# Patient Record
Sex: MALE | Race: WHITE
[De-identification: names, ages, dates, MRNs, and addresses within clinical notes are randomized per-mention and may not be internally consistent; named-entity substitution may affect disease eponyms.]

---

## 2017-02-04 ENCOUNTER — HOSPITAL ENCOUNTER (OUTPATIENT)
Dept: HOSPITAL 75 - MLF | Age: 47
End: 2017-02-04
Attending: NURSE PRACTITIONER
Payer: MEDICAID

## 2017-02-04 DIAGNOSIS — K59.00: Primary | ICD-10-CM

## 2017-02-04 DIAGNOSIS — Z79.82: ICD-10-CM

## 2017-02-04 PROCEDURE — 82274 ASSAY TEST FOR BLOOD FECAL: CPT

## 2017-02-05 ENCOUNTER — HOSPITAL ENCOUNTER (OUTPATIENT)
Dept: HOSPITAL 75 - MLF | Age: 47
End: 2017-02-05
Attending: INTERNAL MEDICINE
Payer: MEDICAID

## 2017-02-05 DIAGNOSIS — L03.116: Primary | ICD-10-CM

## 2017-02-05 PROCEDURE — 87077 CULTURE AEROBIC IDENTIFY: CPT

## 2017-02-05 PROCEDURE — 87186 SC STD MICRODIL/AGAR DIL: CPT

## 2017-02-05 PROCEDURE — 87205 SMEAR GRAM STAIN: CPT

## 2017-02-05 PROCEDURE — 87070 CULTURE OTHR SPECIMN AEROBIC: CPT

## 2017-03-11 ENCOUNTER — HOSPITAL ENCOUNTER (OUTPATIENT)
Dept: HOSPITAL 75 - MLF | Age: 47
LOS: 89 days | Discharge: HOME | End: 2017-06-08
Attending: INTERNAL MEDICINE
Payer: MEDICAID

## 2017-03-11 DIAGNOSIS — R19.5: Primary | ICD-10-CM

## 2017-03-11 PROCEDURE — 82274 ASSAY TEST FOR BLOOD FECAL: CPT

## 2019-09-10 ENCOUNTER — HOSPITAL ENCOUNTER (EMERGENCY)
Dept: HOSPITAL 75 - ER | Age: 49
Discharge: OTHER INSTITUTION | End: 2019-09-10
Payer: MEDICAID

## 2019-09-10 VITALS — SYSTOLIC BLOOD PRESSURE: 191 MMHG | DIASTOLIC BLOOD PRESSURE: 89 MMHG

## 2019-09-10 VITALS — WEIGHT: 315 LBS | BODY MASS INDEX: 43.13 KG/M2 | HEIGHT: 71.65 IN

## 2019-09-10 DIAGNOSIS — F41.9: ICD-10-CM

## 2019-09-10 DIAGNOSIS — Z87.891: ICD-10-CM

## 2019-09-10 DIAGNOSIS — Z88.5: ICD-10-CM

## 2019-09-10 DIAGNOSIS — Z79.84: ICD-10-CM

## 2019-09-10 DIAGNOSIS — Z79.82: ICD-10-CM

## 2019-09-10 DIAGNOSIS — Z91.018: ICD-10-CM

## 2019-09-10 DIAGNOSIS — I10: ICD-10-CM

## 2019-09-10 DIAGNOSIS — E11.52: Primary | ICD-10-CM

## 2019-09-10 DIAGNOSIS — Z88.0: ICD-10-CM

## 2019-09-10 DIAGNOSIS — G47.30: ICD-10-CM

## 2019-09-10 LAB
ALBUMIN SERPL-MCNC: 3.2 GM/DL (ref 3.2–4.5)
ALP SERPL-CCNC: 118 U/L (ref 40–136)
ALT SERPL-CCNC: 15 U/L (ref 0–55)
APTT BLD: 27 SEC (ref 24–35)
BASOPHILS # BLD AUTO: 0 10^3/UL (ref 0–0.1)
BASOPHILS NFR BLD AUTO: 1 % (ref 0–10)
BILIRUB SERPL-MCNC: 0.2 MG/DL (ref 0.1–1)
BUN/CREAT SERPL: 17
CALCIUM SERPL-MCNC: 9.6 MG/DL (ref 8.5–10.1)
CHLORIDE SERPL-SCNC: 99 MMOL/L (ref 98–107)
CO2 SERPL-SCNC: 27 MMOL/L (ref 21–32)
CREAT SERPL-MCNC: 1.08 MG/DL (ref 0.6–1.3)
EOSINOPHIL # BLD AUTO: 0.3 10^3/UL (ref 0–0.3)
EOSINOPHIL NFR BLD AUTO: 4 % (ref 0–10)
ERYTHROCYTE [DISTWIDTH] IN BLOOD BY AUTOMATED COUNT: 15.1 % (ref 10–14.5)
ERYTHROCYTE [SEDIMENTATION RATE] IN BLOOD: 8 MM/HR (ref 0–15)
GFR SERPLBLD BASED ON 1.73 SQ M-ARVRAT: > 60 ML/MIN
GLUCOSE SERPL-MCNC: 345 MG/DL (ref 70–105)
HCT VFR BLD CALC: 43 % (ref 40–54)
HGB BLD-MCNC: 13.9 G/DL (ref 13.3–17.7)
INR PPP: 0.9 (ref 0.8–1.4)
LYMPHOCYTES # BLD AUTO: 1.2 X 10^3 (ref 1–4)
LYMPHOCYTES NFR BLD AUTO: 19 % (ref 12–44)
MANUAL DIFFERENTIAL PERFORMED BLD QL: NO
MCH RBC QN AUTO: 27 PG (ref 25–34)
MCHC RBC AUTO-ENTMCNC: 33 G/DL (ref 32–36)
MCV RBC AUTO: 82 FL (ref 80–99)
MONOCYTES # BLD AUTO: 0.5 X 10^3 (ref 0–1)
MONOCYTES NFR BLD AUTO: 8 % (ref 0–12)
NEUTROPHILS # BLD AUTO: 4.4 X 10^3 (ref 1.8–7.8)
NEUTROPHILS NFR BLD AUTO: 69 % (ref 42–75)
PLATELET # BLD: 362 10^3/UL (ref 130–400)
PMV BLD AUTO: 10.4 FL (ref 7.4–10.4)
POTASSIUM SERPL-SCNC: 3.8 MMOL/L (ref 3.6–5)
PROT SERPL-MCNC: 8.7 GM/DL (ref 6.4–8.2)
PROTHROMBIN TIME: 12.2 SEC (ref 12.2–14.7)
SODIUM SERPL-SCNC: 133 MMOL/L (ref 135–145)
WBC # BLD AUTO: 6.4 10^3/UL (ref 4.3–11)

## 2019-09-10 PROCEDURE — 85025 COMPLETE CBC W/AUTO DIFF WBC: CPT

## 2019-09-10 PROCEDURE — 93926 LOWER EXTREMITY STUDY: CPT

## 2019-09-10 PROCEDURE — 73630 X-RAY EXAM OF FOOT: CPT

## 2019-09-10 PROCEDURE — 85652 RBC SED RATE AUTOMATED: CPT

## 2019-09-10 PROCEDURE — 80053 COMPREHEN METABOLIC PANEL: CPT

## 2019-09-10 PROCEDURE — 83605 ASSAY OF LACTIC ACID: CPT

## 2019-09-10 PROCEDURE — 36415 COLL VENOUS BLD VENIPUNCTURE: CPT

## 2019-09-10 PROCEDURE — 96374 THER/PROPH/DIAG INJ IV PUSH: CPT

## 2019-09-10 PROCEDURE — 85730 THROMBOPLASTIN TIME PARTIAL: CPT

## 2019-09-10 PROCEDURE — 87040 BLOOD CULTURE FOR BACTERIA: CPT

## 2019-09-10 PROCEDURE — 85610 PROTHROMBIN TIME: CPT

## 2019-09-10 RX ADMIN — SODIUM CHLORIDE ONE MLS/HR: 900 INJECTION INTRAVENOUS at 14:51

## 2019-09-10 NOTE — DIAGNOSTIC IMAGING REPORT
INDICATION: Right toe pain.



TECHNIQUE: Grayscale, color-flow, and duplex Doppler evaluation

of the right lower extremity arterial system was performed.



FINDINGS: There is predominantly monophasic flow throughout the

right lower extremity arterial system with the exception of

triphasic flow in the right common femoral artery. The velocities

are fairly symmetric throughout right lower extremity apart from

some dampening at the posterior tibial artery at the ankle. No

high-grade stenosis or occlusion is seen.



IMPRESSION: Predominantly monophasic waveforms throughout the

right lower extremity arterial system. However, no high-grade

stenosis or occlusion is seen.



Dictated by: 



  Dictated on workstation # [id number]

## 2019-09-10 NOTE — DIAGNOSTIC IMAGING REPORT
INDICATION: Right toe pain.



FINDINGS: Three views of the right foot show gas within the soft

tissues surrounding the distal phalanx of the big toe. There is

no appreciable fracture or dislocation.



IMPRESSION: Soft tissue gas surrounding the distal phalanx of the

big toe concerning for possibility of a gangrene.



Dictated by: 



  Dictated on workstation # [id number]

## 2019-09-10 NOTE — CONSULTATION - SURGERY
History of Present Illness


History of Present Illness


Patient Consulted On(wellington/time)


9/10/19


 16:26


Time Seen by Provider:  15:26


History of Present Illness


Surgery asked to consult regarding Right 1st toe.





HPI per ED:  has been treating a wound on his R foot first toe, had wrapped toe 

too tight and cut off blood flow on saturday, took bandage off on  

morning, states whole end of toe is black 





This morbidly obese type II diabetic with chronic lymphedema of the left leg 

presents with a wound to the right great toe. This wound has been present for 

several weeks he felt as though he was finally getting it to start healing. On 

Saturday, 19 he wrapped it tightly with a bandage. He suspects that he 

wrapped too tightly because on  when he removed the bandage the toe was 

black. It has remained black since then. No fevers or chills.


Onset:  other (2 days ago)


Severity:  moderate


Pain/Injury Location:  right 1st toe





When I spoke to pt he states that he tripped on his carpet and it ripped toenail

off "down to the quick".  He states it was healing really well until he wrapped 

it to tight.  He denies pain.





Allergies and Home Medications


Allergies


Coded Allergies:  


     meperidine HCl (Unverified  Allergy, Unknown, 11)


     shellfish derived (Unverified  Allergy, Unknown, 13)


   


   FROM UNCODED ALLERGIES


     Penicillins (Unverified  Adverse Reaction, Intermediate, NAUSEA, 11)





Home Medications


Aspirin 325 Mg Tablet.dr, 325 MG PO DAILY, (Reported)


Atorvastatin Calcium 40 Mg Tablet, 40 MG PO DAILY, (Reported)


Diclofenac Sodium 75 Mg Tablet.dr, 75 MG PO BID, (Reported)


Glucosa Menard 2Kcl/Chondroitin Menard 1 Each Capsule, 1 CAP PO BID, (Reported)


Levofloxacin 500 Mg Tablet, 500 MG PO DAILY@11


   Prescribed by: ENRIQUE FRANK on 16 1005


Lisinopril/Hydrochlorothiazide 1 Each Tablet, 1 TAB PO DAILY, (Reported)


Metformin HCl 1,000 Mg Tablet, 1,000 MG PO BID, (Reported)


Metronidazole 500 Mg Tablet, 500 MG PO TID


   Prescribed by: ENRIQUE FRANK on 16 1005


Pioglitazone HCl 30 Mg Tablet, 30 MG PO DAILY, (Reported)


Tramadol HCl 50 Mg Tablet, 50 MG PO DAILY PRN for PAIN, (Reported)





Patient Home Medication List


Home Medication List Reviewed:  Yes





Past Medical-Social-Family Hx


Patient Social History


Alcohol Use:  Rarely Uses


Recreational Drug Use:  No


Former Smoker, Quit:  1999


Type Used:  Cigarettes


Recent Foreign Travel:  No


Contact w/Someone Who Travel:  No


Recent Infectious Disease Expo:  No


Recent Hopitalizations:  No





Immunizations Up To Date


Tetanus Booster (TDap):  Unknown


PED Vaccines UTD:  No


Date of Pneumonia Vaccine:  2010





Seasonal Allergies


Seasonal Allergies:  Yes





Surgeries


History of Surgeries:  Yes (Left toe amputation, hernia-??Umbilical)





Respiratory


History of Respiratory Disorde:  No


Respiratory Disorders:  Sleep Apnea





Cardiovascular


History of Cardiac Disorders:  Yes


Cardiac Disorders:  Hypertension





Neurological


History of Neurological Disord:  No





Reproductive System


Hx Reproductive Disorders:  No


Sexually Transmitted Disease:  No


HIV/AIDS:  No





Genitourinary


History of Genitourinary Disor:  No





Gastrointestinal


History of Gastrointestinal Di:  No





Musculoskeletal


History of Musculoskeletal Dis:  Yes (lymphedema left leg)


Musculoskeletal Disorders:  Arthritis





Endocrine


History of Endocrine Disorders:  Yes


Endocrine Disorders:  Diabetes, Non-Insulin dep





HEENT


History of HEENT Disorders:  No


Loss of Vision:  Denies


Hearing Impairment:  Denies





Cancer


History of Cancer:  No





Psychosocial


History of Psychiatric Problem:  Yes


Behavioral Health Disorders:  Anxiety





Integumentary


History of Skin or Integumenta:  Yes


Skin/Integumentary Disorders:  Recent Skin Changes





Blood Transfusions


History of Blood Disorders:  No





Family Medical History


Significant Family History:  Cancer (Father, unsure what type), Diabetes, 

Hypertension


Family Medial History:  


Arthritis


  19 FATHER, Onset:Unknown


Diabetes mellitus


  19 MOTHER, , Onset:Unknown





Review of Systems-General


Constitutional:  No chills, No diaphoresis, No weakness


EENTM:  No blurred vision, No mouth swelling, No epistaxis


Respiratory:  No cough, No dyspnea on exertion, No hemoptysis, No short of 

breath


Cardiovascular:  No chest pain, No palpitations


Gastrointestinal:  No abdominal pain, No jaundice, No melena, No nausea, No 

vomiting


Genitourinary:  No dysuria, No frequency, No hematuria


Musculoskeletal:  joint pain, muscle pain, muscle stiffness, other (lymphedema 

left leg)


Skin:  dryness, other (erythema right lower extremity)


Psychiatric/Neurological:  Denies Anxiety, Denies Depressed, Denies Seizure, 

Denies Tremors


Other


pt denies any history of abnormal bleeding or bruising





Physical Exam-General Problems


Physical Exam


Vital Signs





Vital Signs - First Documented








 9/10/19 9/10/19





 13:23 15:56


 


Temp 36.2 


 


Pulse 71 


 


Resp 18 


 


B/P (MAP) 200/97 


 


Pulse Ox  99


 


O2 Delivery Room Air 





Capillary Refill : Less Than 3 Seconds


General Appearance:  WD/WN, no apparent distress


Eyes:  Bilateral Eye PERRL, Bilateral Eye EOMI


HEENT:  pharynx normal; No scleral icterus (R), No scleral icterus (L)


Respiratory:  chest non-tender, lungs clear, normal breath sounds, no 

respiratory distress, no accessory muscle use


Cardiovascular:  regular rate, rhythm, no murmur


Gastrointestinal:  normal bowel sounds, soft, no organomegaly, no pulsatile mass


Extremities:  other (Pt has severe lymphedema in entire left leg, with chronic 

changes, ?charcot foot left.  Right great toe is black, dry gangrene.  + edema 

and erythema between knee and ankle right leg, with some on right foot)


Neurologic/Psychiatric:  CNs II-XII nml as tested, alert, oriented x 3


Skin:  normal color, warm/dry


Lymphatic:  no adenopathy (neck and axilla)





Data Review


Labs


Laboratory Tests


9/10/19 13:44: 


White Blood Count 6.4, Red Blood Count 5.19, Hemoglobin 13.9, Hematocrit 43, 

Mean Corpuscular Volume 82, Mean Corpuscular Hemoglobin 27, Mean Corpuscular 

Hemoglobin Concent 33, Red Cell Distribution Width 15.1H, Platelet Count 362, 

Mean Platelet Volume 10.4, Neutrophils (%) (Auto) 69, Lymphocytes (%) (Auto) 19,

Monocytes (%) (Auto) 8, Eosinophils (%) (Auto) 4, Basophils (%) (Auto) 1, 

Neutrophils # (Auto) 4.4, Lymphocytes # (Auto) 1.2, Monocytes # (Auto) 0.5, 

Eosinophils # (Auto) 0.3, Basophils # (Auto) 0.0, Erythrocyte Sedimentation Rate

8, Prothrombin Time 12.2, INR Comment 0.9, Activated Partial Thromboplast Time 

27, Sodium Level 133L, Potassium Level 3.8, Chloride Level 99, Carbon Dioxide 

Level 27, Anion Gap 7, Blood Urea Nitrogen 18, Creatinine 1.08, Estimat 

Glomerular Filtration Rate > 60, BUN/Creatinine Ratio 17, Glucose Level 345H, 

Lactic Acid Level 1.65, Calcium Level 9.6, Corrected Calcium 10.2H, Total 

Bilirubin 0.2, Aspartate Amino Transf (AST/SGOT) 13, Alanine Aminotransferase 

(ALT/SGPT) 15, Alkaline Phosphatase 118, Total Protein 8.7H, Albumin 3.2





Assessment/Plan


Dry Gangrene right first toe


Lymphedema Left leg





Plan is send home and bring pt back tomorrow for outpt Right 1st toe amputation.

 Discussed the procedure with pt, risks and complications not limited to pain, 

bleeding, infection, scar and possible need for further surgery.


All questions answered to pt's satisfaction.











BREANNA BLACK DO               Sep 10, 2019 16:31

## 2019-09-10 NOTE — ED LOWER EXTREMITY
General


Chief Complaint:  General Problems/Pain


Stated Complaint:  R TOE PAIN


Nursing Triage Note:  


has been treating a wound on his R foot first toe, had wrapped toe too tight and




cut off blood flow on saturday, took bandage off on  morning, states whole




end of toe is black and goo


Nursing Sepsis Screen:  No Definite Risk


Source:  patient


Exam Limitations:  no limitations





History of Present Illness


Date Seen by Provider:  Sep 10, 2019


Time Seen by Provider:  13:53


Initial Comments


This morbidly obese type II diabetic with chronic lymphedema of the left leg 

presents with a wound to the right great toe. This wound has been present for 

several weeks he felt as though he was finally getting it to start healing. On 

Saturday, 19 he wrapped it tightly with a bandage. He suspects that he 

wrapped too tightly because on  when he removed the bandage the toe was 

black. It has remained black since then. No fevers or chills.


Onset:  other (2 days ago)


Severity:  moderate


Pain/Injury Location:  right 1st toe





Allergies and Home Medications


Allergies


Coded Allergies:  


     meperidine HCl (Unverified  Allergy, Unknown, 11)


     shellfish derived (Unverified  Allergy, Unknown, 13)


   


   FROM UNCODED ALLERGIES


     Penicillins (Unverified  Adverse Reaction, Intermediate, NAUSEA, 11)





Home Medications


Aspirin 325 Mg Tablet.dr, 325 MG PO DAILY, (Reported)


Atorvastatin Calcium 40 Mg Tablet, 40 MG PO DAILY, (Reported)


Diclofenac Sodium 75 Mg Tablet.dr, 75 MG PO BID, (Reported)


Glucosa Menard 2Kcl/Chondroitin Menard 1 Each Capsule, 1 CAP PO BID, (Reported)


Levofloxacin 500 Mg Tablet, 500 MG PO DAILY@11


   Prescribed by: ENRIQUE FRANK on 16 1005


Lisinopril/Hydrochlorothiazide 1 Each Tablet, 1 TAB PO DAILY, (Reported)


Metformin HCl 1,000 Mg Tablet, 1,000 MG PO BID, (Reported)


Metronidazole 500 Mg Tablet, 500 MG PO TID


   Prescribed by: ENRIQUE FRANK on 16 1005


Pioglitazone HCl 30 Mg Tablet, 30 MG PO DAILY, (Reported)


Tramadol HCl 50 Mg Tablet, 50 MG PO DAILY PRN for PAIN, (Reported)





Patient Home Medication List


Home Medication List Reviewed:  Yes





Review of Systems


Constitutional:  see HPI


EENTM:  see HPI


Respiratory:  no symptoms reported


Cardiovascular:  no symptoms reported


Genitourinary:  no symptoms reported


Musculoskeletal:  see HPI


Skin:  no symptoms reported


Psychiatric/Neurological:  No Symptoms Reported





Past Medical-Social-Family Hx


Patient Social History


Alcohol Use:  Rarely Uses


Recreational Drug Use:  No


Type Used:  Cigarettes


Former Smoker, Quit:  1999


Recent Foreign Travel:  No


Contact w/Someone Who Travel:  No


Recent Infectious Disease Expo:  No


Recent Hopitalizations:  No





Immunizations Up To Date


Tetanus Booster (TDap):  Unknown


PED Vaccines UTD:  No


Date of Pneumonia Vaccine:  2010





Seasonal Allergies


Seasonal Allergies:  Yes





Past Medical History


Respiratory:  No


Sleep Apnea


Currently Using CPAP:  No (PT STATES "CAN'T")


Currently Using BIPAP:  No


Cardiac:  Yes


Hypertension


Neurological:  No


Reproductive Disorders:  No


Sexually Transmitted Disease:  No


HIV/AIDS:  No


Genitourinary:  No


Gastrointestinal:  No


Musculoskeletal:  No


Arthritis


Endocrine:  Yes


Diabetes, Non-Insulin dep


Are Your Blood Sugars Over 250:  Yes


HEENT:  No


Loss of Vision:  Denies


Hearing Impairment:  Denies


Cancer:  No


Psychosocial:  Yes


Anxiety


Integumentary:  Yes


Recent Skin Changes


Blood Disorders:  No





Family Medical History





Arthritis


  19 FATHER, Onset:Unknown


Diabetes mellitus


  19 MOTHER, , Onset:Unknown


Diabetes





Physical Exam


Vital Signs





Vital Signs - First Documented








 9/10/19





 13:23


 


Temp 36.2


 


Pulse 71


 


Resp 18


 


B/P (MAP) 200/97


 


O2 Delivery Room Air





Capillary Refill : Less Than 3 Seconds


Height, Weight, BMI


Height: 6'0.00"


Weight: 400lbs. 4.0oz. [id number]; 49.00 BMI


Method:Stated


General Appearance:  WD/WN, no apparent distress, obese, other (he is able to 

transfer himself from the wheelchair to the bed)


Neck:  non-tender, full range of motion


Respiratory:  no respiratory distress, no accessory muscle use


Gastrointestinal:  normal bowel sounds, non tender, soft


Hips:  bilateral hip non-tender, bilateral hip normal inspection


Legs:  bilateral leg non-tender, bilateral leg other (the left leg has markedly 

lymphedema with thickening and crusting of the skin. The right lower extremity 

has mild swelling, discoloration of the lower leg consistent with venous stasis 

dermatitis, the right great toe from the mid aspect of the proximal phalanx 

distally is black moist and necrotic.)


Knees:  bilateral knee non-tender, bilateral knee normal range of motion


Ankles:  bilateral ankle non-tender, bilateral ankle normal range of motion


Neurologic/Psychiatric:  alert, normal mood/affect, oriented x 3


Skin:  normal color, other (As above)





Progress/Results/Core Measures


Results/Orders


Lab Results





Laboratory Tests








Test


 9/10/19


13:44 Range/Units


 


 


White Blood Count


 6.4 


 4.3-11.0


10^3/uL


 


Red Blood Count


 5.19 


 4.35-5.85


10^6/uL


 


Hemoglobin 13.9  13.3-17.7  G/DL


 


Hematocrit 43  40-54  %


 


Mean Corpuscular Volume 82  80-99  FL


 


Mean Corpuscular Hemoglobin 27  25-34  PG


 


Mean Corpuscular Hemoglobin


Concent 33 


 32-36  G/DL





 


Red Cell Distribution Width 15.1 H 10.0-14.5  %


 


Platelet Count


 362 


 130-400


10^3/uL


 


Mean Platelet Volume 10.4  7.4-10.4  FL


 


Neutrophils (%) (Auto) 69  42-75  %


 


Lymphocytes (%) (Auto) 19  12-44  %


 


Monocytes (%) (Auto) 8  0-12  %


 


Eosinophils (%) (Auto) 4  0-10  %


 


Basophils (%) (Auto) 1  0-10  %


 


Neutrophils # (Auto) 4.4  1.8-7.8  X 10^3


 


Lymphocytes # (Auto) 1.2  1.0-4.0  X 10^3


 


Monocytes # (Auto) 0.5  0.0-1.0  X 10^3


 


Eosinophils # (Auto)


 0.3 


 0.0-0.3


10^3/uL


 


Basophils # (Auto)


 0.0 


 0.0-0.1


10^3/uL


 


Erythrocyte Sedimentation Rate 8  0-15  MM/HR


 


Prothrombin Time 12.2  12.2-14.7  SEC


 


INR Comment 0.9  0.8-1.4  


 


Activated Partial


Thromboplast Time 27 


 24-35  SEC





 


Sodium Level 133 L 135-145  MMOL/L


 


Potassium Level 3.8  3.6-5.0  MMOL/L


 


Chloride Level 99    MMOL/L


 


Carbon Dioxide Level 27  21-32  MMOL/L


 


Anion Gap 7  5-14  MMOL/L


 


Blood Urea Nitrogen 18  7-18  MG/DL


 


Creatinine


 1.08 


 0.60-1.30


MG/DL


 


Estimat Glomerular Filtration


Rate > 60 


  





 


BUN/Creatinine Ratio 17   


 


Glucose Level 345 H   MG/DL


 


Lactic Acid Level


 1.65 


 0.50-2.00


MMOL/L


 


Calcium Level 9.6  8.5-10.1  MG/DL


 


Corrected Calcium 10.2 H 8.5-10.1  MG/DL


 


Total Bilirubin 0.2  0.1-1.0  MG/DL


 


Aspartate Amino Transf


(AST/SGOT) 13 


 5-34  U/L





 


Alanine Aminotransferase


(ALT/SGPT) 15 


 0-55  U/L





 


Alkaline Phosphatase 118    U/L


 


Total Protein 8.7 H 6.4-8.2  GM/DL


 


Albumin 3.2  3.2-4.5  GM/DL








My Orders





Orders - LATOSHA AKERS


Cbc With Automated Diff (9/10/19 13:30)


Comprehensive Metabolic Panel (9/10/19 13:30)


Erythrocyte Sedimentation Rate (9/10/19 13:30)


Foot, Right, 3 View (9/10/19 13:30)


Us Right Low Ext [id number] (9/10/19 13:40)


Blood Culture (9/10/19 13:40)


Lactic Acid Analyzer (9/10/19 13:40)


Protime With Inr (9/10/19 13:46)


Partial Thromboplastin Time (9/10/19 13:46)


Ed Iv/Invasive Line Start (9/10/19 13:52)


Cefazolin  Injection (Ancef  Injection) (9/10/19 14:45)





Medications Given in ED





Current Medications








 Medications  Dose


 Ordered  Sig/Marvin


 Route  Start Time


 Stop Time Status Last Admin


Dose Admin


 


 Cefazolin Sodium


 1000 mg/Sterile


 Water  10 ml @ 


 200 mls/hr  ONCE  ONCE


 IV  9/10/19 14:45


 9/10/19 14:47 DC 9/10/19 14:51


200 MLS/HR








Vital Signs/I&O











 9/10/19





 13:23


 


Temp 36.2


 


Pulse 71


 


Resp 18


 


B/P (MAP) 200/97


 


O2 Delivery Room Air














Blood Pressure Mean:                    131











Diagnostic Imaging





   Diagonstic Imaging:  Ultrasound


Comments


NAME:   JANNA REYNOLDS


[facility] REC#:   [id number]


ACCOUNT#:   [id number]


PT STATUS:   REG ER


:   1970


PHYSICIAN:   LATOSHA AKERS


ADMIT DATE:   09/10/19/ER


                                   ***Draft***


Date of Exam:09/10/19





US RIGHT LOW EXT [id number]








INDICATION: Right toe pain.





TECHNIQUE: Grayscale, color-flow, and duplex Doppler evaluation


of the right lower extremity arterial system was performed.





FINDINGS: There is predominantly monophasic flow throughout the


right lower extremity arterial system with the exception of


triphasic flow in the right common femoral artery. The velocities


are fairly symmetric throughout right lower extremity apart from


some dampening at the posterior tibial artery at the ankle. No


high-grade stenosis or occlusion is seen.





IMPRESSION: Predominantly monophasic waveforms throughout the


right lower extremity arterial system. However, no high-grade


stenosis or occlusion is seen.





  Dictated on workstation # [id number]








Dict:   09/10/19 1431


Trans:   09/10/19 1442


[facility] [id number]





Interpreted by:     GHADA WATSON MD


Electronically signed by:  


NAME:   JANNA REYNOLDS


[facility] REC#:   [id number]


ACCOUNT#:   [id number]


PT STATUS:   REG ER


:   1970


PHYSICIAN:   LATOSHA AKERS


ADMIT DATE:   09/10/19/ER


                                   ***Draft***


Date of Exam:09/10/19





FOOT, RIGHT, 3 VIEW








INDICATION: Right toe pain.





FINDINGS: Three views of the right foot show gas within the soft


tissues surrounding the distal phalanx of the big toe. There is


no appreciable fracture or dislocation.





IMPRESSION: Soft tissue gas surrounding the distal phalanx of the


big toe concerning for possibility of a gangrene.





  Dictated on workstation # [id number]








Dict:   09/10/19 1445


Trans:   09/10/19 1503


 [id number]





Interpreted by:     SASHA SUMNER MD


Electronically signed by:





Departure


Communication (Admissions)


[id number]-discussed the case with Dr. Dunham who has seen the patient in the 

emergency room. Plan is to send home to return tomorrow for amputation of the 

great toe.





Impression





   Primary Impression:  


   Gangrene of toe of right foot


Disposition:   ADMITTED AS INPATIENT


Condition:  Stable





Admissions


Decision to Admit Reason:  Admit from ER (General)


Decision to Admit/Date:  Sep 10, 2019


Time/Decision to Admit Time:  14:01





Departure-Patient Inst.


Decision time for Depature:  15:43


Referrals:  


YAA LEE MD (PCP/Family)


Primary Care Physician





Add. Discharge Instructions:  


 Do not eat or drink anything after midnight tonight. Check in at the surgery 

center here at the hospital tomorrow at 11:45 AM.











LATOSHA AKERS             Sep 10, 2019 13:56

## 2020-09-28 ENCOUNTER — HOSPITAL ENCOUNTER (INPATIENT)
Dept: HOSPITAL 75 - ER | Age: 50
LOS: 4 days | Discharge: HOME | DRG: 565 | End: 2020-10-02
Attending: FAMILY MEDICINE | Admitting: FAMILY MEDICINE
Payer: MEDICAID

## 2020-09-28 VITALS — SYSTOLIC BLOOD PRESSURE: 151 MMHG | DIASTOLIC BLOOD PRESSURE: 65 MMHG

## 2020-09-28 VITALS — DIASTOLIC BLOOD PRESSURE: 72 MMHG | SYSTOLIC BLOOD PRESSURE: 128 MMHG

## 2020-09-28 VITALS — HEIGHT: 72 IN | BODY MASS INDEX: 42.66 KG/M2 | WEIGHT: 315 LBS

## 2020-09-28 VITALS — DIASTOLIC BLOOD PRESSURE: 80 MMHG | SYSTOLIC BLOOD PRESSURE: 160 MMHG

## 2020-09-28 VITALS — SYSTOLIC BLOOD PRESSURE: 155 MMHG | DIASTOLIC BLOOD PRESSURE: 83 MMHG

## 2020-09-28 VITALS — SYSTOLIC BLOOD PRESSURE: 170 MMHG | DIASTOLIC BLOOD PRESSURE: 86 MMHG

## 2020-09-28 VITALS — SYSTOLIC BLOOD PRESSURE: 121 MMHG | DIASTOLIC BLOOD PRESSURE: 77 MMHG

## 2020-09-28 DIAGNOSIS — G47.30: ICD-10-CM

## 2020-09-28 DIAGNOSIS — Z79.01: ICD-10-CM

## 2020-09-28 DIAGNOSIS — M19.91: ICD-10-CM

## 2020-09-28 DIAGNOSIS — R53.81: ICD-10-CM

## 2020-09-28 DIAGNOSIS — J30.2: ICD-10-CM

## 2020-09-28 DIAGNOSIS — Z87.891: ICD-10-CM

## 2020-09-28 DIAGNOSIS — R26.9: ICD-10-CM

## 2020-09-28 DIAGNOSIS — F41.9: ICD-10-CM

## 2020-09-28 DIAGNOSIS — Z91.120: ICD-10-CM

## 2020-09-28 DIAGNOSIS — T87.81: ICD-10-CM

## 2020-09-28 DIAGNOSIS — I10: ICD-10-CM

## 2020-09-28 DIAGNOSIS — E87.1: ICD-10-CM

## 2020-09-28 DIAGNOSIS — E11.40: ICD-10-CM

## 2020-09-28 DIAGNOSIS — E66.9: ICD-10-CM

## 2020-09-28 DIAGNOSIS — T87.44: Primary | ICD-10-CM

## 2020-09-28 DIAGNOSIS — I89.0: ICD-10-CM

## 2020-09-28 DIAGNOSIS — E11.52: ICD-10-CM

## 2020-09-28 DIAGNOSIS — Z89.612: ICD-10-CM

## 2020-09-28 DIAGNOSIS — D64.9: ICD-10-CM

## 2020-09-28 DIAGNOSIS — Z89.421: ICD-10-CM

## 2020-09-28 DIAGNOSIS — T87.54: ICD-10-CM

## 2020-09-28 DIAGNOSIS — Z79.4: ICD-10-CM

## 2020-09-28 DIAGNOSIS — E11.29: ICD-10-CM

## 2020-09-28 LAB
ALBUMIN SERPL-MCNC: 2.8 GM/DL (ref 3.2–4.5)
ALP SERPL-CCNC: 115 U/L (ref 40–136)
ALT SERPL-CCNC: 13 U/L (ref 0–55)
APTT BLD: 30 SEC (ref 24–35)
APTT PPP: YELLOW S
BACTERIA #/AREA URNS HPF: NEGATIVE /HPF
BASOPHILS # BLD AUTO: 0.1 10^3/UL (ref 0–0.1)
BASOPHILS NFR BLD AUTO: 1 % (ref 0–10)
BILIRUB SERPL-MCNC: 0.1 MG/DL (ref 0.1–1)
BILIRUB UR QL STRIP: NEGATIVE
BUN/CREAT SERPL: 18
CALCIUM SERPL-MCNC: 8.5 MG/DL (ref 8.5–10.1)
CHLORIDE SERPL-SCNC: 97 MMOL/L (ref 98–107)
CO2 SERPL-SCNC: 26 MMOL/L (ref 21–32)
CREAT SERPL-MCNC: 1.06 MG/DL (ref 0.6–1.3)
EOSINOPHIL # BLD AUTO: 0.7 10^3/UL (ref 0–0.3)
EOSINOPHIL NFR BLD AUTO: 9 % (ref 0–10)
FIBRINOGEN PPP-MCNC: CLEAR MG/DL
GFR SERPLBLD BASED ON 1.73 SQ M-ARVRAT: > 60 ML/MIN
GLUCOSE SERPL-MCNC: 458 MG/DL (ref 70–105)
GLUCOSE UR STRIP-MCNC: (no result) MG/DL
HCT VFR BLD CALC: 30 % (ref 40–54)
HGB BLD-MCNC: 9.3 G/DL (ref 13.3–17.7)
INR PPP: 0.9 (ref 0.8–1.4)
KETONES UR QL STRIP: NEGATIVE
LEUKOCYTE ESTERASE UR QL STRIP: NEGATIVE
LYMPHOCYTES # BLD AUTO: 1.1 10^3/UL (ref 1–4)
LYMPHOCYTES NFR BLD AUTO: 15 % (ref 12–44)
MANUAL DIFFERENTIAL PERFORMED BLD QL: NO
MCH RBC QN AUTO: 27 PG (ref 25–34)
MCHC RBC AUTO-ENTMCNC: 31 G/DL (ref 32–36)
MCV RBC AUTO: 88 FL (ref 80–99)
MONOCYTES # BLD AUTO: 0.6 10^3/UL (ref 0–1)
MONOCYTES NFR BLD AUTO: 8 % (ref 0–12)
NEUTROPHILS # BLD AUTO: 5.2 10^3/UL (ref 1.8–7.8)
NEUTROPHILS NFR BLD AUTO: 68 % (ref 42–75)
NITRITE UR QL STRIP: NEGATIVE
PH UR STRIP: 7.5 [PH] (ref 5–9)
PLATELET # BLD: 533 10^3/UL (ref 130–400)
PMV BLD AUTO: 9.8 FL (ref 9–12.2)
POTASSIUM SERPL-SCNC: 4.1 MMOL/L (ref 3.6–5)
PROT SERPL-MCNC: 7.9 GM/DL (ref 6.4–8.2)
PROT UR QL STRIP: (no result)
PROTHROMBIN TIME: 12.3 SEC (ref 12.2–14.7)
RBC #/AREA URNS HPF: (no result) /HPF
SODIUM SERPL-SCNC: 133 MMOL/L (ref 135–145)
SP GR UR STRIP: 1.01 (ref 1.02–1.02)
SQUAMOUS #/AREA URNS HPF: (no result) /HPF
WBC # BLD AUTO: 7.7 10^3/UL (ref 4.3–11)
WBC #/AREA URNS HPF: (no result) /HPF

## 2020-09-28 PROCEDURE — 87205 SMEAR GRAM STAIN: CPT

## 2020-09-28 PROCEDURE — 81000 URINALYSIS NONAUTO W/SCOPE: CPT

## 2020-09-28 PROCEDURE — 82962 GLUCOSE BLOOD TEST: CPT

## 2020-09-28 PROCEDURE — 83540 ASSAY OF IRON: CPT

## 2020-09-28 PROCEDURE — 83036 HEMOGLOBIN GLYCOSYLATED A1C: CPT

## 2020-09-28 PROCEDURE — 80048 BASIC METABOLIC PNL TOTAL CA: CPT

## 2020-09-28 PROCEDURE — 36415 COLL VENOUS BLD VENIPUNCTURE: CPT

## 2020-09-28 PROCEDURE — 80202 ASSAY OF VANCOMYCIN: CPT

## 2020-09-28 PROCEDURE — 87077 CULTURE AEROBIC IDENTIFY: CPT

## 2020-09-28 PROCEDURE — 96360 HYDRATION IV INFUSION INIT: CPT

## 2020-09-28 PROCEDURE — 87186 SC STD MICRODIL/AGAR DIL: CPT

## 2020-09-28 PROCEDURE — 82728 ASSAY OF FERRITIN: CPT

## 2020-09-28 PROCEDURE — 87088 URINE BACTERIA CULTURE: CPT

## 2020-09-28 PROCEDURE — 87040 BLOOD CULTURE FOR BACTERIA: CPT

## 2020-09-28 PROCEDURE — 80053 COMPREHEN METABOLIC PANEL: CPT

## 2020-09-28 PROCEDURE — 96372 THER/PROPH/DIAG INJ SC/IM: CPT

## 2020-09-28 PROCEDURE — 85610 PROTHROMBIN TIME: CPT

## 2020-09-28 PROCEDURE — 85730 THROMBOPLASTIN TIME PARTIAL: CPT

## 2020-09-28 PROCEDURE — 87070 CULTURE OTHR SPECIMN AEROBIC: CPT

## 2020-09-28 PROCEDURE — 71045 X-RAY EXAM CHEST 1 VIEW: CPT

## 2020-09-28 PROCEDURE — 83605 ASSAY OF LACTIC ACID: CPT

## 2020-09-28 PROCEDURE — 85025 COMPLETE CBC W/AUTO DIFF WBC: CPT

## 2020-09-28 RX ADMIN — SODIUM CHLORIDE SCH MLS/HR: 900 INJECTION, SOLUTION INTRAVENOUS at 15:26

## 2020-09-28 RX ADMIN — INSULIN ASPART SCH UNIT: 100 INJECTION, SOLUTION INTRAVENOUS; SUBCUTANEOUS at 21:33

## 2020-09-28 RX ADMIN — INSULIN ASPART SCH UNIT: 100 INJECTION, SOLUTION INTRAVENOUS; SUBCUTANEOUS at 15:40

## 2020-09-28 RX ADMIN — VANCOMYCIN HYDROCHLORIDE SCH MLS/HR: 500 INJECTION, POWDER, LYOPHILIZED, FOR SOLUTION INTRAVENOUS at 15:26

## 2020-09-28 RX ADMIN — AMLODIPINE BESYLATE NR MG: 5 TABLET ORAL at 19:54

## 2020-09-28 RX ADMIN — SODIUM CHLORIDE SCH MLS/HR: 900 INJECTION INTRAVENOUS at 15:27

## 2020-09-28 RX ADMIN — HYDROCODONE BITARTRATE AND ACETAMINOPHEN PRN TAB: 5; 325 TABLET ORAL at 15:27

## 2020-09-28 RX ADMIN — SODIUM CHLORIDE SCH MLS/HR: 900 INJECTION INTRAVENOUS at 21:23

## 2020-09-28 RX ADMIN — ENOXAPARIN SODIUM SCH MG: 100 INJECTION SUBCUTANEOUS at 15:27

## 2020-09-28 RX ADMIN — MICONAZOLE NITRATE SCH GM: 20 POWDER TOPICAL at 21:23

## 2020-09-28 RX ADMIN — AMLODIPINE BESYLATE NR MG: 5 TABLET ORAL at 19:53

## 2020-09-28 NOTE — HISTORY & PHYSICAL
SHARAN MAIER MED STUDENT 20 1502:


HPI


History of Present Illness:


CC: postoperative wound infection 





HPI: 50 yp M presents with postoperative wound infection S/P L AKA on 20. 

pt states that the green and purulent drainage started on 20. pt 

stated that home health encouraged him to be seen at the ED for this drainage. 

pt rates his pain at 3 out of 10. pain is described as achy and dull. pain is 

better with pain meds. pain is worse when he accidentally hits the amputation 

site. pt stated that he was supposed to be started on a wound vac last week, but

he is currently waiting for the supplies to come in. pt left leg is swollen and 

erythematous. there are multiple areas in between the sutures that are green.


Source:  patient


Date seen by provider:  Sep 28, 2020


Time Seen by Provider:  15:02


Attending Physician


Jie Richardson MD


PCP


Noe Roque MD


Consult





Date of Admission


Sep 28, 2020 at 13:49





Home Medications


Home Medications


Reviewed patient Home Medication Reconciliation performed by pharmacy medication

reconciliations technician and/or nursing.


Patients Allergies have been reviewed.





Allergies


Coded Allergies:  


     meperidine HCl (Unverified  Allergy, Unknown, 11)


     shellfish derived (Unverified  Allergy, Unknown, 13)


   


   FROM UNCODED ALLERGIES


     Penicillins (Unverified  Adverse Reaction, Intermediate, NAUSEA, 11)





PMH-Social-Family Hx


Patient Social History


Marrital Status:  cohabiting (lives with and takes care of father )


Former smoker/When Quit:  2003


Type Used:  Cigarettes


2nd Hand Smoke Exposure:  No


Recent Foreign Travel:  No


Contact w/other who traveled:  No


Recent Hopitalizations:  No


Recent Infectious Disease Expo:  No





Immunizations Up To Date


Tetanus Booster (TDap):  Unknown


Date of Pneumonia Vaccine:  2010





Past Medical History





PMHx:


Diabetes mellitus


Hypertension


Lymphedema





PSurgHx:


Right second toe removal due to gangrene after injury


Umbilical hernia repair





Family Medical History


Significant Family History:  Cancer, Diabetes, Hypertension


Family History:  


Arthritis


  19 FATHER, Onset:Unknown


Diabetes mellitus


  19 MOTHER, , Onset:Unknown





Review of Systems (CHC)


Constitutional:  No chills, No diaphoresis, No fever; malaise; No weakness


Respiratory:  No cough, No short of breath


Cardiovascular:  No chest pain, No palpitations


Gastrointestinal:  No abdominal pain, No constipation, No diarrhea, No nausea, 

No vomiting


Genitourinary:  No dysuria, No incontinence


Musculoskeletal:  back pain, other (L thigh pain)





Physical Exam-([facility])


Physical Exam


Vital Signs





                          VS - Last 72 Hours, by Label








 20





 11:30 13:44 14:30 14:41


 


Temp 35.9 35.9  36.2


 


Pulse 85 75 83 74


 


Resp 16 18 16 20


 


B/P (MAP) 194/88 (123) 155/83 (107) 155/83 128/72 (90)


 


Pulse Ox  97 96 98


 


O2 Delivery  Room Air Room Air Room Air





Capillary Refill : Less Than 3 Seconds


General Appearance:  WD/WN, no apparent distress


HEENT:  PERRL/EOMI, normal ENT inspection; No scleral icterus (R), No scleral 

icterus (L)


Neck:  supple; No lymphadenopathy (R), No lymphadenopathy (L)


Respiratory:  chest non-tender, lungs clear, normal breath sounds, no 

respiratory distress, no accessory muscle use


Cardiovascular:  normal peripheral pulses, regular rate, rhythm, no murmur


Peripheral Pulses:  2+ Carotid (R), 2+ Carotid (L), 2+ Dorsalis Pedis (R); 0 

Left Dors-Pedis (L); 2+ Radial Pulses (R), 2+ Radial Pulses (L)


Gastrointestinal:  normal bowel sounds, non tender, soft


Extremities:  inflammation, pedal edema


Neurologic/Psychiatric:  CNs II-XII nml as tested, alert, normal mood/affect, 

oriented x 3


Skin:  normal color, warm/dry; No diaphoresis


Lymphatic:  no adenopathy





Assessment/Plan


Assessment/Plan


Admission Status:  Observation


Assessment & Plan


1 L leg wound infection 


   start vancomycin and cefepime 


   surgery consulted and managing


   NPO after midnight 


   pain management with lortab 


2 IDDM 


   order Hemoglobin A1C 


   start insulin per sliding scale 


3 normocytic anemia 


   pt is not actively bleeding, will continue to monitor 


4 hyponatremia    


5 chronic lymphedema 


6 hypertension 


   continue home dose of amlodipine 


7 dvt prophylaxis 


   lovenox injections





JIE RICHARDSON MD 20 1704:


HPI


History of Present Illness:


Agree with above and verified history. Patient was sent in by his  nurse due 

to increasing drainage and pain in his surgical wound.


Source:  patient


Exam Limitations:  no limitations





Home Medications


Allergies


Coded Allergies:  


     meperidine HCl (Unverified  Allergy, Unknown, 11)


     shellfish derived (Unverified  Allergy, Unknown, 13)


   


   FROM UNCODED ALLERGIES


     Penicillins (Unverified  Adverse Reaction, Intermediate, NAUSEA, 11)





PMH-Social-Family Hx


Past Medical History





IDDM w/Neuropathy and arterial compromise


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


HTN


Obesity


Debility





Family Medical History


Family History:  


Arthritis


  19 FATHER, Onset:Unknown


Diabetes mellitus


  19 MOTHER, , Onset:Unknown





Review of Systems (CHC)


Constitutional:  No fever; malaise


EENTM:  no symptoms reported; No mouth pain, No nose congestion, No nose pain, 

No throat pain


Respiratory:  no symptoms reported; No cough, No short of breath


Cardiovascular:  no symptoms reported; No chest pain, No palpitations


Gastrointestinal:  no symptoms reported; No constipation, No diarrhea, No loss 

of appetite, No nausea, No vomiting


Genitourinary:  no symptoms reported; No dysuria, No incontinence


Musculoskeletal:  back pain (chronic)


Skin:  other (Open surgical wound)


Psychiatric/Neurological:  Numbness, Paresthesia, Weakness





Reviewed Test Results


Reviewed Test Results


Lab





Laboratory Tests








Test


 20


11:55 20


12:50 20


13:04 20


13:35 Range/Units


 


 


White Blood Count


 7.7 


 


 


 


 4.3-11.0


10^3/uL


 


Red Blood Count


 3.43 L


 


 


 


 4.30-5.52


10^6/uL


 


Hemoglobin 9.3 L    13.3-17.7  g/dL


 


Hematocrit 30 L    40-54  %


 


Mean Corpuscular Volume 88     80-99  fL


 


Mean Corpuscular Hemoglobin 27     25-34  pg


 


Mean Corpuscular Hemoglobin


Concent 31 L


 


 


 


 32-36  g/dL





 


Red Cell Distribution Width 14.7 H    10.0-14.5  %


 


Platelet Count


 533 H


 


 


 


 130-400


10^3/uL


 


Mean Platelet Volume 9.8     9.0-12.2  fL


 


Immature Granulocyte % (Auto) 1      %


 


Neutrophils (%) (Auto) 68     42-75  %


 


Lymphocytes (%) (Auto) 15     12-44  %


 


Monocytes (%) (Auto) 8     0-12  %


 


Eosinophils (%) (Auto) 9     0-10  %


 


Basophils (%) (Auto) 1     0-10  %


 


Neutrophils # (Auto)


 5.2 


 


 


 


 1.8-7.8


10^3/uL


 


Lymphocytes # (Auto)


 1.1 


 


 


 


 1.0-4.0


10^3/uL


 


Monocytes # (Auto)


 0.6 


 


 


 


 0.0-1.0


10^3/uL


 


Eosinophils # (Auto)


 0.7 H


 


 


 


 0.0-0.3


10^3/uL


 


Basophils # (Auto)


 0.1 


 


 


 


 0.0-0.1


10^3/uL


 


Immature Granulocyte # (Auto)


 0.1 


 


 


 


 0.0-0.1


10^3/uL


 


Sodium Level 133 L    135-145  MMOL/L


 


Potassium Level 4.1     3.6-5.0  MMOL/L


 


Chloride Level 97 L      MMOL/L


 


Carbon Dioxide Level 26     21-32  MMOL/L


 


Anion Gap 10     5-14  MMOL/L


 


Blood Urea Nitrogen 19 H    7-18  MG/DL


 


Creatinine


 1.06 


 


 


 


 0.60-1.30


MG/DL


 


Estimat Glomerular Filtration


Rate > 60 


 


 


 


  





 


BUN/Creatinine Ratio 18      


 


Glucose Level 458 *H      MG/DL


 


Lactic Acid Level


 2.93 *H


 


 


 


 0.50-2.00


MMOL/L


 


Calcium Level 8.5     8.5-10.1  MG/DL


 


Corrected Calcium 9.5     8.5-10.1  MG/DL


 


Total Bilirubin 0.1     0.1-1.0  MG/DL


 


Aspartate Amino Transf


(AST/SGOT) 11 


 


 


 


 5-34  U/L





 


Alanine Aminotransferase


(ALT/SGPT) 13 


 


 


 


 0-55  U/L





 


Alkaline Phosphatase 115       U/L


 


Total Protein 7.9     6.4-8.2  GM/DL


 


Albumin 2.8 L    3.2-4.5  GM/DL


 


Urine Color  YELLOW     


 


Urine Clarity  CLEAR     


 


Urine pH  7.5    5-9  


 


Urine Specific Gravity  1.015 L   1.016-1.022  


 


Urine Protein  2+ H   NEGATIVE  


 


Urine Glucose (UA)  3+ H   NEGATIVE  


 


Urine Ketones  NEGATIVE    NEGATIVE  


 


Urine Nitrite  NEGATIVE    NEGATIVE  


 


Urine Bilirubin  NEGATIVE    NEGATIVE  


 


Urine Urobilinogen  0.2    < = 1.0  MG/DL


 


Urine Leukocyte Esterase  NEGATIVE    NEGATIVE  


 


Urine RBC (Auto)  TRACE-I    NEGATIVE  


 


Urine RBC  2-5 H    /HPF


 


Urine WBC  2-5     /HPF


 


Urine Squamous Epithelial


Cells 


 0-2 


 


 


  /HPF





 


Urine Crystals  NONE     /LPF


 


Urine Bacteria  NEGATIVE     /HPF


 


Urine Casts  NONE     /LPF


 


Urine Mucus  NEGATIVE     /LPF


 


Urine Culture Indicated  NO     


 


Prothrombin Time   12.3   12.2-14.7  SEC


 


INR Comment   0.9   0.8-1.4  


 


Activated Partial


Thromboplast Time 


 


 30 


 


 24-35  SEC





 


Glucometer    447 *H   MG/DL


 


Test


 20


14:17 20


15:36 


 


 Range/Units


 


 


Lactic Acid Level


 1.69 


 


 


 


 0.50-2.00


MMOL/L


 


Glucometer  363 H     MG/DL











Physical Exam-([facility])


Physical Exam


General Appearance:  WD/WN, no apparent distress, obese


HEENT:  PERRL/EOMI


Neck:  non-tender, full range of motion, supple


Respiratory:  chest non-tender, lungs clear, normal breath sounds, no 

respiratory distress, no accessory muscle use


Cardiovascular:  regular rate, rhythm, no murmur


Gastrointestinal:  normal bowel sounds, non tender, soft


Genital/Rectal:  other (scrotum with erythema and mild swelling, ttp)


Extremities:  other (Left AKA, Large open wound with purulent drainage, 2+ 

pitting edema in LLE)


Neurologic/Psychiatric:  CNs II-XII nml as tested, alert, normal mood/affect, 

oriented x 3


Skin:  normal color, warm/dry


Lymphatic:  no adenopathy





Assessment/Plan


Assessment/Plan


Admission Status:  Inpatient Order (span 2 midnights)


Reason for Inpatient Admission:  


Patient is going to require surgical intervention and IV antibiotics


Assessment & Plan


See Problem list for Dr Richardson's plan, patient seen and examined with Sharan Maier, MS3





(1) Postoperative wound infection


Status:  Acute


Assessment & Plan:  - Dr Pike consulted and managing wound, Started on 

Vancomycin and Cefepime D1





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


Status:  Chronic


Assessment & Plan:  - Restart home insulin with SSI, Accucheck AC/HS, A1c 

pending





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


Status:  Chronic


Assessment & Plan:  - Cr at baseline, will monitor closely due to Vancomycin





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


Status:  Acute


(5) Insulin dependent diabetes mellitus with complications


Status:  Chronic


(6) HTN (hypertension)


Status:  Chronic


Assessment & Plan:  - Restart home meds


Qualifiers:  


   Qualified Codes:  I10 - Essential (primary) hypertension


(7) S/P AKA (above knee amputation)


Status:  Acute





Supervisory-Addendum Brief


Supervisory Addendum


Verification and Attestation of Medical Student E/M Service





A medical student performed and documented this service in my presence. I 

reviewed and verified all information documented by the medical student and made

modifications to such information, when appropriate. I personally performed the 

physical exam and medical decision making. 





 Jie Richardson, Sep 28, 2020,17:07











SHARAN MAIER STUDENT        Sep 28, 2020 15:02


JIE RICHARDSON MD               Sep 28, 2020 17:04

## 2020-09-28 NOTE — CONSULTATION - SURGERY
PASTOR WILKINS,MED STUDENT 20 1838:


History of Present Illness


History of Present Illness


Patient Consulted On(wellington/time)


20


 18:33


Date Seen by Provider:  Sep 28, 2020


Time Seen by Provider:  06:25


History of Present Illness


Yordan Pink is a 50 year old male who presented to the ER today complaining 

of a possible wound infection. He had a left AKA on 20 due to chronic lym

phedema. He states the wound has been draining since then but 2 days ago the 

drainage got thicker and started having a funny odor. He states that it has also

opened up more and he is having more constant pain, and rates it as 3/10. His 

pain is better with pain medication and worse with touching it. Denies fever or 

chills. On exam there is a large amount of greenish pus, and some of the sutures

appear to have opened up. He is very lethargic during exam and states that it is

because of his sleep apnea.





Allergies and Home Medications


Allergies


Coded Allergies:  


     meperidine HCl (Unverified  Allergy, Unknown, 11)


     shellfish derived (Unverified  Allergy, Unknown, 13)


   


   FROM UNCODED ALLERGIES


     Penicillins (Unverified  Adverse Reaction, Intermediate, NAUSEA, 11)





Home Medications


Amlodipine Besylate 5 Mg Tablet, 5 MG PO DAILY


   Prescribed by: JACK OLIVARES on 20


Aspirin 325 Mg Tablet., 325 MG PO DAILY, (Reported)


Hydrocodone/Acetaminophen 1 Each Tablet, 1-2 TAB PO Q4H PRN for PAIN-MODERATE 

(5-7)


   Prescribed by: JACK OLIVARES on 20


Pantoprazole Sodium 40 Mg Tablet., 40 MG PO DAILY


   Prescribed by: JACK OLIVARES on 20 09





Past Medical-Social-Family Hx


Patient Social History


Alcohol Use:  Past History


Number of Drinks Today:  AA


Recreational Drug Use:  No


Smoking Status:  Former Smoker


Former Smoker, Quit:  1999


Type Used:  Cigarettes


2nd Hand Smoke Exposure:  No


Recent Foreign Travel:  No


Contact w/Someone Who Travel:  No


Recent Infectious Disease Expo:  No


Recent Hopitalizations:  No


Physical Abuse Screen:  No


Sexual Abuse:  No





Immunizations Up To Date


Tetanus Booster (TDap):  Unknown


PED Vaccines UTD:  No


Date of Pneumonia Vaccine:  2010





Seasonal Allergies


Seasonal Allergies:  Yes





Surgeries


History of Surgeries:  Yes


Surgeries:  Abdominal (umbilical hernia repair), Amputation (right 2nd toe), 

Orthopedic (left AKA)





Respiratory


History of Respiratory Disorde:  Yes


Respiratory Disorders:  Sleep Apnea





Cardiovascular


History of Cardiac Disorders:  Yes


Cardiac Disorders:  Hypertension





Neurological


History of Neurological Disord:  Yes (SEVERAL CONCUSSIONS PER PATIENT)


Neurological Disorders:  Concussion





Reproductive System


Hx Reproductive Disorders:  No


Sexually Transmitted Disease:  No


HIV/AIDS:  No





Genitourinary


History of Genitourinary Disor:  No





Gastrointestinal


History of Gastrointestinal Di:  No


Gastrointestinal Disorders:  Abdominal Hernia





Musculoskeletal


History of Musculoskeletal Dis:  Yes (lymphedema left leg)


Musculoskeletal Disorders:  Amputee, Arthritis





Endocrine


History of Endocrine Disorders:  Yes


Endocrine Disorders:  Diabetes, Non-Insulin dep





HEENT


History of HEENT Disorders:  No


Loss of Vision:  Denies


Hearing Impairment:  Denies





Cancer


History of Cancer:  No





Psychosocial


History of Psychiatric Problem:  Yes


Behavioral Health Disorders:  Anxiety





Integumentary


History of Skin or Integumenta:  Yes


Skin/Integumentary Disorders:  Recent Skin Changes





Blood Transfusions


History of Blood Disorders:  No





Family Medical History


Significant Family History:  Cancer (father, colon), Diabetes (mother), 

Hypertension


Family Medial History:  


Arthritis


  19 FATHER, Onset:Unknown


Diabetes mellitus


  19 MOTHER, , Onset:Unknown





Review of Systems-General


Constitutional:  No chills, No fever; malaise


EENTM:  throat pain; No double vision, No nose congestion


Respiratory:  No cough, No short of breath


Cardiovascular:  No chest pain; edema; No palpitations


Gastrointestinal:  No abdominal pain, No constipation, No diarrhea, No melena, 

No nausea, No vomiting


Genitourinary:  No dysuria, No frequency, No hematuria


Musculoskeletal:  other (left leg pain)


Skin:  rash (RLE ), other (LLE wound infection)


Psychiatric/Neurological:  Denies Headache





Physical Exam-General Problems


Physical Exam


Vital Signs





Vital Signs - First Documented








 20





 11:30 13:44


 


Temp 35.9 


 


Pulse 85 


 


Resp 16 


 


B/P (MAP) 194/88 (123) 


 


Pulse Ox  97


 


O2 Delivery  Room Air





Capillary Refill : Less Than 3 Seconds


General Appearance:  WD/WN, other (lethargic, falls asleep mid-conversation)


HEENT:  PERRL/EOMI, pharynx normal


Respiratory:  lungs clear, no respiratory distress, no accessory muscle use


Cardiovascular:  regular rate, rhythm, no murmur


Peripheral Pulses:  2+ Dorsalis Pedis (R), 2+ Radial Pulses (R), 2+ Radial 

Pulses (L)


Gastrointestinal:  normal bowel sounds, non tender, soft


Extremities:  pedal edema (RLE), swelling (RLE, lymphedema), other (left AKA, 

incisions opening, draining greenish disharge)


Neurologic/Psychiatric:  alert, other (lethargic)


Skin:  warm/dry, rash (RLE, open areas to medial ankle and dorsal foot)





Data Review


Labs


Laboratory Tests


20 11:55: 


White Blood Count 7.7, Red Blood Count 3.43L, Hemoglobin 9.3L, Hematocrit 30L, 

Mean Corpuscular Volume 88, Mean Corpuscular Hemoglobin 27, Mean Corpuscular 

Hemoglobin Concent 31L, Red Cell Distribution Width 14.7H, Platelet Count 533H, 

Mean Platelet Volume 9.8, Immature Granulocyte % (Auto) 1, Neutrophils (%) 

(Auto) 68, Lymphocytes (%) (Auto) 15, Monocytes (%) (Auto) 8, Eosinophils (%) 

(Auto) 9, Basophils (%) (Auto) 1, Neutrophils # (Auto) 5.2, Lymphocytes # (Auto)

1.1, Monocytes # (Auto) 0.6, Eosinophils # (Auto) 0.7H, Basophils # (Auto) 0.1, 

Immature Granulocyte # (Auto) 0.1, Sodium Level 133L, Potassium Level 4.1, 

Chloride Level 97L, Carbon Dioxide Level 26, Anion Gap 10, Blood Urea Nitrogen 

19H, Creatinine 1.06, Estimat Glomerular Filtration Rate > 60, BUN/Creatinine 

Ratio 18, Glucose Level 458*H, Lactic Acid Level 2.93*H, Calcium Level 8.5, 

Corrected Calcium 9.5, Total Bilirubin 0.1, Aspartate Amino Transf (AST/SGOT) 

11, Alanine Aminotransferase (ALT/SGPT) 13, Alkaline Phosphatase 115, Total 

Protein 7.9, Albumin 2.8L


20 12:50: 


Urine Color YELLOW, Urine Clarity CLEAR, Urine pH 7.5, Urine Specific Gravity 

1.015L, Urine Protein 2+H, Urine Glucose (UA) 3+H, Urine Ketones NEGATIVE, Urine

Nitrite NEGATIVE, Urine Bilirubin NEGATIVE, Urine Urobilinogen 0.2, Urine 

Leukocyte Esterase NEGATIVE, Urine RBC (Auto) TRACE-I, Urine RBC 2-5H, Urine WBC

2-5, Urine Squamous Epithelial Cells 0-2, Urine Crystals NONE, Urine Bacteria 

NEGATIVE, Urine Casts NONE, Urine Mucus NEGATIVE, Urine Culture Indicated NO


20 13:04: 


Prothrombin Time 12.3, INR Comment 0.9, Activated Partial Thromboplast Time 30


20 13:35: Glucometer 447*H


20 14:17: Lactic Acid Level 1.69


20 15:36: Glucometer 363H





Radiology


Date of Exam:20





CHEST 1 VIEW, AP/PA ONLY








INDICATION: Diabetic patient with sepsis.





Frontal chest obtained at 12:07 p.m. and compared to 2020.





There is mild cardiomegaly. Mediastinal silhouette appears


unremarkable.  The lungs are clear. There is no pneumothorax or


pleural fluid.





IMPRESSION:





Mild cardiomegaly with no acute process in the chest.





Assessment/Plan


left AKA wound infection- Cefepime and Vanc. Pack wound with dry Kerlex, wound 

culture, NPO at midnight tonight for possible surgical intervention tomorrow.





Chronic lymphedema- continue local wound care to open areas on RLE





Diabetes Mellitus- sliding scale insulin, managed by medical team





HTN- has had some elevated BPs, on amlodipine currently





Obesity





Sleep apnea





DVT prophylaxis- enoxaparin





Clinical Quality Measures


DVT/VTE Risk/Contraindication:


Risk Factor Score Per Nursin


RFS Level Per Nursing on Admit:  4+=Very High





NAI DAI DO 20 2000:


History of Present Illness


Patient is a 50-year-old male with recent above-knee amputation who has been 

having some green thick drainage with slight odor coming from the left AKA 

wound.  Patient was post have a wound VAC being planned however this was denied 

by insurance.  2 days ago began having the drainage.  A dull aching pain.  

Patient unable to take his insulin as well recently due to cost.  Patient with 

chronic lymphedema to the left lower extremity with a chronic wound which is 

reason for the amputation.  Patient states current pain is 3 out of 10.





Allergies and Home Medications


Allergies


Coded Allergies:  


     meperidine HCl (Unverified  Allergy, Unknown, 11)


     shellfish derived (Unverified  Allergy, Unknown, 13)


   


   FROM UNCODED ALLERGIES


     Penicillins (Unverified  Adverse Reaction, Intermediate, NAUSEA, 11)





Home Medications


Amlodipine Besylate 5 Mg Tablet, 5 MG PO DAILY


   Prescribed by: JACK OLIVARES on 20


Aspirin 325 Mg Tablet.dr, 325 MG PO DAILY, (Reported)


Hydrocodone/Acetaminophen 1 Each Tablet, 1-2 TAB PO Q4H PRN for PAIN-MODERATE 

(5-7)


   Prescribed by: JACK OLIVARES on 20


Pantoprazole Sodium 40 Mg Tablet.dr, 40 MG PO DAILY


   Prescribed by: JACK OLIVARES on 20





Patient Home Medication List


Home Medication List Reviewed:  Yes





Past Medical-Social-Family Hx


Reviewed Nursing Assessment


Reviewed/Agree w Nursing PMH:  Yes





Family Medical History


Significant Family History:  No Pertinent Family Hx


Family Medial History:  


Arthritis


  19 FATHER, Onset:Unknown


Diabetes mellitus


  19 MOTHER, , Onset:Unknown





Review of Systems-General


Constitutional:  No chills, No fever; malaise


EENTM:  No double vision


Respiratory:  No cough, No short of breath


Cardiovascular:  No chest pain; edema; No palpitations


Gastrointestinal:  No abdominal pain, No constipation, No diarrhea, No melena, 

No nausea, No vomiting


Genitourinary:  No dysuria, No frequency, No hematuria


Musculoskeletal:  No back pain; other (left leg pain)


Skin:  rash (RLE ), other (LLE wound infection)


Psychiatric/Neurological:  Denies Depressed, Denies Headache


All Other Systems Reviewed


Negative Unless Noted:  Yes (Negative excepted noted.)





Physical Exam-General Problems


Physical Exam


General Appearance:  WD/WN, no apparent distress, other (lethargic, falls asleep

mid-conversation)


HEENT:  PERRL/EOMI, pharynx normal


Neck:  non-tender, supple, normal inspection


Respiratory:  chest non-tender, lungs clear, no respiratory distress, no 

accessory muscle use


Cardiovascular:  regular rate, rhythm, no edema, no murmur


Gastrointestinal:  normal bowel sounds, non tender, soft


Back:  normal inspection, no CVA tenderness


Extremities:  pedal edema (RLE), swelling (RLE, lymphedema), other (left AKA, 

incisions opening, draining greenish disharge)


Neurologic/Psychiatric:  no motor/sensory deficits, alert, oriented x 3, other 

(lethargic)


Skin:  normal color, warm/dry, rash (RLE, open areas to medial ankle and dorsal 

foot)


Lymphatic:  no adenopathy





Assessment/Plan


left AKA wound infection- Cefepime and Vanc. Pack wound with dry Kerlex, wound 

culture, NPO at midnight tonight for possible surgical intervention tomorrow.





Chronic lymphedema- continue local wound care to open areas on RLE





Diabetes Mellitus- sliding scale insulin, managed by medical team





HTN- has had some elevated BPs, on amlodipine currently





Obesity





Sleep apnea





DVT prophylaxis- enoxaparin





Supervisory-Addendum Brief


Verification & Attestation


Participated in pt care:  history, MDM, physical


Personally performed:  exam, history, MDM, supervision of care


Care discussed with:  Medical Student


Procedures:  n/a


Results interpretation:  Verified all documentation


Verification and Attestation of Medical Student E/M Service





A medical student performed and documented this service in my presence. I 

reviewed and verified all information documented by the medical student and made

modifications to such information, when appropriate. I personally performed the 

physical exam and medical decision making. 





 Nai Dai, Sep 28, 2020,20:00











PASTOR WILKINS,MED STUDENT    Sep 28, 2020 18:38


NAI DAI DO              Sep 28, 2020 20:00

## 2020-09-28 NOTE — ED INTEGUMENTARY GENERAL
General


Chief Complaint:  Skin/Wound Problems


Stated Complaint:  L LEG AMPUTATION INFECTION


Nursing Triage Note:  


ARRIVED VIA WC.  PT HAD A LEFT AKA 2 WEEKS AGO HERE BY DR BLACK.  STATES 


YESTERDAY HE NOTICED GREEN DRAINAGE.  PT IS A INSULIN DEPENDENT DIABETIC WHO HAS




NOT BEEN TAKING HIS INUSLIN DUE TO COST.


Source:  patient


Exam Limitations:  no limitations





History of Present Illness


Date Seen by Provider:  Sep 28, 2020


Time Seen by Provider:  11:45


Initial Comments


To ER infection of the left above-the-knee amputation site. His home health 

nurse referred him to ER. He overall feels okay. Denies fevers or chills.


Timing/Duration:  constant


Severity:  mild


Possible Cause:  no cause identified


Associated Symptoms:  denies symptoms





Allergies and Home Medications


Allergies


Coded Allergies:  


     meperidine HCl (Unverified  Allergy, Unknown, 11)


     shellfish derived (Unverified  Allergy, Unknown, 13)


   


   FROM UNCODED ALLERGIES


     Penicillins (Unverified  Adverse Reaction, Intermediate, NAUSEA, 11)





Home Medications


Amlodipine Besylate 5 Mg Tablet, 5 MG PO DAILY


   Prescribed by: JACK OLIVARES on 20


Aspirin 325 Mg Tablet.dr, 325 MG PO DAILY, (Reported)


Hydrocodone/Acetaminophen 1 Each Tablet, 1-2 TAB PO Q4H PRN for PAIN-MODERATE 

(5-7)


   Prescribed by: JACK OLIVARES on 20


Pantoprazole Sodium 40 Mg Tablet.dr, 40 MG PO DAILY


   Prescribed by: JACK OLIVARES on 20





Patient Home Medication List


Home Medication List Reviewed:  Yes





Review of Systems


Review of Systems


Constitutional:  see HPI


EENTM:  see HPI


Respiratory:  no symptoms reported


Cardiovascular:  no symptoms reported


Genitourinary:  no symptoms reported


Musculoskeletal:  no symptoms reported


Skin:  no symptoms reported


Psychiatric/Neurological:  No Symptoms Reported


Endocrine:  No Symptoms Reported


Hematologic/Lymphatic:  No Symptoms Reported





Past Medical-Social-Family Hx


Patient Social History


Alcohol Beverage of Choice:  Beer


Type Used:  Cigarettes


Former Smoker, Quit:  1999


2nd Hand Smoke Exposure:  No


Recent Foreign Travel:  No


Contact w/Someone Who Travel:  No


Recent Infectious Disease Expo:  No


Recent Hopitalizations:  No





Immunizations Up To Date


Tetanus Booster (TDap):  Unknown


PED Vaccines UTD:  No


Date of Pneumonia Vaccine:  2010





Seasonal Allergies


Seasonal Allergies:  Yes





Past Medical History


Surgeries:  Yes (hernia-??Umbilical)


Amputation, Orthopedic


Respiratory:  No


Sleep Apnea


Currently Using CPAP:  No


Currently Using BIPAP:  No


Cardiac:  Yes


Hypertension


Neurological:  Yes (SEVERAL CONCUSSIONS PER PATIENT)


Concussion


Reproductive Disorders:  No


Sexually Transmitted Disease:  No


HIV/AIDS:  No


Genitourinary:  No


Gastrointestinal:  No


Abdominal Hernia


Musculoskeletal:  Yes (lymphedema left leg)


Arthritis


Endocrine:  Yes


Diabetes, Non-Insulin dep


HEENT:  No


Loss of Vision:  Denies


Hearing Impairment:  Denies


Cancer:  No


Psychosocial:  Yes


Anxiety


Integumentary:  Yes


Recent Skin Changes


Blood Disorders:  No





Family Medical History





Arthritis


  19 FATHER, Onset:Unknown


Diabetes mellitus


  19 MOTHER, , Onset:Unknown


Cancer, Diabetes, Hypertension





Physical Exam


Vital Signs





Vital Signs - First Documented








 20





 11:30


 


Temp 35.9


 


Pulse 85


 


Resp 16


 


B/P (MAP) 194/88 (123)





Capillary Refill : Less Than 3 Seconds


General Appearance:  WD/WN, no apparent distress, obese


Neck:  non-tender, full range of motion


Respiratory:  normal breath sounds, no respiratory distress, no accessory muscle

use


Extremities:  normal range of motion, non-tender


Neurologic/Psychiatric:  alert, normal mood/affect, oriented x 3


Skin:  normal color, warm/dry


Skin Problem Location:  lower extremities (stump on left foul smelling purulent 

discharge, partial dehiscence of surgical wound.)





Progress/Results/Core Measures


Results/Orders


Lab Results





Laboratory Tests








Test


 20


11:55 20


12:50 20


13:04 Range/Units


 


 


White Blood Count


 7.7 


 


 


 4.3-11.0


10^3/uL


 


Red Blood Count


 3.43 L


 


 


 4.30-5.52


10^6/uL


 


Hemoglobin 9.3 L   13.3-17.7  g/dL


 


Hematocrit 30 L   40-54  %


 


Mean Corpuscular Volume 88    80-99  fL


 


Mean Corpuscular Hemoglobin 27    25-34  pg


 


Mean Corpuscular Hemoglobin


Concent 31 L


 


 


 32-36  g/dL





 


Red Cell Distribution Width 14.7 H   10.0-14.5  %


 


Platelet Count


 533 H


 


 


 130-400


10^3/uL


 


Mean Platelet Volume 9.8    9.0-12.2  fL


 


Immature Granulocyte % (Auto) 1     %


 


Neutrophils (%) (Auto) 68    42-75  %


 


Lymphocytes (%) (Auto) 15    12-44  %


 


Monocytes (%) (Auto) 8    0-12  %


 


Eosinophils (%) (Auto) 9    0-10  %


 


Basophils (%) (Auto) 1    0-10  %


 


Neutrophils # (Auto)


 5.2 


 


 


 1.8-7.8


10^3/uL


 


Lymphocytes # (Auto)


 1.1 


 


 


 1.0-4.0


10^3/uL


 


Monocytes # (Auto)


 0.6 


 


 


 0.0-1.0


10^3/uL


 


Eosinophils # (Auto)


 0.7 H


 


 


 0.0-0.3


10^3/uL


 


Basophils # (Auto)


 0.1 


 


 


 0.0-0.1


10^3/uL


 


Immature Granulocyte # (Auto)


 0.1 


 


 


 0.0-0.1


10^3/uL


 


Sodium Level 133 L   135-145  MMOL/L


 


Potassium Level 4.1    3.6-5.0  MMOL/L


 


Chloride Level 97 L     MMOL/L


 


Carbon Dioxide Level 26    21-32  MMOL/L


 


Anion Gap 10    5-14  MMOL/L


 


Blood Urea Nitrogen 19 H   7-18  MG/DL


 


Creatinine


 1.06 


 


 


 0.60-1.30


MG/DL


 


Estimat Glomerular Filtration


Rate > 60 


 


 


  





 


BUN/Creatinine Ratio 18     


 


Glucose Level 458 *H     MG/DL


 


Lactic Acid Level


 2.93 *H


 


 


 0.50-2.00


MMOL/L


 


Calcium Level 8.5    8.5-10.1  MG/DL


 


Corrected Calcium 9.5    8.5-10.1  MG/DL


 


Total Bilirubin 0.1    0.1-1.0  MG/DL


 


Aspartate Amino Transf


(AST/SGOT) 11 


 


 


 5-34  U/L





 


Alanine Aminotransferase


(ALT/SGPT) 13 


 


 


 0-55  U/L





 


Alkaline Phosphatase 115      U/L


 


Total Protein 7.9    6.4-8.2  GM/DL


 


Albumin 2.8 L   3.2-4.5  GM/DL


 


Urine Color  YELLOW    


 


Urine Clarity  CLEAR    


 


Urine pH  7.5   5-9  


 


Urine Specific Gravity  1.015 L  1.016-1.022  


 


Urine Protein  2+ H  NEGATIVE  


 


Urine Glucose (UA)  3+ H  NEGATIVE  


 


Urine Ketones  NEGATIVE   NEGATIVE  


 


Urine Nitrite  NEGATIVE   NEGATIVE  


 


Urine Bilirubin  NEGATIVE   NEGATIVE  


 


Urine Urobilinogen  0.2   < = 1.0  MG/DL


 


Urine Leukocyte Esterase  NEGATIVE   NEGATIVE  


 


Urine RBC (Auto)  TRACE-I   NEGATIVE  


 


Urine RBC  2-5 H   /HPF


 


Urine WBC  2-5    /HPF


 


Urine Squamous Epithelial


Cells 


 0-2 


 


  /HPF





 


Urine Crystals  NONE    /LPF


 


Urine Bacteria  NEGATIVE    /HPF


 


Urine Casts  NONE    /LPF


 


Urine Mucus  NEGATIVE    /LPF


 


Urine Culture Indicated  NO    








My Orders





Orders - LATOSHA AKERS


Cbc With Automated Diff (20 11:43)


Comprehensive Metabolic Panel (20 11:43)


Blood Culture (20 11:43)


Sputum Culture (20 11:43)


Urinalysis (20 11:43)


Urine Culture (20 11:43)


Protime With Inr (20 11:43)


Partial Thromboplastin Time (20 11:43)


Chest 1 View, Ap/Pa Only (20 11:43)


Ed Iv/Invasive Line Start (20 11:43)


Ed Iv/Invasive Line Start (20 11:43)


Vital Signs Adult Sepsis Patie Q15M (20 11:43)


O2 (20 11:43)


Remove Rings In Anticipation O (20 11:43)


Lactic Acid Analyzer (20 11:43)


Insulin (Regular) Human (Novolin R (Per (20 12:30)


Ns Iv 1000 Ml (Sodium Chloride 0.9%) (20 12:30)





Vital Signs/I&O











 20





 11:30


 


Temp 35.9


 


Pulse 85


 


Resp 16


 


B/P (MAP) 194/88 (123)














Blood Pressure Mean:                    123











Departure


Communication (Admissions)


Time/Spoke to Admitting Phy:  13:24


Spoke with Dr. Blas and Dr. Pike, we'll admit on IV antibiotics.





Impression





   Primary Impression:  


   Postoperative wound infection


   Additional Impressions:  


   S/P AKA (above knee amputation)


   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


   Gait disorder


Disposition:   ADMITTED AS INPATIENT


Condition:  Stable





Admissions


Decision to Admit Reason:  Admit from ER (General)


Decision to Admit/Date:  Sep 28, 2020


Time/Decision to Admit Time:  13:22





Departure-Patient Inst.


Referrals:  


YAA LEE MD (PCP/Family)


Primary Care Physician


Patient Instructions:  Wound Care











LATOSHA AKERS             Sep 28, 2020 11:47

## 2020-09-28 NOTE — DIAGNOSTIC IMAGING REPORT
INDICATION: Diabetic patient with sepsis.



Frontal chest obtained at 12:07 p.m. and compared to 08/27/2020.



There is mild cardiomegaly. Mediastinal silhouette appears

unremarkable.  The lungs are clear. There is no pneumothorax or

pleural fluid.



IMPRESSION:



Mild cardiomegaly with no acute process in the chest.



Dictated by: 



  Dictated on workstation # [id number]

## 2020-09-29 VITALS — SYSTOLIC BLOOD PRESSURE: 171 MMHG | DIASTOLIC BLOOD PRESSURE: 80 MMHG

## 2020-09-29 VITALS — SYSTOLIC BLOOD PRESSURE: 112 MMHG | DIASTOLIC BLOOD PRESSURE: 68 MMHG

## 2020-09-29 VITALS — DIASTOLIC BLOOD PRESSURE: 75 MMHG | SYSTOLIC BLOOD PRESSURE: 160 MMHG

## 2020-09-29 VITALS — DIASTOLIC BLOOD PRESSURE: 70 MMHG | SYSTOLIC BLOOD PRESSURE: 144 MMHG

## 2020-09-29 VITALS — SYSTOLIC BLOOD PRESSURE: 137 MMHG | DIASTOLIC BLOOD PRESSURE: 72 MMHG

## 2020-09-29 LAB
ALBUMIN SERPL-MCNC: 2.6 GM/DL (ref 3.2–4.5)
ALP SERPL-CCNC: 94 U/L (ref 40–136)
ALT SERPL-CCNC: 13 U/L (ref 0–55)
BASOPHILS # BLD AUTO: 0.1 10^3/UL (ref 0–0.1)
BASOPHILS NFR BLD AUTO: 1 % (ref 0–10)
BILIRUB SERPL-MCNC: 0.2 MG/DL (ref 0.1–1)
BUN/CREAT SERPL: 20
CALCIUM SERPL-MCNC: 8.2 MG/DL (ref 8.5–10.1)
CHLORIDE SERPL-SCNC: 103 MMOL/L (ref 98–107)
CO2 SERPL-SCNC: 24 MMOL/L (ref 21–32)
CREAT SERPL-MCNC: 0.94 MG/DL (ref 0.6–1.3)
EOSINOPHIL # BLD AUTO: 0.8 10^3/UL (ref 0–0.3)
EOSINOPHIL NFR BLD AUTO: 10 % (ref 0–10)
GFR SERPLBLD BASED ON 1.73 SQ M-ARVRAT: > 60 ML/MIN
GLUCOSE SERPL-MCNC: 237 MG/DL (ref 70–105)
HCT VFR BLD CALC: 28 % (ref 40–54)
HGB BLD-MCNC: 8.6 G/DL (ref 13.3–17.7)
LYMPHOCYTES # BLD AUTO: 1.1 10^3/UL (ref 1–4)
LYMPHOCYTES NFR BLD AUTO: 14 % (ref 12–44)
MANUAL DIFFERENTIAL PERFORMED BLD QL: NO
MCH RBC QN AUTO: 27 PG (ref 25–34)
MCHC RBC AUTO-ENTMCNC: 31 G/DL (ref 32–36)
MCV RBC AUTO: 88 FL (ref 80–99)
MONOCYTES # BLD AUTO: 0.6 10^3/UL (ref 0–1)
MONOCYTES NFR BLD AUTO: 8 % (ref 0–12)
NEUTROPHILS # BLD AUTO: 5.2 10^3/UL (ref 1.8–7.8)
NEUTROPHILS NFR BLD AUTO: 67 % (ref 42–75)
PLATELET # BLD: 510 10^3/UL (ref 130–400)
PMV BLD AUTO: 9.7 FL (ref 9–12.2)
POTASSIUM SERPL-SCNC: 4 MMOL/L (ref 3.6–5)
PROT SERPL-MCNC: 7.1 GM/DL (ref 6.4–8.2)
SODIUM SERPL-SCNC: 135 MMOL/L (ref 135–145)
WBC # BLD AUTO: 7.8 10^3/UL (ref 4.3–11)

## 2020-09-29 RX ADMIN — SODIUM CHLORIDE SCH MLS/HR: 900 INJECTION INTRAVENOUS at 16:02

## 2020-09-29 RX ADMIN — SODIUM CHLORIDE SCH MLS/HR: 900 INJECTION INTRAVENOUS at 08:32

## 2020-09-29 RX ADMIN — SODIUM CHLORIDE SCH MLS/HR: 900 INJECTION, SOLUTION INTRAVENOUS at 02:40

## 2020-09-29 RX ADMIN — VANCOMYCIN HYDROCHLORIDE SCH MLS/HR: 500 INJECTION, POWDER, LYOPHILIZED, FOR SOLUTION INTRAVENOUS at 02:42

## 2020-09-29 RX ADMIN — AMLODIPINE BESYLATE SCH MG: 5 TABLET ORAL at 08:32

## 2020-09-29 RX ADMIN — HYDROCODONE BITARTRATE AND ACETAMINOPHEN PRN TAB: 5; 325 TABLET ORAL at 18:48

## 2020-09-29 RX ADMIN — SODIUM CHLORIDE SCH MLS/HR: 900 INJECTION INTRAVENOUS at 20:45

## 2020-09-29 RX ADMIN — SODIUM CHLORIDE SCH MLS/HR: 900 INJECTION, SOLUTION INTRAVENOUS at 23:44

## 2020-09-29 RX ADMIN — MICONAZOLE NITRATE SCH GM: 20 POWDER TOPICAL at 08:34

## 2020-09-29 RX ADMIN — SODIUM CHLORIDE SCH MLS/HR: 900 INJECTION, SOLUTION INTRAVENOUS at 13:33

## 2020-09-29 RX ADMIN — MICONAZOLE NITRATE SCH GM: 20 POWDER TOPICAL at 20:46

## 2020-09-29 RX ADMIN — INSULIN ASPART SCH UNIT: 100 INJECTION, SOLUTION INTRAVENOUS; SUBCUTANEOUS at 11:59

## 2020-09-29 RX ADMIN — INSULIN ASPART SCH UNIT: 100 INJECTION, SOLUTION INTRAVENOUS; SUBCUTANEOUS at 06:29

## 2020-09-29 RX ADMIN — SODIUM CHLORIDE SCH MLS/HR: 900 INJECTION INTRAVENOUS at 02:42

## 2020-09-29 RX ADMIN — AMLODIPINE BESYLATE NR MG: 5 TABLET ORAL at 19:41

## 2020-09-29 RX ADMIN — ENOXAPARIN SODIUM SCH MG: 100 INJECTION SUBCUTANEOUS at 16:51

## 2020-09-29 RX ADMIN — ENOXAPARIN SODIUM SCH MG: 100 INJECTION SUBCUTANEOUS at 02:41

## 2020-09-29 RX ADMIN — INSULIN ASPART SCH UNIT: 100 INJECTION, SOLUTION INTRAVENOUS; SUBCUTANEOUS at 20:45

## 2020-09-29 RX ADMIN — VANCOMYCIN HYDROCHLORIDE SCH MLS/HR: 500 INJECTION, POWDER, LYOPHILIZED, FOR SOLUTION INTRAVENOUS at 16:02

## 2020-09-29 RX ADMIN — SODIUM CHLORIDE SCH MLS/HR: 900 INJECTION, SOLUTION INTRAVENOUS at 07:07

## 2020-09-29 NOTE — PROGRESS NOTE - SURGERY
PASTOR WILKINS,MED STUDENT 20 0728:


Subjective


Date Seen by a Provider:  Sep 29, 2020


Time Seen by a Provider:  06:52


Subjective/Events-last exam


Patient without complaints this morning, resting comfortably in bed. He states 

his pain is a 2/10. Left stump still draining a moderate amount of purulent 

discharge. Denies fever, chills, chest pain, shortness of breath, n/v.


Review of Systems


General:  No Chills


HEENT:  No Head Aches, No Sore Throat


Pulmonary:  No Dyspnea, No Cough


Cardiovascular:  Edema; No: Chest Pain


Gastrointestinal:  No: Nausea, Vomiting, Abdominal Pain


Genitourinary:  No Dysuria, No Frequency, No Hematuria





Focused Exam


Lactate Level


20 11:55: Lactic Acid Level 2.93*H


20 14:17: Lactic Acid Level 1.69





Objective


Exam





Vital Signs








  Date Time  Temp Pulse Resp B/P (MAP) Pulse Ox O2 Delivery O2 Flow Rate FiO2


 


20 04:45 36.6 77 20 171/80 (110) 95 Room Air  


 


20 23:54 36.4 69 20 151/65 (93) 95 Room Air  


 


20 21:30      Room Air  


 


20 19:29 36.3 66 18 121/77 (92) 92 Room Air  


 


20 17:53    160/80 (106)    


 


20 16:00 36.6 90 18 170/86 (114) 100 Room Air  


 


20 15:00     100 Room Air  


 


20 14:52 36.4 74 20 128/72 98 Room Air  


 


20 14:41 36.2 74 20 128/72 (90) 98 Room Air  


 


20 14:30  83 16 155/83 96 Room Air  


 


20 13:44 35.9 75 18 155/83 (107) 97 Room Air  


 


20 11:30 35.9 85 16 194/88 (123)    














I & O 


 


 20





 07:00


 


Intake Total 1840 ml


 


Output Total 1425 ml


 


Balance 415 ml





Capillary Refill : Greater Than 3 Seconds


General Appearance:  No Apparent Distress, Other (obese)


HEENT:  PERRL/EOMI, Moist Mucous Membranes


Respiratory:  Lungs Clear, No Accessory Muscle Use, No Respiratory Distress


Cardiovascular:  Regular Rate, Rhythm, No Murmur


Peripheral Pulses:  2+ Carotid (R), 2+ Carotid (L), 2+ Dorsalis Pedis (R); 0 

Left Dors-Pedis (L); 2+ Radial Pulses (R), 2+ Radial Pulses (L)


Gastrointestinal:  normal bowel sounds, non tender, soft


Extremity:  Other (left AKA stump with greenish purulent drainage, mild erythema

around sutures, anterior incision (12oclock) with increased drainage than 

yesterday. )


Neurologic/Psychiatric:  Alert, Oriented x3, Normal Mood/Affect


Skin:  Warm/Dry, Rash (RLE, lymphedema)





Results


Lab


Laboratory Tests


20 11:55: 


White Blood Count 7.7, Red Blood Count 3.43L, Hemoglobin 9.3L, Hematocrit 30L, 

Mean Corpuscular Volume 88, Mean Corpuscular Hemoglobin 27, Mean Corpuscular 

Hemoglobin Concent 31L, Red Cell Distribution Width 14.7H, Platelet Count 533H, 

Mean Platelet Volume 9.8, Immature Granulocyte % (Auto) 1, Neutrophils (%) 

(Auto) 68, Lymphocytes (%) (Auto) 15, Monocytes (%) (Auto) 8, Eosinophils (%) 

(Auto) 9, Basophils (%) (Auto) 1, Neutrophils # (Auto) 5.2, Lymphocytes # (Auto)

1.1, Monocytes # (Auto) 0.6, Eosinophils # (Auto) 0.7H, Basophils # (Auto) 0.1, 

Immature Granulocyte # (Auto) 0.1, Sodium Level 133L, Potassium Level 4.1, 

Chloride Level 97L, Carbon Dioxide Level 26, Anion Gap 10, Blood Urea Nitrogen 

19H, Creatinine 1.06, Estimat Glomerular Filtration Rate > 60, BUN/Creatinine 

Ratio 18, Glucose Level 458*H, Lactic Acid Level 2.93*H, Calcium Level 8.5, 

Corrected Calcium 9.5, Total Bilirubin 0.1, Aspartate Amino Transf (AST/SGOT) 

11, Alanine Aminotransferase (ALT/SGPT) 13, Alkaline Phosphatase 115, Total 

Protein 7.9, Albumin 2.8L


20 12:50: 


Urine Color YELLOW, Urine Clarity CLEAR, Urine pH 7.5, Urine Specific Gravity 

1.015L, Urine Protein 2+H, Urine Glucose (UA) 3+H, Urine Ketones NEGATIVE, Urine

Nitrite NEGATIVE, Urine Bilirubin NEGATIVE, Urine Urobilinogen 0.2, Urine 

Leukocyte Esterase NEGATIVE, Urine RBC (Auto) TRACE-I, Urine RBC 2-5H, Urine WBC

2-5, Urine Squamous Epithelial Cells 0-2, Urine Crystals NONE, Urine Bacteria 

NEGATIVE, Urine Casts NONE, Urine Mucus NEGATIVE, Urine Culture Indicated NO


20 13:04: 


Prothrombin Time 12.3, INR Comment 0.9, Activated Partial Thromboplast Time 30


20 13:35: Glucometer 447*H


20 14:17: Lactic Acid Level 1.69


20 15:36: Glucometer 363H


20 21:27: Glucometer 321H


20 04:22: 


White Blood Count 7.8, Red Blood Count 3.15L, Hemoglobin 8.6L, Hematocrit 28L, 

Mean Corpuscular Volume 88, Mean Corpuscular Hemoglobin 27, Mean Corpuscular 

Hemoglobin Concent 31L, Red Cell Distribution Width 14.9H, Platelet Count 510H, 

Mean Platelet Volume 9.7, Immature Granulocyte % (Auto) 1, Neutrophils (%) 

(Auto) 67, Lymphocytes (%) (Auto) 14, Monocytes (%) (Auto) 8, Eosinophils (%) 

(Auto) 10, Basophils (%) (Auto) 1, Neutrophils # (Auto) 5.2, Lymphocytes # 

(Auto) 1.1, Monocytes # (Auto) 0.6, Eosinophils # (Auto) 0.8H, Basophils # 

(Auto) 0.1, Immature Granulocyte # (Auto) 0.1, Sodium Level 135, Potassium Level

4.0, Chloride Level 103, Carbon Dioxide Level 24, Anion Gap 8, Blood Urea 

Nitrogen 19H, Creatinine 0.94, Estimat Glomerular Filtration Rate > 60, 

BUN/Creatinine Ratio 20, Glucose Level 237H, Calcium Level 8.2L, Corrected 

Calcium 9.3, Total Bilirubin 0.2, Aspartate Amino Transf (AST/SGOT) 11, Alanine 

Aminotransferase (ALT/SGPT) 13, Alkaline Phosphatase 94, Total Protein 7.1, 

Albumin 2.6L





Assessment/Plan


left AKA wound infection- continue Cefepime and Vanc. Pack wound with dry 

Kerlex, wound culture, NPO for possible surgical intervention today.





Chronic lymphedema- continue local wound care to open areas on RLE





Diabetes Mellitus- sliding scale insulin, managed by medical team





HTN- has had some elevated BPs, on amlodipine currently





Obesity





Sleep apnea





DVT prophylaxis- enoxaparin





Clinical Quality Measures


DVT/VTE Risk/Contraindication:


Risk Factor Score Per Nursin


RFS Level Per Nursing on Admit:  4+=Very High





NAI PIKE DO 20:


Subjective


Subjective/Events-last exam


Patient still with left leg pain.  He states is about a 2 out of 10.  Still has 

purulent drainage from open wound.  Tolerating wound care.  Has no other 

complaints at this time.  Denies any nausea vomiting fever sweats chills 

shortness of breath or chest pain.





Objective


Exam


General Appearance:  No Apparent Distress, Other (obese)


HEENT:  PERRL/EOMI, Moist Mucous Membranes


Neck:  Full Range of Motion, Normal Inspection, Non Tender


Respiratory:  Chest Non Tender, No Accessory Muscle Use, No Respiratory Distress


Cardiovascular:  Regular Rate, Rhythm, No JVD


Gastrointestinal:  non tender, soft


Extremity:  Other (left AKA stump with greenish purulent drainage, mild erythema

around sutures, anterior incision (12oclock) with increased drainage than 

yesterday. )


Neurologic/Psychiatric:  Alert, Oriented x3, Normal Mood/Affect


Skin:  Warm/Dry, Rash (RLE, lymphedema)


Lymphatic:  No Adenopathy





Assessment/Plan


left AKA wound infection- continue Cefepime and Vanc. Pack wound with dry 

Kerlex, wound culture, wound care performed today





Chronic lymphedema- continue local wound care to open areas on RLE





Diabetes Mellitus- sliding scale insulin, managed by medical team





HTN- has had some elevated BPs, on amlodipine currently





Obesity





Sleep apnea





DVT prophylaxis- enoxaparin





We will plan on wound VAC placement tomorrow morning.








Supervisory-Addendum Brief


Verification & Attestation


Participated in pt care:  history, MDM, physical


Personally performed:  exam, history, MDM, supervision of care


Care discussed with:  Medical Student


Procedures:  n/a


Results interpretation:  Verified all documentation


Verification and Attestation of Medical Student E/M Service





A medical student performed and documented this service in my presence. I 

reviewed and verified all information documented by the medical student and made

modifications to such information, when appropriate. I personally performed the 

physical exam and medical decision making. 





 Nai Pike, Sep 29, 2020,20:24











PASTOR WILKINS,MED STUDENT    Sep 29, 2020 07:28


NAI PIKE DO              Sep 29, 2020 20:24

## 2020-09-29 NOTE — PROGRESS NOTE
Subjective


Subjective/Events-last exam


Patient denies any complaints today. Pain well controlled. Wound continues to 

drain purulent material. NPO for possible surgery.


Review of Systems


General:  No Chills, No Malaise


Pulmonary:  No Dyspnea, No Cough


Cardiovascular:  Edema; No: Chest Pain, Palpitations


Gastrointestinal:  No: Nausea, Vomiting, Diarrhea, Constipation


Neurological:  Weakness, Incoordination





Focused Exam


Lactate Level


20 11:55: Lactic Acid Level 2.93*H


20 14:17: Lactic Acid Level 1.69





Objective


Exam


Last Set of Vital Signs





Vital Signs








  Date Time  Temp Pulse Resp B/P (MAP) Pulse Ox O2 Delivery O2 Flow Rate FiO2


 


20 15:33 36.7 69 20 137/72 (93) 95 Room Air  





Capillary Refill : Greater Than 3 Seconds


I&O











Intake and Output 


 


 20





 00:00


 


Intake Total 1640 ml


 


Output Total 575 ml


 


Balance 1065 ml


 


 


 


Intake Oral 640 ml


 


IV Total 1000 ml


 


Output Urine Total 575 ml


 


Daily Weight Change No





 No








General:  Alert, Oriented X3, No Acute Distress, Other (Obese)


Lungs:  Clear to Auscultation, Normal Air Movement


Heart:  Regular Rate, No Murmurs


Abdomen:  Normal Bowel Sounds, Soft, No Tenderness, No Masses


Extremities:  Other (Left AKA, 3+ pitting edema with open surgical wound 

draining purulent material)





Results/Procedures


Lab


Laboratory Tests


20 21:27: Glucometer 321H


20 04:22: 


White Blood Count 7.8, Red Blood Count 3.15L, Hemoglobin 8.6L, Hematocrit 28L, 

Mean Corpuscular Volume 88, Mean Corpuscular Hemoglobin 27, Mean Corpuscular 

Hemoglobin Concent 31L, Red Cell Distribution Width 14.9H, Platelet Count 510H, 

Mean Platelet Volume 9.7, Immature Granulocyte % (Auto) 1, Neutrophils (%) 

(Auto) 67, Lymphocytes (%) (Auto) 14, Monocytes (%) (Auto) 8, Eosinophils (%) 

(Auto) 10, Basophils (%) (Auto) 1, Neutrophils # (Auto) 5.2, Lymphocytes # 

(Auto) 1.1, Monocytes # (Auto) 0.6, Eosinophils # (Auto) 0.8H, Basophils # 

(Auto) 0.1, Immature Granulocyte # (Auto) 0.1, Sodium Level 135, Potassium Level

4.0, Chloride Level 103, Carbon Dioxide Level 24, Anion Gap 8, Blood Urea 

Nitrogen 19H, Creatinine 0.94, Estimat Glomerular Filtration Rate > 60, 

BUN/Creatinine Ratio 20, Glucose Level 237H, Calcium Level 8.2L, Corrected 

Calcium 9.3, Total Bilirubin 0.2, Aspartate Amino Transf (AST/SGOT) 11, Alanine 

Aminotransferase (ALT/SGPT) 13, Alkaline Phosphatase 94, Total Protein 7.1, 

Albumin 2.6L


20 11:14: Glucometer 223H


20 13:59: Vancomycin Level Trough 16.9


20 16:48: Glucometer 165H





Microbiology


20 Gram Stain - Final, Resulted


          


20 Wound Culture - Preliminary, Resulted


          Mixed Bacterial Ya


          Strep agalactiae Group B


          Pseudomonas aeruginosa


          Proteus Group


          Staphylococcus aureus


          YEAST


20 Urine Culture - Final, Complete


          3 or more isolates


20 Blood Culture - Preliminary, Resulted


          No growth


Radiology


Date of Exam:20





CHEST 1 VIEW, AP/PA ONLY








INDICATION: Diabetic patient with sepsis.





Frontal chest obtained at 12:07 p.m. and compared to 2020.





There is mild cardiomegaly. Mediastinal silhouette appears


unremarkable.  The lungs are clear. There is no pneumothorax or


pleural fluid.





IMPRESSION:





Mild cardiomegaly with no acute process in the chest.





Assessment/Plan


Assessment/Plan


Assessment & Plan


See Problem list for Dr Blas's plan, patient seen and examined with Rosy Maier, MS3





(1) Postoperative wound infection


Status:  Acute


Assessment & Plan:  - Dr Pike consulted and managing wound, Started on 

Vancomycin and Cefepime D1


: D2 IV antibiotics, wound ctx pending





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


Status:  Chronic


Assessment & Plan:  - Restart home insulin with SSI, Accucheck AC/HS, A1c 

pending





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


Status:  Chronic


Assessment & Plan:  - Cr at baseline, will monitor closely due to Vancomycin





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


Status:  Acute


(5) Insulin dependent diabetes mellitus with complications


Status:  Chronic


(6) HTN (hypertension)


Status:  Chronic


Assessment & Plan:  - Restart home meds


Qualifiers:  


   Qualified Codes:  I10 - Essential (primary) hypertension


(7) S/P AKA (above knee amputation)


Status:  Acute





Clinical Quality Measures


DVT/VTE Risk/Contraindication:


Risk Factor Score Per Nursin


RFS Level Per Nursing on Admit:  4+=Very High











JIE BLAS MD               Sep 29, 2020 18:03

## 2020-09-30 VITALS — DIASTOLIC BLOOD PRESSURE: 74 MMHG | SYSTOLIC BLOOD PRESSURE: 150 MMHG

## 2020-09-30 VITALS — SYSTOLIC BLOOD PRESSURE: 140 MMHG | DIASTOLIC BLOOD PRESSURE: 67 MMHG

## 2020-09-30 VITALS — DIASTOLIC BLOOD PRESSURE: 80 MMHG | SYSTOLIC BLOOD PRESSURE: 156 MMHG

## 2020-09-30 VITALS — SYSTOLIC BLOOD PRESSURE: 140 MMHG | DIASTOLIC BLOOD PRESSURE: 69 MMHG

## 2020-09-30 VITALS — SYSTOLIC BLOOD PRESSURE: 144 MMHG | DIASTOLIC BLOOD PRESSURE: 66 MMHG

## 2020-09-30 VITALS — DIASTOLIC BLOOD PRESSURE: 77 MMHG | SYSTOLIC BLOOD PRESSURE: 160 MMHG

## 2020-09-30 LAB
BASOPHILS # BLD AUTO: 0.1 10^3/UL (ref 0–0.1)
BASOPHILS NFR BLD AUTO: 1 % (ref 0–10)
BUN/CREAT SERPL: 15
CALCIUM SERPL-MCNC: 8.2 MG/DL (ref 8.5–10.1)
CHLORIDE SERPL-SCNC: 105 MMOL/L (ref 98–107)
CO2 SERPL-SCNC: 21 MMOL/L (ref 21–32)
CREAT SERPL-MCNC: 0.84 MG/DL (ref 0.6–1.3)
EOSINOPHIL # BLD AUTO: 0.7 10^3/UL (ref 0–0.3)
EOSINOPHIL NFR BLD AUTO: 9 % (ref 0–10)
GFR SERPLBLD BASED ON 1.73 SQ M-ARVRAT: > 60 ML/MIN
GLUCOSE SERPL-MCNC: 167 MG/DL (ref 70–105)
HCT VFR BLD CALC: 29 % (ref 40–54)
HGB BLD-MCNC: 8.8 G/DL (ref 13.3–17.7)
LYMPHOCYTES # BLD AUTO: 1.1 10^3/UL (ref 1–4)
LYMPHOCYTES NFR BLD AUTO: 14 % (ref 12–44)
MANUAL DIFFERENTIAL PERFORMED BLD QL: NO
MCH RBC QN AUTO: 27 PG (ref 25–34)
MCHC RBC AUTO-ENTMCNC: 31 G/DL (ref 32–36)
MCV RBC AUTO: 88 FL (ref 80–99)
MONOCYTES # BLD AUTO: 0.6 10^3/UL (ref 0–1)
MONOCYTES NFR BLD AUTO: 7 % (ref 0–12)
NEUTROPHILS # BLD AUTO: 5.4 10^3/UL (ref 1.8–7.8)
NEUTROPHILS NFR BLD AUTO: 69 % (ref 42–75)
PLATELET # BLD: 561 10^3/UL (ref 130–400)
PMV BLD AUTO: 9.9 FL (ref 9–12.2)
POTASSIUM SERPL-SCNC: 4 MMOL/L (ref 3.6–5)
SODIUM SERPL-SCNC: 135 MMOL/L (ref 135–145)
WBC # BLD AUTO: 7.8 10^3/UL (ref 4.3–11)

## 2020-09-30 RX ADMIN — VANCOMYCIN HYDROCHLORIDE SCH MLS/HR: 500 INJECTION, POWDER, LYOPHILIZED, FOR SOLUTION INTRAVENOUS at 14:49

## 2020-09-30 RX ADMIN — INSULIN ASPART SCH UNIT: 100 INJECTION, SOLUTION INTRAVENOUS; SUBCUTANEOUS at 20:19

## 2020-09-30 RX ADMIN — SODIUM CHLORIDE SCH MLS/HR: 900 INJECTION, SOLUTION INTRAVENOUS at 14:49

## 2020-09-30 RX ADMIN — SODIUM CHLORIDE SCH MLS/HR: 900 INJECTION, SOLUTION INTRAVENOUS at 10:18

## 2020-09-30 RX ADMIN — INSULIN ASPART SCH UNIT: 100 INJECTION, SOLUTION INTRAVENOUS; SUBCUTANEOUS at 06:15

## 2020-09-30 RX ADMIN — INSULIN ASPART SCH UNIT: 100 INJECTION, SOLUTION INTRAVENOUS; SUBCUTANEOUS at 15:59

## 2020-09-30 RX ADMIN — AMLODIPINE BESYLATE SCH MG: 5 TABLET ORAL at 08:05

## 2020-09-30 RX ADMIN — MICONAZOLE NITRATE SCH GM: 20 POWDER TOPICAL at 08:04

## 2020-09-30 RX ADMIN — HYDROCODONE BITARTRATE AND ACETAMINOPHEN PRN TAB: 5; 325 TABLET ORAL at 08:05

## 2020-09-30 RX ADMIN — SODIUM CHLORIDE SCH MLS/HR: 900 INJECTION INTRAVENOUS at 08:05

## 2020-09-30 RX ADMIN — MICONAZOLE NITRATE SCH GM: 20 POWDER TOPICAL at 20:19

## 2020-09-30 RX ADMIN — SODIUM CHLORIDE SCH MLS/HR: 900 INJECTION INTRAVENOUS at 20:19

## 2020-09-30 RX ADMIN — SODIUM CHLORIDE SCH MLS/HR: 900 INJECTION INTRAVENOUS at 14:49

## 2020-09-30 RX ADMIN — INSULIN ASPART SCH UNIT: 100 INJECTION, SOLUTION INTRAVENOUS; SUBCUTANEOUS at 12:12

## 2020-09-30 RX ADMIN — SODIUM CHLORIDE SCH MLS/HR: 900 INJECTION, SOLUTION INTRAVENOUS at 20:23

## 2020-09-30 RX ADMIN — VANCOMYCIN HYDROCHLORIDE SCH MLS/HR: 500 INJECTION, POWDER, LYOPHILIZED, FOR SOLUTION INTRAVENOUS at 02:56

## 2020-09-30 RX ADMIN — SODIUM CHLORIDE SCH MLS/HR: 900 INJECTION INTRAVENOUS at 02:56

## 2020-09-30 NOTE — PROGRESS NOTE
Subjective


Subjective/Events-last exam


Patient doing well this AM. States that the pain is much better. Tolerating PO 

diet.


Review of Systems


Pulmonary:  No Dyspnea, No Cough


Cardiovascular:  No: Chest Pain, Palpitations


Gastrointestinal:  No: Nausea, Vomiting, Abdominal Pain, Diarrhea, Constipation


Musculoskeletal:  leg pain





Focused Exam


Lactate Level


20 11:55: Lactic Acid Level 2.93*H


20 14:17: Lactic Acid Level 1.69





Objective


Exam


Last Set of Vital Signs





Vital Signs








  Date Time  Temp Pulse Resp B/P (MAP) Pulse Ox O2 Delivery O2 Flow Rate FiO2


 


20 20:00     94 Room Air  


 


20 19:45 36.1 64 18 140/67 (91)    





Capillary Refill : Greater Than 3 Seconds


I&O











Intake and Output 


 


 20





 00:00


 


Intake Total 1770 ml


 


Output Total 2480 ml


 


Balance -710 ml


 


 


 


Intake Oral 750 ml


 


IV Total 1020 ml


 


Output Urine Total 2480 ml


 


# Voids 4








General:  Alert, Oriented X3, No Acute Distress


Lungs:  Clear to Auscultation, Normal Air Movement


Heart:  Regular Rate, No Murmurs


Abdomen:  Normal Bowel Sounds, Soft, No Tenderness, No Masses


Extremities:  Other (Left AKA)


Neuro:  Normal Speech, Sensation Intact, Cranial Nerves 3-12 NL





Results/Procedures


Lab


Laboratory Tests


20 05:37: 


White Blood Count 7.8, Red Blood Count 3.24L, Hemoglobin 8.8L, Hematocrit 29L, 

Mean Corpuscular Volume 88, Mean Corpuscular Hemoglobin 27, Mean Corpuscular 

Hemoglobin Concent 31L, Red Cell Distribution Width 14.9H, Platelet Count 561H, 

Mean Platelet Volume 9.9, Immature Granulocyte % (Auto) 0, Neutrophils (%) 

(Auto) 69, Lymphocytes (%) (Auto) 14, Monocytes (%) (Auto) 7, Eosinophils (%) 

(Auto) 9, Basophils (%) (Auto) 1, Neutrophils # (Auto) 5.4, Lymphocytes # (Auto)

1.1, Monocytes # (Auto) 0.6, Eosinophils # (Auto) 0.7H, Basophils # (Auto) 0.1, 

Immature Granulocyte # (Auto) 0.0, Sodium Level 135, Potassium Level 4.0, 

Chloride Level 105, Carbon Dioxide Level 21, Anion Gap 9, Blood Urea Nitrogen 

13, Creatinine 0.84, Estimat Glomerular Filtration Rate > 60, BUN/Creatinine 

Ratio 15, Glucose Level 167H, Calcium Level 8.2L, Iron Level 23L, Total Iron 

Binding Capacity 232L, Unsaturated Iron Binding Capacity 209, Transferrin % 

Saturation 10L, Ferritin 56.3


20 10:47: Glucometer 288H


20 15:26: Glucometer 246H


20 19:59: Glucometer 225H





Microbiology


20 Gram Stain - Final, Resulted


          


20 Wound Culture - Preliminary, Resulted


          Mixed Bacterial Ya


          Strep agalactiae Group B


          Pseudomonas aeruginosa


          Proteus Group


          Staphylococcus aureus


          YEAST


20 Urine Culture - Final, Complete


          3 or more isolates


20 Blood Culture - Preliminary, Resulted


          No growth


Radiology


Date of Exam:20





CHEST 1 VIEW, AP/PA ONLY








INDICATION: Diabetic patient with sepsis.





Frontal chest obtained at 12:07 p.m. and compared to 2020.





There is mild cardiomegaly. Mediastinal silhouette appears


unremarkable.  The lungs are clear. There is no pneumothorax or


pleural fluid.





IMPRESSION:





Mild cardiomegaly with no acute process in the chest.





Assessment/Plan


Assessment/Plan


Assessment & Plan


See Problem list for Dr Blas's plan, patient seen and examined with Rosy Maier, MS3





(1) Postoperative wound infection


Status:  Acute


Assessment & Plan:  - Dr Pike consulted and managing wound, Started on 

Vancomycin and Cefepime D1


: D2 IV antibiotics, wound ctx pending


: Wound Vac placed, multiple bacterial present





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


Status:  Chronic


Assessment & Plan:  - Restart home insulin with SSI, Accucheck AC/HS, A1c 

pending


: A1c 11.2





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


Status:  Chronic


Assessment & Plan:  - Cr at baseline, will monitor closely due to Vancomycin





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


Status:  Acute


(5) Insulin dependent diabetes mellitus with complications


Status:  Chronic


(6) HTN (hypertension)


Status:  Chronic


Assessment & Plan:  - Restart home meds


Qualifiers:  


   Qualified Codes:  I10 - Essential (primary) hypertension


(7) S/P AKA (above knee amputation)


Status:  Acute





Clinical Quality Measures


DVT/VTE Risk/Contraindication:


Risk Factor Score Per Nursin


RFS Level Per Nursing on Admit:  4+=Very High











JIE BLAS MD               Sep 30, 2020 22:20

## 2020-09-30 NOTE — PROGRESS NOTE - SURGERY
PASTOR WILKINS,MED STUDENT 20 0726:


Subjective


Date Seen by a Provider:  Sep 30, 2020


Time Seen by a Provider:  06:59


Subjective/Events-last exam


Patient without complaints this morning. Denies pain to LLE stump, fever, 

chills, chest pain, shortness of breath, n/v. Bedside wound vac placement 

planned for this morning.


Review of Systems


General:  No Chills


HEENT:  No Head Aches


Pulmonary:  No Dyspnea, No Cough


Cardiovascular:  Edema; No: Chest Pain


Gastrointestinal:  No: Nausea, Vomiting, Abdominal Pain, Diarrhea, Constipation


Genitourinary:  No Dysuria, No Hematuria


Neurological:  No: Weakness





Focused Exam


Lactate Level


20 11:55: Lactic Acid Level 2.93*H


20 14:17: Lactic Acid Level 1.69





Objective


Exam





Vital Signs








  Date Time  Temp Pulse Resp B/P (MAP) Pulse Ox O2 Delivery O2 Flow Rate FiO2


 


20 04:39 36.4 63 18 140/69 (92) 99 Room Air  


 


20 00:30 36.4 61 18 144/66 (92) 97 Room Air  


 


20 20:00     94 Room Air  


 


20 19:22 36.4 66 18 112/68 (83) 96 Room Air  


 


20 15:33 36.7 69 20 137/72 (93) 95 Room Air  


 


20 12:32 36.4 67 14 144/70 (94) 99 Room Air  


 


20 08:21 36.2 73 16 160/75 (103) 94 Room Air  


 


20 08:00     94 Room Air  














I & O 


 


 20





 07:00


 


Intake Total 2220 ml


 


Output Total 2805 ml


 


Balance -585 ml





Capillary Refill : Greater Than 3 Seconds


General Appearance:  No Apparent Distress, Obese


HEENT:  PERRL/EOMI, Moist Mucous Membranes


Respiratory:  No Accessory Muscle Use, No Respiratory Distress


Cardiovascular:  Regular Rate, Rhythm


Gastrointestinal:  normal bowel sounds, non tender, soft


Extremity:  Pedal Edema (RLE, pitting), Other (left AKA stump with greenish 

purulent drainage, mild erythema around sutures, unchanged from yesterday)


Neurologic/Psychiatric:  Alert, Normal Mood/Affect


Skin:  Warm/Dry, Rash (RLE, lymphedema)





Results


Lab


Laboratory Tests


20 11:14: Glucometer 223H


20 13:59: Vancomycin Level Trough 16.9


20 16:48: Glucometer 165H


20 20:38: Glucometer 253H


20 05:37: 


White Blood Count 7.8, Red Blood Count 3.24L, Hemoglobin 8.8L, Hematocrit 29L, 

Mean Corpuscular Volume 88, Mean Corpuscular Hemoglobin 27, Mean Corpuscular 

Hemoglobin Concent 31L, Red Cell Distribution Width 14.9H, Platelet Count 561H, 

Mean Platelet Volume 9.9, Immature Granulocyte % (Auto) 0, Neutrophils (%) 

(Auto) 69, Lymphocytes (%) (Auto) 14, Monocytes (%) (Auto) 7, Eosinophils (%) 

(Auto) 9, Basophils (%) (Auto) 1, Neutrophils # (Auto) 5.4, Lymphocytes # (Auto)

1.1, Monocytes # (Auto) 0.6, Eosinophils # (Auto) 0.7H, Basophils # (Auto) 0.1, 

Immature Granulocyte # (Auto) 0.0, Sodium Level 135, Potassium Level 4.0, 

Chloride Level 105, Carbon Dioxide Level 21, Anion Gap 9, Blood Urea Nitrogen 

13, Creatinine 0.84, Estimat Glomerular Filtration Rate > 60, BUN/Creatinine 

Ratio 15, Glucose Level 167H, Calcium Level 8.2L





Microbiology


20 Gram Stain - Final, Resulted


          


20 Wound Culture - Preliminary, Resulted


          Mixed Bacterial Jacky


          Strep agalactiae Group B


          Pseudomonas aeruginosa


          Proteus Group


          Staphylococcus aureus


          YEAST


20 Urine Culture - Final, Complete


          3 or more isolates


20 Blood Culture - Preliminary, Resulted


          No growth





Assessment/Plan


left AKA wound infection- continue Cefepime and Vanc. Cultures showed mixed 

jacky, Strep agalacticae, few Psuedomonas, Proteus and few Staph aureus. Bedside

wound-vac placement this morning. 





Chronic lymphedema- continue local wound care to open areas on RLE





Diabetes Mellitus- sliding scale insulin, managed by medical team





HTN- has had some elevated BPs, on amlodipine currently





Obesity





Sleep apnea





DVT prophylaxis- enoxaparin











Clinical Quality Measures


DVT/VTE Risk/Contraindication:


Risk Factor Score Per Nursin


RFS Level Per Nursing on Admit:  4+=Very High





NAI PIKE DO 20:


Subjective


Subjective/Events-last exam


Patient doing well without any complaints.  Patient still with drainage from the

left lower extremity.  Patient has wound packed and planning on wound VAC 

placement today.  He denies any nausea vomiting fever sweats chills shortness of

breath or chest pain.





Objective


Exam


General Appearance:  No Apparent Distress, Obese


HEENT:  PERRL/EOMI, Moist Mucous Membranes


Neck:  Full Range of Motion, Non Tender


Respiratory:  Chest Non Tender, No Accessory Muscle Use, No Respiratory Distress


Cardiovascular:  Regular Rate, Rhythm, No JVD


Gastrointestinal:  normal bowel sounds, non tender, soft


Extremity:  Pedal Edema (RLE, pitting), Other (left AKA stump with greenish 

purulent drainage, mild erythema around sutures, unchanged from yesterday)


Neurologic/Psychiatric:  Alert, Oriented x3, No Motor/Sensory Deficits, Normal 

Mood/Affect


Skin:  Warm/Dry, Rash (RLE, lymphedema)


Lymphatic:  No Adenopathy





Assessment/Plan


left AKA wound infection- continue Cefepime and Vanc. Cultures showed mixed 

jacky, Strep agalacticae, few Psuedomonas, Proteus and few Staph aureus. Bedside

wound-vac placement this morning. 





Chronic lymphedema- continue local wound care to open areas on RLE





Diabetes Mellitus- sliding scale insulin, managed by medical team





HTN- has had some elevated BPs, on amlodipine currently





Obesity





Sleep apnea





DVT prophylaxis- enoxaparin





Wound VAC was placed today.  Patient after having wound VAC placed having some 

purulent drainage from the lateral aspect of the wound.  Patient discussed going

to the operating room to have the wound overall open washed out and replacing 

the wound VAC which patient understands and wishes to proceed.  There is a lot 

of tunneling of the wound therefore this will assist in healing faster as well. 

Patient n.p.o. after midnight we will plan on surgical intervention tomorrow.





Supervisory-Addendum Brief


Verification & Attestation


Participated in pt care:  history, MDM, physical


Personally performed:  exam, history, MDM, supervision of care


Care discussed with:  Medical Student


Procedures:  n/a


Results interpretation:  Verified all documentation


Verification and Attestation of Medical Student E/M Service





A medical student performed and documented this service in my presence. I 

reviewed and verified all information documented by the medical student and made

modifications to such information, when appropriate. I personally performed the 

physical exam and medical decision making. 





 Nai Pike, Sep 30, 2020,19:52











PASTOR WILKINS,MED STUDENT    Sep 30, 2020 07:26


NAI PIKE DO              Sep 30, 2020 19:54

## 2020-10-01 VITALS — DIASTOLIC BLOOD PRESSURE: 62 MMHG | SYSTOLIC BLOOD PRESSURE: 131 MMHG

## 2020-10-01 VITALS — SYSTOLIC BLOOD PRESSURE: 152 MMHG | DIASTOLIC BLOOD PRESSURE: 80 MMHG

## 2020-10-01 VITALS — SYSTOLIC BLOOD PRESSURE: 179 MMHG | DIASTOLIC BLOOD PRESSURE: 81 MMHG

## 2020-10-01 VITALS — SYSTOLIC BLOOD PRESSURE: 167 MMHG | DIASTOLIC BLOOD PRESSURE: 84 MMHG

## 2020-10-01 VITALS — SYSTOLIC BLOOD PRESSURE: 169 MMHG | DIASTOLIC BLOOD PRESSURE: 78 MMHG

## 2020-10-01 VITALS — SYSTOLIC BLOOD PRESSURE: 139 MMHG | DIASTOLIC BLOOD PRESSURE: 68 MMHG

## 2020-10-01 VITALS — DIASTOLIC BLOOD PRESSURE: 80 MMHG | SYSTOLIC BLOOD PRESSURE: 159 MMHG

## 2020-10-01 VITALS — SYSTOLIC BLOOD PRESSURE: 159 MMHG | DIASTOLIC BLOOD PRESSURE: 80 MMHG

## 2020-10-01 VITALS — SYSTOLIC BLOOD PRESSURE: 153 MMHG | DIASTOLIC BLOOD PRESSURE: 69 MMHG

## 2020-10-01 VITALS — SYSTOLIC BLOOD PRESSURE: 134 MMHG | DIASTOLIC BLOOD PRESSURE: 63 MMHG

## 2020-10-01 VITALS — DIASTOLIC BLOOD PRESSURE: 89 MMHG | SYSTOLIC BLOOD PRESSURE: 196 MMHG

## 2020-10-01 VITALS — SYSTOLIC BLOOD PRESSURE: 163 MMHG | DIASTOLIC BLOOD PRESSURE: 95 MMHG

## 2020-10-01 VITALS — DIASTOLIC BLOOD PRESSURE: 81 MMHG | SYSTOLIC BLOOD PRESSURE: 183 MMHG

## 2020-10-01 VITALS — DIASTOLIC BLOOD PRESSURE: 75 MMHG | SYSTOLIC BLOOD PRESSURE: 160 MMHG

## 2020-10-01 LAB
BASOPHILS # BLD AUTO: 0.1 10^3/UL (ref 0–0.1)
BASOPHILS NFR BLD AUTO: 1 % (ref 0–10)
BUN/CREAT SERPL: 12
CALCIUM SERPL-MCNC: 8.4 MG/DL (ref 8.5–10.1)
CHLORIDE SERPL-SCNC: 107 MMOL/L (ref 98–107)
CO2 SERPL-SCNC: 20 MMOL/L (ref 21–32)
CREAT SERPL-MCNC: 0.93 MG/DL (ref 0.6–1.3)
EOSINOPHIL # BLD AUTO: 0.5 10^3/UL (ref 0–0.3)
EOSINOPHIL NFR BLD AUTO: 7 % (ref 0–10)
GFR SERPLBLD BASED ON 1.73 SQ M-ARVRAT: > 60 ML/MIN
GLUCOSE SERPL-MCNC: 190 MG/DL (ref 70–105)
HCT VFR BLD CALC: 29 % (ref 40–54)
HGB BLD-MCNC: 9 G/DL (ref 13.3–17.7)
LYMPHOCYTES # BLD AUTO: 0.9 10^3/UL (ref 1–4)
LYMPHOCYTES NFR BLD AUTO: 13 % (ref 12–44)
MANUAL DIFFERENTIAL PERFORMED BLD QL: NO
MCH RBC QN AUTO: 27 PG (ref 25–34)
MCHC RBC AUTO-ENTMCNC: 31 G/DL (ref 32–36)
MCV RBC AUTO: 87 FL (ref 80–99)
MONOCYTES # BLD AUTO: 0.5 10^3/UL (ref 0–1)
MONOCYTES NFR BLD AUTO: 7 % (ref 0–12)
NEUTROPHILS # BLD AUTO: 5.3 10^3/UL (ref 1.8–7.8)
NEUTROPHILS NFR BLD AUTO: 72 % (ref 42–75)
PLATELET # BLD: 553 10^3/UL (ref 130–400)
PMV BLD AUTO: 9.8 FL (ref 9–12.2)
POTASSIUM SERPL-SCNC: 4 MMOL/L (ref 3.6–5)
SODIUM SERPL-SCNC: 136 MMOL/L (ref 135–145)
WBC # BLD AUTO: 7.3 10^3/UL (ref 4.3–11)

## 2020-10-01 PROCEDURE — 0YJD0ZZ INSPECTION OF LEFT UPPER LEG, OPEN APPROACH: ICD-10-PCS | Performed by: SURGERY

## 2020-10-01 RX ADMIN — MICONAZOLE NITRATE SCH GM: 20 POWDER TOPICAL at 20:18

## 2020-10-01 RX ADMIN — MICONAZOLE NITRATE SCH GM: 20 POWDER TOPICAL at 09:53

## 2020-10-01 RX ADMIN — INSULIN ASPART SCH UNIT: 100 INJECTION, SOLUTION INTRAVENOUS; SUBCUTANEOUS at 20:18

## 2020-10-01 RX ADMIN — SODIUM CHLORIDE SCH MLS/HR: 900 INJECTION INTRAVENOUS at 02:48

## 2020-10-01 RX ADMIN — SODIUM CHLORIDE SCH MLS/HR: 900 INJECTION INTRAVENOUS at 17:32

## 2020-10-01 RX ADMIN — INSULIN ASPART SCH UNIT: 100 INJECTION, SOLUTION INTRAVENOUS; SUBCUTANEOUS at 17:32

## 2020-10-01 RX ADMIN — AMLODIPINE BESYLATE SCH MG: 5 TABLET ORAL at 17:31

## 2020-10-01 RX ADMIN — ENOXAPARIN SODIUM SCH MG: 100 INJECTION SUBCUTANEOUS at 17:33

## 2020-10-01 RX ADMIN — INSULIN ASPART SCH UNIT: 100 INJECTION, SOLUTION INTRAVENOUS; SUBCUTANEOUS at 11:20

## 2020-10-01 RX ADMIN — SODIUM CHLORIDE SCH MLS/HR: 900 INJECTION, SOLUTION INTRAVENOUS at 17:32

## 2020-10-01 RX ADMIN — VANCOMYCIN HYDROCHLORIDE SCH MLS/HR: 500 INJECTION, POWDER, LYOPHILIZED, FOR SOLUTION INTRAVENOUS at 17:32

## 2020-10-01 RX ADMIN — SODIUM CHLORIDE SCH MLS/HR: 900 INJECTION, SOLUTION INTRAVENOUS at 04:28

## 2020-10-01 RX ADMIN — INSULIN ASPART SCH UNIT: 100 INJECTION, SOLUTION INTRAVENOUS; SUBCUTANEOUS at 05:49

## 2020-10-01 RX ADMIN — SODIUM CHLORIDE SCH MLS/HR: 900 INJECTION INTRAVENOUS at 21:36

## 2020-10-01 RX ADMIN — SODIUM CHLORIDE SCH MLS/HR: 900 INJECTION INTRAVENOUS at 09:00

## 2020-10-01 RX ADMIN — VANCOMYCIN HYDROCHLORIDE SCH MLS/HR: 500 INJECTION, POWDER, LYOPHILIZED, FOR SOLUTION INTRAVENOUS at 02:48

## 2020-10-01 NOTE — PROGRESS NOTE-POST OPERATIVE
Post-Operative Progess Note


Surgeon (s)/Assistant (s)


Surgeon


NAI DAI DO


Assistant:  na





Pre-Operative Diagnosis


left aka wound infection





Post-Operative Diagnosis





same





Procedure & Operative Findings


Date of Procedure


10/1/20


Procedure Performed/Findings


opening of left aka wound with irrigation/washout and applications of wound vac 

24x12 cm.


Anesthesia Type


general





Estimated Blood Loss


Estimated blood loss (mL):  minimal





Specimens/Packing


Specimens Removed


na











NAI DAI DO               Oct 1, 2020 18:52

## 2020-10-01 NOTE — PROGRESS NOTE - SURGERY
HAMMAD ZEPEDA MED STUDENT 10/1/20 0716:


Subjective


Time Seen by a Provider:  06:55


Subjective/Events-last exam


PT CURRENTLY NPO FOR DEBRIDEMENT AND REPLACEMENT OF VAC FOR L AKA SITE. PT STA

[place] HES FEELING WELL WITH NO CHANGE IN CONDITION, PT STATES THAT WITH VAC 

PLACEMENT YESTERDAY WOUND SITE 'LOOKS '. PT STATES PAIN IS 2/10 FROM 

WOUND SITE. PT DENIES NAUSEA OR VOMITING IN PAST 48HRS, DENIES R CALF 

TENDERNESS. PT AND NIGHT-CALL NURSE BOTH STATE PT WAS UP AND URINATING A LOT 

THIS PAST NIGHT, NURSE SAID HE PASSED 2,000ML IN URINE.


Review of Systems


General:  No Chills, No Night Sweats, No Fatigue


HEENT:  No Sore Throat


Pulmonary:  No Dyspnea, No Pleuritic Chest Pain


Cardiovascular:  Edema (R LE 3+ EDEMA); No: Chest Pain, Palpitations


Gastrointestinal:  No: Nausea, Vomiting, Abdominal Pain, Diarrhea, Constipation


Genitourinary:  No Dysuria, No Frequency


Neurological:  No: Weakness





Focused Exam


Lactate Level


20 11:55: Lactic Acid Level 2.93*H


20 14:17: Lactic Acid Level 1.69





Objective


Exam





Vital Signs








  Date Time  Temp Pulse Resp B/P (MAP) Pulse Ox O2 Delivery O2 Flow Rate FiO2


 


10/1/20 03:57 36.1 74 16 153/69 (97) 95 Room Air  


 


10/1/20 00:11 36.2 62 20 179/81 (113) 93 Room Air  


 


20 20:00     94 Room Air  


 


20 19:45 36.1 64 18 140/67 (91) 95 Room Air  


 


20 15:58    160/77 (104)  Room Air  


 


20 12:00 36.6 65 18 156/80 (105) 98 Room Air  


 


20 08:00     94 Room Air  


 


20 07:55 36.2 72 20 150/74 (99) 98 Room Air  














I & O 


 


 10/1/20





 07:00


 


Intake Total 1615 ml


 


Output Total 4900 ml


 


Balance -3285 ml





Capillary Refill : Greater Than 3 Seconds


General Appearance:  No Apparent Distress, Obese


Respiratory:  Chest Non Tender, No Accessory Muscle Use, No Respiratory Distress


Cardiovascular:  Regular Rate, Rhythm, No JVD


Extremity:  Non Tender, No Calf Tenderness, Pedal Edema (RLE, pitting), Other 

(left AKA stump with VAC placed. R foot has proximal dorsal lesion which pt 

states has been present for a couple days. )


Neurologic/Psychiatric:  Alert, Oriented x3, No Motor/Sensory Deficits, Normal 

Mood/Affect


Skin:  Warm/Dry, Rash (RLE, lymphedema)


Lymphatic:  No Adenopathy





Results


Lab


Laboratory Tests


20 10:47: Glucometer 288H


20 15:26: Glucometer 246H


20 19:59: Glucometer 225H


10/1/20 05:48: Glucometer 165H





Microbiology


20 Gram Stain - Final, Resulted


          


20 Wound Culture - Preliminary, Resulted


          Mixed Bacterial Ya


          Strep agalactiae Group B


          Pseudomonas aeruginosa


          Proteus Group


          Staphylococcus aureus


          YEAST


20 Urine Culture - Final, Complete


          3 or more isolates


20 Blood Culture - Preliminary, Resulted


          No growth





Assessment/Plan


Assessment/Plan


Admission Diagonsis


LEFT AKA WOUND INFECTION


Assessment/Plan


LEFT AKA INFECTION: CONTINUE VANCOMYCIN AND CEFEPRIME, MONITOR RENAL FUNCTION 

FOR VANCOMYCIN-INDUCE RENAL TOXICITY, BUN AND CREATINE DECREASED ON 10/1 CMP. 


CONTINUE WITH DEBRIDEMENT AND REPLACEMENT OF VAC LATER TODAY. 





HYPERTENSION: BP THIS MORNING 153/69. CONTINUE TO MONITOR BP AND CONTROL WITH 

AMLODIPINE. 





TYPE 2 DM: INSULIN HELD, GLUCOSE DROPPED  ON 10/1 DUE TO NPO STATUS.





OBESITY: CONSIDER WEIGHT MANAGEMENT COUNCELING AFTER DISCHARGE. 





DVT PROPHYLAXIS: ENOXAPARIN HAS BEEN DISCONTINUED FOR SX LATER TODAY.





Clinical Quality Measures


DVT/VTE Risk/Contraindication:


Risk Factor Score Per Nursin


RFS Level Per Nursing on Admit:  4+=Very High





Supervisory-Addendum Brief


Verification & Attestation


Participated in pt care:  history, physical


Personally performed:  exam, history


Care discussed with:  Medical Student


Procedures:  n/a


HISTORY AND PE PERFORMED BY STUDENT DOCTOR ZEPEDA, RE-EVALUATED BY DR PIKE.





NAI PIKE DO 10/1/20 0952:


Subjective


Date Seen by a Provider:  Oct 1, 2020


Subjective/Events-last exam


NPO.  WOund vac in place.  Having slight drainage from lateral aspect of wound. 

Minimal discomfort 2/10  Denies n/v fever sweats chills shortness of breath or 

chest pain.





Objective


Exam


General Appearance:  No Apparent Distress, Chronically ill, Obese


HEENT:  PERRL/EOMI, Normal ENT Inspection


Neck:  Normal Inspection, Non Tender


Respiratory:  Chest Non Tender, No Accessory Muscle Use, No Respiratory Distress


Cardiovascular:  Regular Rate, Rhythm, No JVD


Extremity:  Non Tender, No Calf Tenderness, Pedal Edema (RLE, pitting), Other 

(left AKA stump with VAC placed. R foot has proximal dorsal lesion which pt 

states has been present for a couple days. )


Neurologic/Psychiatric:  Alert, Oriented x3, No Motor/Sensory Deficits, Normal 

Mood/Affect


Skin:  Normal Color, Warm/Dry


Lymphatic:  No Adenopathy





Assessment/Plan


LEFT AKA WOUND INFECTION


HYPERTENSION


TYPE 2 DM


OBESITY


DVT PROPHYLAXIS: ENOXAPARIN HAS BEEN HELD 


PLAN WASH OUT AND ALL OTHER INDICATED PROCEDURES TO LEFT AKA WOUND AND REPLACE 

WOUNDVAC


NPO


SURGERY TODAY








Supervisory-Addendum Brief


Verification & Attestation


Participated in pt care:  history, MDM, physical


Personally performed:  exam, history, MDM, supervision of care


Care discussed with:  Medical Student


Procedures:  n/a


Results interpretation:  Verified all documentation


Verification and Attestation of Medical Student E/M Service





A medical student performed and documented this service in my presence. I 

reviewed and verified all information documented by the medical student and made

modifications to such information, when appropriate. I personally performed the 

physical exam and medical decision making. 





 Nai Pike, Oct 1, 2020,09:52


  











HAMMAD ZEPEDA MED STUDENT      Oct 1, 2020 07:16


NAI PIKE DO               Oct 1, 2020 09:52

## 2020-10-01 NOTE — PROGRESS NOTE
Subjective


Subjective/Events-last exam


Patient doing well. No concerns today.


Review of Systems


Pulmonary:  No Dyspnea, No Cough


Cardiovascular:  No: Chest Pain, Palpitations


Gastrointestinal:  No: Nausea, Abdominal Pain, Diarrhea, Constipation


Genitourinary:  No Dysuria, No Frequency, No Hematuria


Neurological:  Weakness, Incoordination





Objective


Exam


Last Set of Vital Signs





Vital Signs








  Date Time  Temp Pulse Resp B/P (MAP) Pulse Ox O2 Delivery O2 Flow Rate FiO2


 


10/1/20 19:55 36.0 75 17 134/63 (86) 96 Room Air  


 


10/1/20 16:45       2 





Capillary Refill : Greater Than 3 Seconds


I&O











Intake and Output 


 


 10/1/20





 00:00


 


Intake Total 2265 ml


 


Output Total 3500 ml


 


Balance -1235 ml


 


 


 


Intake Oral 2265 ml


 


Output Urine Total 3500 ml








General:  Alert, Oriented X3


Lungs:  Clear to Auscultation, Normal Air Movement


Heart:  Regular Rate, No Murmurs


Abdomen:  Normal Bowel Sounds, Soft, No Tenderness, No Masses


Extremities:  Other (2+ pitting edema in limb with AKA, wound vac in place)


Neuro:  Normal Speech, Sensation Intact, Cranial Nerves 3-12 NL





Results/Procedures


Lab


Laboratory Tests


10/1/20 05:48: Glucometer 165H


10/1/20 07:17: 


White Blood Count 7.3, Red Blood Count 3.37L, Hemoglobin 9.0L, Hematocrit 29L, 

Mean Corpuscular Volume 87, Mean Corpuscular Hemoglobin 27, Mean Corpuscular 

Hemoglobin Concent 31L, Red Cell Distribution Width 14.9H, Platelet Count 553H, 

Mean Platelet Volume 9.8, Immature Granulocyte % (Auto) 0, Neutrophils (%) 

(Auto) 72, Lymphocytes (%) (Auto) 13, Monocytes (%) (Auto) 7, Eosinophils (%) 

(Auto) 7, Basophils (%) (Auto) 1, Neutrophils # (Auto) 5.3, Lymphocytes # (Auto)

0.9L, Monocytes # (Auto) 0.5, Eosinophils # (Auto) 0.5H, Basophils # (Auto) 0.1,

Immature Granulocyte # (Auto) 0.0, Sodium Level 136, Potassium Level 4.0, 

Chloride Level 107, Carbon Dioxide Level 20L, Anion Gap 9, Blood Urea Nitrogen 

11, Creatinine 0.93, Estimat Glomerular Filtration Rate > 60, BUN/Creatinine 

Ratio 12, Glucose Level 190H, Calcium Level 8.4L


10/1/20 11:10: Glucometer 199H


10/1/20 17:26: Glucometer 155H


10/1/20 20:06: Glucometer 224H





Microbiology


20 Gram Stain - Final, Resulted


          


20 Wound Culture - Preliminary, Resulted


          Mixed Bacterial Ya


          Strep agalactiae Group B


          Pseudomonas aeruginosa


          Pseudomonas aeruginosa#2


          Proteus Group


          Staphylococcus aureus


          YEAST


20 Urine Culture - Final, Complete


          3 or more isolates


20 Blood Culture - Preliminary, Resulted


          No growth


Radiology


Date of Exam:20





CHEST 1 VIEW, AP/PA ONLY








INDICATION: Diabetic patient with sepsis.





Frontal chest obtained at 12:07 p.m. and compared to 2020.





There is mild cardiomegaly. Mediastinal silhouette appears


unremarkable.  The lungs are clear. There is no pneumothorax or


pleural fluid.





IMPRESSION:





Mild cardiomegaly with no acute process in the chest.





Assessment/Plan


Assessment/Plan


Assessment & Plan


See Problem list for Dr Blas's plan, patient seen and examined with Rosy Maier, MS3





(1) Postoperative wound infection


Status:  Acute


Assessment & Plan:  - Dr Pike consulted and managing wound, Started on 

Vancomycin and Cefepime D1


: D2 IV antibiotics, wound ctx pending


: Wound Vac placed, multiple bacterial present


10/1: Waiting for culture and sensitivities





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


Status:  Chronic


Assessment & Plan:  - Restart home insulin with SSI, Accucheck AC/HS, A1c 

pending


: A1c 11.2





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


Status:  Chronic


Assessment & Plan:  - Cr at baseline, will monitor closely due to Vancomycin





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


Status:  Acute


(5) Insulin dependent diabetes mellitus with complications


Status:  Chronic


(6) HTN (hypertension)


Status:  Chronic


Assessment & Plan:  - Restart home meds


Qualifiers:  


   Qualified Codes:  I10 - Essential (primary) hypertension


(7) S/P AKA (above knee amputation)


Status:  Acute





Clinical Quality Measures


DVT/VTE Risk/Contraindication:


Risk Factor Score Per Nursin


RFS Level Per Nursing on Admit:  4+=Very High











JIE BLAS MD                Oct 1, 2020 21:11

## 2020-10-02 VITALS — SYSTOLIC BLOOD PRESSURE: 136 MMHG | DIASTOLIC BLOOD PRESSURE: 70 MMHG

## 2020-10-02 VITALS — DIASTOLIC BLOOD PRESSURE: 82 MMHG | SYSTOLIC BLOOD PRESSURE: 150 MMHG

## 2020-10-02 VITALS — SYSTOLIC BLOOD PRESSURE: 139 MMHG | DIASTOLIC BLOOD PRESSURE: 69 MMHG

## 2020-10-02 LAB
ALBUMIN SERPL-MCNC: 2.8 GM/DL (ref 3.2–4.5)
ALP SERPL-CCNC: 88 U/L (ref 40–136)
ALT SERPL-CCNC: 12 U/L (ref 0–55)
BASOPHILS # BLD AUTO: 0 10^3/UL (ref 0–0.1)
BASOPHILS NFR BLD AUTO: 1 % (ref 0–10)
BILIRUB SERPL-MCNC: 0.2 MG/DL (ref 0.1–1)
BUN/CREAT SERPL: 11
CALCIUM SERPL-MCNC: 8.3 MG/DL (ref 8.5–10.1)
CHLORIDE SERPL-SCNC: 105 MMOL/L (ref 98–107)
CO2 SERPL-SCNC: 21 MMOL/L (ref 21–32)
CREAT SERPL-MCNC: 0.94 MG/DL (ref 0.6–1.3)
EOSINOPHIL # BLD AUTO: 0.4 10^3/UL (ref 0–0.3)
EOSINOPHIL NFR BLD AUTO: 6 % (ref 0–10)
GFR SERPLBLD BASED ON 1.73 SQ M-ARVRAT: > 60 ML/MIN
GLUCOSE SERPL-MCNC: 201 MG/DL (ref 70–105)
HCT VFR BLD CALC: 28 % (ref 40–54)
HGB BLD-MCNC: 8.7 G/DL (ref 13.3–17.7)
LYMPHOCYTES # BLD AUTO: 1 10^3/UL (ref 1–4)
LYMPHOCYTES NFR BLD AUTO: 13 % (ref 12–44)
MANUAL DIFFERENTIAL PERFORMED BLD QL: NO
MCH RBC QN AUTO: 27 PG (ref 25–34)
MCHC RBC AUTO-ENTMCNC: 31 G/DL (ref 32–36)
MCV RBC AUTO: 88 FL (ref 80–99)
MONOCYTES # BLD AUTO: 0.6 10^3/UL (ref 0–1)
MONOCYTES NFR BLD AUTO: 8 % (ref 0–12)
NEUTROPHILS # BLD AUTO: 5.5 10^3/UL (ref 1.8–7.8)
NEUTROPHILS NFR BLD AUTO: 72 % (ref 42–75)
PLATELET # BLD: 545 10^3/UL (ref 130–400)
PMV BLD AUTO: 9.9 FL (ref 9–12.2)
POTASSIUM SERPL-SCNC: 4 MMOL/L (ref 3.6–5)
PROT SERPL-MCNC: 7.6 GM/DL (ref 6.4–8.2)
SODIUM SERPL-SCNC: 136 MMOL/L (ref 135–145)
WBC # BLD AUTO: 7.7 10^3/UL (ref 4.3–11)

## 2020-10-02 RX ADMIN — SODIUM CHLORIDE SCH MLS/HR: 900 INJECTION, SOLUTION INTRAVENOUS at 02:54

## 2020-10-02 RX ADMIN — AMLODIPINE BESYLATE SCH MG: 5 TABLET ORAL at 08:53

## 2020-10-02 RX ADMIN — MICONAZOLE NITRATE SCH GM: 20 POWDER TOPICAL at 09:09

## 2020-10-02 RX ADMIN — ENOXAPARIN SODIUM SCH MG: 100 INJECTION SUBCUTANEOUS at 04:29

## 2020-10-02 RX ADMIN — SODIUM CHLORIDE SCH MLS/HR: 900 INJECTION INTRAVENOUS at 08:54

## 2020-10-02 RX ADMIN — VANCOMYCIN HYDROCHLORIDE SCH MLS/HR: 500 INJECTION, POWDER, LYOPHILIZED, FOR SOLUTION INTRAVENOUS at 04:25

## 2020-10-02 RX ADMIN — INSULIN ASPART SCH UNIT: 100 INJECTION, SOLUTION INTRAVENOUS; SUBCUTANEOUS at 11:52

## 2020-10-02 RX ADMIN — SODIUM CHLORIDE SCH MLS/HR: 900 INJECTION INTRAVENOUS at 04:15

## 2020-10-02 RX ADMIN — INSULIN ASPART SCH UNIT: 100 INJECTION, SOLUTION INTRAVENOUS; SUBCUTANEOUS at 05:42

## 2020-10-02 NOTE — NUR
ATTEMPT TO BRING PT BACK ET HE IS IN THE BATHROOM.
ATTEMPT TO CALL REPORT ET NO ANSWER.
ATTEMPT TO CALL REPORT ET NURSE DID NOT ANSWER THE PHONE.
CALLED DR RICHARDSON.  WHEN PATIENT IS NPO CHANGE SLIDING SCALE TO Q6 HOURS.  CONTINUE TO GIVE 
THE INSULIN THE SCALE CALLS FOR.
CM/SS finalized discharge. 



Plan: The patient will discharge home with home health and Wound Vac. 



CM/SS discussed with patient that he will be discharging home with wound vac and this sw has 
called to resume his home health. He verbalized understanding. CM/SS asked if patient felt 
comfortable going with current discharge plans. He stated "well, I guess all I can do is 
try". 



Home Health: CM/SS contacted [facility] referral line and spoke with Emma. CM/SS 
faxed finalized home health orders to resume care. 



Wound Vac: CM/SS contacted [facility] to discuss wound vac and wound care. She reports patient 
was approved prior to admission and will contact [facility] to verify.



Patient denied any further questions.
CM/SS following patient for discharge planning. 



The patient was sitting up in bed watching a movie on his computer. He states he is doing 
well today. The patient has a wound vac on currently and is planning to go to surgery today. 




This CM/SS discussed that patient may need longer term wound care due to infection and poor 
healing. He verbalized understanding. This sw is concerned about home conditions for wound 
healing. CM/SS discussed nursing home placement and a [facility] in 
[place]. Patient will have to use a [place] [facility] due to his Medicaid. 



STACI/SS spoke with patient's physician regarding plan of care. She is agreeable with plan for 
an LTAC; however, patient may not be agreeable due to his father being at home alone and 
needing care. The patient reported that his niece is helping with his father at this time. 



STACI/SS contacted LTAC in [place] and spoke with Ben in Intake. He reports that patient 
sound appropriate but they are currently full until next week. He will keep patient's 
referral on file if a bed opens up. 



CM/SS will continue to follow.
DR DAI HERE TO SEE PT AT THIS TIME.
JANNA REYNOLDS admitted to room 418-1, with an admitting diagnosis of wound infection, on 
09/28/20 from ED via wheel chair , accompanied by staff .JANNA REYNOLDS introduced to 
surroundings, call light, bed controls, phone, TV, temperature control, lights, meal times, 
smoking policy, visitor policy, side rail policy, bathrooms and showers.  Patient Rights 
given to patient in the handbook. JANNA REYNOLDS verbalizes understanding that [facility] 
is not responsible for the loss or damage to any personal effects or valuables that are kept 
in the patients posession during their hospitalization.  The following Patient Care Plans 
and discharge were discussed with the patient . JANNA REYNOLDS verbalizes understanding of 
Interdisciplinary Patient Education. Patient was  informed about the Rapid Response Team and 
its purpose.
LAB CONTACTED TO DRAW BLUE TOP
LAB IN DRAWING BLOOD AT THIS TIME.
PT'S CALL LIGHT WAS GOING OFF IN BATHROOM. DENIES HELP WHEN I WENT TO CHECK ON 
HIM.
RD ASSESSMENT 



PMHx: HTN; DM; L AKA (8/25/20)



PT INTERACTION: Pt was awake and pleasant during nutrition assessment. Pt states current 
appetite is pretty good. Note pt is currently NPO, per chart review. Pt states following a 
regular diet at home, and has no issues with chewing/swallowing food. Pt states no recent 
issues with nausea, vomiting, or diarrhea. Pt states chronic issues with constipation, and 
that his last BM was 9/28. Note pt not currently on bowel regimen per chart review. Pt 
states recent wt loss due to loss of left leg. Note recent 22# wt gain x3w, per chart 
review. When asked about current level of DM management: "I don't check it every day, but I 
have been eating less sugary-type food, like potatoes and cakes." Note recent HbA1c results 
are currently pending, per chart review. Note presence of wound (surgical wound, left leg), 
per chart review. 



ABNORMAL NUTRITION-RELATED LAB VALUES

LOW: Ca 8.2; alb 2.6

HIGH: BUN 19; glu 237



Est. kcal needs: 2025 kcal | 25 kcal/kg IBW, based on IBW of 81 kg (178#) 

Est. Pro needs:  113 g Pro | 1.4 g Pro/kg IBW



PES STATEMENT: Inadequate oral intake (NI-2.1) related to constipation | NPO status as 
evidenced by pt interview | chart review 



Inadequate protein intake (NI-5.6.1) related to increased protein needs as evidenced by 
presence of wound (surg wound, left leg)



INTERVENTION:  

Note pt is currently NPO. 

Would recommend diet advancement to consistent CHO diet, when medically able and as 
tolerated. 

Pt would likely benefit from nutrition supplementation of Ensure HP (vary) upon diet 
advancement, for increased protein intake for perceived benefit to wound healing. 

Will offer diet education reinforcement prior to discharge. 

Will continue to follow and reassess as pt needs, intake, and status change. 





CAROL Hoang, MS, RD, LD
SPOKE WITH THE PT, WENT THRU THE EXT MED HISTORY AND CALLED [facility] TO COMPLETE THE MED REC



CURRENTLY [facility] HAS LISINOPRIL 20MG AND METFORMIN ER 500MG FILLED AND READY FOR , 
WHEN I CALLED TO SEE THE LAST FILL DATE PRIOR TO THE CURRENT FILL I WAS TOLD BOTH THESE 
MEDICATIONS WERE LAST FILLED IN NOVEMBER 2019 FOR 30 DAY SUPPLIES. FOR THIS REASON I DID NOT 
INCLUDE THEM IN THE MED REC



OTC MEDS:

ASPIRIN 325MG
SUPERVISOR CONTACTED FOR BED.
VANCOMYCIN DOSING



TROUGH LEVEL 16.9 - CONTINUE CURRENT DOSE OF VANC 2 GM Q12H
reassessment of wound vac.  suction/drainage shows sero-sanguienous fluid.  lateral side, 
scabbed-over incision now draining purulent drainage.  Notified , new orders rec'd. pt to 
be NPO after midnight, hold today's lovenox, dr will debride wound in OR tomorrow and place 
wound vac in OR.
No

## 2020-10-02 NOTE — PROGRESS NOTE - SURGERY
HAMMAD ZEPEDA MED STUDENT 10/2/20 0754:


Subjective


Time Seen by a Provider:  07:20


Subjective/Events-last exam


PT FEELING WELL SINCE DEBRIDEMENT AND VAC PLACEMENT YESTERDAY 10/1 BY DR PIKE.

PT DESCRIBED THE WOUND SITE AS 'RAW', A 1/10 PAIN THAT IS NON-EXISTENT AT REST. 

PT STATES THE ALARM FOR THE VAC MACHINE HASNT GONE OFF SINCE PT GOT BACK TO 

HOSPTIAL BED FROM OPERATION. PT HAS PASSED STOOL AND URINATED SINCE AFTER THE 

PROCEDURE. DENIES ABD PAIN, NAUSEA, VOMITING AND CHEST PAIN.


Review of Systems


General:  No Night Sweats, No Fatigue


Pulmonary:  No Dyspnea, No Cough, No Pleuritic Chest Pain


Cardiovascular:  No: Chest Pain, Palpitations


Gastrointestinal:  No: Nausea, Vomiting, Abdominal Pain, Diarrhea, Constipation


Genitourinary:  No Dysuria





Objective


Exam





Vital Signs








  Date Time  Temp Pulse Resp B/P (MAP) Pulse Ox O2 Delivery O2 Flow Rate FiO2


 


10/2/20 04:26 36.3 61 18 139/69 (92) 97 Room Air  


 


10/1/20 23:39 36.2 74 18 131/62 (85) 98 Room Air  


 


10/1/20 19:55 36.0 75 17 134/63 (86) 96 Room Air  


 


10/1/20 19:05      Room Air  


 


10/1/20 17:07 36.1 62 17 196/89 (124) 99 Room Air  


 


10/1/20 16:45      Nasal Cannula 2 


 


10/1/20 16:40 36.6  16 167/84 (111) 98 Nasal Cannula 2 


 


10/1/20 16:35      OxyMask 2 


 


10/1/20 16:30   16 152/80 (104) 94 Nasal Cannula 2 


 


10/1/20 16:25      OxyMask 4 


 


10/1/20 16:20   18 163/95 (117) 100 OxyMask 4 


 


10/1/20 16:15      OxyMask 6 


 


10/1/20 16:12 36.7 63 20 159/80 (106) 99 Room Air  


 


10/1/20 16:10   20 169/78 (108) 96 OxyMask 6 


 


10/1/20 16:05      OxyMask 8 


 


10/1/20 16:00   18 160/75 (103) 95 OxyMask 8 


 


10/1/20 15:52 36.6  10 139/68 (91) 96 OxyMask 10 


 


10/1/20 15:52      OxyMask 10 


 


10/1/20 12:12 36.7 63 20 159/80 (106) 99 Room Air  


 


10/1/20 08:00      Room Air  


 


10/1/20 08:00 36.2 66 20 183/81 (115) 98 Room Air  














I & O 


 


 10/2/20





 07:00


 


Intake Total 4120 ml


 


Output Total 4370 ml


 


Balance -250 ml





Capillary Refill : Greater Than 3 Seconds


General Appearance:  No Apparent Distress, Chronically ill, Obese


Respiratory:  Chest Non Tender, Lungs Clear, Normal Breath Sounds, No Accessory 

Muscle Use, No Respiratory Distress


Cardiovascular:  Regular Rate, Rhythm, No JVD


Extremity:  Pedal Edema (RLE, pitting), Other (left AKA stump with VAC placed. R

foot has proximal dorsal lesion which pt states has been present for SEVERAL 

days. )


Neurologic/Psychiatric:  Alert, Oriented x3, No Motor/Sensory Deficits, Normal 

Mood/Affect


Skin:  Normal Color, Warm/Dry


Lymphatic:  No Adenopathy





Results


Lab


Laboratory Tests


10/1/20 11:10: Glucometer 199H


10/1/20 17:26: Glucometer 155H


10/1/20 20:06: Glucometer 224H


10/2/20 05:24: 


White Blood Count 7.7, Red Blood Count 3.19L, Hemoglobin 8.7L, Hematocrit 28L, 

Mean Corpuscular Volume 88, Mean Corpuscular Hemoglobin 27, Mean Corpuscular 

Hemoglobin Concent 31L, Red Cell Distribution Width 14.8H, Platelet Count 545H, 

Mean Platelet Volume 9.9, Immature Granulocyte % (Auto) 0, Neutrophils (%) 

(Auto) 72, Lymphocytes (%) (Auto) 13, Monocytes (%) (Auto) 8, Eosinophils (%) 

(Auto) 6, Basophils (%) (Auto) 1, Neutrophils # (Auto) 5.5, Lymphocytes # (Auto)

1.0, Monocytes # (Auto) 0.6, Eosinophils # (Auto) 0.4H, Basophils # (Auto) 0.0, 

Immature Granulocyte # (Auto) 0.0, Sodium Level 136, Potassium Level 4.0, 

Chloride Level 105, Carbon Dioxide Level 21, Anion Gap 10, Blood Urea Nitrogen 

10, Creatinine 0.94, Estimat Glomerular Filtration Rate > 60, BUN/Creatinine 

Ratio 11, Glucose Level 201H, Calcium Level 8.3L, Corrected Calcium 9.3, Total 

Bilirubin 0.2, Aspartate Amino Transf (AST/SGOT) 16, Alanine Aminotransferase 

(ALT/SGPT) 12, Alkaline Phosphatase 88, Total Protein 7.6, Albumin 2.8L


10/2/20 05:37: Glucometer 207H





Microbiology


20 Gram Stain - Final, Resulted


          


20 Wound Culture - Preliminary, Resulted


          Mixed Bacterial Ya


          Strep agalactiae Group B


          Pseudomonas aeruginosa


          Pseudomonas aeruginosa#2


          Proteus Group


          Staphylococcus aureus


          YEAST


20 Urine Culture - Final, Complete


          3 or more isolates


20 Blood Culture - Preliminary, Resulted


          No growth





Assessment/Plan


Assessment/Plan


Admission Diagonsis


LEFT AKA INFECTION


Assessment/Plan


LEFT AKA INFECTION: CONTINUE VANCOMYCIN AND CEFEPRIME, MONITOR RENAL FUNCTION 

FOR VANCOMYCIN-INDUCE RENAL TOXICITY, BUN AND CREATINE DECREASED ON 10/2 CMP. 


MONITOR VAC PLACEMENT SITE. 





HYPERTENSION: BP THIS MORNING 139/69. CONTINUE TO MONITOR BP AND CONTROL WITH 

AMLODIPINE. 





TYPE 2 DM: INSULIN RESTARTED AFTER SX. 





OBESITY: CONSIDER WEIGHT MANAGEMENT COUNCELING AFTER DISCHARGE. 





DVT PROPHYLAXIS: ENOXAPARIN HAS RESTARTED AFTER YESTERDAYS SX.








Clinical Quality Measures


DVT/VTE Risk/Contraindication:


Risk Factor Score Per Nursin


RFS Level Per Nursing on Admit:  4+=Very High





Supervisory-Addendum Brief


Verification & Attestation


Participated in pt care:  history, physical


Personally performed:  exam, history


Care discussed with:  Medical Student


Procedures:  n/a


HISTORY AND PE PERFORMED BY STUDENT DOCTOR KATELYN, RE-EVALUATED BY DR PIKE.





NAI PIKE DO 10/2/20 1303:


Subjective


Subjective/Events-last exam


feeling well pain controlled


denies new complaints


wound vac in place





Objective


Exam


General Appearance:  No Apparent Distress, Obese


HEENT:  PERRL/EOMI


Neck:  Non Tender


Respiratory:  Chest Non Tender, No Accessory Muscle Use, No Respiratory Distress


Cardiovascular:  Regular Rate, Rhythm, No JVD


Gastrointestinal:  non tender, soft


Extremity:  Normal Capillary Refill, Non Tender, Other (left AKA stump with VAC 

placed. R foot has proximal dorsal lesion )


Neurologic/Psychiatric:  Alert, Oriented x3, No Motor/Sensory Deficits, Normal 

Mood/Affect


Skin:  Normal Color, Warm/Dry


Lymphatic:  No Adenopathy





Assessment/Plan


left aka wound infection s/p opening wound, washout and wound vac placement POD1


abx tailored to cultures


will need wound care at dc


okay to dc home from surgical standpoint.





Supervisory-Addendum Brief


Verification & Attestation


Participated in pt care:  history, MDM, physical


Personally performed:  exam, history, MDM, supervision of care


Care discussed with:  Medical Student


Procedures:  n/a


Results interpretation:  Verified all documentation


Verification and Attestation of Medical Student E/M Service





A medical student performed and documented this service in my presence. I 

reviewed and verified all information documented by the medical student and made

modifications to such information, when appropriate. I personally performed the 

physical exam and medical decision making. 





 Nai Pike, Oct 2, 2020,13:03


  











HAMMAD ZEPEDA MED STUDENT      Oct 2, 2020 07:54


NAI PIKE DO               Oct 2, 2020 13:03

## 2020-10-02 NOTE — DISCHARGE SUMMARY
Discharge Summary


Reconcile Patient Problems


Problems Reviewed?:  Yes





Instructions for Patient


[facility], [phone]


Assessment/Instructions


Left Surgical Wound Infection


h/o Left AKA


IDDM Uncontrolled


HTN


Physician to follow Patient:  Heurter


Discharge Diet for Home:  ADA Diet


Hospital Course


Date of Admission: Sep 28, 2020 at 13:49 


Admission Diagnosis :  





Family Physician/Provider: Noe Roque MD  





Date of Discharge: 10/2/20 


Discharge Diagnosis: 


Post Surgical Wound Infection


s/p Left AKA


IDDM Uncontrolled


HTN








Hospital Course:


51 yo M that presented with worsening post op infection. Patient was sent to ER 

by  nurse because his wound was getting larger and had more drainage. He was 

started on broad spectrum IV antibiotics and wound was cultured. Patient was 

seen by General surgery and had wound vac placed. He was continued on IV 

antibiotics until culture results were available. He has a multi organism 

infection in the wound. Patient was transitioned to PO Levaquin at discharge.











Labs and Pending Lab Test:


Laboratory Tests


10/1/20 17:26: Glucometer 155H


10/1/20 20:06: Glucometer 224H


10/2/20 05:24: 


White Blood Count 7.7, Red Blood Count 3.19L, Hemoglobin 8.7L, Hematocrit 28L, 

Mean Corpuscular Volume 88, Mean Corpuscular Hemoglobin 27, Mean Corpuscular 

Hemoglobin Concent 31L, Red Cell Distribution Width 14.8H, Platelet Count 545H, 

Mean Platelet Volume 9.9, Immature Granulocyte % (Auto) 0, Neutrophils (%) 

(Auto) 72, Lymphocytes (%) (Auto) 13, Monocytes (%) (Auto) 8, Eosinophils (%) 

(Auto) 6, Basophils (%) (Auto) 1, Neutrophils # (Auto) 5.5, Lymphocytes # (Auto)

1.0, Monocytes # (Auto) 0.6, Eosinophils # (Auto) 0.4H, Basophils # (Auto) 0.0, 

Immature Granulocyte # (Auto) 0.0, Sodium Level 136, Potassium Level 4.0, 

Chloride Level 105, Carbon Dioxide Level 21, Anion Gap 10, Blood Urea Nitrogen 

10, Creatinine 0.94, Estimat Glomerular Filtration Rate > 60, BUN/Creatinine 

Ratio 11, Glucose Level 201H, Calcium Level 8.3L, Corrected Calcium 9.3, Total 

Bilirubin 0.2, Aspartate Amino Transf (AST/SGOT) 16, Alanine Aminotransferase 

(ALT/SGPT) 12, Alkaline Phosphatase 88, Total Protein 7.6, Albumin 2.8L


10/2/20 05:37: Glucometer 207H





Microbiology


9/28/20 Gram Stain - Final, Resulted


          


9/28/20 Wound Culture - Preliminary, Resulted


          Mixed Bacterial Ya


          Strep agalactiae Group B


          Pseudomonas aeruginosa


          Pseudomonas aeruginosa#2


          Proteus Group


          Staphylococcus aureus


          YEAST


9/28/20 Urine Culture - Final, Complete


          3 or more isolates


9/28/20 Blood Culture - Preliminary, Resulted


          No growth





Home Meds


Active


Reported


Hydrocodone-Acetamin 5-325 mg (Hydrocodone/Acetaminophen) 1 Each Tablet 1-2 Each

PO Q4H PRN


Pantoprazole Sodium 40 Mg Tablet.dr 40 Mg PO DAILY


Amlodipine Besylate 5 Mg Tablet 5 Mg PO DAILY


Aspirin EC (Aspirin) 325 Mg Tablet.dr 325 Mg PO DAILY


Consulations


Dr Pike: General Surgery


Patient Allergies:  


Coded Allergies:  


     meperidine HCl (Unverified  Allergy, Unknown, 11/26/11)


     shellfish derived (Unverified  Allergy, Unknown, 1/14/13)


   


   FROM UNCODED ALLERGIES


     Penicillins (Unverified  Adverse Reaction, Intermediate, NAUSEA, 11/26/11)


Height (Feet):  6


Height (Inches):  0.00


Weight (Pounds):  400


Weight (Ounces):  4.0


New Medications:  


Levofloxacin (Levofloxacin) 750 Mg Tablet


750 MG PO DAILY, #10 TAB





 


Continued Medications:  


Amlodipine Besylate (Amlodipine Besylate) 5 Mg Tablet


5 MG PO DAILY, TAB





Aspirin (Aspirin EC) 325 Mg Tablet.dr


325 MG PO DAILY, TAB





Hydrocodone/Acetaminophen (Hydrocodone-Acetamin 5-325 mg) 1 Each Tablet


1-2 EACH PO Q4H PRN for PAIN-MODERATE (5-7), TAB





Pantoprazole Sodium (Pantoprazole Sodium) 40 Mg Tablet.dr


40 MG PO DAILY, TAB











Home Health Need/Face to Face


Date of Face to Face:  Oct 2, 2020


Clinical Findings:  Non or partial weight bearing, Wound infection, Non-healing 

wound


I have seen Pt face-to-face:  Yes


Discharged To:  Home


Diagnosis/Conditions:  


See Above


Patient is Homebound due to:  Jesika fall risk due to instabilty, Non-weight 

bearing


Homebound Status


   Due to the above stated illness, injury or surgical procedure (medical 

condition or diagnosis) and associated clinical findings, the patient is 

homebound because of his/her inability to leave home except with aid of a 

supportive device and/or person AND leaving the home requires a considerable and

taxing effort or is medically contraindicated.


Pt req the following assistanc:  Aid of another person





Home Health Nursing Orders


Home Health Services Order:  Nursing Services, Occupational Ther-Evaluate & 

Treat, Physical Therapy-Evaluate & Treat, Wound Care-Eval/Treat





Wound Vac Care


Wound care on Right toe


DM control: A1c increased to 11





Therapy Orders


Therapy Orders:  OT (must have SN or PT order), PT to assess for OT


Therapy Specific Orders:  Increase strength/endurance


Certify Stmt


I certify that this patient is under my care and that I, a nurse practitioner or

a physician; a assistant working with me, had a face to face encounter that -

meets the physician face to face encounter requirements with this patient as 

dated.





Discharge Physical Exam


General:  Alert, Oriented X3, Cooperative


Lungs:  Clear to Auscultation, Normal Air Movement


Heart:  Regular Rate, No Murmurs


Abdomen:  Normal Bowel Sounds, Soft, No Tenderness, No Masses


Extremities:  Other (LLE wound vac in place, swelling present in stump)


Neuro:  Cranial Nerves 3-12 NL


Psych/Mental Status:  Mental Status NL, Mood NL











JIE RICHARDSON MD                Oct 2, 2020 11:34

## 2020-10-02 NOTE — OPERATIVE REPORT
DATE OF SERVICE:  10/01/2020



PREOPERATIVE DIAGNOSIS:

Left above-knee amputation wound infection.



POSTOPERATIVE DIAGNOSIS:

Left above-knee amputation wound infection.



PROCEDURE:

Opening of the left above-knee amputation wound with irrigation and washout and

application of wound VAC 24 x 12 cm.



SURGEON:

Nai Pike DO



ANESTHESIA:

General.



ESTIMATED BLOOD LOSS:

Minimal.



COMPLICATIONS:

None.



INDICATIONS:

The patient is a 50-year-old male who had recent left above-knee amputation. 

The patient's wound became infected and we discussed risks and benefits of above

procedure.  He wishes to proceed.  Consent was signed in the chart.



DESCRIPTION OF PROCEDURE:

The patient was taken to the operating suite, was prepped and draped in sterile

fashion.  Timeout was performed.  Cautery was used to open up the skin where

the tunneling was present on the wound for easier management of his wound.  The

incision was made along the previous scars from the distal stump.  Once opened,

the wound was irrigated and washed with a power  3 liters.  The wound

bed appears clean.  There were no pockets present at this time.  The femur was

inspected and has had some granulation tissue starting to begin.  The vascular

bundles were inspected.  Hemostasis was still present.  At this time, white foam

was placed over the bone and the vascular bundle.  White foam was then used to

pack the wound and then the left leg was then washed and dried and the bandage

was applied to the wound VAC and wound VAC was then able to be turned on in the

usual fashion.  The overall wound packing was 24 x 12 cm.  The patient tolerated

procedure well without any complications.  He was taken to the recovery room in

stable condition.





Job ID: [id number]

DocumentID: [id number]

Dictated Date:  10/01/2020 18:51:10

Transcription Date: 10/02/2020 05:51:53

Dictated By: NAI PIKE DO

[facility]

## 2020-10-02 NOTE — ANESTHESIA-GENERAL POST-OP
General


Patient Condition


Mental Status/LOC:  Same as Preop


Cardiovascular:  Satisfactory


Nausea/Vomiting:  Absent


Respiratory:  Satisfactory


Pain:  Controlled


Complications:  Absent





Post Op Complications


Complications


None





Follow Up Care/Instructions


Patient Instructions


None needed.





Anesthesia/Patient Condition


Patient Condition


Patient is doing well, no complaints, stable vital signs, no apparent adverse 

anesthesia problems.











XANDER KAN DO          Oct 2, 2020 10:58

## 2020-10-12 ENCOUNTER — HOSPITAL ENCOUNTER (EMERGENCY)
Dept: HOSPITAL 75 - ER | Age: 50
Discharge: HOME | End: 2020-10-12
Payer: MEDICAID

## 2020-10-12 VITALS — WEIGHT: 315 LBS | HEIGHT: 71.65 IN | BODY MASS INDEX: 43.13 KG/M2

## 2020-10-12 VITALS — DIASTOLIC BLOOD PRESSURE: 76 MMHG | SYSTOLIC BLOOD PRESSURE: 184 MMHG

## 2020-10-12 DIAGNOSIS — Z87.891: ICD-10-CM

## 2020-10-12 DIAGNOSIS — Z82.61: ICD-10-CM

## 2020-10-12 DIAGNOSIS — Z79.82: ICD-10-CM

## 2020-10-12 DIAGNOSIS — Z48.01: ICD-10-CM

## 2020-10-12 DIAGNOSIS — Z87.820: ICD-10-CM

## 2020-10-12 DIAGNOSIS — E66.9: ICD-10-CM

## 2020-10-12 DIAGNOSIS — L03.116: Primary | ICD-10-CM

## 2020-10-12 DIAGNOSIS — Z88.0: ICD-10-CM

## 2020-10-12 DIAGNOSIS — Z83.3: ICD-10-CM

## 2020-10-12 DIAGNOSIS — Z88.5: ICD-10-CM

## 2020-10-12 PROCEDURE — 99282 EMERGENCY DEPT VISIT SF MDM: CPT

## 2020-10-12 NOTE — ED INTEGUMENTARY GENERAL
General


Chief Complaint:  Skin/Wound Problems


Stated Complaint:  WOUND INFECTION LEFT LEG


Source:  patient


Exam Limitations:  no limitations





History of Present Illness


Date Seen by Provider:  Oct 12, 2020


Time Seen by Provider:  17:36


Initial Comments


Patient presents ER by private conveyance with chief complaint he is having 

wound back problems. He went to Dr. Dunham who did his left leg above-the-knee 

amputation today and they change the dressing and had a working the time he 

left. He says when home health came back tonight it was not working again so 

they reapplied a new wound VAC dressing and it is still getting the same errors 

of high leak. His home health nurse noticed that his leg was warm to the touch 

and erythematous. He stopped his antibiotic course 2 days ago at the end of 10 

days. He does not have any fever nausea vomiting or pain.





Allergies and Home Medications


Allergies


Coded Allergies:  


     meperidine HCl (Unverified  Allergy, Unknown, 11)


     shellfish derived (Unverified  Allergy, Unknown, 13)


   


   FROM UNCODED ALLERGIES


     Penicillins (Unverified  Adverse Reaction, Intermediate, NAUSEA, 11)





Home Medications


Amlodipine Besylate 5 Mg Tablet, 5 MG PO DAILY, (Reported)


Aspirin 325 Mg Tablet.dr, 325 MG PO DAILY, (Reported)


Cephalexin 500 Mg Tablet, 500 MG PO QID


   Prescribed by: ROWENA STOCKTON on 10/12/20 1800


Hydrocodone/Acetaminophen 1 Each Tablet, 1-2 EACH PO Q4H PRN for PAIN-MODERATE 

(5-7), (Reported)


Levofloxacin 750 Mg Tablet, 750 MG PO DAILY


   Prescribed by: JIE RICHARDSON on 10/2/20 1139


Pantoprazole Sodium 40 Mg Tablet., 40 MG PO DAILY, (Reported)





Patient Home Medication List


Home Medication List Reviewed:  Yes





Review of Systems


Review of Systems


Constitutional:  No chills, No diaphoresis


EENTM:  No ear discharge, No ear pain


Respiratory:  No cough, No short of breath


Cardiovascular:  No chest pain, No palpitations


Gastrointestinal:  No abdominal pain, No nausea, No vomiting





All Other Systems Reviewed


Negative Unless Noted:  Yes





Past Medical-Social-Family Hx


Patient Social History


Alcohol Use:  Regular Use


Alcohol Beverage of Choice:  Beer


Recreational Drug Use:  No


Smoking Status:  Former Smoker


Type Used:  Cigarettes


Former Smoker, Quit:  1999


2nd Hand Smoke Exposure:  No


Recent Foreign Travel:  No


Contact w/Someone Who Travel:  No


Recent Hopitalizations:  No





Immunizations Up To Date


Tetanus Booster (TDap):  Unknown


PED Vaccines UTD:  No


Date of Pneumonia Vaccine:  2010





Seasonal Allergies


Seasonal Allergies:  Yes





Past Medical History


Surgeries:  Yes


Abdominal, Amputation, Orthopedic


Respiratory:  Yes


Sleep Apnea


Currently Using CPAP:  No


Currently Using BIPAP:  No


Cardiac:  Yes


Hypertension


Neurological:  Yes (SEVERAL CONCUSSIONS PER PATIENT)


Concussion


Reproductive Disorders:  No


Sexually Transmitted Disease:  No


HIV/AIDS:  No


Genitourinary:  No


Gastrointestinal:  No


Abdominal Hernia


Musculoskeletal:  Yes (lymphedema left leg)


Amputee, Arthritis


Endocrine:  Yes


Diabetes, Non-Insulin dep


HEENT:  No


Loss of Vision:  Denies


Hearing Impairment:  Denies


Cancer:  No


Psychosocial:  Yes


Anxiety


Integumentary:  Yes


Recent Skin Changes


Blood Disorders:  No





Family Medical History





Arthritis


  19 FATHER, Onset:Unknown


Diabetes mellitus


  19 MOTHER, , Onset:Unknown


No Pertinent Family Hx





Physical Exam


Vital Signs





Vital Signs - First Documented








 10/12/20





 17:40


 


Temp 36.5


 


Pulse 77


 


Resp 20


 


B/P (MAP) 184/76 (112)


 


Pulse Ox 98


 


O2 Delivery Room Air





Capillary Refill :


General Appearance:  no apparent distress, obese


Neck:  full range of motion, normal inspection


Cardiovascular:  normal peripheral pulses, regular rate, rhythm


Respiratory:  no respiratory distress, no accessory muscle use


Neurologic/Psychiatric:  alert, normal mood/affect, oriented x 3


Skin:  warm/dry, other (Erythematous to this portion of the left stump. Clean 

looking wound with wound VAC on.)





Progress/Results/Core Measures


Results/Orders


My Orders





Orders - ROWENA STOCKTON


Cbc With Automated Diff (10/12/20 17:51)


Comprehensive Metabolic Panel (10/12/20 17:51)


Hs C Reactive Protein (10/12/20 17:51)


Ed Iv/Invasive Line Start (10/12/20 17:51)





Vital Signs/I&O











 10/12/20





 17:40


 


Temp 36.5


 


Pulse 77


 


Resp 20


 


B/P (MAP) 184/76 (112)


 


Pulse Ox 98


 


O2 Delivery Room Air











Progress


Progress Note :  


   Time:  17:49


Progress Note


Discussed the case with Dr. Dunham, General Surgery. He recommends that the 

patient just keep the wound VAC limping along and see him tomorrow morning in 

the clinic. He is okay with us starting a cephalosporin for potential 

cellulitis.





Departure


Impression





   Primary Impression:  


   Cellulitis


   Qualified Codes:  L03.116 - Cellulitis of left lower limb


   Additional Impression:  


   Encounter for management of vacuum-assisted closure (VAC) of wound


Disposition:   HOME, SELF-CARE


Condition:  Stable





Departure-Patient Inst.


Decision time for Depature:  17:59


Referrals:  


BREANNA DUNHAM DAVID F MD (PCP/Family)


Primary Care Physician


Patient Instructions:  Wound Care (DC), Cellulitis (Skin Infection), Adult (DC)





Add. Discharge Instructions:  


Keep the wound VAC running as often as possible. Just keep turning it back on.


Tomorrow morning call Dr. Dunham for an appointment in his office in the 

morning.


Start taking Keflex one capsule 4 times a day for the next week.


All discharge instructions reviewed with patient and/or family. Voiced 

understanding.


Scripts


Cephalexin (Cephalexin) 500 Mg Tablet


500 MG PO QID, #28 TAB 0 Refills


   Prov: ROWENA STOCKTON         10/12/20











ROWENA STOCKTON                 Oct 12, 2020 17:57

## 2020-12-10 ENCOUNTER — HOSPITAL ENCOUNTER (OUTPATIENT)
Dept: HOSPITAL 75 - WOUNDCARE | Age: 50
End: 2020-12-10
Attending: SURGERY
Payer: MEDICAID

## 2020-12-10 DIAGNOSIS — E66.01: ICD-10-CM

## 2020-12-10 DIAGNOSIS — L97.122: ICD-10-CM

## 2020-12-10 DIAGNOSIS — Z89.612: ICD-10-CM

## 2020-12-10 DIAGNOSIS — E11.65: ICD-10-CM

## 2020-12-10 DIAGNOSIS — E11.622: Primary | ICD-10-CM

## 2020-12-10 DIAGNOSIS — I89.0: ICD-10-CM

## 2020-12-10 PROCEDURE — 11045 DBRDMT SUBQ TISS EACH ADDL: CPT

## 2020-12-10 PROCEDURE — 11042 DBRDMT SUBQ TIS 1ST 20SQCM/<: CPT

## 2020-12-17 ENCOUNTER — HOSPITAL ENCOUNTER (OUTPATIENT)
Dept: HOSPITAL 75 - WOUNDCARE | Age: 50
End: 2020-12-17
Attending: SURGERY
Payer: MEDICAID

## 2020-12-17 DIAGNOSIS — E66.01: ICD-10-CM

## 2020-12-17 DIAGNOSIS — L97.122: ICD-10-CM

## 2020-12-17 DIAGNOSIS — I89.0: ICD-10-CM

## 2020-12-17 DIAGNOSIS — E11.622: Primary | ICD-10-CM

## 2020-12-17 DIAGNOSIS — Z89.612: ICD-10-CM

## 2020-12-17 DIAGNOSIS — I96: ICD-10-CM

## 2020-12-17 DIAGNOSIS — E11.65: ICD-10-CM

## 2020-12-17 PROCEDURE — 11045 DBRDMT SUBQ TISS EACH ADDL: CPT

## 2020-12-17 PROCEDURE — 11042 DBRDMT SUBQ TIS 1ST 20SQCM/<: CPT

## 2021-01-15 ENCOUNTER — HOSPITAL ENCOUNTER (OUTPATIENT)
Dept: HOSPITAL 75 - WOUNDCARE | Age: 51
End: 2021-01-15
Attending: ORTHOPAEDIC SURGERY
Payer: MEDICAID

## 2021-01-15 DIAGNOSIS — E11.65: ICD-10-CM

## 2021-01-15 DIAGNOSIS — E66.01: ICD-10-CM

## 2021-01-15 DIAGNOSIS — L97.122: ICD-10-CM

## 2021-01-15 DIAGNOSIS — I89.0: ICD-10-CM

## 2021-01-15 DIAGNOSIS — E11.622: Primary | ICD-10-CM

## 2021-01-15 DIAGNOSIS — Z89.612: ICD-10-CM

## 2021-01-15 DIAGNOSIS — I96: ICD-10-CM

## 2021-01-15 PROCEDURE — 11042 DBRDMT SUBQ TIS 1ST 20SQCM/<: CPT

## 2021-01-15 PROCEDURE — 11045 DBRDMT SUBQ TISS EACH ADDL: CPT

## 2021-01-21 ENCOUNTER — HOSPITAL ENCOUNTER (OUTPATIENT)
Dept: HOSPITAL 75 - WOUNDCARE | Age: 51
End: 2021-01-21
Attending: SURGERY
Payer: MEDICAID

## 2021-01-21 DIAGNOSIS — Z89.612: ICD-10-CM

## 2021-01-21 DIAGNOSIS — E11.52: ICD-10-CM

## 2021-01-21 DIAGNOSIS — I89.0: ICD-10-CM

## 2021-01-21 DIAGNOSIS — E66.01: ICD-10-CM

## 2021-01-21 DIAGNOSIS — E11.622: ICD-10-CM

## 2021-01-21 DIAGNOSIS — E11.65: ICD-10-CM

## 2021-01-21 DIAGNOSIS — L97.122: Primary | ICD-10-CM

## 2021-01-21 PROCEDURE — 11042 DBRDMT SUBQ TIS 1ST 20SQCM/<: CPT

## 2021-01-21 PROCEDURE — 11045 DBRDMT SUBQ TISS EACH ADDL: CPT

## 2021-01-26 ENCOUNTER — HOSPITAL ENCOUNTER (OUTPATIENT)
Dept: HOSPITAL 75 - WOUNDCARE | Age: 51
End: 2021-01-26
Attending: ORTHOPAEDIC SURGERY
Payer: MEDICAID

## 2021-01-26 DIAGNOSIS — E11.622: ICD-10-CM

## 2021-01-26 DIAGNOSIS — E66.01: ICD-10-CM

## 2021-01-26 DIAGNOSIS — E11.65: ICD-10-CM

## 2021-01-26 DIAGNOSIS — I89.0: ICD-10-CM

## 2021-01-26 DIAGNOSIS — Z89.612: ICD-10-CM

## 2021-01-26 DIAGNOSIS — L97.122: ICD-10-CM

## 2021-01-26 DIAGNOSIS — I96: Primary | ICD-10-CM

## 2021-01-26 PROCEDURE — 11042 DBRDMT SUBQ TIS 1ST 20SQCM/<: CPT

## 2021-01-26 PROCEDURE — 11045 DBRDMT SUBQ TISS EACH ADDL: CPT

## 2021-02-02 ENCOUNTER — HOSPITAL ENCOUNTER (OUTPATIENT)
Dept: HOSPITAL 75 - WOUNDCARE | Age: 51
End: 2021-02-02
Attending: SURGERY
Payer: MEDICAID

## 2021-02-02 DIAGNOSIS — Z89.612: ICD-10-CM

## 2021-02-02 DIAGNOSIS — E11.52: ICD-10-CM

## 2021-02-02 DIAGNOSIS — I89.0: ICD-10-CM

## 2021-02-02 DIAGNOSIS — E11.65: ICD-10-CM

## 2021-02-02 DIAGNOSIS — L97.122: Primary | ICD-10-CM

## 2021-02-02 DIAGNOSIS — E11.622: ICD-10-CM

## 2021-02-02 DIAGNOSIS — E66.01: ICD-10-CM

## 2021-02-02 PROCEDURE — 11042 DBRDMT SUBQ TIS 1ST 20SQCM/<: CPT

## 2021-02-02 PROCEDURE — 11045 DBRDMT SUBQ TISS EACH ADDL: CPT

## 2021-02-09 ENCOUNTER — HOSPITAL ENCOUNTER (OUTPATIENT)
Dept: HOSPITAL 75 - WOUNDCARE | Age: 51
End: 2021-02-09
Attending: SURGERY
Payer: MEDICAID

## 2021-02-09 DIAGNOSIS — L97.122: ICD-10-CM

## 2021-02-09 DIAGNOSIS — Z89.612: ICD-10-CM

## 2021-02-09 DIAGNOSIS — E11.622: ICD-10-CM

## 2021-02-09 DIAGNOSIS — I96: Primary | ICD-10-CM

## 2021-02-09 DIAGNOSIS — E11.65: ICD-10-CM

## 2021-02-09 DIAGNOSIS — E66.01: ICD-10-CM

## 2021-02-09 DIAGNOSIS — I89.0: ICD-10-CM

## 2021-02-09 PROCEDURE — 11045 DBRDMT SUBQ TISS EACH ADDL: CPT

## 2021-02-09 PROCEDURE — 11042 DBRDMT SUBQ TIS 1ST 20SQCM/<: CPT

## 2021-02-19 ENCOUNTER — HOSPITAL ENCOUNTER (OUTPATIENT)
Dept: HOSPITAL 75 - WOUNDCARE | Age: 51
End: 2021-02-19
Attending: ORTHOPAEDIC SURGERY
Payer: MEDICAID

## 2021-02-19 DIAGNOSIS — E11.622: Primary | ICD-10-CM

## 2021-02-19 DIAGNOSIS — I89.0: ICD-10-CM

## 2021-02-19 DIAGNOSIS — I96: ICD-10-CM

## 2021-02-19 DIAGNOSIS — Z89.612: ICD-10-CM

## 2021-02-19 DIAGNOSIS — L97.122: ICD-10-CM

## 2021-02-19 DIAGNOSIS — E66.01: ICD-10-CM

## 2021-02-19 DIAGNOSIS — E11.65: ICD-10-CM

## 2021-02-19 PROCEDURE — 11042 DBRDMT SUBQ TIS 1ST 20SQCM/<: CPT

## 2021-02-19 PROCEDURE — 11045 DBRDMT SUBQ TISS EACH ADDL: CPT

## 2021-02-23 ENCOUNTER — HOSPITAL ENCOUNTER (OUTPATIENT)
Dept: HOSPITAL 75 - WOUNDCARE | Age: 51
End: 2021-02-23
Attending: SURGERY
Payer: MEDICAID

## 2021-02-23 DIAGNOSIS — I89.0: ICD-10-CM

## 2021-02-23 DIAGNOSIS — E11.622: ICD-10-CM

## 2021-02-23 DIAGNOSIS — E66.01: ICD-10-CM

## 2021-02-23 DIAGNOSIS — I96: Primary | ICD-10-CM

## 2021-02-23 DIAGNOSIS — E11.65: ICD-10-CM

## 2021-02-23 DIAGNOSIS — Z89.612: ICD-10-CM

## 2021-02-23 DIAGNOSIS — L97.122: ICD-10-CM

## 2021-02-23 PROCEDURE — 11045 DBRDMT SUBQ TISS EACH ADDL: CPT

## 2021-02-23 PROCEDURE — 11042 DBRDMT SUBQ TIS 1ST 20SQCM/<: CPT

## 2021-03-04 ENCOUNTER — HOSPITAL ENCOUNTER (OUTPATIENT)
Dept: HOSPITAL 75 - WOUNDCARE | Age: 51
End: 2021-03-04
Attending: ORTHOPAEDIC SURGERY
Payer: MEDICAID

## 2021-03-04 DIAGNOSIS — E11.65: ICD-10-CM

## 2021-03-04 DIAGNOSIS — I89.0: ICD-10-CM

## 2021-03-04 DIAGNOSIS — E11.52: ICD-10-CM

## 2021-03-04 DIAGNOSIS — L97.122: Primary | ICD-10-CM

## 2021-03-04 DIAGNOSIS — E11.622: ICD-10-CM

## 2021-03-04 DIAGNOSIS — E66.01: ICD-10-CM

## 2021-03-04 DIAGNOSIS — Z89.612: ICD-10-CM

## 2021-03-04 PROCEDURE — 11042 DBRDMT SUBQ TIS 1ST 20SQCM/<: CPT

## 2021-03-04 PROCEDURE — 11045 DBRDMT SUBQ TISS EACH ADDL: CPT

## 2021-03-09 ENCOUNTER — HOSPITAL ENCOUNTER (OUTPATIENT)
Dept: HOSPITAL 75 - WOUNDCARE | Age: 51
End: 2021-03-09
Attending: SURGERY
Payer: MEDICAID

## 2021-03-09 DIAGNOSIS — I96: Primary | ICD-10-CM

## 2021-03-09 DIAGNOSIS — E66.01: ICD-10-CM

## 2021-03-09 DIAGNOSIS — I89.0: ICD-10-CM

## 2021-03-09 DIAGNOSIS — E11.65: ICD-10-CM

## 2021-03-09 DIAGNOSIS — Z89.612: ICD-10-CM

## 2021-03-09 DIAGNOSIS — L97.122: ICD-10-CM

## 2021-03-09 DIAGNOSIS — E11.622: ICD-10-CM

## 2021-03-09 PROCEDURE — 11042 DBRDMT SUBQ TIS 1ST 20SQCM/<: CPT

## 2021-03-09 PROCEDURE — 11045 DBRDMT SUBQ TISS EACH ADDL: CPT

## 2021-03-16 ENCOUNTER — HOSPITAL ENCOUNTER (OUTPATIENT)
Dept: HOSPITAL 75 - WOUNDCARE | Age: 51
End: 2021-03-16
Attending: SURGERY
Payer: MEDICAID

## 2021-03-16 DIAGNOSIS — Z89.612: ICD-10-CM

## 2021-03-16 DIAGNOSIS — E66.01: ICD-10-CM

## 2021-03-16 DIAGNOSIS — I96: Primary | ICD-10-CM

## 2021-03-16 DIAGNOSIS — I89.0: ICD-10-CM

## 2021-03-16 DIAGNOSIS — E11.65: ICD-10-CM

## 2021-03-16 DIAGNOSIS — L97.122: ICD-10-CM

## 2021-03-16 DIAGNOSIS — E11.622: ICD-10-CM

## 2021-03-16 PROCEDURE — 11045 DBRDMT SUBQ TISS EACH ADDL: CPT

## 2021-03-16 PROCEDURE — 11042 DBRDMT SUBQ TIS 1ST 20SQCM/<: CPT

## 2021-03-23 ENCOUNTER — HOSPITAL ENCOUNTER (OUTPATIENT)
Dept: HOSPITAL 75 - WOUNDCARE | Age: 51
End: 2021-03-23
Attending: SURGERY
Payer: MEDICAID

## 2021-03-23 DIAGNOSIS — I89.0: ICD-10-CM

## 2021-03-23 DIAGNOSIS — E11.52: ICD-10-CM

## 2021-03-23 DIAGNOSIS — Z89.612: ICD-10-CM

## 2021-03-23 DIAGNOSIS — E66.01: ICD-10-CM

## 2021-03-23 DIAGNOSIS — E11.65: ICD-10-CM

## 2021-03-23 DIAGNOSIS — L97.122: Primary | ICD-10-CM

## 2021-03-23 DIAGNOSIS — E11.622: ICD-10-CM

## 2021-03-23 PROCEDURE — 11045 DBRDMT SUBQ TISS EACH ADDL: CPT

## 2021-03-23 PROCEDURE — 97605 NEG PRS WND THER DME<=50SQCM: CPT

## 2021-03-23 PROCEDURE — 11042 DBRDMT SUBQ TIS 1ST 20SQCM/<: CPT

## 2021-03-25 ENCOUNTER — HOSPITAL ENCOUNTER (OUTPATIENT)
Dept: HOSPITAL 75 - LABNPT | Age: 51
End: 2021-03-25
Attending: INTERNAL MEDICINE
Payer: MEDICAID

## 2021-03-25 DIAGNOSIS — I10: ICD-10-CM

## 2021-03-25 DIAGNOSIS — E11.40: ICD-10-CM

## 2021-03-25 DIAGNOSIS — E66.01: ICD-10-CM

## 2021-03-25 DIAGNOSIS — L97.122: Primary | ICD-10-CM

## 2021-03-25 DIAGNOSIS — I89.0: ICD-10-CM

## 2021-03-25 DIAGNOSIS — Z89.612: ICD-10-CM

## 2021-03-25 LAB
ALBUMIN SERPL-MCNC: 3.1 GM/DL (ref 3.2–4.5)
ALP SERPL-CCNC: 135 U/L (ref 40–136)
ALT SERPL-CCNC: 15 U/L (ref 0–55)
BASOPHILS # BLD AUTO: 0.1 10^3/UL (ref 0–0.1)
BASOPHILS NFR BLD AUTO: 1 % (ref 0–10)
BILIRUB SERPL-MCNC: 0.2 MG/DL (ref 0.1–1)
BUN/CREAT SERPL: 23
CALCIUM SERPL-MCNC: 8.2 MG/DL (ref 8.5–10.1)
CHLORIDE SERPL-SCNC: 97 MMOL/L (ref 98–107)
CO2 SERPL-SCNC: 25 MMOL/L (ref 21–32)
CREAT SERPL-MCNC: 1.39 MG/DL (ref 0.6–1.3)
EOSINOPHIL # BLD AUTO: 0.6 10^3/UL (ref 0–0.3)
EOSINOPHIL NFR BLD AUTO: 8 % (ref 0–10)
GFR SERPLBLD BASED ON 1.73 SQ M-ARVRAT: 54 ML/MIN
GLUCOSE SERPL-MCNC: 559 MG/DL (ref 70–105)
HCT VFR BLD CALC: 37 % (ref 40–54)
HGB BLD-MCNC: 11.8 G/DL (ref 13.3–17.7)
LYMPHOCYTES # BLD AUTO: 1.3 10^3/UL (ref 1–4)
LYMPHOCYTES NFR BLD AUTO: 17 % (ref 12–44)
MANUAL DIFFERENTIAL PERFORMED BLD QL: NO
MCH RBC QN AUTO: 28 PG (ref 25–34)
MCHC RBC AUTO-ENTMCNC: 32 G/DL (ref 32–36)
MCV RBC AUTO: 86 FL (ref 80–99)
MONOCYTES # BLD AUTO: 0.6 10^3/UL (ref 0–1)
MONOCYTES NFR BLD AUTO: 8 % (ref 0–12)
NEUTROPHILS # BLD AUTO: 5.1 10^3/UL (ref 1.8–7.8)
NEUTROPHILS NFR BLD AUTO: 66 % (ref 42–75)
PLATELET # BLD: 343 10^3/UL (ref 130–400)
PMV BLD AUTO: 11.2 FL (ref 9–12.2)
POTASSIUM SERPL-SCNC: 5 MMOL/L (ref 3.6–5)
PROT SERPL-MCNC: 7.4 GM/DL (ref 6.4–8.2)
SODIUM SERPL-SCNC: 129 MMOL/L (ref 135–145)
WBC # BLD AUTO: 7.7 10^3/UL (ref 4.3–11)

## 2021-03-25 PROCEDURE — 83036 HEMOGLOBIN GLYCOSYLATED A1C: CPT

## 2021-03-25 PROCEDURE — 80053 COMPREHEN METABOLIC PANEL: CPT

## 2021-03-25 PROCEDURE — 85025 COMPLETE CBC W/AUTO DIFF WBC: CPT

## 2021-03-30 ENCOUNTER — HOSPITAL ENCOUNTER (OUTPATIENT)
Dept: HOSPITAL 75 - WOUNDCARE | Age: 51
End: 2021-03-30
Attending: SURGERY
Payer: MEDICAID

## 2021-03-30 DIAGNOSIS — I96: Primary | ICD-10-CM

## 2021-03-30 DIAGNOSIS — E11.65: ICD-10-CM

## 2021-03-30 DIAGNOSIS — Z89.612: ICD-10-CM

## 2021-03-30 DIAGNOSIS — E11.622: ICD-10-CM

## 2021-03-30 DIAGNOSIS — L97.122: ICD-10-CM

## 2021-03-30 DIAGNOSIS — I89.0: ICD-10-CM

## 2021-03-30 DIAGNOSIS — E66.01: ICD-10-CM

## 2021-03-30 PROCEDURE — 97605 NEG PRS WND THER DME<=50SQCM: CPT

## 2021-04-06 ENCOUNTER — HOSPITAL ENCOUNTER (OUTPATIENT)
Dept: HOSPITAL 75 - WOUNDCARE | Age: 51
End: 2021-04-06
Attending: SURGERY
Payer: MEDICAID

## 2021-04-06 DIAGNOSIS — E11.622: ICD-10-CM

## 2021-04-06 DIAGNOSIS — Z89.612: ICD-10-CM

## 2021-04-06 DIAGNOSIS — E66.01: ICD-10-CM

## 2021-04-06 DIAGNOSIS — I89.0: ICD-10-CM

## 2021-04-06 DIAGNOSIS — L97.122: ICD-10-CM

## 2021-04-06 DIAGNOSIS — E11.52: Primary | ICD-10-CM

## 2021-04-06 DIAGNOSIS — E11.65: ICD-10-CM

## 2021-04-06 DIAGNOSIS — I96: ICD-10-CM

## 2021-04-06 PROCEDURE — 11045 DBRDMT SUBQ TISS EACH ADDL: CPT

## 2021-04-06 PROCEDURE — 11042 DBRDMT SUBQ TIS 1ST 20SQCM/<: CPT

## 2021-04-13 ENCOUNTER — HOSPITAL ENCOUNTER (OUTPATIENT)
Dept: HOSPITAL 75 - WOUNDCARE | Age: 51
End: 2021-04-13
Attending: SURGERY
Payer: MEDICAID

## 2021-04-13 DIAGNOSIS — E11.65: ICD-10-CM

## 2021-04-13 DIAGNOSIS — E11.622: ICD-10-CM

## 2021-04-13 DIAGNOSIS — Z89.612: ICD-10-CM

## 2021-04-13 DIAGNOSIS — E66.01: ICD-10-CM

## 2021-04-13 DIAGNOSIS — I89.0: ICD-10-CM

## 2021-04-13 DIAGNOSIS — L97.122: ICD-10-CM

## 2021-04-13 DIAGNOSIS — I96: Primary | ICD-10-CM

## 2021-04-13 PROCEDURE — 11042 DBRDMT SUBQ TIS 1ST 20SQCM/<: CPT

## 2021-04-13 PROCEDURE — 11045 DBRDMT SUBQ TISS EACH ADDL: CPT

## 2021-04-13 PROCEDURE — 99212 OFFICE O/P EST SF 10 MIN: CPT

## 2021-04-20 ENCOUNTER — HOSPITAL ENCOUNTER (OUTPATIENT)
Dept: HOSPITAL 75 - WOUNDCARE | Age: 51
End: 2021-04-20
Attending: SURGERY
Payer: MEDICAID

## 2021-04-20 DIAGNOSIS — Z89.612: ICD-10-CM

## 2021-04-20 DIAGNOSIS — I89.0: ICD-10-CM

## 2021-04-20 DIAGNOSIS — E11.52: ICD-10-CM

## 2021-04-20 DIAGNOSIS — E66.01: ICD-10-CM

## 2021-04-20 DIAGNOSIS — E11.622: ICD-10-CM

## 2021-04-20 DIAGNOSIS — L97.222: Primary | ICD-10-CM

## 2021-04-20 DIAGNOSIS — E11.65: ICD-10-CM

## 2021-04-20 PROCEDURE — 11042 DBRDMT SUBQ TIS 1ST 20SQCM/<: CPT

## 2021-04-20 PROCEDURE — 11045 DBRDMT SUBQ TISS EACH ADDL: CPT

## 2021-04-29 ENCOUNTER — HOSPITAL ENCOUNTER (OUTPATIENT)
Dept: HOSPITAL 75 - WOUNDCARE | Age: 51
End: 2021-04-29
Attending: SURGERY
Payer: MEDICAID

## 2021-04-29 DIAGNOSIS — L97.211: ICD-10-CM

## 2021-04-29 DIAGNOSIS — E11.622: ICD-10-CM

## 2021-04-29 DIAGNOSIS — L97.122: ICD-10-CM

## 2021-04-29 DIAGNOSIS — E66.01: ICD-10-CM

## 2021-04-29 DIAGNOSIS — I96: Primary | ICD-10-CM

## 2021-04-29 DIAGNOSIS — E11.65: ICD-10-CM

## 2021-04-29 DIAGNOSIS — I89.0: ICD-10-CM

## 2021-04-29 DIAGNOSIS — Z89.612: ICD-10-CM

## 2021-04-29 PROCEDURE — 11045 DBRDMT SUBQ TISS EACH ADDL: CPT

## 2021-04-29 PROCEDURE — 11042 DBRDMT SUBQ TIS 1ST 20SQCM/<: CPT

## 2021-05-04 ENCOUNTER — HOSPITAL ENCOUNTER (OUTPATIENT)
Dept: HOSPITAL 75 - RAD | Age: 51
End: 2021-05-04
Attending: SURGERY
Payer: MEDICAID

## 2021-05-04 ENCOUNTER — HOSPITAL ENCOUNTER (OUTPATIENT)
Dept: HOSPITAL 75 - WOUNDCARE | Age: 51
End: 2021-05-04
Attending: SURGERY
Payer: MEDICAID

## 2021-05-04 DIAGNOSIS — Z89.612: ICD-10-CM

## 2021-05-04 DIAGNOSIS — I70.232: ICD-10-CM

## 2021-05-04 DIAGNOSIS — E11.622: ICD-10-CM

## 2021-05-04 DIAGNOSIS — I70.234: ICD-10-CM

## 2021-05-04 DIAGNOSIS — E11.65: ICD-10-CM

## 2021-05-04 DIAGNOSIS — I89.0: ICD-10-CM

## 2021-05-04 DIAGNOSIS — L97.211: ICD-10-CM

## 2021-05-04 DIAGNOSIS — L97.122: ICD-10-CM

## 2021-05-04 DIAGNOSIS — L97.411: Primary | ICD-10-CM

## 2021-05-04 DIAGNOSIS — E66.01: ICD-10-CM

## 2021-05-04 DIAGNOSIS — E11.52: ICD-10-CM

## 2021-05-04 DIAGNOSIS — L97.211: Primary | ICD-10-CM

## 2021-05-04 PROCEDURE — 11045 DBRDMT SUBQ TISS EACH ADDL: CPT

## 2021-05-04 PROCEDURE — 93923 UPR/LXTR ART STDY 3+ LVLS: CPT

## 2021-05-04 PROCEDURE — 11042 DBRDMT SUBQ TIS 1ST 20SQCM/<: CPT

## 2021-05-04 PROCEDURE — 93926 LOWER EXTREMITY STUDY: CPT

## 2021-05-04 NOTE — DIAGNOSTIC IMAGING REPORT
EXAMINATION:

Right ankle brachial index. 



INDICATION: 

Leg pain.



TECHNIQUE: 

The ankle-brachial index was obtained in the usual manner. The

ankle-brachial index is 0.97 (normal 1.0 greater).



IMPRESSION: 

1. The ankle brachial index of the right lower extremity is just

below normal limits.

2. Reportedly, a right lower extremity arterial Doppler exam is

pending for further evaluation.



Dictated by: 



  Dictated on workstation # [id number]

## 2021-05-04 NOTE — DIAGNOSTIC IMAGING REPORT
EXAMINATION:

Right lower extremity arterial Doppler. 



INDICATION: 

Non-pressure chronic ulcer right calf.



TECHNIQUE: 

Spectral and color flow imaging of the arterial system of the

right lower extremity was performed.



FINDINGS:

The previous right lower extremity arterial Doppler exam of

09/10/2019 noted predominantly monophasic waveforms throughout

the arterial system of the right lower extremity but failed to

show any sign of a hemodynamically significant stenosis or

occlusion.



On this study, there is still monophasic flow throughout the

arterial system; however, in the interval since the prior exam, a

rather abrupt change in the velocity has occurred between the

midportion of the superficial femoral the distal superficial

femoral arteries. I suspect that there is a hemodynamically

significant stenosis in this region. Furthermore, the proximal

midportion of the anterior tibial artery could not be visualized

and may well be occluded. There has also been a reduction in the

velocities in the distal posterior tibial and dorsalis pedis

arteries.



IMPRESSION: 

The appearance of the right lower extremity has worsened since

the prior exam as a hemodynamically significant stenosis has

developed near the junction of the mid and distal superficial

femoral arteries. The poor visualization of the proximal and mid

anterior tibial artery would also indicate that this segment of

the vessel is occluded. These findings do result in diminished

arterial blood flow to the right lower leg.  



Dictated by: 



  Dictated on workstation # [id number]

## 2021-05-18 ENCOUNTER — HOSPITAL ENCOUNTER (OUTPATIENT)
Dept: HOSPITAL 75 - WOUNDCARE | Age: 51
End: 2021-05-18
Attending: SURGERY
Payer: MEDICAID

## 2021-05-18 DIAGNOSIS — Z89.612: ICD-10-CM

## 2021-05-18 DIAGNOSIS — E11.622: ICD-10-CM

## 2021-05-18 DIAGNOSIS — I70.234: ICD-10-CM

## 2021-05-18 DIAGNOSIS — E66.01: ICD-10-CM

## 2021-05-18 DIAGNOSIS — L97.212: ICD-10-CM

## 2021-05-18 DIAGNOSIS — L97.122: ICD-10-CM

## 2021-05-18 DIAGNOSIS — E11.65: ICD-10-CM

## 2021-05-18 DIAGNOSIS — L97.412: ICD-10-CM

## 2021-05-18 DIAGNOSIS — I89.0: ICD-10-CM

## 2021-05-18 DIAGNOSIS — I96: Primary | ICD-10-CM

## 2021-05-18 PROCEDURE — 11045 DBRDMT SUBQ TISS EACH ADDL: CPT

## 2021-05-18 PROCEDURE — 11042 DBRDMT SUBQ TIS 1ST 20SQCM/<: CPT

## 2021-05-25 ENCOUNTER — HOSPITAL ENCOUNTER (OUTPATIENT)
Dept: HOSPITAL 75 - WOUNDCARE | Age: 51
End: 2021-05-25
Attending: SURGERY
Payer: MEDICAID

## 2021-05-25 DIAGNOSIS — Z89.612: ICD-10-CM

## 2021-05-25 DIAGNOSIS — E11.65: ICD-10-CM

## 2021-05-25 DIAGNOSIS — L03.115: Primary | ICD-10-CM

## 2021-05-25 DIAGNOSIS — L97.212: ICD-10-CM

## 2021-05-25 DIAGNOSIS — L97.122: ICD-10-CM

## 2021-05-25 DIAGNOSIS — I70.234: ICD-10-CM

## 2021-05-25 DIAGNOSIS — E66.01: ICD-10-CM

## 2021-05-25 DIAGNOSIS — I96: ICD-10-CM

## 2021-05-25 DIAGNOSIS — E11.622: ICD-10-CM

## 2021-05-25 DIAGNOSIS — L97.412: ICD-10-CM

## 2021-05-25 DIAGNOSIS — I89.0: ICD-10-CM

## 2021-05-25 PROCEDURE — 11042 DBRDMT SUBQ TIS 1ST 20SQCM/<: CPT

## 2021-05-25 PROCEDURE — 11045 DBRDMT SUBQ TISS EACH ADDL: CPT

## 2021-06-07 ENCOUNTER — HOSPITAL ENCOUNTER (OUTPATIENT)
Dept: HOSPITAL 75 - LABNPT | Age: 51
End: 2021-06-07
Attending: THORACIC SURGERY (CARDIOTHORACIC VASCULAR SURGERY)
Payer: MEDICAID

## 2021-06-07 DIAGNOSIS — Z01.812: Primary | ICD-10-CM

## 2021-06-15 ENCOUNTER — HOSPITAL ENCOUNTER (OUTPATIENT)
Dept: HOSPITAL 75 - WOUNDCARE | Age: 51
End: 2021-06-15
Attending: SURGERY
Payer: MEDICAID

## 2021-06-15 DIAGNOSIS — L97.212: ICD-10-CM

## 2021-06-15 DIAGNOSIS — E11.65: ICD-10-CM

## 2021-06-15 DIAGNOSIS — L03.115: Primary | ICD-10-CM

## 2021-06-15 DIAGNOSIS — Z89.612: ICD-10-CM

## 2021-06-15 DIAGNOSIS — I70.234: ICD-10-CM

## 2021-06-15 DIAGNOSIS — L97.122: ICD-10-CM

## 2021-06-15 DIAGNOSIS — E66.01: ICD-10-CM

## 2021-06-15 DIAGNOSIS — L97.412: ICD-10-CM

## 2021-06-15 DIAGNOSIS — I89.0: ICD-10-CM

## 2021-06-15 DIAGNOSIS — E11.622: ICD-10-CM

## 2021-06-15 DIAGNOSIS — E11.52: ICD-10-CM

## 2021-06-15 PROCEDURE — 11042 DBRDMT SUBQ TIS 1ST 20SQCM/<: CPT

## 2021-06-15 PROCEDURE — 11045 DBRDMT SUBQ TISS EACH ADDL: CPT

## 2021-06-22 ENCOUNTER — HOSPITAL ENCOUNTER (OUTPATIENT)
Dept: HOSPITAL 75 - WOUNDCARE | Age: 51
End: 2021-06-22
Attending: SURGERY
Payer: MEDICAID

## 2021-06-22 DIAGNOSIS — E11.622: ICD-10-CM

## 2021-06-22 DIAGNOSIS — L03.115: Primary | ICD-10-CM

## 2021-06-22 DIAGNOSIS — E11.65: ICD-10-CM

## 2021-06-22 DIAGNOSIS — I89.0: ICD-10-CM

## 2021-06-22 DIAGNOSIS — L97.122: ICD-10-CM

## 2021-06-22 DIAGNOSIS — L97.212: ICD-10-CM

## 2021-06-22 DIAGNOSIS — L97.412: ICD-10-CM

## 2021-06-22 DIAGNOSIS — E66.01: ICD-10-CM

## 2021-06-22 DIAGNOSIS — Z89.612: ICD-10-CM

## 2021-06-22 DIAGNOSIS — I96: ICD-10-CM

## 2021-06-22 DIAGNOSIS — I70.234: ICD-10-CM

## 2021-06-22 PROCEDURE — 11045 DBRDMT SUBQ TISS EACH ADDL: CPT

## 2021-06-22 PROCEDURE — 11042 DBRDMT SUBQ TIS 1ST 20SQCM/<: CPT

## 2021-07-06 ENCOUNTER — HOSPITAL ENCOUNTER (OUTPATIENT)
Dept: HOSPITAL 75 - WOUNDCARE | Age: 51
End: 2021-07-06
Attending: SURGERY
Payer: MEDICAID

## 2021-07-06 DIAGNOSIS — Z89.612: ICD-10-CM

## 2021-07-06 DIAGNOSIS — E66.01: ICD-10-CM

## 2021-07-06 DIAGNOSIS — L03.115: Primary | ICD-10-CM

## 2021-07-06 DIAGNOSIS — I89.0: ICD-10-CM

## 2021-07-06 DIAGNOSIS — E11.52: ICD-10-CM

## 2021-07-06 DIAGNOSIS — I70.234: ICD-10-CM

## 2021-07-06 DIAGNOSIS — E11.65: ICD-10-CM

## 2021-07-06 DIAGNOSIS — L97.212: ICD-10-CM

## 2021-07-06 DIAGNOSIS — L97.122: ICD-10-CM

## 2021-07-06 DIAGNOSIS — E11.622: ICD-10-CM

## 2021-07-06 DIAGNOSIS — L97.412: ICD-10-CM

## 2021-07-06 PROCEDURE — 11042 DBRDMT SUBQ TIS 1ST 20SQCM/<: CPT

## 2021-07-06 PROCEDURE — 11045 DBRDMT SUBQ TISS EACH ADDL: CPT

## 2021-07-13 ENCOUNTER — HOSPITAL ENCOUNTER (OUTPATIENT)
Dept: HOSPITAL 75 - WOUNDCARE | Age: 51
End: 2021-07-13
Attending: SURGERY
Payer: MEDICAID

## 2021-07-13 ENCOUNTER — HOSPITAL ENCOUNTER (OUTPATIENT)
Dept: HOSPITAL 75 - LAB | Age: 51
End: 2021-07-13
Attending: SURGERY
Payer: MEDICAID

## 2021-07-13 DIAGNOSIS — L03.115: Primary | ICD-10-CM

## 2021-07-13 DIAGNOSIS — L97.122: ICD-10-CM

## 2021-07-13 DIAGNOSIS — I89.0: ICD-10-CM

## 2021-07-13 DIAGNOSIS — E66.01: ICD-10-CM

## 2021-07-13 DIAGNOSIS — I70.234: ICD-10-CM

## 2021-07-13 DIAGNOSIS — L97.412: ICD-10-CM

## 2021-07-13 DIAGNOSIS — E11.622: ICD-10-CM

## 2021-07-13 DIAGNOSIS — Z89.612: ICD-10-CM

## 2021-07-13 DIAGNOSIS — E11.65: ICD-10-CM

## 2021-07-13 DIAGNOSIS — L97.212: ICD-10-CM

## 2021-07-13 LAB
ALBUMIN SERPL-MCNC: 2.9 GM/DL (ref 3.2–4.5)
ALP SERPL-CCNC: 92 U/L (ref 40–136)
ALT SERPL-CCNC: 12 U/L (ref 0–55)
BASOPHILS # BLD AUTO: 0.1 10^3/UL (ref 0–0.1)
BASOPHILS NFR BLD AUTO: 1 % (ref 0–10)
BILIRUB SERPL-MCNC: 0.2 MG/DL (ref 0.1–1)
BUN/CREAT SERPL: 16
CALCIUM SERPL-MCNC: 8.5 MG/DL (ref 8.5–10.1)
CHLORIDE SERPL-SCNC: 99 MMOL/L (ref 98–107)
CO2 SERPL-SCNC: 24 MMOL/L (ref 21–32)
CREAT SERPL-MCNC: 1.35 MG/DL (ref 0.6–1.3)
EOSINOPHIL # BLD AUTO: 0.3 10^3/UL (ref 0–0.3)
EOSINOPHIL NFR BLD AUTO: 3 % (ref 0–10)
GFR SERPLBLD BASED ON 1.73 SQ M-ARVRAT: 56 ML/MIN
GLUCOSE SERPL-MCNC: 459 MG/DL (ref 70–105)
HCT VFR BLD CALC: 38 % (ref 40–54)
HGB BLD-MCNC: 11.7 G/DL (ref 13.3–17.7)
LYMPHOCYTES # BLD AUTO: 1.1 10^3/UL (ref 1–4)
LYMPHOCYTES NFR BLD AUTO: 13 % (ref 12–44)
MANUAL DIFFERENTIAL PERFORMED BLD QL: NO
MCH RBC QN AUTO: 26 PG (ref 25–34)
MCHC RBC AUTO-ENTMCNC: 31 G/DL (ref 32–36)
MCV RBC AUTO: 83 FL (ref 80–99)
MONOCYTES # BLD AUTO: 0.6 10^3/UL (ref 0–1)
MONOCYTES NFR BLD AUTO: 8 % (ref 0–12)
NEUTROPHILS # BLD AUTO: 6.5 10^3/UL (ref 1.8–7.8)
NEUTROPHILS NFR BLD AUTO: 76 % (ref 42–75)
PLATELET # BLD: 357 10^3/UL (ref 130–400)
PMV BLD AUTO: 10.3 FL (ref 9–12.2)
POTASSIUM SERPL-SCNC: 4.6 MMOL/L (ref 3.6–5)
PROT SERPL-MCNC: 7 GM/DL (ref 6.4–8.2)
SODIUM SERPL-SCNC: 135 MMOL/L (ref 135–145)
WBC # BLD AUTO: 8.6 10^3/UL (ref 4.3–11)

## 2021-07-13 PROCEDURE — 11045 DBRDMT SUBQ TISS EACH ADDL: CPT

## 2021-07-13 PROCEDURE — 36415 COLL VENOUS BLD VENIPUNCTURE: CPT

## 2021-07-13 PROCEDURE — 85025 COMPLETE CBC W/AUTO DIFF WBC: CPT

## 2021-07-13 PROCEDURE — 83036 HEMOGLOBIN GLYCOSYLATED A1C: CPT

## 2021-07-13 PROCEDURE — 11042 DBRDMT SUBQ TIS 1ST 20SQCM/<: CPT

## 2021-07-13 PROCEDURE — 80053 COMPREHEN METABOLIC PANEL: CPT

## 2021-07-27 ENCOUNTER — HOSPITAL ENCOUNTER (OUTPATIENT)
Dept: HOSPITAL 75 - WOUNDCARE | Age: 51
End: 2021-07-27
Attending: SURGERY
Payer: MEDICAID

## 2021-07-27 DIAGNOSIS — L97.412: ICD-10-CM

## 2021-07-27 DIAGNOSIS — E11.52: ICD-10-CM

## 2021-07-27 DIAGNOSIS — L03.115: Primary | ICD-10-CM

## 2021-07-27 DIAGNOSIS — L97.122: ICD-10-CM

## 2021-07-27 DIAGNOSIS — I70.234: ICD-10-CM

## 2021-07-27 DIAGNOSIS — I89.0: ICD-10-CM

## 2021-07-27 DIAGNOSIS — E11.622: ICD-10-CM

## 2021-07-27 DIAGNOSIS — E11.65: ICD-10-CM

## 2021-07-27 DIAGNOSIS — Z89.612: ICD-10-CM

## 2021-07-27 DIAGNOSIS — L97.212: ICD-10-CM

## 2021-07-27 DIAGNOSIS — E66.01: ICD-10-CM

## 2021-07-27 PROCEDURE — 11042 DBRDMT SUBQ TIS 1ST 20SQCM/<: CPT

## 2021-07-27 PROCEDURE — 11045 DBRDMT SUBQ TISS EACH ADDL: CPT

## 2021-08-03 ENCOUNTER — HOSPITAL ENCOUNTER (OUTPATIENT)
Dept: HOSPITAL 75 - WOUNDCARE | Age: 51
End: 2021-08-03
Attending: SURGERY
Payer: MEDICAID

## 2021-08-03 DIAGNOSIS — E11.622: ICD-10-CM

## 2021-08-03 DIAGNOSIS — L97.222: ICD-10-CM

## 2021-08-03 DIAGNOSIS — Z89.612: ICD-10-CM

## 2021-08-03 DIAGNOSIS — I70.234: ICD-10-CM

## 2021-08-03 DIAGNOSIS — I96: ICD-10-CM

## 2021-08-03 DIAGNOSIS — I89.0: ICD-10-CM

## 2021-08-03 DIAGNOSIS — E66.9: ICD-10-CM

## 2021-08-03 DIAGNOSIS — L97.212: ICD-10-CM

## 2021-08-03 DIAGNOSIS — L97.412: ICD-10-CM

## 2021-08-03 DIAGNOSIS — L03.115: Primary | ICD-10-CM

## 2021-08-03 DIAGNOSIS — E11.65: ICD-10-CM

## 2021-08-03 PROCEDURE — 11042 DBRDMT SUBQ TIS 1ST 20SQCM/<: CPT

## 2021-08-03 PROCEDURE — 11045 DBRDMT SUBQ TISS EACH ADDL: CPT

## 2021-08-10 ENCOUNTER — HOSPITAL ENCOUNTER (OUTPATIENT)
Dept: HOSPITAL 75 - WOUNDCARE | Age: 51
End: 2021-08-10
Attending: SURGERY
Payer: MEDICAID

## 2021-08-10 DIAGNOSIS — E11.65: ICD-10-CM

## 2021-08-10 DIAGNOSIS — L03.115: Primary | ICD-10-CM

## 2021-08-10 DIAGNOSIS — Z89.612: ICD-10-CM

## 2021-08-10 DIAGNOSIS — E11.52: ICD-10-CM

## 2021-08-10 DIAGNOSIS — L97.212: ICD-10-CM

## 2021-08-10 DIAGNOSIS — L97.412: ICD-10-CM

## 2021-08-10 DIAGNOSIS — L97.122: ICD-10-CM

## 2021-08-10 DIAGNOSIS — I70.234: ICD-10-CM

## 2021-08-10 DIAGNOSIS — E11.622: ICD-10-CM

## 2021-08-10 DIAGNOSIS — I89.0: ICD-10-CM

## 2021-08-10 DIAGNOSIS — E66.01: ICD-10-CM

## 2021-08-10 PROCEDURE — 11045 DBRDMT SUBQ TISS EACH ADDL: CPT

## 2021-08-10 PROCEDURE — 11042 DBRDMT SUBQ TIS 1ST 20SQCM/<: CPT

## 2021-08-17 ENCOUNTER — HOSPITAL ENCOUNTER (OUTPATIENT)
Dept: HOSPITAL 75 - WOUNDCARE | Age: 51
End: 2021-08-17
Attending: SURGERY
Payer: MEDICAID

## 2021-08-17 DIAGNOSIS — L03.115: Primary | ICD-10-CM

## 2021-08-17 DIAGNOSIS — E66.01: ICD-10-CM

## 2021-08-17 DIAGNOSIS — E11.622: ICD-10-CM

## 2021-08-17 DIAGNOSIS — E11.65: ICD-10-CM

## 2021-08-17 DIAGNOSIS — E11.52: ICD-10-CM

## 2021-08-17 DIAGNOSIS — L97.412: ICD-10-CM

## 2021-08-17 DIAGNOSIS — I70.234: ICD-10-CM

## 2021-08-17 DIAGNOSIS — L97.122: ICD-10-CM

## 2021-08-17 DIAGNOSIS — L97.212: ICD-10-CM

## 2021-08-17 DIAGNOSIS — I89.0: ICD-10-CM

## 2021-08-17 DIAGNOSIS — Z89.612: ICD-10-CM

## 2021-08-17 PROCEDURE — 11045 DBRDMT SUBQ TISS EACH ADDL: CPT

## 2021-08-17 PROCEDURE — 11042 DBRDMT SUBQ TIS 1ST 20SQCM/<: CPT

## 2021-08-24 ENCOUNTER — HOSPITAL ENCOUNTER (OUTPATIENT)
Dept: HOSPITAL 75 - WOUNDCARE | Age: 51
End: 2021-08-24
Attending: SURGERY
Payer: MEDICAID

## 2021-08-24 DIAGNOSIS — E66.01: ICD-10-CM

## 2021-08-24 DIAGNOSIS — E11.622: ICD-10-CM

## 2021-08-24 DIAGNOSIS — E11.65: ICD-10-CM

## 2021-08-24 DIAGNOSIS — I89.0: ICD-10-CM

## 2021-08-24 DIAGNOSIS — Z89.612: ICD-10-CM

## 2021-08-24 DIAGNOSIS — L97.412: ICD-10-CM

## 2021-08-24 DIAGNOSIS — I70.234: ICD-10-CM

## 2021-08-24 DIAGNOSIS — L97.122: ICD-10-CM

## 2021-08-24 DIAGNOSIS — L97.212: ICD-10-CM

## 2021-08-24 DIAGNOSIS — L03.115: Primary | ICD-10-CM

## 2021-08-24 DIAGNOSIS — I96: ICD-10-CM

## 2021-08-24 PROCEDURE — 11042 DBRDMT SUBQ TIS 1ST 20SQCM/<: CPT

## 2021-08-24 PROCEDURE — 11045 DBRDMT SUBQ TISS EACH ADDL: CPT

## 2021-09-07 ENCOUNTER — HOSPITAL ENCOUNTER (OUTPATIENT)
Dept: HOSPITAL 75 - WOUNDCARE | Age: 51
End: 2021-09-07
Attending: SURGERY
Payer: MEDICAID

## 2021-09-07 DIAGNOSIS — L03.115: Primary | ICD-10-CM

## 2021-09-07 DIAGNOSIS — E11.52: ICD-10-CM

## 2021-09-07 DIAGNOSIS — E11.65: ICD-10-CM

## 2021-09-07 DIAGNOSIS — Z89.612: ICD-10-CM

## 2021-09-07 DIAGNOSIS — E11.622: ICD-10-CM

## 2021-09-07 DIAGNOSIS — L97.212: ICD-10-CM

## 2021-09-07 DIAGNOSIS — L97.122: ICD-10-CM

## 2021-09-07 DIAGNOSIS — L97.412: ICD-10-CM

## 2021-09-07 DIAGNOSIS — I70.234: ICD-10-CM

## 2021-09-07 DIAGNOSIS — I89.0: ICD-10-CM

## 2021-09-07 DIAGNOSIS — E66.01: ICD-10-CM

## 2021-09-14 ENCOUNTER — HOSPITAL ENCOUNTER (OUTPATIENT)
Dept: HOSPITAL 75 - WOUNDCARE | Age: 51
End: 2021-09-14
Attending: SURGERY
Payer: MEDICAID

## 2021-09-14 DIAGNOSIS — Z89.612: ICD-10-CM

## 2021-09-14 DIAGNOSIS — E66.01: ICD-10-CM

## 2021-09-14 DIAGNOSIS — L97.112: ICD-10-CM

## 2021-09-14 DIAGNOSIS — I89.0: ICD-10-CM

## 2021-09-14 DIAGNOSIS — E11.622: ICD-10-CM

## 2021-09-14 DIAGNOSIS — L97.412: Primary | ICD-10-CM

## 2021-09-14 DIAGNOSIS — I70.234: ICD-10-CM

## 2021-09-14 DIAGNOSIS — E11.65: ICD-10-CM

## 2021-09-14 DIAGNOSIS — E11.52: ICD-10-CM

## 2021-09-14 DIAGNOSIS — L97.212: ICD-10-CM

## 2021-09-14 PROCEDURE — 11045 DBRDMT SUBQ TISS EACH ADDL: CPT

## 2021-09-14 PROCEDURE — 11042 DBRDMT SUBQ TIS 1ST 20SQCM/<: CPT

## 2021-09-21 ENCOUNTER — HOSPITAL ENCOUNTER (OUTPATIENT)
Dept: HOSPITAL 75 - WOUNDCARE | Age: 51
End: 2021-09-21
Attending: SURGERY
Payer: MEDICAID

## 2021-09-21 DIAGNOSIS — E11.622: ICD-10-CM

## 2021-09-21 DIAGNOSIS — E66.01: ICD-10-CM

## 2021-09-21 DIAGNOSIS — L97.122: ICD-10-CM

## 2021-09-21 DIAGNOSIS — I89.0: ICD-10-CM

## 2021-09-21 DIAGNOSIS — L97.212: ICD-10-CM

## 2021-09-21 DIAGNOSIS — E11.52: ICD-10-CM

## 2021-09-21 DIAGNOSIS — Z89.612: ICD-10-CM

## 2021-09-21 DIAGNOSIS — I70.234: ICD-10-CM

## 2021-09-21 DIAGNOSIS — L97.412: Primary | ICD-10-CM

## 2021-09-21 DIAGNOSIS — E11.65: ICD-10-CM

## 2021-09-21 PROCEDURE — 11042 DBRDMT SUBQ TIS 1ST 20SQCM/<: CPT

## 2021-09-21 PROCEDURE — 99214 OFFICE O/P EST MOD 30 MIN: CPT

## 2021-09-21 PROCEDURE — 11045 DBRDMT SUBQ TISS EACH ADDL: CPT

## 2021-09-28 ENCOUNTER — HOSPITAL ENCOUNTER (OUTPATIENT)
Dept: HOSPITAL 75 - WOUNDCARE | Age: 51
End: 2021-09-28
Attending: SURGERY
Payer: MEDICAID

## 2021-09-28 DIAGNOSIS — L97.212: ICD-10-CM

## 2021-09-28 DIAGNOSIS — I70.234: ICD-10-CM

## 2021-09-28 DIAGNOSIS — Z89.612: ICD-10-CM

## 2021-09-28 DIAGNOSIS — L97.122: ICD-10-CM

## 2021-09-28 DIAGNOSIS — I96: Primary | ICD-10-CM

## 2021-09-28 DIAGNOSIS — E66.01: ICD-10-CM

## 2021-09-28 DIAGNOSIS — E11.65: ICD-10-CM

## 2021-09-28 DIAGNOSIS — I89.0: ICD-10-CM

## 2021-09-28 DIAGNOSIS — L97.412: ICD-10-CM

## 2021-09-28 DIAGNOSIS — E11.622: ICD-10-CM

## 2021-09-28 PROCEDURE — 11045 DBRDMT SUBQ TISS EACH ADDL: CPT

## 2021-09-28 PROCEDURE — 11042 DBRDMT SUBQ TIS 1ST 20SQCM/<: CPT

## 2021-10-05 ENCOUNTER — HOSPITAL ENCOUNTER (OUTPATIENT)
Dept: HOSPITAL 75 - WOUNDCARE | Age: 51
End: 2021-10-05
Attending: SURGERY
Payer: MEDICAID

## 2021-10-05 DIAGNOSIS — L97.412: Primary | ICD-10-CM

## 2021-10-05 DIAGNOSIS — L97.212: ICD-10-CM

## 2021-10-05 DIAGNOSIS — I89.0: ICD-10-CM

## 2021-10-05 DIAGNOSIS — I70.234: ICD-10-CM

## 2021-10-05 DIAGNOSIS — E11.65: ICD-10-CM

## 2021-10-05 DIAGNOSIS — E11.52: ICD-10-CM

## 2021-10-05 DIAGNOSIS — Z89.612: ICD-10-CM

## 2021-10-05 DIAGNOSIS — E66.01: ICD-10-CM

## 2021-10-05 DIAGNOSIS — E11.622: ICD-10-CM

## 2021-10-05 DIAGNOSIS — L97.122: ICD-10-CM

## 2021-10-05 PROCEDURE — 11045 DBRDMT SUBQ TISS EACH ADDL: CPT

## 2021-10-05 PROCEDURE — 11042 DBRDMT SUBQ TIS 1ST 20SQCM/<: CPT

## 2021-10-12 ENCOUNTER — HOSPITAL ENCOUNTER (OUTPATIENT)
Dept: HOSPITAL 75 - WOUNDCARE | Age: 51
End: 2021-10-12
Attending: SURGERY
Payer: MEDICAID

## 2021-10-12 DIAGNOSIS — I89.0: ICD-10-CM

## 2021-10-12 DIAGNOSIS — E11.52: ICD-10-CM

## 2021-10-12 DIAGNOSIS — Z89.612: ICD-10-CM

## 2021-10-12 DIAGNOSIS — E11.622: ICD-10-CM

## 2021-10-12 DIAGNOSIS — L97.212: ICD-10-CM

## 2021-10-12 DIAGNOSIS — L97.122: ICD-10-CM

## 2021-10-12 DIAGNOSIS — I70.234: Primary | ICD-10-CM

## 2021-10-12 DIAGNOSIS — E66.01: ICD-10-CM

## 2021-10-12 DIAGNOSIS — E11.42: ICD-10-CM

## 2021-10-12 DIAGNOSIS — L97.412: ICD-10-CM

## 2021-10-12 DIAGNOSIS — E11.65: ICD-10-CM

## 2021-10-12 PROCEDURE — 11042 DBRDMT SUBQ TIS 1ST 20SQCM/<: CPT

## 2021-10-12 PROCEDURE — 11045 DBRDMT SUBQ TISS EACH ADDL: CPT

## 2021-10-19 ENCOUNTER — HOSPITAL ENCOUNTER (OUTPATIENT)
Dept: HOSPITAL 75 - WOUNDCARE | Age: 51
End: 2021-10-19
Attending: FAMILY MEDICINE
Payer: MEDICAID

## 2021-10-19 ENCOUNTER — HOSPITAL ENCOUNTER (OUTPATIENT)
Dept: HOSPITAL 75 - LAB | Age: 51
End: 2021-10-19
Attending: FAMILY MEDICINE
Payer: MEDICAID

## 2021-10-19 DIAGNOSIS — E66.01: ICD-10-CM

## 2021-10-19 DIAGNOSIS — E11.52: ICD-10-CM

## 2021-10-19 DIAGNOSIS — L97.412: Primary | ICD-10-CM

## 2021-10-19 DIAGNOSIS — Z89.612: ICD-10-CM

## 2021-10-19 DIAGNOSIS — E11.65: ICD-10-CM

## 2021-10-19 DIAGNOSIS — I89.0: ICD-10-CM

## 2021-10-19 DIAGNOSIS — L97.122: ICD-10-CM

## 2021-10-19 DIAGNOSIS — E11.622: ICD-10-CM

## 2021-10-19 DIAGNOSIS — I70.234: ICD-10-CM

## 2021-10-19 DIAGNOSIS — L97.212: ICD-10-CM

## 2021-10-19 LAB
ALBUMIN SERPL-MCNC: 2.9 GM/DL (ref 3.2–4.5)
ALP SERPL-CCNC: 100 U/L (ref 40–136)
ALT SERPL-CCNC: 17 U/L (ref 0–55)
BASOPHILS # BLD AUTO: 0.1 10^3/UL (ref 0–0.1)
BASOPHILS NFR BLD AUTO: 1 % (ref 0–10)
BILIRUB SERPL-MCNC: 0.3 MG/DL (ref 0.1–1)
BUN/CREAT SERPL: 12
CALCIUM SERPL-MCNC: 8.7 MG/DL (ref 8.5–10.1)
CHLORIDE SERPL-SCNC: 100 MMOL/L (ref 98–107)
CO2 SERPL-SCNC: 23 MMOL/L (ref 21–32)
CREAT SERPL-MCNC: 1.29 MG/DL (ref 0.6–1.3)
EOSINOPHIL # BLD AUTO: 0.4 10^3/UL (ref 0–0.3)
EOSINOPHIL NFR BLD AUTO: 5 % (ref 0–10)
GFR SERPLBLD BASED ON 1.73 SQ M-ARVRAT: 59 ML/MIN
GLUCOSE SERPL-MCNC: 381 MG/DL (ref 70–105)
HCT VFR BLD CALC: 38 % (ref 40–54)
HGB BLD-MCNC: 11.7 G/DL (ref 13.3–17.7)
LYMPHOCYTES # BLD AUTO: 1 10^3/UL (ref 1–4)
LYMPHOCYTES NFR BLD AUTO: 12 % (ref 12–44)
MANUAL DIFFERENTIAL PERFORMED BLD QL: NO
MCH RBC QN AUTO: 26 PG (ref 25–34)
MCHC RBC AUTO-ENTMCNC: 31 G/DL (ref 32–36)
MCV RBC AUTO: 84 FL (ref 80–99)
MONOCYTES # BLD AUTO: 0.6 10^3/UL (ref 0–1)
MONOCYTES NFR BLD AUTO: 6 % (ref 0–12)
NEUTROPHILS # BLD AUTO: 6.5 10^3/UL (ref 1.8–7.8)
NEUTROPHILS NFR BLD AUTO: 75 % (ref 42–75)
PLATELET # BLD: 378 10^3/UL (ref 130–400)
PMV BLD AUTO: 10.3 FL (ref 9–12.2)
POTASSIUM SERPL-SCNC: 4.1 MMOL/L (ref 3.6–5)
PROT SERPL-MCNC: 7.1 GM/DL (ref 6.4–8.2)
SODIUM SERPL-SCNC: 133 MMOL/L (ref 135–145)
WBC # BLD AUTO: 8.6 10^3/UL (ref 4.3–11)

## 2021-10-19 PROCEDURE — 85025 COMPLETE CBC W/AUTO DIFF WBC: CPT

## 2021-10-19 PROCEDURE — 84134 ASSAY OF PREALBUMIN: CPT

## 2021-10-19 PROCEDURE — 36415 COLL VENOUS BLD VENIPUNCTURE: CPT

## 2021-10-19 PROCEDURE — 80053 COMPREHEN METABOLIC PANEL: CPT

## 2021-10-19 PROCEDURE — 11042 DBRDMT SUBQ TIS 1ST 20SQCM/<: CPT

## 2021-10-19 PROCEDURE — 83036 HEMOGLOBIN GLYCOSYLATED A1C: CPT

## 2021-10-26 ENCOUNTER — HOSPITAL ENCOUNTER (OUTPATIENT)
Dept: HOSPITAL 75 - WOUNDCARE | Age: 51
End: 2021-10-26
Attending: FAMILY MEDICINE
Payer: MEDICAID

## 2021-10-26 DIAGNOSIS — E11.52: ICD-10-CM

## 2021-10-26 DIAGNOSIS — E66.01: ICD-10-CM

## 2021-10-26 DIAGNOSIS — L97.412: ICD-10-CM

## 2021-10-26 DIAGNOSIS — L97.212: ICD-10-CM

## 2021-10-26 DIAGNOSIS — L97.122: ICD-10-CM

## 2021-10-26 DIAGNOSIS — E11.622: Primary | ICD-10-CM

## 2021-10-26 DIAGNOSIS — Z89.612: ICD-10-CM

## 2021-10-26 DIAGNOSIS — E11.65: ICD-10-CM

## 2021-10-26 DIAGNOSIS — I89.0: ICD-10-CM

## 2021-10-26 DIAGNOSIS — I70.261: ICD-10-CM

## 2021-10-26 PROCEDURE — 11045 DBRDMT SUBQ TISS EACH ADDL: CPT

## 2021-10-26 PROCEDURE — 11042 DBRDMT SUBQ TIS 1ST 20SQCM/<: CPT

## 2021-11-02 ENCOUNTER — HOSPITAL ENCOUNTER (OUTPATIENT)
Dept: HOSPITAL 75 - WOUNDCARE | Age: 51
End: 2021-11-02
Attending: FAMILY MEDICINE
Payer: MEDICAID

## 2021-11-02 DIAGNOSIS — L97.212: ICD-10-CM

## 2021-11-02 DIAGNOSIS — Z89.612: ICD-10-CM

## 2021-11-02 DIAGNOSIS — I89.0: ICD-10-CM

## 2021-11-02 DIAGNOSIS — E11.65: ICD-10-CM

## 2021-11-02 DIAGNOSIS — E11.52: ICD-10-CM

## 2021-11-02 DIAGNOSIS — I70.234: Primary | ICD-10-CM

## 2021-11-02 DIAGNOSIS — E66.01: ICD-10-CM

## 2021-11-02 DIAGNOSIS — E11.622: ICD-10-CM

## 2021-11-02 DIAGNOSIS — L97.412: ICD-10-CM

## 2021-11-02 DIAGNOSIS — L97.122: ICD-10-CM

## 2021-11-02 PROCEDURE — 11045 DBRDMT SUBQ TISS EACH ADDL: CPT

## 2021-11-02 PROCEDURE — 11042 DBRDMT SUBQ TIS 1ST 20SQCM/<: CPT

## 2021-11-09 ENCOUNTER — HOSPITAL ENCOUNTER (OUTPATIENT)
Dept: HOSPITAL 75 - WOUNDCARE | Age: 51
End: 2021-11-09
Attending: FAMILY MEDICINE
Payer: MEDICAID

## 2021-11-09 DIAGNOSIS — L97.212: ICD-10-CM

## 2021-11-09 DIAGNOSIS — L97.122: ICD-10-CM

## 2021-11-09 DIAGNOSIS — L97.412: ICD-10-CM

## 2021-11-09 DIAGNOSIS — I70.234: Primary | ICD-10-CM

## 2021-11-09 DIAGNOSIS — E66.01: ICD-10-CM

## 2021-11-09 DIAGNOSIS — E11.52: ICD-10-CM

## 2021-11-09 DIAGNOSIS — I89.0: ICD-10-CM

## 2021-11-09 DIAGNOSIS — Z89.612: ICD-10-CM

## 2021-11-09 DIAGNOSIS — Z91.19: ICD-10-CM

## 2021-11-09 DIAGNOSIS — Z91.11: ICD-10-CM

## 2021-11-09 DIAGNOSIS — E11.622: ICD-10-CM

## 2021-11-09 DIAGNOSIS — E11.65: ICD-10-CM

## 2021-11-09 PROCEDURE — 11045 DBRDMT SUBQ TISS EACH ADDL: CPT

## 2021-11-09 PROCEDURE — 11042 DBRDMT SUBQ TIS 1ST 20SQCM/<: CPT

## 2021-11-11 ENCOUNTER — HOSPITAL ENCOUNTER (OUTPATIENT)
Dept: HOSPITAL 75 - RAD | Age: 51
End: 2021-11-11
Attending: FAMILY MEDICINE
Payer: MEDICAID

## 2021-11-11 DIAGNOSIS — I70.234: Primary | ICD-10-CM

## 2021-11-11 DIAGNOSIS — L97.212: ICD-10-CM

## 2021-11-11 DIAGNOSIS — L97.412: ICD-10-CM

## 2021-11-11 DIAGNOSIS — L97.122: ICD-10-CM

## 2021-11-11 PROCEDURE — 93926 LOWER EXTREMITY STUDY: CPT

## 2021-11-17 ENCOUNTER — HOSPITAL ENCOUNTER (OUTPATIENT)
Dept: HOSPITAL 75 - WOUNDCARE | Age: 51
End: 2021-11-17
Attending: FAMILY MEDICINE
Payer: MEDICAID

## 2021-11-17 DIAGNOSIS — E11.65: ICD-10-CM

## 2021-11-17 DIAGNOSIS — B37.2: ICD-10-CM

## 2021-11-17 DIAGNOSIS — E66.01: ICD-10-CM

## 2021-11-17 DIAGNOSIS — E11.622: ICD-10-CM

## 2021-11-17 DIAGNOSIS — L97.122: ICD-10-CM

## 2021-11-17 DIAGNOSIS — Z91.11: ICD-10-CM

## 2021-11-17 DIAGNOSIS — L97.212: ICD-10-CM

## 2021-11-17 DIAGNOSIS — E11.52: Primary | ICD-10-CM

## 2021-11-17 DIAGNOSIS — I89.0: ICD-10-CM

## 2021-11-17 DIAGNOSIS — I70.261: ICD-10-CM

## 2021-11-17 DIAGNOSIS — L97.412: ICD-10-CM

## 2021-11-17 DIAGNOSIS — Z89.612: ICD-10-CM

## 2021-11-17 DIAGNOSIS — Z91.19: ICD-10-CM

## 2021-11-17 PROCEDURE — 11045 DBRDMT SUBQ TISS EACH ADDL: CPT

## 2021-11-17 PROCEDURE — 11042 DBRDMT SUBQ TIS 1ST 20SQCM/<: CPT

## 2021-11-23 ENCOUNTER — HOSPITAL ENCOUNTER (OUTPATIENT)
Dept: HOSPITAL 75 - WOUNDCARE | Age: 51
End: 2021-11-23
Attending: FAMILY MEDICINE
Payer: MEDICAID

## 2021-11-23 DIAGNOSIS — E11.52: ICD-10-CM

## 2021-11-23 DIAGNOSIS — L97.412: ICD-10-CM

## 2021-11-23 DIAGNOSIS — E66.01: ICD-10-CM

## 2021-11-23 DIAGNOSIS — E11.622: ICD-10-CM

## 2021-11-23 DIAGNOSIS — L97.212: ICD-10-CM

## 2021-11-23 DIAGNOSIS — Z91.11: ICD-10-CM

## 2021-11-23 DIAGNOSIS — B37.2: ICD-10-CM

## 2021-11-23 DIAGNOSIS — E11.65: ICD-10-CM

## 2021-11-23 DIAGNOSIS — Z89.612: ICD-10-CM

## 2021-11-23 DIAGNOSIS — Z91.19: ICD-10-CM

## 2021-11-23 DIAGNOSIS — L97.122: ICD-10-CM

## 2021-11-23 DIAGNOSIS — I89.0: ICD-10-CM

## 2021-11-23 DIAGNOSIS — I70.234: Primary | ICD-10-CM

## 2021-11-23 PROCEDURE — 11045 DBRDMT SUBQ TISS EACH ADDL: CPT

## 2021-11-23 PROCEDURE — 11042 DBRDMT SUBQ TIS 1ST 20SQCM/<: CPT

## 2021-11-24 ENCOUNTER — HOSPITAL ENCOUNTER (OUTPATIENT)
Dept: HOSPITAL 75 - ER | Age: 51
Setting detail: OBSERVATION
LOS: 2 days | Discharge: HOME HEALTH | End: 2021-11-26
Attending: INTERNAL MEDICINE | Admitting: FAMILY MEDICINE
Payer: MEDICAID

## 2021-11-24 VITALS — SYSTOLIC BLOOD PRESSURE: 164 MMHG | DIASTOLIC BLOOD PRESSURE: 94 MMHG

## 2021-11-24 VITALS — DIASTOLIC BLOOD PRESSURE: 106 MMHG | SYSTOLIC BLOOD PRESSURE: 169 MMHG

## 2021-11-24 VITALS — DIASTOLIC BLOOD PRESSURE: 85 MMHG | SYSTOLIC BLOOD PRESSURE: 156 MMHG

## 2021-11-24 VITALS — HEIGHT: 72 IN | WEIGHT: 315 LBS | BODY MASS INDEX: 42.66 KG/M2

## 2021-11-24 DIAGNOSIS — Z79.84: ICD-10-CM

## 2021-11-24 DIAGNOSIS — K59.00: ICD-10-CM

## 2021-11-24 DIAGNOSIS — I42.8: ICD-10-CM

## 2021-11-24 DIAGNOSIS — Z79.899: ICD-10-CM

## 2021-11-24 DIAGNOSIS — J96.21: Primary | ICD-10-CM

## 2021-11-24 DIAGNOSIS — Z83.3: ICD-10-CM

## 2021-11-24 DIAGNOSIS — E11.621: ICD-10-CM

## 2021-11-24 DIAGNOSIS — I50.31: ICD-10-CM

## 2021-11-24 DIAGNOSIS — I73.9: ICD-10-CM

## 2021-11-24 DIAGNOSIS — I89.0: ICD-10-CM

## 2021-11-24 DIAGNOSIS — E66.01: ICD-10-CM

## 2021-11-24 DIAGNOSIS — J90: ICD-10-CM

## 2021-11-24 DIAGNOSIS — Z91.19: ICD-10-CM

## 2021-11-24 DIAGNOSIS — I11.0: ICD-10-CM

## 2021-11-24 LAB
ALBUMIN SERPL-MCNC: 2.9 GM/DL (ref 3.2–4.5)
ALP SERPL-CCNC: 107 U/L (ref 40–136)
ALT SERPL-CCNC: 16 U/L (ref 0–55)
APTT BLD: 30 SEC (ref 24–35)
APTT PPP: YELLOW S
BACTERIA #/AREA URNS HPF: (no result) /HPF
BASOPHILS # BLD AUTO: 0.1 10^3/UL (ref 0–0.1)
BASOPHILS NFR BLD AUTO: 1 % (ref 0–10)
BILIRUB SERPL-MCNC: 0.2 MG/DL (ref 0.1–1)
BILIRUB UR QL STRIP: NEGATIVE
BUN/CREAT SERPL: 18
CALCIUM SERPL-MCNC: 8.8 MG/DL (ref 8.5–10.1)
CHLORIDE SERPL-SCNC: 100 MMOL/L (ref 98–107)
CO2 SERPL-SCNC: 24 MMOL/L (ref 21–32)
CREAT SERPL-MCNC: 1.47 MG/DL (ref 0.6–1.3)
EOSINOPHIL # BLD AUTO: 0.3 10^3/UL (ref 0–0.3)
EOSINOPHIL NFR BLD AUTO: 3 % (ref 0–10)
FIBRINOGEN PPP-MCNC: CLEAR MG/DL
GFR SERPLBLD BASED ON 1.73 SQ M-ARVRAT: 51 ML/MIN
GLUCOSE SERPL-MCNC: 446 MG/DL (ref 70–105)
GLUCOSE UR STRIP-MCNC: (no result) MG/DL
HCT VFR BLD CALC: 35 % (ref 40–54)
HGB BLD-MCNC: 10.8 G/DL (ref 13.3–17.7)
INR PPP: 1 (ref 0.8–1.4)
KETONES UR QL STRIP: NEGATIVE
LEUKOCYTE ESTERASE UR QL STRIP: NEGATIVE
LYMPHOCYTES # BLD AUTO: 1.1 10^3/UL (ref 1–4)
LYMPHOCYTES NFR BLD AUTO: 11 % (ref 12–44)
MANUAL DIFFERENTIAL PERFORMED BLD QL: NO
MCH RBC QN AUTO: 25 PG (ref 25–34)
MCHC RBC AUTO-ENTMCNC: 31 G/DL (ref 32–36)
MCV RBC AUTO: 82 FL (ref 80–99)
MONOCYTES # BLD AUTO: 0.6 10^3/UL (ref 0–1)
MONOCYTES NFR BLD AUTO: 6 % (ref 0–12)
NEUTROPHILS # BLD AUTO: 7.8 10^3/UL (ref 1.8–7.8)
NEUTROPHILS NFR BLD AUTO: 79 % (ref 42–75)
NITRITE UR QL STRIP: NEGATIVE
PH UR STRIP: 8 [PH] (ref 5–9)
PLATELET # BLD: 389 10^3/UL (ref 130–400)
PMV BLD AUTO: 10.9 FL (ref 9–12.2)
POTASSIUM SERPL-SCNC: 4.9 MMOL/L (ref 3.6–5)
PROT SERPL-MCNC: 7.2 GM/DL (ref 6.4–8.2)
PROT UR QL STRIP: (no result)
PROTHROMBIN TIME: 13.2 SEC (ref 12.2–14.7)
RBC #/AREA URNS HPF: (no result) /HPF
SODIUM SERPL-SCNC: 133 MMOL/L (ref 135–145)
SP GR UR STRIP: 1.01 (ref 1.02–1.02)
SQUAMOUS #/AREA URNS HPF: (no result) /HPF
WBC # BLD AUTO: 9.9 10^3/UL (ref 4.3–11)
WBC #/AREA URNS HPF: (no result) /HPF

## 2021-11-24 PROCEDURE — 74022 RADEX COMPL AQT ABD SERIES: CPT

## 2021-11-24 PROCEDURE — 85025 COMPLETE CBC W/AUTO DIFF WBC: CPT

## 2021-11-24 PROCEDURE — 80053 COMPREHEN METABOLIC PANEL: CPT

## 2021-11-24 PROCEDURE — 36415 COLL VENOUS BLD VENIPUNCTURE: CPT

## 2021-11-24 PROCEDURE — 87636 SARSCOV2 & INF A&B AMP PRB: CPT

## 2021-11-24 PROCEDURE — 85610 PROTHROMBIN TIME: CPT

## 2021-11-24 PROCEDURE — 74176 CT ABD & PELVIS W/O CONTRAST: CPT

## 2021-11-24 PROCEDURE — 96374 THER/PROPH/DIAG INJ IV PUSH: CPT

## 2021-11-24 PROCEDURE — 94664 DEMO&/EVAL PT USE INHALER: CPT

## 2021-11-24 PROCEDURE — 87040 BLOOD CULTURE FOR BACTERIA: CPT

## 2021-11-24 PROCEDURE — 83880 ASSAY OF NATRIURETIC PEPTIDE: CPT

## 2021-11-24 PROCEDURE — 73620 X-RAY EXAM OF FOOT: CPT

## 2021-11-24 PROCEDURE — 80048 BASIC METABOLIC PNL TOTAL CA: CPT

## 2021-11-24 PROCEDURE — 87088 URINE BACTERIA CULTURE: CPT

## 2021-11-24 PROCEDURE — 81000 URINALYSIS NONAUTO W/SCOPE: CPT

## 2021-11-24 PROCEDURE — 83605 ASSAY OF LACTIC ACID: CPT

## 2021-11-24 PROCEDURE — 93306 TTE W/DOPPLER COMPLETE: CPT

## 2021-11-24 PROCEDURE — 85730 THROMBOPLASTIN TIME PARTIAL: CPT

## 2021-11-24 PROCEDURE — 82947 ASSAY GLUCOSE BLOOD QUANT: CPT

## 2021-11-24 PROCEDURE — 85027 COMPLETE CBC AUTOMATED: CPT

## 2021-11-24 RX ADMIN — INSULIN ASPART SCH UNIT: 100 INJECTION, SOLUTION INTRAVENOUS; SUBCUTANEOUS at 22:38

## 2021-11-24 RX ADMIN — POLYETHYLENE GLYCOL (3350) SCH GM: 17 POWDER, FOR SOLUTION ORAL at 22:13

## 2021-11-24 NOTE — DIAGNOSTIC IMAGING REPORT
EXAMINATION: Abdominal radiographs, acute series.



DATE: November 24, 2021. 



CLINICAL INDICATION: 51-year-old male, sepsis.



COMPARISON: None.



COMMENTS: There is no identified free intracranial air. There are

technical limitations of the study relating to patient body

habitus. There is no definite focal airspace consolidation. There

are no abnormally distended gas-filled segments of bowel. There

is no identified pneumatosis or portal venous gas. There is no

identified abnormal radiodensity overlying the kidneys or

expected locations of the ureters.



IMPRESSION: No radiographically apparent acute abdominal

abnormality.



Dictated by: 



  Dictated on workstation # [id number]

## 2021-11-24 NOTE — DIAGNOSTIC IMAGING REPORT
PROCEDURE: CT abdomen and pelvis without contrast.



TECHNIQUE: Multiple contiguous axial images were obtained through

the abdomen and pelvis without the use of intravenous contrast.

Auto Exposure Controls were utilized during the CT exam to meet

ALARA standards for radiation dose reduction. 



INDICATION: Abdominal pain and constipation.



COMPARISON: None.



FINDINGS: Included portions of the lung bases show mild bibasilar

effusions. Scattered groundglass densities and septal thickening

are also noted, suggestive of pulmonary edema.



CT ABDOMEN: Left lateral margins cannot be entirely included in

the field-of-view secondary to patient body habitus, but

visualized portions of the large and small bowel are

nondistended. Normal appendix is identified.



Exophytic hypodense cyst of the left kidney is noted. Bilateral

renal calcifications are also present and are likely vascular in

nature. No ureteral calculi are seen on either side.

Additionally, there is no hydronephrosis or other evidence of

obstruction.



The adrenal glands, spleen, pancreas and liver have an

unremarkable noncontrast CT appearance. There is no loculated

fluid collection, free fluid or free air. No abnormal mesenteric

or retroperitoneal adenopathy is seen. Osseous structures show no

acute abnormality.



CT PELVIS: Urinary bladder is unopacified and only mildly

distended. No calculi are seen within the urinary bladder. There

is no loculated fluid collection, free fluid or free air. Several

mildly enlarged inguinal and iliac chain lymph nodes are

identified, bilaterally. Reference lymph node is identified

anterior to the left pubic ramus and measures 1.6 x 2.1 cm.



There is no loculated fluid collection or free air within the

pelvis. Osseous structures show no acute abnormality.



IMPRESSION:

1. No acute abnormality is seen within the abdomen or pelvis.

2. Mild bilateral pleural effusions with probable underlying

pulmonary edema.

3. Several mildly prominent bilateral pelvic lymph nodes of

uncertain significance or etiology. Clinical correlation is

advised. 



Dictated by: 



  Dictated on workstation # [id number]

## 2021-11-24 NOTE — ED GENERAL
General


Chief Complaint:  General Problems/Pain


Stated Complaint:  CONSTIPATED, SOA


Nursing Triage Note:  


TO ED PER W/C WITH C/O CONSTIPATION AND SOA. X 3 DAYS PATIENT HAS L BKA


Source of Information:  Patient


Exam Limitations:  No Limitations





History of Present Illness


Date Seen by Provider:  2021


Time Seen by Provider:  15:59


Initial Comments


This is a 51-year-old male who presented to the ER via POV with complaints of 

constipation and shortness of breath over the past 2 days.  States that his 

symptoms started simultaneously and he is now short of breath with rest.  States

that he is suppose to be wearing a CPAP at bedtime but he is noncompliant with 

this.  Additionally states that he is diabetic he does not check his blood 

sugar.  He does not know any of his home medications.  States that he does have 

a history of constipation.  He is currently still passing gas.  Denies any 

abdominal pain at this time, just states there is "lots of pressure". States 

that he is on an antibiotic for the wound on his right lower extremity but he is

unsure what it is.  Additionally states that he is currently treated with the 

[facility] wound clinic.





Allergies and Home Medications


Allergies


Coded Allergies:  


     meperidine HCl (Unverified  Allergy, Unknown, 11)


     shellfish derived (Unverified  Allergy, Unknown, 13)


   FROM UNCODED ALLERGIES


     Penicillins (Unverified  Adverse Reaction, Intermediate, NAUSEA, 11)





Patient Home Medication List


Home Medication List Reviewed:  Yes


Aspirin (Aspirin EC) 325 Mg Tablet.dr, 325 MG PO DAILY, (Reported)


   Entered as Reported by: LENCHO KULKARNI on 12/15/16 1542


   Last Action: Continued


Atorvastatin Calcium (Atorvastatin Calcium) 40 Mg Tablet, 40 MG PO DAILY


   Prescribed by: ENRIQUE FRANK on 21 1018


Insulin Aspart (Novolog Flexpen) 300 Units/3 Ml Solution, 5 UNITS SQ AC


   Prescribed by: ENRIQUE FRANK on 21 1018


Insulin Detemir (Levemir Flextouch) 100 Unit/1 Ml Insuln.pen, 20 UNITS SQ BID


   Prescribed by: ENRIQUE FRANK on 21 1018


Levofloxacin (Levofloxacin) 750 Mg Tablet, 750 MG PO HS


   Prescribed by: ENRIQUE FRANK on 21 1018


Lisinopril (Lisinopril) 20 Mg Tablet, 20 MG PO DAILY


   Prescribed by: ENRIQUE FRANK on 21 1018


Metformin HCl (Metformin HCl) 1,000 Mg Tablet, 1,000 MG PO BID


   Prescribed by: ENRIQUE FRANK on 21 1018


Discontinued Medications


Amlodipine Besylate (Amlodipine Besylate) 5 Mg Tablet, 5 MG PO DAILY, (Reported)


   Entered as Reported by: KRISTY TOLEDO on 20


   Last Action: Discontinued


Cephalexin (Cephalexin) 500 Mg Tablet, 500 MG PO QID


   Prescribed by: ROWENA STOCKTON on 10/12/20 1800


   Last Action: Discontinued


Hydrocodone/Acetaminophen (Hydrocodone-Acetamin 5-325 mg) 1 Each Tablet, 1-2 

EACH PO Q4H PRN for PAIN-MODERATE (5-7), (Reported)


   Entered as Reported by: KRISTY TOLEDO on 20


   Last Action: Discontinued


Levofloxacin (Levofloxacin) 750 Mg Tablet, 750 MG PO DAILY


   Prescribed by: JIE RICHARDSON on 10/2/20 113


   Last Action: Discontinued


Pantoprazole Sodium (Pantoprazole Sodium) 40 Mg Tablet.dr, 40 MG PO DAILY, 

(Reported)


   Entered as Reported by: KRISTY TOLEDO on 20


   Last Action: Discontinued





Review of Systems


Review of Systems


Constitutional:  see HPI


EENTM:  no symptoms reported


Respiratory:  see HPI


Cardiovascular:  see HPI


Gastrointestinal:  see HPI


Genitourinary:  no symptoms reported, other (recent UTI)


Musculoskeletal:  no symptoms reported


Skin:  see HPI


Psychiatric/Neurological:  No Symptoms Reported


Hematologic/Lymphatic:  No Symptoms Reported


Immunological/Allergic:  no symptoms reported





Past Medical-Social-Family Hx


Patient Social History


Tobacco Use?:  No


Use of E-Cig and/or Vaping dev:  No


Substance use?:  No





Immunizations Up To Date


Tetanus Booster (TDap):  Unknown


PED Vaccines UTD:  No


First/Initial COVID19 Vaccinat:  UNSURE OF DATE


Second COVID19 Vaccination Keshawn:  UNSURE OF DATE


COVID19 Vaccine :  FAN





Seasonal Allergies


Seasonal Allergies:  Yes





Past Medical History


Surgeries:  Yes


Abdominal, Amputation, Orthopedic


Respiratory:  Yes


Sleep Apnea


Currently Using CPAP:  No


Currently Using BIPAP:  No


Cardiac:  Yes


Hypertension


Neurological:  Yes (SEVERAL CONCUSSIONS PER PATIENT)


Concussion


Reproductive Disorders:  No


Sexually Transmitted Disease:  No


HIV/AIDS:  No


Genitourinary:  No


Gastrointestinal:  Yes


Abdominal Hernia


Musculoskeletal:  Yes (lymphedema left leg)


Amputee, Arthritis


Endocrine:  Yes


Diabetes, Non-Insulin dep


HEENT:  No


Loss of Vision:  Denies


Hearing Impairment:  Denies


Cancer:  No


Psychosocial:  Yes


Anxiety


Integumentary:  Yes


Recent Skin Changes


Blood Disorders:  No





Family Medical History





Arthritis


  19 FATHER, Onset:Unknown


Diabetes mellitus


  19 MOTHER, , Onset:Unknown


No Pertinent Family Hx





Physical Exam


Vital Signs


Capillary Refill : Less Than 3 Seconds


Height, Weight, BMI


Height: 6'0.00"


Weight: 400lbs. 4.0oz. 181.058654sp; 49.00 BMI


Method:Stated


General Appearance:  No Apparent Distress, WD/WN


Eyes:  Bilateral Eye Normal Inspection, Bilateral Eye PERRL, Bilateral Eye EOMI


HEENT:  PERRL/EOMI, Normal ENT Inspection, Pharynx Normal, Moist Mucous 

Membranes


Neck:  Full Range of Motion, Normal Inspection, Supple


Respiratory:  No Respiratory Distress, Crackles, Decreased Breath Sounds, Other 

(increased effort )


Cardiovascular:  Regular Rate, Rhythm, No Gallop


Gastrointestinal:  No No Organomegaly (unable to assess ); Non Tender, Soft


Neurologic/Psychiatric:  Alert, Oriented x3, No Motor/Sensory Deficits


Skin:  Normal Color, Warm/Dry





Focused Exam


Lactate Level


21 16:18: Lactic Acid Level 1.21





Lactic Acid Level





Laboratory Tests








Test


 21


16:18


 


Lactic Acid Level


 1.21 MMOL/L


(0.50-2.00)











Progress/Results/Core Measures


Suspected Sepsis


SIRS


Temperature: 


Pulse: 93 


Respiratory Rate: 18


 


Laboratory Tests


21 16:18: White Blood Count 9.9


Blood Pressure  / 


Mean: 


 





21 16:18: Lactic Acid Level 1.21


Laboratory Tests


21 16:18: 


Creatinine 1.47H, Platelet Count 389, Total Bilirubin 0.2


21 17:26: INR Comment 1.0








Results/Orders


Lab Results





Laboratory Tests








Test


 21


15:36 21


16:18 21


17:26 21


18:47 Range/Units


 


 


Urine Color YELLOW      


 


Urine Clarity CLEAR      


 


Urine pH 8.0     5-9  


 


Urine Specific Gravity 1.015 L    1.016-1.022  


 


Urine Protein 2+ H    NEGATIVE  


 


Urine Glucose (UA) 3+ H    NEGATIVE  


 


Urine Ketones NEGATIVE     NEGATIVE  


 


Urine Nitrite NEGATIVE     NEGATIVE  


 


Urine Bilirubin NEGATIVE     NEGATIVE  


 


Urine Urobilinogen 0.2     < = 1.0  MG/DL


 


Urine Leukocyte Esterase NEGATIVE     NEGATIVE  


 


Urine RBC (Auto) TRACE-I H    NEGATIVE  


 


Urine RBC 2-5 H     /HPF


 


Urine WBC 2-5      /HPF


 


Urine Squamous Epithelial


Cells 2-5 


 


 


 


  /HPF





 


Urine Crystals NONE      /LPF


 


Urine Bacteria TRACE      /HPF


 


Urine Casts NONE      /LPF


 


Urine Mucus SMALL H     /LPF


 


Urine Culture Indicated


 CULTURE


PENDING 


 


 


  





 


White Blood Count


 


 9.9 


 


 


 4.3-11.0


10^3/uL


 


Red Blood Count


 


 4.30 


 


 


 4.30-5.52


10^6/uL


 


Hemoglobin  10.8 L   13.3-17.7  g/dL


 


Hematocrit  35 L   40-54  %


 


Mean Corpuscular Volume  82    80-99  fL


 


Mean Corpuscular Hemoglobin  25    25-34  pg


 


Mean Corpuscular Hemoglobin


Concent 


 31 L


 


 


 32-36  g/dL





 


Red Cell Distribution Width  15.3 H   10.0-14.5  %


 


Platelet Count


 


 389 


 


 


 130-400


10^3/uL


 


Mean Platelet Volume  10.9    9.0-12.2  fL


 


Immature Granulocyte % (Auto)  0     %


 


Neutrophils (%) (Auto)  79 H   42-75  %


 


Lymphocytes (%) (Auto)  11 L   12-44  %


 


Monocytes (%) (Auto)  6    0-12  %


 


Eosinophils (%) (Auto)  3    0-10  %


 


Basophils (%) (Auto)  1    0-10  %


 


Neutrophils # (Auto)


 


 7.8 


 


 


 1.8-7.8


10^3/uL


 


Lymphocytes # (Auto)


 


 1.1 


 


 


 1.0-4.0


10^3/uL


 


Monocytes # (Auto)


 


 0.6 


 


 


 0.0-1.0


10^3/uL


 


Eosinophils # (Auto)


 


 0.3 


 


 


 0.0-0.3


10^3/uL


 


Basophils # (Auto)


 


 0.1 


 


 


 0.0-0.1


10^3/uL


 


Immature Granulocyte # (Auto)


 


 0.0 


 


 


 0.0-0.1


10^3/uL


 


Sodium Level  133 L   135-145  MMOL/L


 


Potassium Level  4.9    3.6-5.0  MMOL/L


 


Chloride Level  100      MMOL/L


 


Carbon Dioxide Level  24    21-32  MMOL/L


 


Anion Gap  9    5-14  MMOL/L


 


Blood Urea Nitrogen  27 H   7-18  MG/DL


 


Creatinine


 


 1.47 H


 


 


 0.60-1.30


MG/DL


 


Estimat Glomerular Filtration


Rate 


 51 


 


 


  





 


BUN/Creatinine Ratio  18     


 


Glucose Level  446 *H     MG/DL


 


Lactic Acid Level


 


 1.21 


 


 


 0.50-2.00


MMOL/L


 


Calcium Level  8.8    8.5-10.1  MG/DL


 


Corrected Calcium  9.7    8.5-10.1  MG/DL


 


Total Bilirubin  0.2    0.1-1.0  MG/DL


 


Aspartate Amino Transf


(AST/SGOT) 


 22 


 


 


 5-34  U/L





 


Alanine Aminotransferase


(ALT/SGPT) 


 16 


 


 


 0-55  U/L





 


Alkaline Phosphatase  107      U/L


 


B-Type Natriuretic Peptide  499.1 H   <100.0  PG/ML


 


Total Protein  7.2    6.4-8.2  GM/DL


 


Albumin  2.9 L   3.2-4.5  GM/DL


 


Prothrombin Time   13.2   12.2-14.7  SEC


 


INR Comment   1.0   0.8-1.4  


 


Activated Partial


Thromboplast Time 


 


 30 


 


 24-35  SEC





 


SARS-CoV-2 RNA (RT-PCR)    Not Detected  Not Detecte  








Micro Results





Microbiology


21 Blood Culture - Final, Complete


           No growth


21 Blood Culture - Final, Complete


           No growth


21 Urine Culture - Final, Complete


           See Comments





My Orders





Orders - JOY ZIEGLER


Cbc With Automated Diff (21 16:06)


Comprehensive Metabolic Panel (21 16:06)


Blood Culture (21 16:06)


Urinalysis (21 16:06)


Urine Culture (21 16:06)


Protime With Inr (21 16:06)


Partial Thromboplastin Time (21 16:06)


Ed Iv/Invasive Line Start (21 16:06)


Lactic Acid Analyzer (21 16:06)


Acute Abd Series (21 16:06)


Ct Abdomen/Pelvis Wo (21 17:18)


Insulin (Regular) Human (Novolin R (Per (21 18:30)


Covid 19 Inhouse Test (21 18:44)


BNP (21 19:33)





Medications Given in ED





Vital Signs/I&O


Capillary Refill : Less Than 3 Seconds





Diagnostic Imaging





   Diagonstic Imaging:  CT


   Plain Films/CT/US/NM/MRI:  abdomen


Comments


[facility].


[place], [place]





NAME:   JANNA REYNOLDS


[facility] REC#:   [id number]


ACCOUNT#:   [id number]


PT STATUS:   ADM Codey


:   1970


PHYSICIAN:   JOY ZIEGLER


ADMIT DATE:   


***Signed***


Date of Exam:21





CT ABDOMEN/PELVIS WO








PROCEDURE: CT abdomen and pelvis without contrast.





TECHNIQUE: Multiple contiguous axial images were obtained through


the abdomen and pelvis without the use of intravenous contrast.


Auto Exposure Controls were utilized during the CT exam to meet


ALARA standards for radiation dose reduction. 





INDICATION: Abdominal pain and constipation.





COMPARISON: None.





FINDINGS: Included portions of the lung bases show mild bibasilar


effusions. Scattered groundglass densities and septal thickening


are also noted, suggestive of pulmonary edema.





CT ABDOMEN: Left lateral margins cannot be entirely included in


the field-of-view secondary to patient body habitus, but


visualized portions of the large and small bowel are


nondistended. Normal appendix is identified.





Exophytic hypodense cyst of the left kidney is noted. Bilateral


renal calcifications are also present and are likely vascular in


nature. No ureteral calculi are seen on either side.


Additionally, there is no hydronephrosis or other evidence of


obstruction.





The adrenal glands, spleen, pancreas and liver have an


unremarkable noncontrast CT appearance. There is no loculated


fluid collection, free fluid or free air. No abnormal mesenteric


or retroperitoneal adenopathy is seen. Osseous structures show no


acute abnormality.





CT PELVIS: Urinary bladder is unopacified and only mildly


distended. No calculi are seen within the urinary bladder. There


is no loculated fluid collection, free fluid or free air. Several


mildly enlarged inguinal and iliac chain lymph nodes are


identified, bilaterally. Reference lymph node is identified


anterior to the left pubic ramus and measures 1.6 x 2.1 cm.





There is no loculated fluid collection or free air within the


pelvis. Osseous structures show no acute abnormality.





IMPRESSION:


1. No acute abnormality is seen within the abdomen or pelvis.


2. Mild bilateral pleural effusions with probable underlying


pulmonary edema.


3. Several mildly prominent bilateral pelvic lymph nodes of


uncertain significance or etiology. Clinical correlation is


advised. 





Dictated by: 





  Dictated on workstation # [id number]








Dict:   21 1745


Trans:   21


[facility] [id number]





Interpreted by:     NILSA ALTMAN MD


Electronically signed by: NILSA ALTMAN MD 21








   Diagonstic Imaging:  Xray


Comments


                 [facility] [place], [place]





NAME:   JANNA REYNOLDS


[facility] REC#:   [id number]


ACCOUNT#:   [id number]


PT STATUS:   ADM Codey


:   1970


PHYSICIAN:   ENRIQUE FRANK MD


ADMIT DATE:   


                                  ***Signed***


Date of Exam:21





FOOT, RIGHT, 2 VIEW








Examination: Right foot two view.





HISTORY: Diabetic foot wound.





COMPARISON: None available.





FINDINGS:





There is soft tissue irregularity over the dorsum of the foot.


There is diffuse soft tissue swelling about the foot. There has


been a transmetatarsal phalangeal amputation of the right great


toe. There is  mild irregularity at the amputation site but no


clear erosions are seen. No acute fracture.





IMPRESSION:





1. Skin irregularity and soft tissue swelling overlying the


dorsum of the foot but no clear evidence of osteomyelitis.





Dictated by: 





  Dictated on workstation # [id number]








Dict:   21 1149


Trans:   21 1308


[facility] [id number]





Interpreted by:     MIGNON BE MD


Electronically signed by: MIGNON BE MD 21 1308








   Diagonstic Imaging:  Xray


Comments


[facility].


[place], [place]





NAME:   JANNA REYNOLDS


[facility] REC#:   [id number]


ACCOUNT#:   [id number]


PT STATUS:   REG ER


:   1970


PHYSICIAN:   JOY ZIEGLER


ADMIT DATE:   21/ER


***Signed***


Date of Exam:21





ACUTE ABD SERIES








EXAMINATION: Abdominal radiographs, acute series.





DATE: 2021. 





CLINICAL INDICATION: 51-year-old male, sepsis.





COMPARISON: None.





COMMENTS: There is no identified free intracranial air. There are


technical limitations of the study relating to patient body


habitus. There is no definite focal airspace consolidation. There


are no abnormally distended gas-filled segments of bowel. There


is no identified pneumatosis or portal venous gas. There is no


identified abnormal radiodensity overlying the kidneys or


expected locations of the ureters.





IMPRESSION: No radiographically apparent acute abdominal


abnormality.





Dictated by: 





  Dictated on workstation # [id number]








Dict:   21


Trans:   21


[facility] [id number]





Interpreted by:     ALICJA HENRIQUEZ MD


Electronically signed by: ALICJA HENRIQUEZ MD 21





Departure


Communication (Admissions)


Time/Spoke to Admitting Phy:  19:20


Dr. Frank





Impression





   Primary Impression:  


   Pleural effusion, bilateral


   Additional Impressions:  


   Lymphedema of lower extremity


   Right foot ulcer


Disposition:   ADMITTED AS INPATIENT


Condition:  Stable





Admissions


Decision to Admit Reason:  Admit from ER (General)


Decision to Admit/Date:  2021


Time/Decision to Admit Time:  19:20





Departure-Patient Inst.


Referrals:  


YAA LEE MD (PCP/Family)


Primary Care Physician


Scripts


Levofloxacin (Levofloxacin) 750 Mg Tablet


750 MG PO HS, #4 TAB 0 Refills


   Prov: ENRIQUE FRANK MD         21 


Insulin Aspart (Novolog Flexpen) 300 Units/3 Ml Solution


5 UNITS SQ AC, #4 EA 0 Refills


   Prov: ENRIQUE FRANK MD         21 


Atorvastatin Calcium (Atorvastatin Calcium) 40 Mg Tablet


40 MG PO DAILY, #30 TAB 0 Refills


   Prov: ENRIQUE FRANK MD         21 


Lisinopril (Lisinopril) 20 Mg Tablet


20 MG PO DAILY, #30 TAB 0 Refills


   Prov: ENRIQUE FRANK MD         21 


Metformin HCl (Metformin HCl) 1,000 Mg Tablet


1000 MG PO BID, #60 TAB 0 Refills


   Prov: ENRIQUE FRANK MD         21 


Insulin Detemir (Levemir Flextouch) 100 Unit/1 Ml Insuln.pen


20 UNITS SQ BID, #4 EA 0 Refills


   Prov: ENRIQUE FRANK MD         21











JOY ZIEGLER           2021 16:07

## 2021-11-25 VITALS — DIASTOLIC BLOOD PRESSURE: 94 MMHG | SYSTOLIC BLOOD PRESSURE: 162 MMHG

## 2021-11-25 VITALS — DIASTOLIC BLOOD PRESSURE: 76 MMHG | SYSTOLIC BLOOD PRESSURE: 181 MMHG

## 2021-11-25 VITALS — DIASTOLIC BLOOD PRESSURE: 92 MMHG | SYSTOLIC BLOOD PRESSURE: 152 MMHG

## 2021-11-25 VITALS — DIASTOLIC BLOOD PRESSURE: 81 MMHG | SYSTOLIC BLOOD PRESSURE: 165 MMHG

## 2021-11-25 VITALS — DIASTOLIC BLOOD PRESSURE: 74 MMHG | SYSTOLIC BLOOD PRESSURE: 162 MMHG

## 2021-11-25 LAB
BUN/CREAT SERPL: 17
CALCIUM SERPL-MCNC: 8.6 MG/DL (ref 8.5–10.1)
CHLORIDE SERPL-SCNC: 103 MMOL/L (ref 98–107)
CO2 SERPL-SCNC: 24 MMOL/L (ref 21–32)
CREAT SERPL-MCNC: 1.25 MG/DL (ref 0.6–1.3)
GFR SERPLBLD BASED ON 1.73 SQ M-ARVRAT: 61 ML/MIN
GLUCOSE SERPL-MCNC: 286 MG/DL (ref 70–105)
HCT VFR BLD CALC: 34 % (ref 40–54)
HGB BLD-MCNC: 10.3 G/DL (ref 13.3–17.7)
MCH RBC QN AUTO: 25 PG (ref 25–34)
MCHC RBC AUTO-ENTMCNC: 31 G/DL (ref 32–36)
MCV RBC AUTO: 83 FL (ref 80–99)
PLATELET # BLD: 358 10^3/UL (ref 130–400)
PMV BLD AUTO: 9.9 FL (ref 9–12.2)
POTASSIUM SERPL-SCNC: 4.3 MMOL/L (ref 3.6–5)
SODIUM SERPL-SCNC: 135 MMOL/L (ref 135–145)
WBC # BLD AUTO: 8.2 10^3/UL (ref 4.3–11)

## 2021-11-25 RX ADMIN — INSULIN ASPART SCH UNIT: 100 INJECTION, SOLUTION INTRAVENOUS; SUBCUTANEOUS at 11:03

## 2021-11-25 RX ADMIN — INSULIN ASPART SCH UNIT: 100 INJECTION, SOLUTION INTRAVENOUS; SUBCUTANEOUS at 05:17

## 2021-11-25 RX ADMIN — POLYETHYLENE GLYCOL (3350) SCH GM: 17 POWDER, FOR SOLUTION ORAL at 20:40

## 2021-11-25 RX ADMIN — DOCUSATE SODIUM AND SENNOSIDES SCH EA: 8.6; 5 TABLET, FILM COATED ORAL at 20:40

## 2021-11-25 RX ADMIN — POLYETHYLENE GLYCOL (3350) SCH GM: 17 POWDER, FOR SOLUTION ORAL at 08:23

## 2021-11-25 RX ADMIN — INSULIN ASPART SCH UNIT: 100 INJECTION, SOLUTION INTRAVENOUS; SUBCUTANEOUS at 16:48

## 2021-11-25 RX ADMIN — DOCUSATE SODIUM AND SENNOSIDES SCH EA: 8.6; 5 TABLET, FILM COATED ORAL at 08:22

## 2021-11-25 RX ADMIN — POLYETHYLENE GLYCOL (3350) SCH GM: 17 POWDER, FOR SOLUTION ORAL at 10:41

## 2021-11-25 RX ADMIN — ENOXAPARIN SODIUM SCH MG: 100 INJECTION SUBCUTANEOUS at 20:40

## 2021-11-25 RX ADMIN — Medication SCH ML: at 12:41

## 2021-11-25 RX ADMIN — INSULIN ASPART SCH UNIT: 100 INJECTION, SOLUTION INTRAVENOUS; SUBCUTANEOUS at 20:48

## 2021-11-25 RX ADMIN — Medication SCH ML: at 05:11

## 2021-11-25 RX ADMIN — ENOXAPARIN SODIUM SCH MG: 100 INJECTION SUBCUTANEOUS at 08:22

## 2021-11-25 RX ADMIN — PANTOPRAZOLE SODIUM SCH MG: 40 TABLET, DELAYED RELEASE ORAL at 05:11

## 2021-11-25 RX ADMIN — Medication SCH ML: at 20:49

## 2021-11-25 NOTE — HISTORY & PHYSICAL
DIAMOND GIBBS 21 1029:


HPI


History of Present Illness:


Mr. Estrada is a 52 y/o male with PMHx of uncontrolled T2DM, obstructive sleep 

apnea, left LE BKA, lymphedema, foot ulcer, and anxiety. He presented to the ER 

on  for SOB and constipation that started simultaneously 2 days ago. He 

states that he thought he was just so "backed up" that it was causing him to be 

SOB. He has not had a bowel movement for approximately 3 days. He was given 

senna yesterday and still has not had a bowel movement even with addition of 

Miralax and Mg Citrate today. On CXR and abdominal CT there was evidence of b/l 

pleural effusions with no other acute findings. He was started on furosemide for

this as well and is feeling less SOB today. He is also not feeling as bloated 

today. BMP was elevated at 500 and when asked if he has ever had a heart ultraso

und he isn't certain that he has. He has been seeing [facility] wound care for his foot

ulcer and has been finishing a course of cephalexin.


Source:  patient, RN notes reviewed, EMS notes reviewed


Exam Limitations:  no limitations


Date seen by provider:  2021


Time Seen by Provider:  10:00


Attending Physician


Enrique Frank MD


PCP


Noe Roque MD


Consult





Date of Admission


2021 at 19:34





Home Medications


Home Medications


Reviewed patient Home Medication Reconciliation performed by pharmacy medication

reconciliations technician and/or nursing.


Patients Allergies have been reviewed.





Allergies


Coded Allergies:  


     meperidine HCl (Unverified  Allergy, Unknown, 11)


     shellfish derived (Unverified  Allergy, Unknown, 13)


   FROM UNCODED ALLERGIES


     Penicillins (Unverified  Adverse Reaction, Intermediate, NAUSEA, 11)





PMH-Social-Family Hx


Patient Social History


Former smoker/When Quit:  2003


2nd Hand Smoke Exposure:  No


Recent Hopitalizations:  Yes (2020)


Alcohol Use?:  No


Have you traveled recently?:  No





Immunizations Up To Date


Tetanus Booster (TDap):  Unknown


Date of Pneumonia Vaccine:  2010





Past Medical History





IDDM w/Neuropathy and arterial compromise


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


HTN


Obesity


Debility





Family Medical History


Significant Family History:  No Pertinent Family Hx


Family History:  


Arthritis


  19 FATHER, Onset:Unknown


Diabetes mellitus


  19 MOTHER, , Onset:Unknown





Review of Systems ([facility])


Constitutional:  No chills, No diaphoresis, No dizziness, No fever


EENTM:  no symptoms reported; No hearing loss, No double vision, No vision loss


Respiratory:  No cough, No hemoptysis; short of breath (better today. Still 

mildly short of breath); No wheezing


Cardiovascular:  no symptoms reported; No chest pain; edema (lymphedema of right

LE)


Gastrointestinal:  No RUQ, No LUQ, No RLQ, No LLQ; constipation; No loss of 

appetite, No nausea, No vomiting


Genitourinary:  No decreased output, No dysuria, No frequency, No incontinence


Musculoskeletal:  no symptoms reported


Skin:  see HPI, change in color, other (Right foot ulcer with erythema of right 

calf without sign of infection)


Psychiatric/Neurological:  See HPI





Reviewed Test Results


Reviewed Test Results


Lab





Glucose 299


Radiology


CXR and abdominal CT: mild b/l pleural effusions. No evidence of SBO.





Physical Exam-([facility])


Physical Exam


Vital Signs





                          VS - Last 72 Hours, by Label








 21





 14:38 18:08 20:12 20:25


 


Temp 36.0  36.9 


 


Pulse 93 72 74 


 


Resp 18 18 18 


 


B/P (MAP)  164/94 178/104 


 


Pulse Ox 93 96 98 


 


O2 Delivery Room Air Nasal Cannula Nasal Cannula Room Air


 


O2 Flow Rate  3.00 3.00 


 


    





 21





 20:50 21:11 23:05 04:16


 


Temp 37.0 36.0 36.5 36.4


 


Pulse 81 93 85 74


 


Resp 22  20 20


 


B/P (MAP) 169/106 (127)  156/85 (108) 162/74 (103)


 


Pulse Ox 97 93 91 93


 


O2 Delivery Room Air  Room Air Room Air


 


FiO2  21  


 


    





 21 





 07:45 08:00 10:10 


 


Temp 36.1   


 


Pulse 75   


 


Resp 20   


 


B/P (MAP) 165/81 (109)   


 


Pulse Ox 96   


 


O2 Delivery Room Air Room Air Room Air 





Capillary Refill : Less Than 3 Seconds


General Appearance:  no apparent distress, obese


Eyes:  Bilateral Eye Normal Inspection, Bilateral Eye PERRL, Bilateral Eye EOMI


HEENT:  No scleral icterus (R), No scleral icterus (L), No photophobia


Neck:  supple, normal inspection


Respiratory:  chest non-tender, lungs clear, normal breath sounds, no 

respiratory distress, no accessory muscle use; No crackles, No rhonchi, No 

wheezing, No plerual rub


Cardiovascular:  normal peripheral pulses, regular rate, rhythm, no edema, no 

gallop, no JVD; No JVD, No friction rub


Peripheral Pulses:  3+ Dorsalis Pedis (R)


Gastrointestinal:  normal bowel sounds, non tender, soft, no organomegaly, no 

pulsatile mass; No distended, No guarding, No hernia


Back:  normal inspection


Extremities:  pedal edema (Lypmhedema of right LE with hemostasis and multiple 

right foot ulcerations. There is crusting, xerosis but no sign of infection. 

Right calf has dressing in place. No dressing on right foot. ), swelling


Neurologic/Psychiatric:  alert, oriented x 3


Skin:  warm/dry, other (Right foot ulcer)





Assessment/Plan


Assessment/Plan


Admission Status:  Inpatient Order (span 2 midnights)


Reason for Inpatient Admission:  


shortness of breath secondary to bilateral pleural effusions





(1) Acute respiratory insufficiency


Status:  Acute


Assessment & Plan:  Feeling better today after starting furosemide and 

restarting Levofloxacin. Pneumonia ruled out as there is no indication of 

infection. BNP is at 500. Will consider echocardiogram tomorrow as there is none

on file. 





(2) Pleural effusion, bilateral


Status:  Acute


Assessment & Plan:  Likely secondary to fluid overload.Continue furosemide. 

Continue to monitor. 





(3) Constipation


Status:  Acute


Assessment & Plan:  Currently on senna, miralax, and started magnesium citrate 

today. Recheck with patient in AM.





(4) Insulin dependent diabetes mellitus with complications


Status:  Chronic


Assessment & Plan:  Started on lower dose sliding scale as home dosing is not 

accurate due to non-compliance. Will continue to monitor. Advised to consider 

continuous glucose monitor in outpatient setting with PCP as pt states he is 

afraid of needles which keeps him from checking his sugars and taking insulin. 





(5) Right foot ulcer


Status:  Acute


Assessment & Plan:  Recent arterial u/s indicating insufficient blood flow to 

LE. Likely to slow healing of ulcer. Will consult cardiology. 


Will order XR of right foot today to r/o osteomyelitis.





(6) Lymphedema of lower extremity


Status:  Acute


Assessment & Plan:  Previous outpatient visit showing arterial stenosis with 

decrease flow. Consult cardiology.








ENRIQUE FRANK MD 21 1115:


HPI


History of Present Illness:


Pt seeing [facility], states he was on an antibiotic course that has

not finished, but doesn't know what it was. Hasn't used insulin since he ran out

a few months ago and it was going to cost $600, doesn't check blood sugar due to

pain and fear of the finger sticks, but states he can give himself insulin 

injections. He has not used cpap in years. No known cardiac issues in the past, 

doesn't think he has had an echo.


Source:  patient





Home Medications


Allergies


Coded Allergies:  


     meperidine HCl (Unverified  Allergy, Unknown, 11)


     shellfish derived (Unverified  Allergy, Unknown, 13)


   FROM UNCODED ALLERGIES


     Penicillins (Unverified  Adverse Reaction, Intermediate, NAUSEA, 11)





PMH-Social-Family Hx


Past Medical History





PMHx:


DMII


HTN


HLD


Morbid obesity


Diabetic foot ulcers


Sleep apnea





SurgHx:


Left BKA for necrotizing wound





Family Medical History


Family History:  


Arthritis


  19 FATHER, Onset:Unknown


Diabetes mellitus


  19 MOTHER, , Onset:Unknown





Physical Exam-([facility])


Physical Exam


General Appearance:  no apparent distress, obese


Respiratory:  lungs clear, normal breath sounds


Cardiovascular:  regular rate, rhythm, no murmur


Gastrointestinal:  normal bowel sounds, non tender, soft


Extremities:  other (Right leg with erythema and thickened crusted skin, kerlix 

around shin, ulcer near great toe area with yellow exudate, thick crust over 

most of dorsum of foot)


Neurologic/Psychiatric:  alert, normal mood/affect





Assessment/Plan


Assessment/Plan





(1) Pleural effusion, bilateral


Status:  Acute


(2) Right foot ulcer


Status:  Acute


(3) Lymphedema of lower extremity


Status:  Acute


(4) Insulin dependent diabetes mellitus with complications


Status:  Chronic


(5) Acute respiratory insufficiency


Status:  Acute


(6) Constipation


Status:  Acute


(7) HTN (hypertension)


Status:  Chronic


Assessment & Plan:  Resume home lisinopril


Qualifiers:  


   Qualified Codes:  I10 - Essential (primary) hypertension





Supervisory-Addendum Brief


Verification & Attestation


Participated in pt care:  history, MDM, physical


Personally performed:  exam, history, MDM


Care discussed with:  Medical Student


Procedures:  n/a


Verification and Attestation of Medical Student E/M Service





A medical student performed and documented this service in my presence. I 

reviewed and verified all information documented by the medical student and made

modifications to such information, when appropriate. I personally performed my 

own physical exam and medical decision making, see my note section, I did not 

repeat entire exam documented by med student.





 Enrique Frank, 2021,11:10











DIAMOND GIBBS                2021 10:29


ENRIQUE FRANK MD             2021 11:15

## 2021-11-25 NOTE — DIAGNOSTIC IMAGING REPORT
Examination: Right foot two view.



HISTORY: Diabetic foot wound.



COMPARISON: None available.



FINDINGS:



There is soft tissue irregularity over the dorsum of the foot.

There is diffuse soft tissue swelling about the foot. There has

been a transmetatarsal phalangeal amputation of the right great

toe. There is  mild irregularity at the amputation site but no

clear erosions are seen. No acute fracture.



IMPRESSION:



1. Skin irregularity and soft tissue swelling overlying the

dorsum of the foot but no clear evidence of osteomyelitis.



Dictated by: 



  Dictated on workstation # [id number]

## 2021-11-26 VITALS — DIASTOLIC BLOOD PRESSURE: 88 MMHG | SYSTOLIC BLOOD PRESSURE: 164 MMHG

## 2021-11-26 VITALS — DIASTOLIC BLOOD PRESSURE: 81 MMHG | SYSTOLIC BLOOD PRESSURE: 136 MMHG

## 2021-11-26 VITALS — SYSTOLIC BLOOD PRESSURE: 163 MMHG | DIASTOLIC BLOOD PRESSURE: 95 MMHG

## 2021-11-26 VITALS — DIASTOLIC BLOOD PRESSURE: 91 MMHG | SYSTOLIC BLOOD PRESSURE: 142 MMHG

## 2021-11-26 LAB
BUN/CREAT SERPL: 15
CALCIUM SERPL-MCNC: 8.2 MG/DL (ref 8.5–10.1)
CHLORIDE SERPL-SCNC: 104 MMOL/L (ref 98–107)
CO2 SERPL-SCNC: 22 MMOL/L (ref 21–32)
CREAT SERPL-MCNC: 1.36 MG/DL (ref 0.6–1.3)
GFR SERPLBLD BASED ON 1.73 SQ M-ARVRAT: 55 ML/MIN
GLUCOSE SERPL-MCNC: 199 MG/DL (ref 70–105)
HCT VFR BLD CALC: 33 % (ref 40–54)
HGB BLD-MCNC: 10.2 G/DL (ref 13.3–17.7)
MCH RBC QN AUTO: 25 PG (ref 25–34)
MCHC RBC AUTO-ENTMCNC: 31 G/DL (ref 32–36)
MCV RBC AUTO: 82 FL (ref 80–99)
PLATELET # BLD: 343 10^3/UL (ref 130–400)
PMV BLD AUTO: 10.5 FL (ref 9–12.2)
POTASSIUM SERPL-SCNC: 4 MMOL/L (ref 3.6–5)
SODIUM SERPL-SCNC: 136 MMOL/L (ref 135–145)
WBC # BLD AUTO: 8.4 10^3/UL (ref 4.3–11)

## 2021-11-26 RX ADMIN — ENOXAPARIN SODIUM SCH MG: 100 INJECTION SUBCUTANEOUS at 10:02

## 2021-11-26 RX ADMIN — POLYETHYLENE GLYCOL (3350) SCH GM: 17 POWDER, FOR SOLUTION ORAL at 10:02

## 2021-11-26 RX ADMIN — POLYETHYLENE GLYCOL (3350) SCH GM: 17 POWDER, FOR SOLUTION ORAL at 14:31

## 2021-11-26 RX ADMIN — Medication SCH ML: at 06:14

## 2021-11-26 RX ADMIN — PANTOPRAZOLE SODIUM SCH MG: 40 TABLET, DELAYED RELEASE ORAL at 06:13

## 2021-11-26 RX ADMIN — INSULIN ASPART SCH UNIT: 100 INJECTION, SOLUTION INTRAVENOUS; SUBCUTANEOUS at 06:13

## 2021-11-26 RX ADMIN — INSULIN ASPART SCH UNIT: 100 INJECTION, SOLUTION INTRAVENOUS; SUBCUTANEOUS at 12:04

## 2021-11-26 RX ADMIN — DOCUSATE SODIUM AND SENNOSIDES SCH EA: 8.6; 5 TABLET, FILM COATED ORAL at 10:02

## 2021-11-26 NOTE — DISCHARGE SUMMARY
Discharge Summary


Instructions for Patient


Assessment/Instructions


Diabetic foot wound


Diabetes mellitus uncontrolled


Hypertension


Sleep apnea


Peripheral vascular disease


Physician to follow Patient:  Jude


Discharge Diet for Home:  ADA Diet


Hospital Course


Date of Admission: Nov 24, 2021 at 19:34 


Admission Diagnosis :  


Acute on chronic hypoxic respiratory failure


Bilateral pleural effusion


Constipation


DMII


DMII with foot ulcer


Peripheral vascular disease


Morbid obesity


Not taking home medications





Family Physician/Provider: Noe Roque MD  





Date of Discharge: 11/26/21 


Discharge Diagnosis: 


Acute on chronic hypoxic respiratory failure


Bilateral pleural effusion


Constipation


DMII


DMII with foot ulcer


Peripheral vascular disease


Morbid obesity


Not taking home medications





Hospital Course:


Pt admitted due to shortness of breath and constipation, requiring 2 lpm 

supplemental oxygen initially. He has known sleep apnea, has not used cpap in 

years. Has not been taking insulin for several months due to cost and not 

checking blood sugar due to needle phobia/pain. He has been seeing wound care 

for diabetic wound on right foot, had an arterial ultrasound showing decreased 

flow earlier this month, Cardiology consulted for this and his bilateral 

effusions. Echo showed grade 2 diastolic dysfunction, Cardiology noted he had 

peripheral intervention with Dr. Persaud already. Insulin and blood pressure 

medications sent to [facility] for cost and levofloxacin continued for 4 further

days outpatient for his wound. Home health services reordered on d/c. He did not

have bowel movement, but did note improvement in his abdominal pain and 

breathing and was on room air at discharge.








Labs and Pending Lab Test:


Laboratory Tests


11/25/21 16:10: Glucometer 278H


11/25/21 20:42: Glucometer 341H


11/26/21 05:37: 


White Blood Count 8.4, Red Blood Count 4.07L, Hemoglobin 10.2L, Hematocrit 33L, 

Mean Corpuscular Volume 82, Mean Corpuscular Hemoglobin 25, Mean Corpuscular 

Hemoglobin Concent 31L, Red Cell Distribution Width 15.4H, Platelet Count 343, 

Mean Platelet Volume 10.5, Sodium Level 136, Potassium Level 4.0, Chloride Level

104, Carbon Dioxide Level 22, Anion Gap 10, Blood Urea Nitrogen 20H, Creatinine 

1.36H, Estimat Glomerular Filtration Rate 55, BUN/Creatinine Ratio 15, Glucose L

evel 199H, Calcium Level 8.2L


11/26/21 11:04: Glucometer 274H





Microbiology


11/24/21 Blood Culture - Preliminary, Resulted


           No growth


11/24/21 Urine Culture - Final, Complete


           See Comments





Home Meds


Active


Levofloxacin 750 Mg Tablet 750 Mg PO HS


Novolog Flexpen (Insulin Aspart) 300 Units/3 Ml Solution 5 Units SQ AC


Atorvastatin Calcium 40 Mg Tablet 40 Mg PO DAILY


Lisinopril 20 Mg Tablet 20 Mg PO DAILY


Metformin HCl 1,000 Mg Tablet 1,000 Mg PO BID


Levemir Flextouch (Insulin Detemir) 100 Unit/1 Ml Insuln.pen 20 Units SQ BID


Reported


Aspirin EC (Aspirin) 325 Mg Tablet. 325 Mg PO DAILY


Consulations


Cardiology


Patient Allergies:  


Coded Allergies:  


     meperidine HCl (Unverified  Allergy, Unknown, 11/26/11)


     shellfish derived (Unverified  Allergy, Unknown, 1/14/13)


   FROM UNCODED ALLERGIES


     Penicillins (Unverified  Adverse Reaction, Intermediate, NAUSEA, 11/26/11)


Height (Feet):  6


Height (Inches):  0.00


Weight (Pounds):  400


Weight (Ounces):  4.0





Home Health Need/Face to Face


Date of Face to Face:  Nov 26, 2021


Clinical Findings:  Instability


I have seen Pt face-to-face:  Yes


Discharged To:  Home


Diagnosis/Conditions:  


See discharge diagnoses


Patient is Homebound due to:  Jesika fall risk due to instabilty


Homebound Status


   Due to the above stated illness, injury or surgical procedure (medical 

condition or diagnosis) and associated clinical findings, the patient is 

homebound because of his/her inability to leave home except with aid of a 

supportive device and/or person AND leaving the home requires a considerable and

taxing effort or is medically contraindicated.


Pt req the following assistanc:  Wheelchair





Home Health Nursing Orders


Home Health Services Order:  Nursing Services, Occupational Ther-Evaluate & 

Treat, Physical Therapy-Evaluate & Treat





Home Health Infusion Therapy


Line Start Date:  Nov 24, 2021


Certify Stmt


I certify that this patient is under my care and that I, a nurse practitioner or

a physician; a assistant working with me, had a face to face encounter that -

meets the physician face to face encounter requirements with this patient as 

dated.





Discharge Physical Exam


General:  Alert, No Acute Distress


Lungs:  Clear to Auscultation, Normal Air Movement


Heart:  Regular Rate, No Murmurs


Abdomen:  Normal Bowel Sounds, No Tenderness


Extremities:  Other (right foot with ulcerated wound near great toe area with 

yellow slough, entire leg with erythema and thick scaling)


Neuro:  Normal Speech


Psych/Mental Status:  Mood NL











ENRIQUE FRANK MD             Nov 26, 2021 13:29

## 2021-11-26 NOTE — CONSULTATION-CARDIOLOGY
HPI-Cardiology


Cardiology Consultation


Date of Consultation


21


Date of Admission





Time Seen by Provider:  10:18


Indication:  Peripheral arterial disease





HPI


51 years old gentleman with history of diabetes mellitus, hypertension, 

peripheral arterial disease.  Had chronic foot ulcer on the right, he was 

admitted for increasing shortness of breath and constipation.  He denied any 

chest pain or palpitation.  No syncope.


He underwent peripheral angiogram and intervention on his right leg by Dr. Ramakrishna Persaud in 2021.


Had amputation of his left leg due to lymphedema and nonhealing wound when he 

was in his late 20s.


No previous known cardiac disease





Home Medications & Allergies


Allergies:  


Coded Allergies:  


     meperidine HCl (Unverified  Allergy, Unknown, 11)


     shellfish derived (Unverified  Allergy, Unknown, 13)


   FROM UNCODED ALLERGIES


     Penicillins (Unverified  Adverse Reaction, Intermediate, NAUSEA, 11)


Home Medication List Reviewed:  Yes





PMH-Social-Family Hx


Patient Social History


Former smoker/When Quit:  2003


Type Used:  Cigarettes


2nd Hand Smoke Exposure:  No


Recent Hopitalizations:  Yes (2020)


Have you traveled recently?:  No


Alcohol Use?:  No





Immunizations Up To Date


Tetanus Booster (TDap):  Unknown


Date of Pneumonia Vaccine:  2010





Past Medical History


Discussed below





Family Medical History


Significant Family History:  No Pertinent Family Hx


Family History:  


Arthritis


  19 FATHER, Onset:Unknown


Diabetes mellitus


  19 MOTHER, , Onset:Unknown





Review of Systems-General


Review of Systems


Constitutional:  see HPI, malaise


EENTM:  no symptoms reported


Respiratory:  see HPI; No cough; dyspnea on exertion; No hemoptysis, No 

orthopnea, No phlegm, No short of breath, No stridor, No wheezing, No other


Cardiovascular:  see HPI; No chest pain; edema; No Hx of Intervention, No 

palpitations, No syncope, No vascular heart diseas, No other


Gastrointestinal:  see HPI


Genitourinary:  no symptoms reported, other (recent UTI)


Musculoskeletal:  no symptoms reported, see HPI


Skin:  see HPI


Psychiatric/Neurological:  No Symptoms Reported, See HPI





Reviewed Test Results


Reviewed Test Results


Lab





Laboratory Tests








Test


 21


10:23 21


16:10 21


20:42 21


05:37 Range/Units


 


 


Glucometer 296 H 278 H 341 H    MG/DL


 


White Blood Count


 


 


 


 8.4 


 4.3-11.0


10^3/uL


 


Red Blood Count


 


 


 


 4.07 L


 4.30-5.52


10^6/uL


 


Hemoglobin    10.2 L 13.3-17.7  g/dL


 


Hematocrit    33 L 40-54  %


 


Mean Corpuscular Volume    82  80-99  fL


 


Mean Corpuscular Hemoglobin    25  25-34  pg


 


Mean Corpuscular Hemoglobin


Concent 


 


 


 31 L


 32-36  g/dL





 


Red Cell Distribution Width    15.4 H 10.0-14.5  %


 


Platelet Count


 


 


 


 343 


 130-400


10^3/uL


 


Mean Platelet Volume    10.5  9.0-12.2  fL


 


Sodium Level    136  135-145  MMOL/L


 


Potassium Level    4.0  3.6-5.0  MMOL/L


 


Chloride Level    104    MMOL/L


 


Carbon Dioxide Level    22  21-32  MMOL/L


 


Anion Gap    10  5-14  MMOL/L


 


Blood Urea Nitrogen    20 H 7-18  MG/DL


 


Creatinine


 


 


 


 1.36 H


 0.60-1.30


MG/DL


 


Estimat Glomerular Filtration


Rate 


 


 


 55 


  





 


BUN/Creatinine Ratio    15   


 


Glucose Level    199 H   MG/DL


 


Calcium Level    8.2 L 8.5-10.1  MG/DL








Radiology


CXR and abdominal CT: mild b/l pleural effusions. No evidence of SBO.





Physical Exam


Physical Exam


Vital Signs





Vital Signs - First Documented








 21





 14:38 18:08 21:11


 


Temp 36.0  


 


Pulse 93  


 


Resp 18  


 


B/P (MAP)  164/94 


 


Pulse Ox 93  


 


O2 Delivery Room Air  


 


O2 Flow Rate  3.00 


 


FiO2   21





Capillary Refill : Less Than 3 Seconds


Height, Weight, BMI


Height: 6'0.00"


Weight: 400lbs. 4.0oz. 181.414030cm; 49.51 BMI


Method:Stated


General Appearance:  No Apparent Distress, WD/WN


Eyes:  Bilateral Eye Normal Inspection, Bilateral Eye PERRL, Bilateral Eye EOMI


HEENT:  PERRL/EOMI, Normal ENT Inspection, Pharynx Normal, Moist Mucous Memb

ranes


Neck:  Full Range of Motion, Normal Inspection, Supple


Respiratory:  No Respiratory Distress, Crackles, Decreased Breath Sounds, Other 

(increased effort )


Cardiovascular:  Regular Rate, Rhythm, No Gallop


Gastrointestinal:  No No Organomegaly (unable to assess ); Non Tender, Soft


Extremity:  Other (Left BKA, right lower extremity edema and ulceration)





A/P-Cardiology


Admission Diagnosis


Shortness of breath


Pleural effusion


Congestive heart failure, acute left ventricular diastolic dysfunction, 

nonischemic cardiomyopathy


Hypertension





Assessment/Plan


Shortness of breath, bilateral pleural effusion, given Lasix, had some 

improvement.  Unable to tolerate aggressive diuresis due to worsening renal 

function





Congestive heart failure, acute left ventricular diastolic dysfunction, normal 

systolic function on echo.  Probably secondary to hypertensive heart disease and

poorly controlled blood pressure.  Had mild elevation in BNP.


Received 2 doses of Lasix, has worsening of his renal function.  We will 

continue with gentle diuresis and monitor renal function closely.





Peripheral arterial disease, nonhealing foot ulcer on the right, had peripheral 

intervention done with Dr. Ramakrishna Persaud at [facility] in 2021.  

Continue to monitor





History of left AKA due to lymphedema and nonhealing ulcer in the remote past





Hypertension, poor control.  Maintained on lisinopril 20 mg daily, I will add 

hydrochlorothiazide 25 mg daily and will consider adding low-dose beta-blockers 

if he can tolerate it





Hyperlipidemia, maintained on Lipitor.  Continue to monitor





Constipation





Diabetes mellitus, poorly controlled, followed and managed by primary care 

physician





Morbid obesity, BMI 49





History of noncompliance, educated on compliance











JOMAR DIMAS MD              2021 10:24

## 2021-11-30 ENCOUNTER — HOSPITAL ENCOUNTER (OUTPATIENT)
Dept: HOSPITAL 75 - WOUNDCARE | Age: 51
End: 2021-11-30
Attending: FAMILY MEDICINE
Payer: MEDICAID

## 2021-11-30 DIAGNOSIS — Z89.612: ICD-10-CM

## 2021-11-30 DIAGNOSIS — Z91.11: ICD-10-CM

## 2021-11-30 DIAGNOSIS — L97.212: ICD-10-CM

## 2021-11-30 DIAGNOSIS — L03.115: ICD-10-CM

## 2021-11-30 DIAGNOSIS — B37.2: ICD-10-CM

## 2021-11-30 DIAGNOSIS — E11.65: ICD-10-CM

## 2021-11-30 DIAGNOSIS — Z91.19: ICD-10-CM

## 2021-11-30 DIAGNOSIS — L97.122: ICD-10-CM

## 2021-11-30 DIAGNOSIS — E11.52: ICD-10-CM

## 2021-11-30 DIAGNOSIS — I70.234: Primary | ICD-10-CM

## 2021-11-30 DIAGNOSIS — L97.412: ICD-10-CM

## 2021-11-30 DIAGNOSIS — E11.622: ICD-10-CM

## 2021-11-30 DIAGNOSIS — I89.0: ICD-10-CM

## 2021-11-30 DIAGNOSIS — E66.01: ICD-10-CM

## 2021-11-30 PROCEDURE — 11042 DBRDMT SUBQ TIS 1ST 20SQCM/<: CPT

## 2021-11-30 PROCEDURE — 11045 DBRDMT SUBQ TISS EACH ADDL: CPT

## 2021-12-07 ENCOUNTER — HOSPITAL ENCOUNTER (OUTPATIENT)
Dept: HOSPITAL 75 - WOUNDCARE | Age: 51
End: 2021-12-07
Attending: FAMILY MEDICINE
Payer: MEDICAID

## 2021-12-07 DIAGNOSIS — L97.412: Primary | ICD-10-CM

## 2021-12-07 DIAGNOSIS — L97.212: ICD-10-CM

## 2021-12-07 DIAGNOSIS — L03.115: ICD-10-CM

## 2021-12-07 DIAGNOSIS — I89.0: ICD-10-CM

## 2021-12-07 DIAGNOSIS — Z91.11: ICD-10-CM

## 2021-12-07 DIAGNOSIS — B37.2: ICD-10-CM

## 2021-12-07 DIAGNOSIS — E66.01: ICD-10-CM

## 2021-12-07 DIAGNOSIS — E11.622: ICD-10-CM

## 2021-12-07 DIAGNOSIS — Z89.612: ICD-10-CM

## 2021-12-07 DIAGNOSIS — E11.65: ICD-10-CM

## 2021-12-07 DIAGNOSIS — E11.52: ICD-10-CM

## 2021-12-07 DIAGNOSIS — L97.122: ICD-10-CM

## 2021-12-07 DIAGNOSIS — Z91.19: ICD-10-CM

## 2021-12-07 DIAGNOSIS — I70.234: ICD-10-CM

## 2021-12-07 PROCEDURE — 11042 DBRDMT SUBQ TIS 1ST 20SQCM/<: CPT

## 2021-12-07 PROCEDURE — 11045 DBRDMT SUBQ TISS EACH ADDL: CPT

## 2021-12-14 ENCOUNTER — HOSPITAL ENCOUNTER (OUTPATIENT)
Dept: HOSPITAL 75 - WOUNDCARE | Age: 51
End: 2021-12-14
Attending: FAMILY MEDICINE
Payer: MEDICAID

## 2021-12-14 DIAGNOSIS — L97.212: ICD-10-CM

## 2021-12-14 DIAGNOSIS — E66.01: ICD-10-CM

## 2021-12-14 DIAGNOSIS — E11.52: ICD-10-CM

## 2021-12-14 DIAGNOSIS — B37.2: ICD-10-CM

## 2021-12-14 DIAGNOSIS — L97.412: Primary | ICD-10-CM

## 2021-12-14 DIAGNOSIS — Z91.19: ICD-10-CM

## 2021-12-14 DIAGNOSIS — E11.622: ICD-10-CM

## 2021-12-14 DIAGNOSIS — L97.122: ICD-10-CM

## 2021-12-14 DIAGNOSIS — I89.0: ICD-10-CM

## 2021-12-14 DIAGNOSIS — L03.115: ICD-10-CM

## 2021-12-14 DIAGNOSIS — I70.234: ICD-10-CM

## 2021-12-14 DIAGNOSIS — Z91.11: ICD-10-CM

## 2021-12-14 DIAGNOSIS — E11.65: ICD-10-CM

## 2021-12-14 DIAGNOSIS — Z89.612: ICD-10-CM

## 2021-12-14 PROCEDURE — 11045 DBRDMT SUBQ TISS EACH ADDL: CPT

## 2021-12-14 PROCEDURE — 11042 DBRDMT SUBQ TIS 1ST 20SQCM/<: CPT

## 2021-12-19 ENCOUNTER — HOSPITAL ENCOUNTER (INPATIENT)
Dept: HOSPITAL 75 - ER | Age: 51
LOS: 8 days | Discharge: HOME HEALTH | DRG: 291 | End: 2021-12-27
Attending: INTERNAL MEDICINE | Admitting: FAMILY MEDICINE
Payer: MEDICAID

## 2021-12-19 VITALS — SYSTOLIC BLOOD PRESSURE: 143 MMHG | DIASTOLIC BLOOD PRESSURE: 96 MMHG

## 2021-12-19 VITALS — SYSTOLIC BLOOD PRESSURE: 144 MMHG | DIASTOLIC BLOOD PRESSURE: 102 MMHG

## 2021-12-19 VITALS — SYSTOLIC BLOOD PRESSURE: 152 MMHG | DIASTOLIC BLOOD PRESSURE: 77 MMHG

## 2021-12-19 VITALS — SYSTOLIC BLOOD PRESSURE: 143 MMHG | DIASTOLIC BLOOD PRESSURE: 90 MMHG

## 2021-12-19 VITALS — WEIGHT: 315 LBS | HEIGHT: 71.65 IN | BODY MASS INDEX: 43.13 KG/M2

## 2021-12-19 VITALS — SYSTOLIC BLOOD PRESSURE: 143 MMHG | DIASTOLIC BLOOD PRESSURE: 85 MMHG

## 2021-12-19 VITALS — SYSTOLIC BLOOD PRESSURE: 133 MMHG | DIASTOLIC BLOOD PRESSURE: 93 MMHG

## 2021-12-19 VITALS — SYSTOLIC BLOOD PRESSURE: 137 MMHG | DIASTOLIC BLOOD PRESSURE: 82 MMHG

## 2021-12-19 DIAGNOSIS — I50.33: ICD-10-CM

## 2021-12-19 DIAGNOSIS — Z79.4: ICD-10-CM

## 2021-12-19 DIAGNOSIS — Z87.891: ICD-10-CM

## 2021-12-19 DIAGNOSIS — J96.20: ICD-10-CM

## 2021-12-19 DIAGNOSIS — E11.65: ICD-10-CM

## 2021-12-19 DIAGNOSIS — D64.9: ICD-10-CM

## 2021-12-19 DIAGNOSIS — Z89.512: ICD-10-CM

## 2021-12-19 DIAGNOSIS — E66.2: ICD-10-CM

## 2021-12-19 DIAGNOSIS — Z20.822: ICD-10-CM

## 2021-12-19 DIAGNOSIS — L97.219: ICD-10-CM

## 2021-12-19 DIAGNOSIS — M19.90: ICD-10-CM

## 2021-12-19 DIAGNOSIS — I11.0: Primary | ICD-10-CM

## 2021-12-19 DIAGNOSIS — Z91.19: ICD-10-CM

## 2021-12-19 DIAGNOSIS — L97.129: ICD-10-CM

## 2021-12-19 DIAGNOSIS — E78.2: ICD-10-CM

## 2021-12-19 DIAGNOSIS — L03.115: ICD-10-CM

## 2021-12-19 DIAGNOSIS — F41.9: ICD-10-CM

## 2021-12-19 DIAGNOSIS — Z66: ICD-10-CM

## 2021-12-19 DIAGNOSIS — T87.89: ICD-10-CM

## 2021-12-19 DIAGNOSIS — Z79.899: ICD-10-CM

## 2021-12-19 DIAGNOSIS — L97.519: ICD-10-CM

## 2021-12-19 DIAGNOSIS — Z79.84: ICD-10-CM

## 2021-12-19 LAB
ALBUMIN SERPL-MCNC: 2.8 GM/DL (ref 3.2–4.5)
ALP SERPL-CCNC: 108 U/L (ref 40–136)
ALT SERPL-CCNC: 23 U/L (ref 0–55)
APTT BLD: 33 SEC (ref 24–35)
APTT PPP: YELLOW S
ARTERIAL PATENCY WRIST A: POSITIVE
BACTERIA #/AREA URNS HPF: NEGATIVE /HPF
BASE EXCESS STD BLDA CALC-SCNC: -0.7 MMOL/L (ref -2.5–2.5)
BASOPHILS # BLD AUTO: 0.1 10^3/UL (ref 0–0.1)
BASOPHILS NFR BLD AUTO: 1 % (ref 0–10)
BDY SITE: (no result)
BILIRUB SERPL-MCNC: 0.4 MG/DL (ref 0.1–1)
BILIRUB UR QL STRIP: NEGATIVE
BODY TEMPERATURE: 96.3
BUN/CREAT SERPL: 15
CALCIUM SERPL-MCNC: 8.4 MG/DL (ref 8.5–10.1)
CHLORIDE SERPL-SCNC: 101 MMOL/L (ref 98–107)
CO2 BLDA CALC-SCNC: 25.8 MMOL/L (ref 21–31)
CO2 SERPL-SCNC: 19 MMOL/L (ref 21–32)
CREAT SERPL-MCNC: 1.17 MG/DL (ref 0.6–1.3)
D DIMER PPP FEU-MCNC: 2 UG/ML (ref 0–0.49)
EOSINOPHIL # BLD AUTO: 0.3 10^3/UL (ref 0–0.3)
EOSINOPHIL NFR BLD AUTO: 3 % (ref 0–10)
FIBRINOGEN PPP-MCNC: CLEAR MG/DL
GFR SERPLBLD BASED ON 1.73 SQ M-ARVRAT: 66 ML/MIN
GLUCOSE SERPL-MCNC: 341 MG/DL (ref 70–105)
GLUCOSE UR STRIP-MCNC: (no result) MG/DL
HCT VFR BLD CALC: 34 % (ref 40–54)
HGB BLD-MCNC: 10.1 G/DL (ref 13.3–17.7)
INHALED O2 FLOW RATE: (no result) L/MIN
INR PPP: 1 (ref 0.8–1.4)
KETONES UR QL STRIP: NEGATIVE
LEUKOCYTE ESTERASE UR QL STRIP: NEGATIVE
LYMPHOCYTES # BLD AUTO: 0.8 10^3/UL (ref 1–4)
LYMPHOCYTES NFR BLD AUTO: 10 % (ref 12–44)
MANUAL DIFFERENTIAL PERFORMED BLD QL: NO
MCH RBC QN AUTO: 25 PG (ref 25–34)
MCHC RBC AUTO-ENTMCNC: 30 G/DL (ref 32–36)
MCV RBC AUTO: 83 FL (ref 80–99)
MONOCYTES # BLD AUTO: 0.6 10^3/UL (ref 0–1)
MONOCYTES NFR BLD AUTO: 7 % (ref 0–12)
NEUTROPHILS # BLD AUTO: 6.6 10^3/UL (ref 1.8–7.8)
NEUTROPHILS NFR BLD AUTO: 79 % (ref 42–75)
NITRITE UR QL STRIP: NEGATIVE
PCO2 BLDA: 44 MMHG (ref 35–45)
PH BLDA: 7.35 [PH] (ref 7.37–7.43)
PH UR STRIP: 7 [PH] (ref 5–9)
PLATELET # BLD: 369 10^3/UL (ref 130–400)
PMV BLD AUTO: 9.9 FL (ref 9–12.2)
PO2 BLDA: 75 MMHG (ref 79–93)
POTASSIUM SERPL-SCNC: 5.4 MMOL/L (ref 3.6–5)
PROT SERPL-MCNC: 7.6 GM/DL (ref 6.4–8.2)
PROT UR QL STRIP: (no result)
PROTHROMBIN TIME: 13.9 SEC (ref 12.2–14.7)
RBC #/AREA URNS HPF: (no result) /HPF
RENAL EPI CELLS #/AREA URNS HPF: (no result) /HPF
SAO2 % BLDA FROM PO2: 95 % (ref 94–100)
SODIUM SERPL-SCNC: 132 MMOL/L (ref 135–145)
SP GR UR STRIP: 1.02 (ref 1.02–1.02)
SQUAMOUS #/AREA URNS HPF: (no result) /HPF
VENTILATION MODE VENT: NO
WBC # BLD AUTO: 8.3 10^3/UL (ref 4.3–11)
WBC #/AREA URNS HPF: (no result) /HPF

## 2021-12-19 PROCEDURE — 85610 PROTHROMBIN TIME: CPT

## 2021-12-19 PROCEDURE — 93005 ELECTROCARDIOGRAM TRACING: CPT

## 2021-12-19 PROCEDURE — 87040 BLOOD CULTURE FOR BACTERIA: CPT

## 2021-12-19 PROCEDURE — 87186 SC STD MICRODIL/AGAR DIL: CPT

## 2021-12-19 PROCEDURE — 85379 FIBRIN DEGRADATION QUANT: CPT

## 2021-12-19 PROCEDURE — 71045 X-RAY EXAM CHEST 1 VIEW: CPT

## 2021-12-19 PROCEDURE — 80202 ASSAY OF VANCOMYCIN: CPT

## 2021-12-19 PROCEDURE — 84145 PROCALCITONIN (PCT): CPT

## 2021-12-19 PROCEDURE — 71046 X-RAY EXAM CHEST 2 VIEWS: CPT

## 2021-12-19 PROCEDURE — 80048 BASIC METABOLIC PNL TOTAL CA: CPT

## 2021-12-19 PROCEDURE — 87088 URINE BACTERIA CULTURE: CPT

## 2021-12-19 PROCEDURE — 85025 COMPLETE CBC W/AUTO DIFF WBC: CPT

## 2021-12-19 PROCEDURE — 71275 CT ANGIOGRAPHY CHEST: CPT

## 2021-12-19 PROCEDURE — 87636 SARSCOV2 & INF A&B AMP PRB: CPT

## 2021-12-19 PROCEDURE — 83605 ASSAY OF LACTIC ACID: CPT

## 2021-12-19 PROCEDURE — 94760 N-INVAS EAR/PLS OXIMETRY 1: CPT

## 2021-12-19 PROCEDURE — 82805 BLOOD GASES W/O2 SATURATION: CPT

## 2021-12-19 PROCEDURE — 81000 URINALYSIS NONAUTO W/SCOPE: CPT

## 2021-12-19 PROCEDURE — 85730 THROMBOPLASTIN TIME PARTIAL: CPT

## 2021-12-19 PROCEDURE — 87077 CULTURE AEROBIC IDENTIFY: CPT

## 2021-12-19 PROCEDURE — 94761 N-INVAS EAR/PLS OXIMETRY MLT: CPT

## 2021-12-19 PROCEDURE — 80061 LIPID PANEL: CPT

## 2021-12-19 PROCEDURE — 80053 COMPREHEN METABOLIC PANEL: CPT

## 2021-12-19 PROCEDURE — 84484 ASSAY OF TROPONIN QUANT: CPT

## 2021-12-19 PROCEDURE — 83880 ASSAY OF NATRIURETIC PEPTIDE: CPT

## 2021-12-19 PROCEDURE — 36415 COLL VENOUS BLD VENIPUNCTURE: CPT

## 2021-12-19 PROCEDURE — 82947 ASSAY GLUCOSE BLOOD QUANT: CPT

## 2021-12-19 RX ADMIN — INSULIN ASPART SCH UNIT: 100 INJECTION, SOLUTION INTRAVENOUS; SUBCUTANEOUS at 22:41

## 2021-12-19 RX ADMIN — ENOXAPARIN SODIUM SCH MG: 100 INJECTION SUBCUTANEOUS at 22:41

## 2021-12-19 NOTE — DIAGNOSTIC IMAGING REPORT
PROCEDURE: CT angiography of the chest with contrast.



TECHNIQUE: Multiple contiguous axial images were obtained through

the chest after uneventful bolus administration of intravenous

contrast. 3D reconstructed CTA MIP acquisitions were also

performed.

Auto Exposure Controls were utilized during the CT exam to meet

ALARA standards for radiation dose reduction. 



 INDICATION:  Chest pain and shortness of breath.



FINDINGS: There are patchy bilateral pulmonary infiltrates. There

are bilateral pleural effusions, right greater than left. There

is no pneumothorax. The heart size is normal. The thoracic aorta

is normal in caliber without evidence of dissection. There are no

filling defects within the pulmonary arteries to suggest

pulmonary embolism. No pathologically enlarged adenopathy in the

chest. Visualized intra-abdominal structures are unremarkable.

There are degenerative changes in the spine.



IMPRESSION: Patchy bibasilar infiltrates and bilateral pleural

effusions right greater than left.



No evidence of pulmonary embolism, aortic dissection or aneurysm.



Dictated by: 



  Dictated on workstation # [id number]

## 2021-12-19 NOTE — ED DYSPNEA
General


Stated Complaint:  SOB


Source of Information:  Patient


Exam Limitations:  No Limitations


 (SHARAN GASPAR MD)





History of Present Illness


Date Seen by Provider:  Dec 19, 2021


Time Seen by Provider:  16:50


Initial Comments


Patient is a 51-year-old male who presents to the emergency department today 

with a chief complaint of shortness of breath.  Patient states his symptoms 

started 2 or 3 days ago.  He states they were not this bad at onset.  He states 

this morning he was significantly worse.  He states he feels like his lungs are 

either "full of fluid" or I am "backed up".  Patient states that he has not had 

a bowel movement in several days and he normally goes weekly.  He denies any 

recent black or bloody stools.  He denies abdominal pain.  He denies chest pain.

 He has a history of lymphedema with previous left above-the-knee amputation.  

He is a diabetic.  He has no known history of congestive heart failure.  He does

not wear a CPAP or supplemental oxygen at home.  Denies fevers or chills.  

States that he is Moderna vaccinated in April of this year.  He has not had a 

booster.  Denies any known sick contacts with Covid.  No fevers or chills.  No 

productive cough.  Sitting forward seems to improve his oxygenation, laying back

drops him down to 90.  He is quite tachypneic on presentation able to speak in 

only 3 or 4 word sentences.





Patient adamantly refuses BiPAP even with medication for anxiety.





All other review of systems reviewed and negative except as stated


Timing/Duration:  Other (2-3 days)


Severity:  Severe


Activities at Onset:  None


Prior Episodes/Possible Cause:  Occasional Episodes


Associated Symptoms:  Anxiety, Other (short of breath and headache)


 (SHARAN GASPAR MD)





Allergies and Home Medications


Allergies


Coded Allergies:  


     meperidine HCl (Unverified  Allergy, Unknown, 11)


     shellfish derived (Unverified  Allergy, Unknown, 13)


   FROM UNCODED ALLERGIES


     Penicillins (Unverified  Adverse Reaction, Intermediate, NAUSEA, 11)





Patient Home Medication List


Home Medication List Reviewed:  Yes


 (SHARAN GASPAR MD)


Aspirin (Aspirin EC) 325 Mg Tablet., 325 MG PO DAILY, (Reported)


   Entered as Reported by: LENCHO KULKARNI on 12/15/16 1559


   Last Action: Continued


Atorvastatin Calcium (Atorvastatin Calcium) 40 Mg Tablet, 40 MG PO DAILY


   Prescribed by: ENRIQUE FRANK on 21


   Last Action: Continued


Insulin Aspart (Novolog Flexpen) 300 Units/3 Ml Solution, 5 UNITS SQ AC


   Prescribed by: ENRIQUE FRANK on 21


   Last Action: Converted


Insulin Detemir (Levemir Flextouch) 100 Unit/1 Ml Insuln.pen, 20 UNITS SQ BID


   Prescribed by: ENRIQUE FRANK on 21


   Last Action: Converted


Levofloxacin (Levofloxacin) 750 Mg Tablet, 750 MG PO HS


   Prescribed by: ENRIQUE FRANK on 21


   Last Action: Reviewed


Lisinopril (Lisinopril) 20 Mg Tablet, 20 MG PO DAILY


   Prescribed by: ENRIQUE FRANK on 21


   Last Action: Continued


Metformin HCl (Metformin HCl) 1,000 Mg Tablet, 1,000 MG PO BID


   Prescribed by: ENRIQUE FRANK on 21


   Last Action: Held





Review of Systems


Review of Systems


Constitutional:  see HPI


EENTM:  no symptoms reported


Respiratory:  dyspnea on exertion


Cardiovascular:  no symptoms reported


Gastrointestinal:  no symptoms reported


Genitourinary:  no symptoms reported


Musculoskeletal:  other (swelling right leg)


Psychiatric/Neurological:  Anxiety (SHARAN GASPAR MD)





All Other Systems Reviewed


Negative Unless Noted:  Yes


 (SHARAN GASPAR MD)





Past Medical-Social-Family Hx


Immunizations Up To Date


Tetanus Booster (TDap):  Unknown


PED Vaccines UTD:  No


First/Initial COVID19 Vaccinat:  April


Second COVID19 Vaccination Keshawn:  May


 (SHARAN GASPAR MD)





Seasonal Allergies


Seasonal Allergies:  Yes


 (SHARAN GASPAR MD)





Past Medical History


Surgeries:  Yes


Abdominal, Amputation, Orthopedic


Respiratory:  Yes


Sleep Apnea


Currently Using CPAP:  No


Currently Using BIPAP:  No


Cardiac:  Yes


Hypertension


Neurological:  Yes (SEVERAL CONCUSSIONS PER PATIENT)


Concussion


Reproductive Disorders:  No


Sexually Transmitted Disease:  No


HIV/AIDS:  No


Genitourinary:  No


Gastrointestinal:  Yes


Abdominal Hernia


Musculoskeletal:  Yes (lymphedema left leg)


Amputee, Arthritis


Endocrine:  Yes


Diabetes, Non-Insulin dep


HEENT:  No


Loss of Vision:  Denies


Hearing Impairment:  Denies


Cancer:  No


Psychosocial:  Yes


Anxiety


Integumentary:  Yes


Recent Skin Changes


Blood Disorders:  No


 (SHARAN GASPAR MD)





Family Medical History





Arthritis


  19 FATHER, Onset:Unknown


Diabetes mellitus


  19 MOTHER, , Onset:Unknown


No Pertinent Family Hx


 (SHARAN GASPAR MD)





Physical Exam


Vital Signs





Vital Signs - First Documented








 21





 16:49


 


Temp 36.2


 


Pulse 108


 


Resp 40


 


B/P (MAP) 162/104 (123)


 


Pulse Ox 98


 


O2 Delivery Nasal Cannula


 


O2 Flow Rate 2.00





 (DHARA GALE DO)


Vital Signs


Capillary Refill :  


 (SHARAN GASPAR MD)


Height, Weight, BMI


Height: 6'0.00"


Weight: 400lbs. 4.0oz. [id number]; 49.51 BMI


Method:Stated


General Appearance:  Anxious, Moderate Distress


HEENT:  PERRL/EOMI, Other (dry cracked lips)


Respiratory:  Other (3-4 word dyspnea; accessory muscle use "belly breathing")


Cardiovascular:  Regular Rate, Rhythm, Normal Peripheral Pulses, Other 

(significant lymphedema RLE; )


Gastrointestinal:  Other (morbid obesity)


Extremity:  Other (left AKA with wound at end of stump - does not appear 

infected.  RLE severe lymphedema with skin changes)


Neurologic/Psychiatric:  Alert, Oriented x3, No Motor/Sensory Deficits, Normal 

Mood/Affect


Skin:  Warm/Dry, Pallor (SHARAN GASPAR MD)


Extremity:  Other (left AKA with wound at end of stump - does not appear 

infected.  RLE severe lymphedema with skin changes) (DHARA GALE DO)





Focused Exam


Sepsis Stage:  Ruled Out


Possible Source:  Pulmonary


Lactate Level


21 17:10: Lactic Acid Level 1.81


 (DHARA GALE DO)


Time of Focused Exam:  19:55


Respiratory:  Accessory Muscle Use, Decreased Breath Sounds, Other (STILL WITH 

LABORED BREATHING, ABDOMINAL RETRACTIONS, DECREASED AERATION IN BASES)


Cardiovascular:  Regular Rate, Rhythm


Capillary Refill:  Less Than 3 Seconds


Skin:  normal color, warm/dry


Lactic Acid Level





Laboratory Tests








Test


 21


17:10


 


Lactic Acid Level


 1.81 MMOL/L


(0.50-2.00)





 (DHARA GALE DO)


Within 3hrs of presentation:  Admin ABX, Blood cultures prior to ABX's, Focus 

exam, Lactate level, Other (FLUIDS HELD DUE TO CHF )


 (DHARA GALE DO)





Progress/Results/Core Measures


Results/Orders


Lab Results





Laboratory Tests








Test


 21


16:58 21


17:10 21


17:18 21


17:35 Range/Units


 


 


Influenza Type A (RT-PCR) Not Detected     Not Detecte  


 


Influenza Type B (RT-PCR) Not Detected     Not Detecte  


 


SARS-CoV-2 RNA (RT-PCR) Not Detected     Not Detecte  


 


Sodium Level  132 L   135-145  MMOL/L


 


Potassium Level  5.4 H   3.6-5.0  MMOL/L


 


Chloride Level  101      MMOL/L


 


Carbon Dioxide Level  19 L   21-32  MMOL/L


 


Anion Gap  12    5-14  MMOL/L


 


Blood Urea Nitrogen  18    7-18  MG/DL


 


Creatinine


 


 1.17 


 


 


 0.60-1.30


MG/DL


 


Estimat Glomerular Filtration


Rate 


 66 


 


 


  





 


BUN/Creatinine Ratio  15     


 


Glucose Level  341 H     MG/DL


 


Lactic Acid Level


 


 1.81 


 


 


 0.50-2.00


MMOL/L


 


Calcium Level  8.4 L   8.5-10.1  MG/DL


 


Corrected Calcium  9.4    8.5-10.1  MG/DL


 


Total Bilirubin  0.4    0.1-1.0  MG/DL


 


Aspartate Amino Transf


(AST/SGOT) 


 36 H


 


 


 5-34  U/L





 


Alanine Aminotransferase


(ALT/SGPT) 


 23 


 


 


 0-55  U/L





 


Alkaline Phosphatase  108      U/L


 


Troponin I  0.035 H   <0.028  NG/ML


 


Total Protein  7.6    6.4-8.2  GM/DL


 


Albumin  2.8 L   3.2-4.5  GM/DL


 


Procalcitonin  0.10 H   <0.10  NG/ML


 


Blood Gas Puncture Site   LEFT RADIAL    


 


Blood Gas Patient Temperature   96.3    


 


Arterial Blood pH   7.35 L  7.37-7.43  


 


Arterial Blood Partial


Pressure CO2 


 


 44 


 


 35-45  MMHG





 


Arterial Blood Partial


Pressure O2 


 


 75 L


 


 79-93  MMHG





 


Arterial Blood HCO3   24   23-27  MMOL/L


 


Arterial Blood Total CO2


 


 


 25.8 


 


 21.0-31.0


MMOL/L


 


Arterial Blood Oxygen


Saturation 


 


 95 


 


   %





 


Arterial Blood Base Excess


 


 


 -0.7 


 


 -2.5-2.5


MMOL/L


 


[place] Test   POSITIVE    


 


Blood Gas Ventilator Setting   NO    


 


Blood Gas Inspired Oxygen   3 L    


 


White Blood Count


 


 


 


 8.3 


 4.3-11.0


10^3/uL


 


Red Blood Count


 


 


 


 4.10 L


 4.30-5.52


10^6/uL


 


Hemoglobin    10.1 L 13.3-17.7  g/dL


 


Hematocrit    34 L 40-54  %


 


Mean Corpuscular Volume    83  80-99  fL


 


Mean Corpuscular Hemoglobin    25  25-34  pg


 


Mean Corpuscular Hemoglobin


Concent 


 


 


 30 L


 32-36  g/dL





 


Red Cell Distribution Width    15.9 H 10.0-14.5  %


 


Platelet Count


 


 


 


 369 


 130-400


10^3/uL


 


Mean Platelet Volume    9.9  9.0-12.2  fL


 


Immature Granulocyte % (Auto)    0   %


 


Neutrophils (%) (Auto)    79 H 42-75  %


 


Lymphocytes (%) (Auto)    10 L 12-44  %


 


Monocytes (%) (Auto)    7  0-12  %


 


Eosinophils (%) (Auto)    3  0-10  %


 


Basophils (%) (Auto)    1  0-10  %


 


Neutrophils # (Auto)


 


 


 


 6.6 


 1.8-7.8


10^3/uL


 


Lymphocytes # (Auto)


 


 


 


 0.8 L


 1.0-4.0


10^3/uL


 


Monocytes # (Auto)


 


 


 


 0.6 


 0.0-1.0


10^3/uL


 


Eosinophils # (Auto)


 


 


 


 0.3 


 0.0-0.3


10^3/uL


 


Basophils # (Auto)


 


 


 


 0.1 


 0.0-0.1


10^3/uL


 


Immature Granulocyte # (Auto)


 


 


 


 0.0 


 0.0-0.1


10^3/uL


 


B-Type Natriuretic Peptide    969.5 H <100.0  PG/ML


 


Test


 21


17:40 21


18:51 


 


 Range/Units


 


 


Prothrombin Time 13.9     12.2-14.7  SEC


 


INR Comment 1.0     0.8-1.4  


 


Activated Partial


Thromboplast Time 33 


 


 


 


 24-35  SEC





 


D-Dimer


 2.00 H


 


 


 


 0.00-0.49


UG/ML


 


Urine Color  YELLOW     


 


Urine Clarity  CLEAR     


 


Urine pH  7.0    5-9  


 


Urine Specific Gravity  1.020    1.016-1.022  


 


Urine Protein  2+ H   NEGATIVE  


 


Urine Glucose (UA)  3+ H   NEGATIVE  


 


Urine Ketones  NEGATIVE    NEGATIVE  


 


Urine Nitrite  NEGATIVE    NEGATIVE  


 


Urine Bilirubin  NEGATIVE    NEGATIVE  


 


Urine Urobilinogen  0.2    < = 1.0  MG/DL


 


Urine Leukocyte Esterase  NEGATIVE    NEGATIVE  


 


Urine RBC (Auto)  TRACE-I H   NEGATIVE  


 


Urine RBC  0-2     /HPF


 


Urine WBC  0-2     /HPF


 


Urine Squamous Epithelial


Cells 


 0-2 


 


 


  /HPF





 


Urine Renal Epithelial Cells  NONE     /HPF


 


Urine Crystals  NONE     /LPF


 


Urine Bacteria  NEGATIVE     /HPF


 


Urine Casts  NONE     /LPF


 


Urine Mucus  NEGATIVE     /LPF


 


Urine Culture Indicated


 


 CULTURE


PENDING 


 


  








 (DHARA GALE DO)


My Orders





Orders - DHARA GALE DO


Ct Angio Chest W (21 18:08)


Furosemide  Injection (Lasix  Injection) (21 18:15)


Nitroglycerin Ointment (Nitrobid Ointme (21 18:15)


Iohexol Injection (Omnipaque 350 Mg/Ml 1 (21 18:30)


Received Contrast (Hold Metformin- Contr (21 18:30)


Ns (Ivpb) (Sodium Chloride 0.9% Ivpb Bag (21 18:30)


Code/Resuscitation (21 19:02)


Blood Culture (21 19:49)


Sputum Culture (21 19:49)


Urinalysis (21 19:49)


Urine Culture (21 19:49)


Protime With Inr (21 19:49)


Partial Thromboplastin Time (21 19:49)


Ed Iv/Invasive Line Start (21 19:49)


Vital Signs Adult Sepsis Patie Q15M (21 19:49)


O2 (21 19:49)


Remove Rings In Anticipation O (21 19:49)


Lactic Acid Analyzer (21 19:49)


Procalcitonin (Pct) (21 19:49)


Cefepime Injection (Maxipime Injection) (21 20:00)


Ed Admission (Communication) (21 19:54)


 (DHARA GALE DO)


Medications Given in ED





Current Medications








 Medications  Dose


 Ordered  Sig/Marvin


 Route  Start Time


 Stop Time Status Last Admin


Dose Admin


 


 Furosemide  80 mg  ONCE  ONCE


 IVP  21 18:15


 21 18:16 DC 21 18:19


80 MG


 


 Iohexol  100 ml  ONCE  ONCE


 IV  21 18:30


 21 18:31 DC 21 19:24


95 ML


 


 Nitroglycerin  1 inch  ONCE  ONCE


 TOP  21 18:15


 21 18:16 DC 21 18:19


1 INCH


 


 Sodium Chloride  100 ml  ONCE  ONCE


 IV  21 18:30


 21 18:31 DC 21 19:24


80 ML





 (DHARA GALE DO)


Vital Signs/I&O











 21





 16:49


 


Temp 36.2


 


Pulse 108


 


Resp 40


 


B/P (MAP) 162/104 (123)


 


Pulse Ox 98


 


O2 Delivery Nasal Cannula


 


O2 Flow Rate 2.00





 (DHARA GALE DO)





Progress


Progress Note :  


Progress Note


--ASSUMED CARE FROM DR. GASPAR, LAB AND CXR BACK, WILL ORDER CT CHEST 

ANGIOGRAM, GIVE LASIX AND NITROPASTE, AND OBTAIN EKG ( UNABLE TO OBTAIN EKG DUE 

TO PT'S LABORED BREATHING). 


PT IS WITH LABORED BREATHING, BUT IS IMPROVED FROM ARRIVAL, PER DR. GASPAR 

AND NURSING STAFF. 


MAINTAINING O2 SATS OF 99% ON 4L/NC


PT HAS ADAMANTLY REFUSED BIPAP, AND WISHES TO BE DNR/DNI. 


PT WITH LONG HISTORY OF NON-COMPLIANCE IN ALL ASPECTS OF CARE


 (DHARA GALE DO)


Initial ECG Impression Date:  Dec 19, 2021


Initial ECG Impression Time:  19:27


Initial ECG Rate:  94


Initial ECG Rhythm:  Normal Sinus


Initial ECG Impression:  Nonspecific Changes


 (DHARA GALE DO)





Diagnostic Imaging





Comments


CXR--PER RADIOLOGIST REPORT AT 1914





FINDINGS: The heart size is upper limits of normal. Mild venous


congestion. No pleural effusion or pneumothorax. The mediastinum


is unremarkable.





IMPRESSION: Upper limits normal size heart with some mild central


pulmonary venous congestion.








CT CHEST ANGIOGRAM--PER RADIOLOGIST REPORT AT 1949





FINDINGS: There are patchy bilateral pulmonary infiltrates. There


are bilateral pleural effusions, right greater than left. There


is no pneumothorax. The heart size is normal. The thoracic aorta


is normal in caliber without evidence of dissection. There are no


filling defects within the pulmonary arteries to suggest


pulmonary embolism. No pathologically enlarged adenopathy in the


chest. Visualized intra-abdominal structures are unremarkable.


There are degenerative changes in the spine.





IMPRESSION: Patchy bibasilar infiltrates and bilateral pleural


effusions right greater than left.





No evidence of pulmonary embolism, aortic dissection or aneurysm.


   Reviewed:  Reviewed by Me


 (DHARA GALE DO)





Departure


Communication (Admissions)


--SPOKE WITH DR. FRANK, ACCEPTS PT FOR ADMIT. SHE WILL DO ADMIT ORDERS


 (DHARA GALE DO)





Impression





   Primary Impression:  


   Acute respiratory insufficiency


   Additional Impressions:  


   CHF (congestive heart failure)


   Elevated troponin


   Diabetes mellitus, insulin dependent (IDDM), uncontrolled


   Morbid obesity


   HTN (hypertension)


   Anemia


   ANEMIA


   S/P LEFT ABOVE THE KNEE AMPUTATION


   Status post amputation of right great toe


   CHRONIC LEG CELLULITIS


   Electrolyte imbalance


   Non-compliance


   CHRONIC WOUND RIGHT FOOT


   BIBASILAR INFILTRATES


   Bilateral pleural effusion


Disposition:   ADMITTED AS INPATIENT


Condition:  Stable





Admissions


Decision to Admit Reason:  Admit from ER (General)


Decision to Admit/Date:  Dec 19, 2021


Time/Decision to Admit Time:  19:50


 (DHARA GALE DO)





Departure-Patient Inst.


Referrals:  


YAA LEE MD (PCP/Family)


Primary Care Physician











SHARAN GASPAR MD         Dec 19, 2021 17:08


DHARA GALE DO                 Dec 19, 2021 19:00

## 2021-12-19 NOTE — DIAGNOSTIC IMAGING REPORT
INDICATION: Shortness of breath.



Comparison made with prior examination of 09/28/2020.



FINDINGS: The heart size is upper limits of normal. Mild venous

congestion. No pleural effusion or pneumothorax. The mediastinum

is unremarkable.



IMPRESSION: Upper limits normal size heart with some mild central

pulmonary venous congestion.



Dictated by: 



  Dictated on workstation # [id number]

## 2021-12-20 VITALS — SYSTOLIC BLOOD PRESSURE: 107 MMHG | DIASTOLIC BLOOD PRESSURE: 58 MMHG

## 2021-12-20 VITALS — DIASTOLIC BLOOD PRESSURE: 104 MMHG | SYSTOLIC BLOOD PRESSURE: 127 MMHG

## 2021-12-20 VITALS — SYSTOLIC BLOOD PRESSURE: 156 MMHG | DIASTOLIC BLOOD PRESSURE: 91 MMHG

## 2021-12-20 VITALS — SYSTOLIC BLOOD PRESSURE: 128 MMHG | DIASTOLIC BLOOD PRESSURE: 57 MMHG

## 2021-12-20 VITALS — SYSTOLIC BLOOD PRESSURE: 118 MMHG | DIASTOLIC BLOOD PRESSURE: 92 MMHG

## 2021-12-20 VITALS — SYSTOLIC BLOOD PRESSURE: 130 MMHG | DIASTOLIC BLOOD PRESSURE: 66 MMHG

## 2021-12-20 VITALS — SYSTOLIC BLOOD PRESSURE: 104 MMHG | DIASTOLIC BLOOD PRESSURE: 63 MMHG

## 2021-12-20 LAB
ALBUMIN SERPL-MCNC: 2.6 GM/DL (ref 3.2–4.5)
ALP SERPL-CCNC: 82 U/L (ref 40–136)
ALT SERPL-CCNC: 23 U/L (ref 0–55)
BASOPHILS # BLD AUTO: 0 10^3/UL (ref 0–0.1)
BASOPHILS NFR BLD AUTO: 1 % (ref 0–10)
BILIRUB SERPL-MCNC: 0.3 MG/DL (ref 0.1–1)
BUN/CREAT SERPL: 14
CALCIUM SERPL-MCNC: 8.4 MG/DL (ref 8.5–10.1)
CHLORIDE SERPL-SCNC: 104 MMOL/L (ref 98–107)
CO2 SERPL-SCNC: 26 MMOL/L (ref 21–32)
CREAT SERPL-MCNC: 1.29 MG/DL (ref 0.6–1.3)
EOSINOPHIL # BLD AUTO: 0.3 10^3/UL (ref 0–0.3)
EOSINOPHIL NFR BLD AUTO: 5 % (ref 0–10)
GFR SERPLBLD BASED ON 1.73 SQ M-ARVRAT: 59 ML/MIN
GLUCOSE SERPL-MCNC: 232 MG/DL (ref 70–105)
HCT VFR BLD CALC: 31 % (ref 40–54)
HGB BLD-MCNC: 8.9 G/DL (ref 13.3–17.7)
LYMPHOCYTES # BLD AUTO: 0.6 10^3/UL (ref 1–4)
LYMPHOCYTES NFR BLD AUTO: 9 % (ref 12–44)
MANUAL DIFFERENTIAL PERFORMED BLD QL: NO
MCH RBC QN AUTO: 25 PG (ref 25–34)
MCHC RBC AUTO-ENTMCNC: 29 G/DL (ref 32–36)
MCV RBC AUTO: 84 FL (ref 80–99)
MONOCYTES # BLD AUTO: 0.6 10^3/UL (ref 0–1)
MONOCYTES NFR BLD AUTO: 8 % (ref 0–12)
NEUTROPHILS # BLD AUTO: 5.3 10^3/UL (ref 1.8–7.8)
NEUTROPHILS NFR BLD AUTO: 77 % (ref 42–75)
PLATELET # BLD: 311 10^3/UL (ref 130–400)
PMV BLD AUTO: 9.3 FL (ref 9–12.2)
POTASSIUM SERPL-SCNC: 4.5 MMOL/L (ref 3.6–5)
PROT SERPL-MCNC: 6.5 GM/DL (ref 6.4–8.2)
SODIUM SERPL-SCNC: 137 MMOL/L (ref 135–145)
WBC # BLD AUTO: 6.9 10^3/UL (ref 4.3–11)

## 2021-12-20 RX ADMIN — INSULIN ASPART SCH UNIT: 100 INJECTION, SOLUTION INTRAVENOUS; SUBCUTANEOUS at 12:06

## 2021-12-20 RX ADMIN — INSULIN ASPART SCH UNIT: 100 INJECTION, SOLUTION INTRAVENOUS; SUBCUTANEOUS at 21:02

## 2021-12-20 RX ADMIN — ENOXAPARIN SODIUM SCH MG: 100 INJECTION SUBCUTANEOUS at 21:02

## 2021-12-20 RX ADMIN — ASPIRIN SCH MG: 325 TABLET, COATED ORAL at 08:06

## 2021-12-20 RX ADMIN — INSULIN ASPART SCH UNIT: 100 INJECTION, SOLUTION INTRAVENOUS; SUBCUTANEOUS at 17:43

## 2021-12-20 RX ADMIN — FUROSEMIDE SCH MG: 10 INJECTION, SOLUTION INTRAVENOUS at 08:07

## 2021-12-20 RX ADMIN — INSULIN ASPART SCH UNIT: 100 INJECTION, SOLUTION INTRAVENOUS; SUBCUTANEOUS at 06:40

## 2021-12-20 RX ADMIN — FUROSEMIDE SCH MG: 10 INJECTION, SOLUTION INTRAVENOUS at 21:01

## 2021-12-20 RX ADMIN — LISINOPRIL SCH MG: 20 TABLET ORAL at 08:06

## 2021-12-20 RX ADMIN — ENOXAPARIN SODIUM SCH MG: 100 INJECTION SUBCUTANEOUS at 08:06

## 2021-12-20 RX ADMIN — INSULIN ASPART SCH UNIT: 100 INJECTION, SOLUTION INTRAVENOUS; SUBCUTANEOUS at 10:08

## 2021-12-20 RX ADMIN — INSULIN ASPART SCH UNIT: 100 INJECTION, SOLUTION INTRAVENOUS; SUBCUTANEOUS at 17:47

## 2021-12-20 NOTE — HISTORY & PHYSICAL-HOSPITALIST
ROSALES LOPEZ 21 1103:


History of Present Illness


HPI/Chief Complaint


Patient is a 51 year old male who is admitted from [facility] ER for complaints of sh

ortness of breath. These symptoms started 2-3 days ago and have progressively 

gotten worse. Patient feels like his lungs are "full of fluid". Patient has a 

medical history of Peripheral artery disease, HTN, HLD, T2DM. Patient has had a 

left AKA. Patient is also complaining of being constipated, stating he hasn't 

had a bowel movement in days. He doesn't remember exactly when his last BM was. 

Patient was found to be hypoxic and placed on 4L O2 nasal cannula. Patient 

doesn't use oxygen at home. Patient refused BiPAP. Patient denied history of 

congestive heart failure. Patient was found to have a BNP of 969. Imaging showed

central pulmonary venous congestion. Patient did have a CT angiogram that showed

patchy bibasilar infiltrates and bilateral pleural effusions, and was negative 

for  pulmonary embolism, aortic dissection, or aneurysm. for Patient denied 

having chest pain. Patient does have a wound on his right foot where his big toe

was amputated. Patient has a D-Dimer of 2.0 and Procal of .10. Will consult 

wound care and cardiology and trend labs. Patient is DNR/DNI. 





Today patient is tired, but states he is breathing better, still struggling when

exerting himself. Down to 2L O2 nasal cannula. Still no BM. Patient was given 

the influenza quad vaccine.


Source:  patient, RN/MD, RN notes reviewed


Date Seen


21


Time Seen by a Provider:  08:00


Attending Physician


Sendy Olivares DO


PCP


Noe Roque MD


Referring Physician





Date of Admission


Dec 19, 2021 at 19:55





Home Medications & Allergies


Home Medications


Reviewed patient Home Medication Reconciliation performed by pharmacy medication

reconciliations technician and/or nursing.


Patients Allergies have been reviewed.





Allergies





Allergies


Coded Allergies


  meperidine HCl (Unverified Allergy, Unknown, 11)


  shellfish derived (Unverified Allergy, Unknown, 13)


    FROM UNCODED ALLERGIES


  Penicillins (Unverified Adverse Reaction, Intermediate, NAUSEA, 11)








Past Medical-Social-Family Hx


Patient Social History


Tobacco Use?:  No


Smoking Status:  Former Smoker


Use of E-Cig and/or Vaping dev:  No


Substance use?:  No


Alcohol Use?:  No


Pt feels they are or have been:  Yes





Immunizations Up To Date


First/Initial COVID19 Vaccinat:  April


Second COVID19 Vaccination Keshawn:  May


Tetanus Booster (TDap):  Unknown


Hepatitis A:  No


Hepatitis B:  No


PED Vaccines UTD:  No


Date of Pneumonia Vaccine:  2010





Seasonal Allergies


Seasonal Allergies:  Yes





Current Status


Advance Directives:  Yes


Communicates:  Verbally


Primary Language:  English


Preferred Spoken Language:  English


Is interpretation needed?:  No


Sensory deficits:  Vision impairment


Implanted or Applied Medical D:  None





Past Medical History


Surgeries:  Abdominal, Amputation, Orthopedic


Sleep Apnea


Currently Using CPAP:  No


Currently Using BIPAP:  No


Hypertension


Concussion


Sexually Transmitted Disease:  No


HIV/AIDS:  No


Abdominal Hernia


Amputee, Arthritis


Diabetes, Non-Insulin dep


Loss of Vision:  Denies


Hearing Impairment:  Denies


Anxiety


Recent Skin Changes


Blood Disorders:  No





PMHx:


DMII


HTN


HLD


Morbid obesity


Diabetic foot ulcers


Sleep apnea





SurgHx:


Left BKA for necrotizing wound





Family Medical History





Arthritis


  19 FATHER, Onset:Unknown


Diabetes mellitus


  19 MOTHER, , Onset:Unknown


No Pertinent Family Hx





Review of Systems


Constitutional:  see HPI


Respiratory:  see HPI


Cardiovascular:  No chest pain


Gastrointestinal:  constipation





Physical Exam


Physical Exam


Vital Signs





Vital Signs - First Documented








 21





 16:49


 


Temp 36.2


 


Pulse 108


 


Resp 40


 


B/P (MAP) 162/104 (123)


 


Pulse Ox 98


 


O2 Delivery Nasal Cannula


 


O2 Flow Rate 2.00





Capillary Refill : Less Than 3 Seconds


Height, Weight, BMI


Height: 6'0.00"


Weight: 400lbs. 4.0oz. 181.248244fu; 55.09 BMI


Method:Stated


General Appearance:  Chronically ill, Mild Distress


Respiratory:  Chest Non Tender, Lungs Clear, No Accessory Muscle Use, No 

Respiratory Distress


Cardiovascular:  Regular Rate, Rhythm, Normal Peripheral Pulses


Rectal:  Deferred


Extremity:  Other (Right foot wound at big toe amputation site. )


Neurologic/Psychiatric:  Alert, Oriented x3, Normal Mood/Affect





Results


Results/Procedures


Labs


Laboratory Tests


21 17:10








21 17:35








Patient resulted labs reviewed.


Imaging:  Reviewed Imaging Films, Reviewed Imaging Report


Imaging


                 [facility] [place], [place]





NAME:   JANNA REYNOLDS SHAN


[facility] REC#:   [id number]


ACCOUNT#:   [id number]


PT STATUS:   ADM IN


:   1970


PHYSICIAN:   DHARA GALE DO


ADMIT DATE:   21/CSD


                                  ***Signed***


Date of Exam:21





CT ANGIO CHEST W








PROCEDURE: CT angiography of the chest with contrast.





TECHNIQUE: Multiple contiguous axial images were obtained through


the chest after uneventful bolus administration of intravenous


contrast. 3D reconstructed CTA MIP acquisitions were also


performed.


Auto Exposure Controls were utilized during the CT exam to meet


ALARA standards for radiation dose reduction. 





 INDICATION:  Chest pain and shortness of breath.





FINDINGS: There are patchy bilateral pulmonary infiltrates. There


are bilateral pleural effusions, right greater than left. There


is no pneumothorax. The heart size is normal. The thoracic aorta


is normal in caliber without evidence of dissection. There are no


filling defects within the pulmonary arteries to suggest


pulmonary embolism. No pathologically enlarged adenopathy in the


chest. Visualized intra-abdominal structures are unremarkable.


There are degenerative changes in the spine.





IMPRESSION: Patchy bibasilar infiltrates and bilateral pleural


effusions right greater than left.





No evidence of pulmonary embolism, aortic dissection or aneurysm.





Dictated by: 





  Dictated on workstation # [id number]








Dict:   21


Trans:   21


CVB [id number]





Interpreted by:     AURORA MONTANEZ MD


Electronically signed by: AURORA MONTANEZ MD 21











                 [facility] [place], [place]





NAME:   JANNA REYNOLDS


[facility] REC#:   [id number]


ACCOUNT#:   [id number]


PT STATUS:   REG ER


:   1970


PHYSICIAN:   SHARAN GASPAR MD


ADMIT DATE:   21/ER


                                  ***Signed***


Date of Exam:21





CHEST 1 VIEW, AP/PA ONLY








INDICATION: Shortness of breath.





Comparison made with prior examination of 2020.





FINDINGS: The heart size is upper limits of normal. Mild venous


congestion. No pleural effusion or pneumothorax. The mediastinum


is unremarkable.





IMPRESSION: Upper limits normal size heart with some mild central


pulmonary venous congestion.





Dictated by: 





  Dictated on workstation # [id number]








Dict:   21


Trans:   21


[facility] [id number]





Interpreted by:     AURORA MONTANEZ MD


Electronically signed by: AURORA MONTANEZ MD 21





Assessment/Plan


Admission Diagnosis


Acute respiratory failure, acute congestive heart failure exacerbation


Admission Status:  Inpatient Order (span 2 midnights)


Reason for Inpatient Admission:  


Acute respiratory failure


Acute congestive heart failure exacerbation





Assessment and Plan


Assessment


Acute on Chronic Congestive heart failure exacerbation


Acute on chronic respiratory failure


Obesity hypoventilation syndrome 


Obstructive sleep apnea


History of Peripheral artery disease


T2 Diabetes Mellitus 


  - Left Above the knee amputation 


  - Right hallux amputation 


Hypertension


Hyperlipidemia 








Plan


IV Lasix


Insulin


Continue home medications


Continue oxygen therapy 


Monitor labs


Consult cardiology 


Consult wound care


PT/OT


Lovenox for DVT prophylaxis





SENDY OLIVARES  21 0542:


History of Present Illness


HPI/Chief Complaint


CC: SOB





HPI: This is a 51yoWM who has had multiple hospital stays due to acute on 

chronic CHF with obesity hypoventilation syndrome. He was given IV diuresis and 

he remains a do not intubate, do not resuscitate. Cardiology was consulted. Labs

were ordered, they are pending currently. May be able to transfer to [facility]. PT and OT ordered and wound care for the right great toe amputation site.


Source:  patient, RN/MD, RN notes reviewed


Exam Limitations:  clinical condition





Past Medical-Social-Family Hx


Patient Social History


Marrital Status:  single


Employed/Student:  unemployed


Smoking Status:  Former Smoker





Past Medical History


Sleep Apnea


Currently Using CPAP:  No


Currently Using BIPAP:  No


Cardiomyopathy, Chronic Edema/Swelling, High Cholesterol, Hypertension


Arthritis, Chronic Back Pain





Family Medical History





Arthritis


  19 FATHER, Onset:Unknown


Diabetes mellitus


  19 MOTHER, , Onset:Unknown





Review of Systems


ROS-Unable to Obtain:  Patient sleeping


Constitutional:  see HPI





Physical Exam


Physical Exam


General Appearance:  Anxious, Chronically ill, Obese


Eyes:  Right Eye Normal Inspection, Right Eye PERRL


HEENT:  PERRL/EOMI, Normal ENT Inspection, Pharynx Normal, Moist Mucous 

Membranes


Neck:  Full Range of Motion, Normal Inspection, Non Tender


Respiratory:  Chest Non Tender, Lungs Clear, No Accessory Muscle Use, No 

Respiratory Distress, Decreased Breath Sounds


Cardiovascular:  Regular Rate, Rhythm, No Edema, No Gallop, No JVD, No Murmur, 

Normal Peripheral Pulses


Gastrointestinal:  Normal Bowel Sounds, No Organomegaly, No Pulsatile Mass, Non 

Tender, Soft


Back:  Normal Inspection, No CVA Tenderness, No Vertebral Tenderness


Extremity:  Normal Capillary Refill, Normal Inspection, Normal Range of Motion, 

Non Tender, No Calf Tenderness, No Pedal Edema, Other (Right foot wound at big 

toe amputation site. )


Neurologic/Psychiatric:  Alert, No Motor/Sensory Deficits, Normal Mood/Affect, 

Depressed Affect


Skin:  Normal Color, Warm/Dry


Lymphatic:  No Adenopathy





Assessment/Plan


Admission Diagnosis


Assessment:


Acute on chronic respiratory failure


Obesity hypoventilation syndrome noncompliant with CPAP and BiPAP


Morbid obesity BMI 55


Right great toe amputation site wound


Congestive heart failure diastolic type


Noncompliance


DNR





Plan:


Supportive care


Wound care


Cardiology


Pulmonology


Admission Status:  Inpatient Order (span 2 midnights)


Reason for Inpatient Admission:  


Acute on chronic respiratory failure





Diagnosis/Problems


Diagnosis/Problems





(1) CHF (congestive heart failure)


Status:  Acute


(2) Acute respiratory insufficiency


Status:  Acute


(3) Morbid obesity


Status:  Acute


(4) Bilateral pleural effusion


Status:  Acute


(5) Hypoxia


(6) Respiratory failure


(7) Non-compliance


Status:  Acute


(8) HTN (hypertension)


Status:  Chronic


(9) Diabetes mellitus, insulin dependent (IDDM), uncontrolled


Status:  Acute





Supervisory-Addendum Brief


Verification & Attestation


Participated in pt care:  history, MDM, physical


Personally performed:  exam, history, MDM, supervision of care


Care discussed with:  Medical Student


Procedures:  n/a


Results interpretation:  Verified all documentation


Verification and Attestation of Medical Student E/M Service





A medical student performed and documented this service in my presence. I 

reviewed and verified all information documented by the medical student and made

modifications to such information, when appropriate. I personally performed the 

physical exam and medical decision making. 





 Sendy Olivares, Dec 21, 2021,05:39











JESSICAROSALES                 Dec 20, 2021 11:03


SENDY OLIVARES DO                Dec 21, 2021 05:42

## 2021-12-20 NOTE — CONSULTATION-CARDIOLOGY
HPI-Cardiology


Cardiology Consultation:


Date of Consultation


21


Time Seen by a Provider:  09:35


Date of Admission





Attending Physician


Sendy Gisbon DO


Admitting Physician


Noe Roque MD


Consulting Physician


JEREMIAH SLAUGHTER MD, MA, FACP, FACC, [facility], CCDS





Physcian requesting Card consult: Dr Gibson





HPI:


Chief Complaint:


Shortness of breath


Mr. Pink is a 51 yr old male admitted to 509 from the ED with increasing SOB

over the last few days.  He is lethargic this morning.  He does not c/o CP.  He 

reports he takes medications for blood pressure at home, but does not know the 

names.  He reports he has a wound to his right foot, but does not offer any 

information other than to say he's had it for awhile.  He awakens easily, but 

drifts back to sleep quickly.





Review of Systems-Cardiology


Review of Systems


Constitutional:  malaise, tiredness, other (He does  not provide a detailed 

ROS))


Eyes:  No vision change


Ears/Nose/Throat:  No recent hearing loss


Cardiovascular:  As described under HPI


Gastrointestinal:  As described under HPI


Genitourinary:  No dysuria, No hematuria


Musculoskeletal:  back pain (chronic)


Skin:  No rash


Psychiatric/Neurological:  No focal weakness, No syncope


Hematologic:  No bleeding abnormalities





All Other Systems Reviewed


Negative Unless Noted:  Yes





PMH-Social-Family Hx


Patient Social History


Smoking Status:  Former Smoker


Former smoker/When Quit:  2003


2nd Hand Smoke Exposure:  No


Have you traveled recently?:  No


Alcohol Use?:  No


Pt feels they are or have been:  Yes





Immunizations Up To Date


Tetanus Booster (TDap):  Unknown


Date of Pneumonia Vaccine:  2010





Past Medical History


PMH


As described under Assessment.





Family Medical History


Family Medical History:  


No documented family h/o CAD.


Family History:  


Arthritis


  19 FATHER, Onset:Unknown


Diabetes mellitus


  19 MOTHER, , Onset:Unknown





Allergies and Home Medications


Allergies


Coded Allergies:  


     meperidine HCl (Unverified  Allergy, Unknown, 11)


     shellfish derived (Unverified  Allergy, Unknown, 13)


   FROM UNCODED ALLERGIES


     Penicillins (Unverified  Adverse Reaction, Intermediate, NAUSEA, 11)





Patient Home Medication List


Home Medication List Reviewed:  Yes


Aspirin (Aspirin EC) 325 Mg Tablet., 325 MG PO DAILY, (Reported)


   Entered as Reported by: LENCHO KULKARNI on 12/15/16 1559


   Last Action: Reviewed


Atorvastatin Calcium (Atorvastatin Calcium) 40 Mg Tablet, 40 MG PO DAILY, 

(Reported)


   Entered as Reported by: KRISTY TOLEDO on 21


   Last Action: Reviewed


Doxycycline Hyclate (Doxycycline Hyclate) 100 Mg Tablet, 100 MG PO BID, 

(Reported)


   Entered as Reported by: KRISTY TOLEDO on 21


   Last Action: Reviewed


Insulin Aspart (Novolog Flexpen) 300 Units/3 Ml Solution, 5 UNITS SQ AC, 

(Reported)


   Entered as Reported by: KRISTY TOLEDO on 21


   Last Action: Reviewed


Insulin Detemir (Levemir Flextouch) 100 Unit/1 Ml Insuln.pen, 20 UNIT SQ BID, 

(Reported)


   Entered as Reported by: KRISTY TOLEDO on 21


   Last Action: Reviewed


Lisinopril (Lisinopril) 20 Mg Tablet, 20 MG PO DAILY, (Reported)


   Entered as Reported by: KRISTY TOLEDO on 21


   Last Action: Reviewed


Metformin HCl (Metformin HCl) 1,000 Mg Tablet, 1,000 MG PO BID, (Reported)


   Entered as Reported by: KRISTY TOLEDO on 21


   Last Action: Reviewed


Discontinued Medications


Atorvastatin Calcium (Atorvastatin Calcium) 40 Mg Tablet, 40 MG PO DAILY


   Discontinued Reason: Duplicate Order


   Prescribed by: ENRIQUE FRANK on 21


   Last Action: Discontinued


Insulin Aspart (Novolog Flexpen) 300 Units/3 Ml Solution, 5 UNITS SQ AC


   Discontinued Reason: Duplicate Order


   Prescribed by: ENRIQUE FRANK on 21


   Last Action: Discontinued


Insulin Detemir (Levemir Flextouch) 100 Unit/1 Ml Insuln.pen, 20 UNITS SQ BID


   Discontinued Reason: Duplicate Order


   Prescribed by: ENRIQUE FRANK on 21


   Last Action: Discontinued


Levofloxacin (Levofloxacin) 750 Mg Tablet, 750 MG PO HS


   Discontinued Reason: No Longer Taking


   Prescribed by: ENRIQUE FRANK on 21


   Last Action: Discontinued


Lisinopril (Lisinopril) 20 Mg Tablet, 20 MG PO DAILY


   Discontinued Reason: Duplicate Order


   Prescribed by: ENRIQUE FRANK on 21


   Last Action: Discontinued


Metformin HCl (Metformin HCl) 1,000 Mg Tablet, 1,000 MG PO BID


   Discontinued Reason: Duplicate Order


   Prescribed by: ENRIQUE FRANK on 21


   Last Action: Discontinued





Physical Exam-Cardiology


Physical Exam


Vital Signs/I&O











 21





 23:00 00:00 00:00 04:00


 


Temp  36.3  36.7


 


Pulse 80 80 71 68


 


Resp 25 17 25 22


 


B/P (MAP) 144/102 (116) 145/101 (116) 156/91 (112) 118/92 (101)


 


Pulse Ox 96 98 100 99


 


O2 Delivery Nasal Cannula Nasal Cannula Nasal Cannula Nasal Cannula


 


O2 Flow Rate 4.00 4.00 4.00 2.00


 


    





 21   





 08:08   


 


Temp 36.2   


 


Pulse 73   


 


Resp 14   


 


B/P (MAP) 127/104 (112)   


 


Pulse Ox 92   


 


O2 Delivery Nasal Cannula   














 21





 00:00


 


Intake Total 250 ml


 


Output Total 400 ml


 


Balance -150 ml





Capillary Refill : Less Than 3 Seconds


Constitutional:  other (Lethargic)


HEENT:  hearing is well preserved


Neck:  No carotid bruit; carotid pulses are 2 + bilaterally


Respiratory:  chest expansion is symmetric, chest is bilaterally symmetric, 

other (fair air entry)


Cardiovascular:  regular rate-rhythm; No JVD; S1 and S2


Gastrointestinal:  soft, round, audible bowel sounds


Extremities:  other (L AKA, missing 1st toe on R foot)


Neurologic/Psychiatric:  other (appears to be able to move all limbs)


Skin:  normal color, warm/dry, other (R LE with great toe amputation; skin is 

red, leathery in appearance with thick, patchy, scaling)





Data Review


Labs


Laboratory Tests


21 16:58: 


Influenza Type A (RT-PCR) Not Detected, Influenza Type B (RT-PCR) Not Detected, 

SARS-CoV-2 RNA (RT-PCR) Not Detected


21 17:10: 


Sodium Level 132L, Potassium Level 5.4H, Chloride Level 101, Carbon Dioxide 

Level 19L, Anion Gap 12, Blood Urea Nitrogen 18, Creatinine 1.17, Estimat 

Glomerular Filtration Rate 66, BUN/Creatinine Ratio 15, Glucose Level 341H, 

Lactic Acid Level 1.81, Calcium Level 8.4L, Corrected Calcium 9.4, Total 

Bilirubin 0.4, Aspartate Amino Transf (AST/SGOT) 36H, Alanine Aminotransferase 

(ALT/SGPT) 23, Alkaline Phosphatase 108, Troponin I 0.035H, Total Protein 7.6, 

Albumin 2.8L, Procalcitonin 0.10H


21 17:18: 


Blood Gas Puncture Site LEFT RADIAL, Blood Gas Patient Temperature 96.3, 

Arterial Blood pH 7.35L, Arterial Blood Partial Pressure CO2 44, Arterial Blood 

Partial Pressure O2 75L, Arterial Blood HCO3 24, Arterial Blood Total CO2 25.8, 

Arterial Blood Oxygen Saturation 95, Arterial Blood Base Excess -0.7, Kenney Test

POSITIVE, Blood Gas Ventilator Setting NO, Blood Gas Inspired Oxygen 3 L


21 17:35: 


White Blood Count 8.3, Red Blood Count 4.10L, Hemoglobin 10.1L, Hematocrit 34L, 

Mean Corpuscular Volume 83, Mean Corpuscular Hemoglobin 25, Mean Corpuscular 

Hemoglobin Concent 30L, Red Cell Distribution Width 15.9H, Platelet Count 369, 

Mean Platelet Volume 9.9, Immature Granulocyte % (Auto) 0, Neutrophils (%) 

(Auto) 79H, Lymphocytes (%) (Auto) 10L, Monocytes (%) (Auto) 7, Eosinophils (%) 

(Auto) 3, Basophils (%) (Auto) 1, Neutrophils # (Auto) 6.6, Lymphocytes # (Auto)

0.8L, Monocytes # (Auto) 0.6, Eosinophils # (Auto) 0.3, Basophils # (Auto) 0.1, 

Immature Granulocyte # (Auto) 0.0, B-Type Natriuretic Peptide 969.5H


21 17:40: 


Prothrombin Time 13.9, INR Comment 1.0, Activated Partial Thromboplast Time 33, 

D-Dimer 2.00H


21 18:51: 


Urine Color YELLOW, Urine Clarity CLEAR, Urine pH 7.0, Urine Specific Gravity 

1.020, Urine Protein 2+H, Urine Glucose (UA) 3+H, Urine Ketones NEGATIVE, Urine 

Nitrite NEGATIVE, Urine Bilirubin NEGATIVE, Urine Urobilinogen 0.2, Urine 

Leukocyte Esterase NEGATIVE, Urine RBC (Auto) TRACE-IH, Urine RBC 0-2, Urine WBC

0-2, Urine Squamous Epithelial Cells 0-2, Urine Renal Epithelial Cells NONE, 

Urine Crystals NONE, Urine Bacteria NEGATIVE, Urine Casts NONE, Urine Mucus 

NEGATIVE, Urine Culture Indicated CULTURE PENDING


21 22:02: Glucometer 292H


21 06:33: Glucometer 186H


21 10:41: Glucometer 229H





Laboratory Tests


21 17:10








21 17:35











A/P-Cardiology


Assessment/Admission Diagnosis





Respiratory distress 


- likely multi-factorial - acute on chronic diastolic CHF and obesity 

hypoventilation syndrome and ?pneumonia





Acute on chronic diastolic CHF


- Echocardiogram of 21 by Dr. Myers showed mod concentric hypertrophy.  

LVEF 50-55%.  Grade 2 diastolic dysfunction.  RA dilated.  Mild MR.  PASP 15-20 

mmhg





PAD


- h/o peripheral intervention by Dr. Persaud at [facility] in 2021 - details 

unknown





H/O L AKA





HTN





HLD





DM 2





Morbid obesity


- BMI 55.1





Non-compliance





Discussion and Recomendations





Diuretics as needed and as tolerated


Management of respiratory distress per Medical services


Monitor lab closely


Replace electrolytes as indicated


Further recs will be based on his hospital course


We would like to thank Dr. Gibson for this consult











JEREMIAH SLAUGHTER MD FACP FAC CCDS   Dec 20, 2021 10:54

## 2021-12-20 NOTE — CONSULTATION-CARDIOLOGY
HPI-Cardiology


Cardiology Consultation:


Date of Consultation


21


Time Seen by a Provider:  09:15


Date of Admission


21


Attending Physician


Sendy Gibson DO


Admitting Physician


Noe Roque MD


Consulting Physician


Loc Patten MD





HPI:


Chief Complaint:


Respiratory distress


Mr. Reynolds is a 51 yr old male admitted to 509 from the ED with increasing SOB

over the last few days.  He is lethargic this morning.  He does not c/o CP.  He 

reports he takes medications for blood pressure at home, but does not know the 

names.  He reports he has a wound to his right foot, but does not offer any 

information other than to say he's had it for awhile.  He awakens easily, but 

drifts back to sleep quickly.





Review of Systems-Cardiology


Review of Systems


Other comments


To the extent that it could be obtained is as per HPI





All Other Systems Reviewed


Negative Unless Noted:  Yes





PMH-Social-Family Hx


Patient Social History


Smoking Status:  Former Smoker


Former smoker/When Quit:  2003


2nd Hand Smoke Exposure:  No


Have you traveled recently?:  No


Alcohol Use?:  No


Pt feels they are or have been:  Yes





Immunizations Up To Date


Tetanus Booster (TDap):  Unknown


Date of Pneumonia Vaccine:  2010





Past Medical History


PMH


As described under Assessment.





Family Medical History


Family Medical History:  


No documented family h/o CAD.


Family History:  


Arthritis


  19 FATHER, Onset:Unknown


Diabetes mellitus


  19 MOTHER, , Onset:Unknown





Allergies and Home Medications


Allergies


Coded Allergies:  


     meperidine HCl (Unverified  Allergy, Unknown, 11)


     shellfish derived (Unverified  Allergy, Unknown, 13)


   FROM UNCODED ALLERGIES


     Penicillins (Unverified  Adverse Reaction, Intermediate, NAUSEA, 11)





Patient Home Medication List


Aspirin (Aspirin EC) 325 Mg Tablet.dr, 325 MG PO DAILY, (Reported)


   Entered as Reported by: LENCHO KULKARNI on 12/15/16 5398


   Last Action: Reviewed


Atorvastatin Calcium (Atorvastatin Calcium) 40 Mg Tablet, 40 MG PO DAILY, 

(Reported)


   Entered as Reported by: KRISTY TOLEDO on 21


   Last Action: Continued


Doxycycline Hyclate (Doxycycline Hyclate) 100 Mg Tablet, 100 MG PO BID, 

(Reported)


   Entered as Reported by: KRISTY TOLEDO on 21


   Last Action: Held


Insulin Aspart (Novolog Flexpen) 300 Units/3 Ml Solution, 5 UNITS SQ AC, 

(Reported)


   Entered as Reported by: KRISTY TOLEDO on 21


   Last Action: Converted


Insulin Detemir (Levemir Flextouch) 100 Unit/1 Ml Insuln.pen, 20 UNIT SQ BID, 

(Reported)


   Entered as Reported by: KRISTY TOLEDO on 21


   Last Action: Converted


Lisinopril (Lisinopril) 20 Mg Tablet, 20 MG PO DAILY, (Reported)


   Entered as Reported by: KRISTY TOLEDO on 21


   Last Action: Continued


Metformin HCl (Metformin HCl) 1,000 Mg Tablet, 1,000 MG PO BID, (Reported)


   Entered as Reported by: KRISTY TOLEDO on 21


   Last Action: Held


Discontinued Medications


Atorvastatin Calcium (Atorvastatin Calcium) 40 Mg Tablet, 40 MG PO DAILY


   Discontinued Reason: Duplicate Order


   Prescribed by: ENRIQUE FRANK on 21


   Last Action: Discontinued


Insulin Aspart (Novolog Flexpen) 300 Units/3 Ml Solution, 5 UNITS SQ AC


   Discontinued Reason: Duplicate Order


   Prescribed by: ENRIQUE FRANK on 21


   Last Action: Discontinued


Insulin Detemir (Levemir Flextouch) 100 Unit/1 Ml Insuln.pen, 20 UNITS SQ BID


   Discontinued Reason: Duplicate Order


   Prescribed by: ENRIQUE FRANK on 21


   Last Action: Discontinued


Levofloxacin (Levofloxacin) 750 Mg Tablet, 750 MG PO HS


   Discontinued Reason: No Longer Taking


   Prescribed by: ENRIQUE FRANK on 21


   Last Action: Discontinued


Lisinopril (Lisinopril) 20 Mg Tablet, 20 MG PO DAILY


   Discontinued Reason: Duplicate Order


   Prescribed by: ENRIQUE FRANK on 21


   Last Action: Discontinued


Metformin HCl (Metformin HCl) 1,000 Mg Tablet, 1,000 MG PO BID


   Discontinued Reason: Duplicate Order


   Prescribed by: ENRIQUE FRANK on 21


   Last Action: Discontinued





Physical Exam-Cardiology


Physical Exam


Vital Signs/I&O











 21





 23:52 04:02 07:23 07:56


 


Temp 36.6 36.4  36.4


 


Pulse 64 62  68


 


Resp 22 18  16


 


B/P (MAP) 107/58 (74) 116/64 (81)  134/67 (89)


 


Pulse Ox 97 98  95


 


O2 Delivery Nasal Cannula Nasal Cannula Nasal Cannula Nasal Cannula


 


O2 Flow Rate 2.00 3.00 5.00 


 


    





 21  





 09:36 09:42  


 


Pulse Ox 93   


 


O2 Delivery Nasal Cannula Nasal Cannula  


 


O2 Flow Rate 4.00 4.00  














 21





 00:00


 


Intake Total 2280 ml


 


Output Total 800 ml


 


Balance 1480 ml





Capillary Refill : Less Than 3 Seconds


Constitutional:  other (Lethargic)


HEENT:  hearing is well preserved


Neck:  No carotid bruit; carotid pulses are 2 + bilaterally


Respiratory:  chest expansion is symmetric, chest is bilaterally symmetric, 

other (fair air entry)


Cardiovascular:  regular rate-rhythm; No JVD; S1 and S2


Gastrointestinal:  soft, round, audible bowel sounds


Extremities:  other (L AKA)


Neurologic/Psychiatric:  grossly intact (moves extremities)


Skin:  normal color, warm/dry, other (R LE with great toe amputation; skin is 

red, leathery in appearance with thick, patchy, scaling)





Data Review


Labs


Laboratory Tests


21 10:41: Glucometer 229H


21 10:55: 


White Blood Count 6.9, Red Blood Count 3.62L, Hemoglobin 8.9L, Hematocrit 31L, 

Mean Corpuscular Volume 84, Mean Corpuscular Hemoglobin 25, Mean Corpuscular 

Hemoglobin Concent 29L, Red Cell Distribution Width 15.9H, Platelet Count 311, 

Mean Platelet Volume 9.3, Immature Granulocyte % (Auto) 0, Neutrophils (%) 

(Auto) 77H, Lymphocytes (%) (Auto) 9L, Monocytes (%) (Auto) 8, Eosinophils (%) 

(Auto) 5, Basophils (%) (Auto) 1, Neutrophils # (Auto) 5.3, Lymphocytes # (Auto)

0.6L, Monocytes # (Auto) 0.6, Eosinophils # (Auto) 0.3, Basophils # (Auto) 0.0, 

Immature Granulocyte # (Auto) 0.0, Sodium Level 137, Potassium Level 4.5, Chlor

renu Level 104, Carbon Dioxide Level 26, Anion Gap 7, Blood Urea Nitrogen 18, 

Creatinine 1.29, Estimat Glomerular Filtration Rate 59, BUN/Creatinine Ratio 14,

Glucose Level 232H, Calcium Level 8.4L, Corrected Calcium 9.5, Total Bilirubin 

0.3, Aspartate Amino Transf (AST/SGOT) 16, Alanine Aminotransferase (ALT/SGPT) 

23, Alkaline Phosphatase 82, Total Protein 6.5, Albumin 2.6L


21 15:23: Glucometer 152H


21 20:44: Glucometer 217H


21 05:23: Glucometer 109


21 07:10: 


White Blood Count 6.9, Red Blood Count 3.76L, Hemoglobin 9.3L, Hematocrit 32L, 

Mean Corpuscular Volume 84, Mean Corpuscular Hemoglobin 25, Mean Corpuscular 

Hemoglobin Concent 29L, Red Cell Distribution Width 16.3H, Platelet Count 340, 

Mean Platelet Volume 9.8, Immature Granulocyte % (Auto) 0, Neutrophils (%) 

(Auto) 69, Lymphocytes (%) (Auto) 13, Monocytes (%) (Auto) 10, Eosinophils (%) 

(Auto) 7, Basophils (%) (Auto) 1, Neutrophils # (Auto) 4.7, Lymphocytes # (Auto)

0.9L, Monocytes # (Auto) 0.7, Eosinophils # (Auto) 0.5H, Basophils # (Auto) 0.1,

Immature Granulocyte # (Auto) 0.0, Sodium Level 138, Potassium Level 4.3, 

Chloride Level 103, Carbon Dioxide Level 27, Anion Gap 8, Blood Urea Nitrogen 

20H, Creatinine 1.40H, Estimat Glomerular Filtration Rate 53, BUN/Creatinine 

Ratio 14, Glucose Level 106H, Calcium Level 8.1L, Corrected Calcium 9.2, Total B

ilirubin 0.4, Aspartate Amino Transf (AST/SGOT) 14, Alanine Aminotransferase 

(ALT/SGPT) 18, Alkaline Phosphatase 87, Total Protein 6.7, Albumin 2.6L





Microbiology


21 Blood Culture - Preliminary, Resulted


           No growth


21 Urine Culture - Final, Complete


           Mixed Bacterial Ya








Radiology


NAME:   JANNA REYNOLDS


[facility] REC#:   [id number]


ACCOUNT#:   [id number]


PT STATUS:   REG ER


:   1970


PHYSICIAN:   SHARAN GASPAR MD


ADMIT DATE:   21/ER


***Signed***


Date of Exam:21





CHEST 1 VIEW, AP/PA ONLY








INDICATION: Shortness of breath.





Comparison made with prior examination of 2020.





FINDINGS: The heart size is upper limits of normal. Mild venous


congestion. No pleural effusion or pneumothorax. The mediastinum


is unremarkable.





IMPRESSION: Upper limits normal size heart with some mild central


pulmonary venous congestion.





Dictated by: 





  Dictated on workstation # [id number]








Dict:   21


Trans:   21


CVB [id number]





Interpreted by:     AURORA MONTANEZ MD


Electronically signed by: AURORA MONTANEZ MD 21


NAME:   JANNA REYNOLDS


[facility] REC#:   [id number]


ACCOUNT#:   [id number]


PT STATUS:   ADM IN


:   1970


PHYSICIAN:   DHARA GALE DO


ADMIT DATE:   21/[facility]


***Signed***


Date of Exam:21





CT ANGIO CHEST W








PROCEDURE: CT angiography of the chest with contrast.





TECHNIQUE: Multiple contiguous axial images were obtained through


the chest after uneventful bolus administration of intravenous


contrast. 3D reconstructed CTA MIP acquisitions were also


performed.


Auto Exposure Controls were utilized during the CT exam to meet


ALARA standards for radiation dose reduction. 





 INDICATION:  Chest pain and shortness of breath.





FINDINGS: There are patchy bilateral pulmonary infiltrates. There


are bilateral pleural effusions, right greater than left. There


is no pneumothorax. The heart size is normal. The thoracic aorta


is normal in caliber without evidence of dissection. There are no


filling defects within the pulmonary arteries to suggest


pulmonary embolism. No pathologically enlarged adenopathy in the


chest. Visualized intra-abdominal structures are unremarkable.


There are degenerative changes in the spine.





IMPRESSION: Patchy bibasilar infiltrates and bilateral pleural


effusions right greater than left.





No evidence of pulmonary embolism, aortic dissection or aneurysm.





Dictated by: 





  Dictated on workstation # [id number]








Dict:   21


Trans:   21


CVB [id number]





Interpreted by:     AURORA MONTANEZ MD


Electronically signed by: AURORA MONTANEZ MD 21





ECG Impression


ECG


Initial ECG Rhythm:  Normal Sinus





A/P-Cardiology


Assessment/Admission Diagnosis


Respiratory distress 


- likely multi-factorial - acute on chronic diastolic CHF and obesity 

hypoventilation syndrome





Acute on chronic diastolic CHF


- Echocardiogram of 21 by Dr. Myers showed mod concentric hypertrophy.  

LVEF 50-55%.  Grade 2 diastolic dysfunction.  RA dilated.  Mild MR.  PASP 15-20 

mmhg





PAD


- h/o periphearl intervention by Dr. Persaud at [facility] in 2021 - details 

unknown





H/O L AKA





HTN





HLD





DM 2





Morbid obesity


- BMI 55.1





Non-compliance





Discussion and Recomendations


Multi-factorial resp distress - likely d/t acute on chronic CHF and/or obesity-

hypoventilation syndrome


Continue diuretics


Management of respiratory distress per medical services


Monitor lab closely


Replace electrolytes as indicated


Further recs will be based on his hospital course


We would like to thank Dr. Gibson for this consult











PASTOR ORTIZ           Dec 20, 2021 09:20

## 2021-12-20 NOTE — WOUND CARE ASSESSMENT
Wound Care Assessment


Date Seen by Provider:  Dec 20, 2021


Time Seen by Provider:  12:13


Chief Complaint


Numerous chronic ulcers bilateral lower extremities


HPI


Yordan is a gentleman well known to my practice. In the last several months, 

Yordan has become more and more apathetic about his medical care, most recently 

stopping all his diabetic, HTN, and cardiac medications. He did state that he 

had restarted these medications at his last visit with me last week, but there 

was no evidence of this on his vitals (BP chronically elevated). His last A1C 

was 14.2. He does not take his insulin as directed and does not check his blood 

sugars. His PCP has offered continuous glucose monitoring and Yordan has not 

followed through. Yordan has RLE peripheral arterial disease making debridements 

impossible and wound healing difficult as well. He has been scheduled to see 

vascular in past and has been noncompliant with these appointments. We do 

debride his left upper leg stump ulcer weekly to prevent worsening but it is 

unlikely to heal as well with his diabetes and massive lymphedema. Yordan is 

unfaithful in doing dressing changes and has limited finances. Home health does 

assist in dressing changes 3 times weekly. He is currently doing 1/4 strength 

Dakin's WTD dressings 3-7 times weekly (depending on his true compliance with 

our recommendations). His RLE edema today looks better than I have seen it in 

some time (likely due to diuresis and elevation of LE). His R. great toe stump 

ulcer is dry as is his dorsal foot ulcer. I plan for silver alginate dressings 

to his left upper leg stump site, posterior calf and lateral calf ulcers covered

with ABD and medipore tape. Will plan for Betadyne to foot wounds. Control of 

Yordan's numerous other medical ailments is crucial to his ability to heal these 

ulcers.


Smoking Status:  Former Smoker


Recreational Drug Use:  No


Alcohol Use:  Denies Use


Review of Systems


General:  Fatigue (somnalence), Other (morbid obeisty)


Cardiovascular:  Paroxysmal Noc. Dyspnea, Edema





Exam





Vital Signs








  Date Time  Temp Pulse Resp B/P (MAP) Pulse Ox O2 Delivery O2 Flow Rate FiO2


 


12/20/21 09:00     95 Nasal Cannula 2.00 


 


12/20/21 08:08 36.2 73 14 127/104 (112)    





Capillary Refill : Less Than 3 Seconds


General Appearance:  moderate distress (Somnalent, SOA and falling asleep during

exam), obese


Respiratory:  respiratory distress, accessory muscle use


Extremities:  pedal edema


Neurologic/Psychiatric:  depressed affect, other (Somnalent)


Skin:  other (Erythema to RLE (chronic and unchanged))


Wound assessment:


1. Left upper leg stump site: 8x3x0.5 cm. The epithelialization is none, there 

is no tunneling or undermining, drainage is large and serosanguinous, 

granulation is medium and pink, necrotic is medium and slough, wound margins 

show epibole


2. R. post calf: 6x4x0.4. The epithelialization is none, there is no tunneling 

or undermining, drainage is large and serosanguinous, granulation is medium and 

pink, necrotic is medium and slough, wound margins show epibole


3. R. foot: 5x6x0.4. The epithelialization is none, there is no tunneling or 

undermining, drainage is large and serosanguinous, granulation is medium and 

pink, necrotic is medium and slough, wound margins show epibole


4. R. ant. calf: 4x1x0.1. The epithelialization is none, there is no tunneling 

or undermining, drainage is large and serosanguinous, granulation is medium and 

pink, necrotic is medium and slough, wound margins show epibole





Results


Laboratory Tests


12/19/21 16:58: 


Influenza Type A (RT-PCR) Not Detected, Influenza Type B (RT-PCR) Not Detected, 

SARS-CoV-2 RNA (RT-PCR) Not Detected


12/19/21 17:10: 


Sodium Level 132L, Potassium Level 5.4H, Chloride Level 101, Carbon Dioxide 

Level 19L, Anion Gap 12, Blood Urea Nitrogen 18, Creatinine 1.17, Estimat 

Glomerular Filtration Rate 66, BUN/Creatinine Ratio 15, Glucose Level 341H, 

Lactic Acid Level 1.81, Calcium Level 8.4L, Corrected Calcium 9.4, Total 

Bilirubin 0.4, Aspartate Amino Transf (AST/SGOT) 36H, Alanine Aminotransferase 

(ALT/SGPT) 23, Alkaline Phosphatase 108, Troponin I 0.035H, Total Protein 7.6, 

Albumin 2.8L, Procalcitonin 0.10H


12/19/21 17:18: 


Blood Gas Puncture Site LEFT RADIAL, Blood Gas Patient Temperature 96.3, 

Arterial Blood pH 7.35L, Arterial Blood Partial Pressure CO2 44, Arterial Blood 

Partial Pressure O2 75L, Arterial Blood HCO3 24, Arterial Blood Total CO2 25.8, 

Arterial Blood Oxygen Saturation 95, Arterial Blood Base Excess -0.7, Kenney Test

POSITIVE, Blood Gas Ventilator Setting NO, Blood Gas Inspired Oxygen 3 L


12/19/21 17:35: 


White Blood Count 8.3, Red Blood Count 4.10L, Hemoglobin 10.1L, Hematocrit 34L, 

Mean Corpuscular Volume 83, Mean Corpuscular Hemoglobin 25, Mean Corpuscular 

Hemoglobin Concent 30L, Red Cell Distribution Width 15.9H, Platelet Count 369, 

Mean Platelet Volume 9.9, Immature Granulocyte % (Auto) 0, Neutrophils (%) 

(Auto) 79H, Lymphocytes (%) (Auto) 10L, Monocytes (%) (Auto) 7, Eosinophils (%) 

(Auto) 3, Basophils (%) (Auto) 1, Neutrophils # (Auto) 6.6, Lymphocytes # (Auto)

0.8L, Monocytes # (Auto) 0.6, Eosinophils # (Auto) 0.3, Basophils # (Auto) 0.1, 

Immature Granulocyte # (Auto) 0.0, B-Type Natriuretic Peptide 969.5H


12/19/21 17:40: 


Prothrombin Time 13.9, INR Comment 1.0, Activated Partial Thromboplast Time 33, 

D-Dimer 2.00H


12/19/21 18:51: 


Urine Color YELLOW, Urine Clarity CLEAR, Urine pH 7.0, Urine Specific Gravity 

1.020, Urine Protein 2+H, Urine Glucose (UA) 3+H, Urine Ketones NEGATIVE, Urine 

Nitrite NEGATIVE, Urine Bilirubin NEGATIVE, Urine Urobilinogen 0.2, Urine 

Leukocyte Esterase NEGATIVE, Urine RBC (Auto) TRACE-IH, Urine RBC 0-2, Urine WBC

0-2, Urine Squamous Epithelial Cells 0-2, Urine Renal Epithelial Cells NONE, 

Urine Crystals NONE, Urine Bacteria NEGATIVE, Urine Casts NONE, Urine Mucus 

NEGATIVE, Urine Culture Indicated CULTURE PENDING


12/19/21 22:02: Glucometer 292H


12/20/21 06:33: Glucometer 186H


12/20/21 10:41: Glucometer 229H


12/20/21 10:55: 


White Blood Count 6.9, Red Blood Count 3.62L, Hemoglobin 8.9L, Hematocrit 31L, 

Mean Corpuscular Volume 84, Mean Corpuscular Hemoglobin 25, Mean Corpuscular 

Hemoglobin Concent 29L, Red Cell Distribution Width 15.9H, Platelet Count 311, 

Mean Platelet Volume 9.3, Immature Granulocyte % (Auto) 0, Neutrophils (%) 

(Auto) 77H, Lymphocytes (%) (Auto) 9L, Monocytes (%) (Auto) 8, Eosinophils (%) 

(Auto) 5, Basophils (%) (Auto) 1, Neutrophils # (Auto) 5.3, Lymphocytes # (Auto)

0.6L, Monocytes # (Auto) 0.6, Eosinophils # (Auto) 0.3, Basophils # (Auto) 0.0, 

Immature Granulocyte # (Auto) 0.0, Sodium Level 137, Potassium Level 4.5, 

Chloride Level 104, Carbon Dioxide Level 26, Anion Gap 7, Blood Urea Nitrogen 

18, Creatinine 1.29, Estimat Glomerular Filtration Rate 59, BUN/Creatinine Ratio

14, Glucose Level 232H, Calcium Level 8.4L, Corrected Calcium 9.5, Total 

Bilirubin 0.3, Aspartate Amino Transf (AST/SGOT) 16, Alanine Aminotransferase 

(ALT/SGPT) 23, Alkaline Phosphatase 82, Total Protein 6.5, Albumin 2.6L





Assessment/Plan/Dx


Assessment:


1. Chronic full thickness ulcers of Left upper leg (stump), R. posterior and 

lateral calf, R. dorsal foot, R. great toe stump site


2. DM2: poorly controlled


3. CHF: poorly controlled


4. HTN: poorly controlled


5. Lymphedema: poorly controlled





Plan: 


1. Left upper leg (stump site), Right posterior calf, Right lateral calf: 

Cleanse daily with Vashe. Apply silver alginate HF to wound bed and cover with 

ABD and tape daily


2. R. dorsal foot and R. great toe stump site: Paint twice daily with betadyne 

and allow to air dry. Leave open to air











ES LUBIN MD            Dec 20, 2021 12:20

## 2021-12-20 NOTE — OCC THERAPY PROGRESS NOTE
Therapy Progress Note


OT orders received and chart was reviewed. Attempt at OT eval. Pt lethargic, 

having difficult time staying awake and falling asleep mid sentence. OT to 

attempt again 12/21/21 if appropriate.











Yaritza Grace OT                Dec 20, 2021 15:09

## 2021-12-20 NOTE — PHYSICAL THERAPY EVALUATION
PT Evaluation-General


Medical Diagnosis


Admission Date


Dec 19, 2021 at 19:55


Medical Diagnosis:  Dyspnea, shortness of breath


Onset Date:  Dec 19, 2021





Therapy Diagnosis


Therapy Diagnosis:  Gait deficit, strength deficit





Height/Weight


Height (Feet):  6


Height (Inches):  0.00


Weight (Pounds):  400


Weight (Ounces):  4.0





Precautions


Precautions/Isolations:  Contact Isolation, Fall Prevention, Standard 

Precautions





Referral


Physician:  Dr. Gibson


Reason for Referral:  Evaluation/Treatment





Medical History


Pertinent Medical History:  DM, HTN





Social History


Home:  [facility]


Current Living Status:  Alone


Entry Into Home:  Ramp


Patient reports he has an upstairs, but does not have to go to the floor.





Prior


Prior Level of Function


SCALE: Activities may be completed with or without assistive devices.





6-Indepedent-patient completes the activity by him/herself with no assistance 

from a helper.


5-Set-up or Clean-up Assistance-helper sets up or cleans up; patient completes 

activity. [place] assists only prior to or  


    following the activity.


4-Supervision or Touching Assistance-helper provides verbal cues and/or 

touching/steadying and/or contact guard assistance as patient completes 

activity. Assistance may be provided   


    throughout the activity or intermittently.


3-Partial/Moderate Assistance-helper does LESS THAN HALF the effort. [place] 

lifts, holds or supports trunk or limbs, but provides less than half the effort.


2-Substantial/Maximal Assistance-helper does MORE THAN HALF the effort. [place] 

lifts or holds trunk or limbs and provides more than half the effort.


8-Nrabzgdvh-flotkq does ALL the effort. Patient does none of the effort to 

complete the activity. Or, the assistance of 2 or more helpers is required for 

the patient to complete the  


    activity.


If activity was not attempted, code reason:


7-Patient Refused.


9-Not Applicable-not attempted and the patient did not perform the activity 

before the current illness, exacerbation or injury.


10-Not Attempted due to Environmental Limitations-(lack of equipment, weather 

restraints, etc.).


88-Not Attempted due to Medical Conditions or Safety Concerns.


Bed Mobility:  6


Transfers (B,C,W/C):  6


Gait:  88


Stairs:  88


Indoor Mobility (Ambulation):  Not [place]


Stairs:  Not [place]


Prior Devices Use:  Manual wheelchair





PT Evaluation-Current


Subjective


Patient lying supine in bed upon PT arrival, agreeable to treatment.  Patient 

currently reports 0/10 pain, however notes he is very tired "because I haven't 

slept much in the past 3-4 days. "





ROM/Strength


ROM Lower Extremities


Right hip- appears limited significantly into hip flexion; right knee lacks ~10 

degrees of extension and knee flexion to 70 degrees; right ankle lacks 10 

degrees dorsiflexion and plantarflexion to 30 degrees.  


Left LE N/A as he presents with a left AKA from ~ 2 years ago.  Given his 

limited mobility, observation of ROM is difficult to obtain


Strength Lower Extremities


Right LE 2+/5 all available planes.  Left LE N/A





Integumentary/Posture


Bladder Incontinence:  Yes





Sensory


Vision:  Functional


Hearing:  Functional


Sensation Right Lower Extremit:  Impaired


Sensation Left Lower Extremity:  Impaired


Sensation Lower Extremities


Patient demonstrates declined sensation to light touch in left residual limb, 

and declined and absent in various dermatomes of right LE.  Inconsistent with 

report during testing.





Transfers


Roll Left to Right (QC):  3


Sit to Lying (QC):  3


Lying to Sitting/Side of Bed(Q:  3





Gait


Does the Patient Walk?:  No and Walking Goal NOT indicated


Mode of Locomotion:  Wheelchair


Anticipated Mode of Locomotion:  Wheelchair





Wheelchair Training


Does the Pt Use a Wheelchair?:  Yes


Distance:  N/A


Wheel 50 ft with 2 turns (QC):  88


Wheel 150 ft (QC):  88





Balance


Sitting Static:  Poor


Sitting Dynamic:  Poor





Assessment/Needs


Patient tolerated treatment poorly.  He reports no feelings of shortness of 

breath and O2 sats stay above 94-95%, however he fatigues very quickly and 

reports he is really tired.  Fatigue is most likely due to recent decline in 

sleep the past few nights.  Patient was able to transfer with min A to the side 

of the bed.  Patient requested a stop to the evaluation and requested to return 

to bed.  Patient lying  supine in bed post treatment with all needs met, nursing

notified, call light in hand.  Patient in contact Isolation


Rehab Potential:  Poor





PT Long Term Goals


Long Term Goals


PT Long Term Goals Time Frame:  Dec 31, 2021


Roll Left & Right (QC):  4


Sit to Lying (QC):  4


Lying-Sitting on Side/Bed(QC):  4


Sit to Stand (QC):  4


Chair/Bed-to-Chair Xfer(QC):  4


Wheel 50 feet with 2 turns (QC:  5


Type:  Manual


Wheel 150 feet:  5


Type:  Manual





PT Plan


Problem List


Problem List:  Activity Tolerance, Functional Strength, Safety, Balance, Gait, 

Transfer, Bed Mobility, ROM





Treatment/Plan


Treatment Plan:  Continue Plan of Care


Treatment Plan:  Bed Mobility, Education, Functional Activity Cosmo, Functional 

Strength, Group Therapy, Gait, Safety, Therapeutic Exercise, Transfers


Treatment Duration:  Jan 28, 2022


Frequency:  6 times per week


Estimated Hrs Per Day:  .25 hour per day





Safety Risks/Education


Patient Education:  Transfer Techniques


Teaching Recipient:  Patient


Teaching Methods:  Demonstration, Discussion


Response to Teaching:  Reinforcement Needed





Discharge Recommendations


Target Placement


SNF





Time/GCodes


Time In:  1040


Time Out:  1105


Total Billed Treatment Time:  25


Total Billed Treatment


Visit, MARU Campbell JOHN A PT                Dec 20, 2021 11:10

## 2021-12-21 VITALS — SYSTOLIC BLOOD PRESSURE: 104 MMHG | DIASTOLIC BLOOD PRESSURE: 65 MMHG

## 2021-12-21 VITALS — SYSTOLIC BLOOD PRESSURE: 134 MMHG | DIASTOLIC BLOOD PRESSURE: 67 MMHG

## 2021-12-21 VITALS — SYSTOLIC BLOOD PRESSURE: 114 MMHG | DIASTOLIC BLOOD PRESSURE: 66 MMHG

## 2021-12-21 VITALS — DIASTOLIC BLOOD PRESSURE: 64 MMHG | SYSTOLIC BLOOD PRESSURE: 116 MMHG

## 2021-12-21 VITALS — SYSTOLIC BLOOD PRESSURE: 128 MMHG | DIASTOLIC BLOOD PRESSURE: 96 MMHG

## 2021-12-21 LAB
ALBUMIN SERPL-MCNC: 2.6 GM/DL (ref 3.2–4.5)
ALP SERPL-CCNC: 87 U/L (ref 40–136)
ALT SERPL-CCNC: 18 U/L (ref 0–55)
BASOPHILS # BLD AUTO: 0.1 10^3/UL (ref 0–0.1)
BASOPHILS NFR BLD AUTO: 1 % (ref 0–10)
BILIRUB SERPL-MCNC: 0.4 MG/DL (ref 0.1–1)
BUN/CREAT SERPL: 14
CALCIUM SERPL-MCNC: 8.1 MG/DL (ref 8.5–10.1)
CHLORIDE SERPL-SCNC: 103 MMOL/L (ref 98–107)
CO2 SERPL-SCNC: 27 MMOL/L (ref 21–32)
CREAT SERPL-MCNC: 1.4 MG/DL (ref 0.6–1.3)
EOSINOPHIL # BLD AUTO: 0.5 10^3/UL (ref 0–0.3)
EOSINOPHIL NFR BLD AUTO: 7 % (ref 0–10)
GFR SERPLBLD BASED ON 1.73 SQ M-ARVRAT: 53 ML/MIN
GLUCOSE SERPL-MCNC: 106 MG/DL (ref 70–105)
HCT VFR BLD CALC: 32 % (ref 40–54)
HGB BLD-MCNC: 9.3 G/DL (ref 13.3–17.7)
LYMPHOCYTES # BLD AUTO: 0.9 10^3/UL (ref 1–4)
LYMPHOCYTES NFR BLD AUTO: 13 % (ref 12–44)
MANUAL DIFFERENTIAL PERFORMED BLD QL: NO
MCH RBC QN AUTO: 25 PG (ref 25–34)
MCHC RBC AUTO-ENTMCNC: 29 G/DL (ref 32–36)
MCV RBC AUTO: 84 FL (ref 80–99)
MONOCYTES # BLD AUTO: 0.7 10^3/UL (ref 0–1)
MONOCYTES NFR BLD AUTO: 10 % (ref 0–12)
NEUTROPHILS # BLD AUTO: 4.7 10^3/UL (ref 1.8–7.8)
NEUTROPHILS NFR BLD AUTO: 69 % (ref 42–75)
PLATELET # BLD: 340 10^3/UL (ref 130–400)
PMV BLD AUTO: 9.8 FL (ref 9–12.2)
POTASSIUM SERPL-SCNC: 4.3 MMOL/L (ref 3.6–5)
PROT SERPL-MCNC: 6.7 GM/DL (ref 6.4–8.2)
SODIUM SERPL-SCNC: 138 MMOL/L (ref 135–145)
WBC # BLD AUTO: 6.9 10^3/UL (ref 4.3–11)

## 2021-12-21 RX ADMIN — POLYETHYLENE GLYCOL (3350) SCH GM: 17 POWDER, FOR SOLUTION ORAL at 21:03

## 2021-12-21 RX ADMIN — DOCUSATE SODIUM SCH MG: 100 CAPSULE ORAL at 10:48

## 2021-12-21 RX ADMIN — ASPIRIN SCH MG: 325 TABLET, COATED ORAL at 08:54

## 2021-12-21 RX ADMIN — ENOXAPARIN SODIUM SCH MG: 100 INJECTION SUBCUTANEOUS at 08:54

## 2021-12-21 RX ADMIN — INSULIN ASPART SCH UNIT: 100 INJECTION, SOLUTION INTRAVENOUS; SUBCUTANEOUS at 10:49

## 2021-12-21 RX ADMIN — LACTULOSE SCH GM: 20 SOLUTION ORAL at 21:03

## 2021-12-21 RX ADMIN — LISINOPRIL SCH MG: 20 TABLET ORAL at 08:31

## 2021-12-21 RX ADMIN — LISINOPRIL SCH MG: 20 TABLET ORAL at 08:54

## 2021-12-21 RX ADMIN — FUROSEMIDE SCH MG: 10 INJECTION, SOLUTION INTRAVENOUS at 08:54

## 2021-12-21 RX ADMIN — POLYETHYLENE GLYCOL (3350) SCH GM: 17 POWDER, FOR SOLUTION ORAL at 10:49

## 2021-12-21 RX ADMIN — INSULIN ASPART SCH UNIT: 100 INJECTION, SOLUTION INTRAVENOUS; SUBCUTANEOUS at 08:31

## 2021-12-21 RX ADMIN — ENOXAPARIN SODIUM SCH MG: 100 INJECTION SUBCUTANEOUS at 21:03

## 2021-12-21 RX ADMIN — LACTULOSE SCH GM: 20 SOLUTION ORAL at 10:49

## 2021-12-21 RX ADMIN — INSULIN ASPART SCH UNIT: 100 INJECTION, SOLUTION INTRAVENOUS; SUBCUTANEOUS at 15:38

## 2021-12-21 RX ADMIN — INSULIN ASPART SCH UNIT: 100 INJECTION, SOLUTION INTRAVENOUS; SUBCUTANEOUS at 05:24

## 2021-12-21 RX ADMIN — DOCUSATE SODIUM AND SENNOSIDES SCH EA: 8.6; 5 TABLET, FILM COATED ORAL at 21:03

## 2021-12-21 RX ADMIN — INSULIN ASPART SCH UNIT: 100 INJECTION, SOLUTION INTRAVENOUS; SUBCUTANEOUS at 12:23

## 2021-12-21 RX ADMIN — INSULIN ASPART SCH UNIT: 100 INJECTION, SOLUTION INTRAVENOUS; SUBCUTANEOUS at 12:20

## 2021-12-21 RX ADMIN — INSULIN ASPART SCH UNIT: 100 INJECTION, SOLUTION INTRAVENOUS; SUBCUTANEOUS at 20:16

## 2021-12-21 RX ADMIN — INSULIN ASPART SCH UNIT: 100 INJECTION, SOLUTION INTRAVENOUS; SUBCUTANEOUS at 17:36

## 2021-12-21 RX ADMIN — DOCUSATE SODIUM SCH MG: 100 CAPSULE ORAL at 21:03

## 2021-12-21 RX ADMIN — DOCUSATE SODIUM AND SENNOSIDES SCH EA: 8.6; 5 TABLET, FILM COATED ORAL at 10:48

## 2021-12-21 RX ADMIN — INSULIN ASPART SCH UNIT: 100 INJECTION, SOLUTION INTRAVENOUS; SUBCUTANEOUS at 08:54

## 2021-12-21 RX ADMIN — VANCOMYCIN HYDROCHLORIDE SCH MLS/HR: 500 INJECTION, POWDER, LYOPHILIZED, FOR SOLUTION INTRAVENOUS at 22:35

## 2021-12-21 RX ADMIN — INSULIN ASPART SCH UNIT: 100 INJECTION, SOLUTION INTRAVENOUS; SUBCUTANEOUS at 16:06

## 2021-12-21 NOTE — PHYSICAL THERAPY DAILY NOTE
PT Daily Note-Current


Subjective


Patient lying supine in bed  upon PT arrival, agreeable to treatment. Patient 

rates pain at 5/10 currently in his back.





Mental Status


Patient Orientation:  Person, Place


Attachments:  [place]son Catheter, IV





Transfers


SCALE: Activities may be completed with or without assistive devices.





6-Indepedent-patient completes the activity by him/herself with no assistance 

from a helper.


5-Set-up or Clean-up Assistance-helper sets up or cleans up; patient completes 

activity. [place] assists only prior to or  


    following the activity.


4-Supervision or Touching Assistance-helper provides verbal cues and/or 

touching/steadying and/or contact guard assistance as patient completes 

activity. Assistance may be provided   


    throughout the activity or intermittently.


3-Partial/Moderate Assistance-helper does LESS THAN HALF the effort. [place] 

lifts, holds or supports trunk or limbs, but provides less than half the effort.


2-Substantial/Maximal Assistance-helper does MORE THAN HALF the effort. [place] 

lifts or holds trunk or limbs and provides more than half the effort.


8-Pqhoymmlb-cyzkiy does ALL the effort. Patient does none of the effort to 

complete the activity. Or, the assistance of 2 or more helpers is required for 

the patient to complete the  


    activity.


If activity was not attempted, code reason:


7-Patient Refused.


9-Not Applicable-not attempted and the patient did not perform the activity 

before the current illness, exacerbation or injury.


10-Not Attempted due to Environmental Limitations-(lack of equipment, weather 

restraints, etc.).


88-Not Attempted due to Medical Conditions or Safety Concerns.





Gait Training


Does the Patient Walk?:  No and Walking Goal NOT indicated





Exercises


Supine Ex:  Ankle pumps, Quad Set, Glut sets, Heel Slides, Short Arc Quads, 

Straight leg raise, Hip abd/add


Supine Reps:  20





Assessment


Current Status:  Fair Progress


Patient lying in bed upon PT arrival, performs right LE therapeutic exercises as

listed above.   Patient reports he isn't feeling well, dizzy and light headed 

and politely refuses OOB therapeutic exercise.  Patient in bed post treatment 

with all needs met, nursing notified, call light in reach.





PT Long Term Goals


Long Term Goals


PT Long Term Goals Time Frame:  Dec 31, 2021


Roll Left & Right (QC):  4


Sit to Lying (QC):  4


Lying-Sitting on Side/Bed(QC):  4


Sit to Stand (QC):  4


Chair/Bed-to-Chair Xfer(QC):  4


Wheel 50 feet with 2 turns (QC:  5


Type:  Manual


Wheel 150 feet:  5


Type:  Manual





PT Plan


Treatment/Plan


Treatment Plan:  Continue Plan of Care


Treatment Plan:  Bed Mobility, Education, Functional Activity Cosmo, Functional 

Strength, Group Therapy, Gait, Safety, Therapeutic Exercise, Transfers


Treatment Duration:  Jan 28, 2022


Frequency:  6 times per week


Estimated Hrs Per Day:  .25 hour per day





Time/GCodes


Time In:  1051


Time Out:  1106


Total Billed Treatment Time:  15


Total Billed Treatment


Visit, Ex











MIGNON LAY PT                Dec 21, 2021 14:47

## 2021-12-21 NOTE — TELE-ICU PROGRESS NOTE
Progress Note


discussed with RN - pulmonary consult was cancelled by Dr Gibson 


 - please call if any new/additional questions 


THANK YOU





Focused Exam


Lactate Level


12/19/21 17:10: Lactic Acid Level 1.81


Height, Weight, BMI


Height: 6'0.00"


Weight: 400lbs. 4.0oz. 181.747557kh; 55.09 BMI


Method:Stated


Time of Focused Exam:  19:55











AGUILA GASTON MD         Dec 21, 2021 10:55

## 2021-12-21 NOTE — PROGRESS NOTE - CARDIOLOGY
Cardiology SOAP Progress Note


Subjective:


In bed


More alert today


No c/o CP


Feels SOB is better today





Objective:


I&O/Vital Signs











 12/28/21





 00:00


 


Intake Total 1029 ml


 


Output Total 1600 ml


 


Balance -571 ml








Weight (Pounds):  400


Weight (Ounces):  4.0


Weight (Calculated Kilograms):  181.357685


Constitutional:  AAO x 3, other (morbidly obese)


Respiratory:  chest expansion is symmetric, chest is bilaterally symmetric, 

other (fair air entry)


Cardiovascular:  regular rate-rhythm; No JVD; S1 and S2


Gastrointestional:  soft, round, audible bowel sounds


Extremities:  other (L AKA, missing 1st toe on R foot)


Neurologic/Psychiatric:  other (appears to be able to move all limbs)


Skin:  normal color, warm/dry, other (R LE with great toe amputation; skin is 

red, leathery in appearance with thick, patchy, scaling)





Results/Procedures:


Labs





Microbiology


12/19/21 Blood Culture - Final, Complete


           No growth


12/19/21 Urine Culture - Final, Complete


           Mixed Bacterial Ya








A/P:


Assessment:





Respiratory distress 


- likely multi-factorial - acute on chronic diastolic CHF and obesity 

hypoventilation syndrome and ?pneumonia





Acute on chronic diastolic CHF


- Echocardiogram of 11-26-21 by Dr. Myres showed mod concentric hypertrophy.  

LVEF 50-55%.  Grade 2 diastolic dysfunction.  RA dilated.  Mild MR.  PASP 15-20 

mmhg





PAD


- h/o peripheral intervention by Dr. Persaud at [facility] in April 2021 - details 

unknown





H/O L AKA





HTN





HLD





DM 2





Morbid obesity


- BMI 55.1





Non-compliance


Plan:


Change diuretics to oral starting tomorrow


Management of respiratory distress per Medical services


Monitor lab closely


Replace electrolytes as indicated











PASTOR ORTIZ           Dec 21, 2021 10:26

## 2021-12-21 NOTE — OCCUPATIONAL THERAPY EVAL
OT Evaluation-General/PLF


Medical Diagnosis


Admission Date


Dec 19, 2021 at 19:55


Medical Diagnosis:  Dyspnea, shortness of breath


Onset Date:  Dec 19, 2021





Therapy Diagnosis


Therapy Diagnosis:  Impaired adls, endurnace, strength





Height/Weight


Height (Feet):  6


Height (Inches):  0.00


Weight (Pounds):  400


Weight (Ounces):  4.0





Precautions


Precautions/Isolations:  Contact Isolation, Fall Prevention, Pressure Ulcer





Referral


Physician:  Dr. Gibson


Referral Reason:  Evaluation/Treatment





Medical History


Pertinent Medical History:  DM, HTN


Additional Medical History


Left AKA


Current History


Pt presents to ER with SOB and dyspnea. found to be hypoxic and placed on 4L O2 

nasal cannula. He reports living alone in a multi story home. All needs can be 

met on main level. He was indep with adls and iadls. He has a friend that 

assists with all transportation. He uses a w/c at baseline and states that he w

as able to transfer independently. He no longer takes showers due to shower 

being too small. He thus, performs sponge baths with wet wipes.


Reviewed History:  Yes





Social History


Home:  [facility]


Current Living Status:  Alone


Entry Into Home:  Ramp





ADL-Prior Level of Function


SCALE: Activities may be completed with or without assistive devices.





6-Indepedent-patient completes the activity by him/herself with no assistance 

from a helper.


5-Set-up or Clean-up Assistance-helper sets up or cleans up; patient completes 

activity. [place] assists only prior to or  


    following the activity.


4-Supervision or Touching Assistance-helper provides verbal cues and/or 

touching/steadying and/or contact guard assistance as patient completes 

activity. Assistance may be provided   


    throughout the activity or intermittently.


3-Partial/Moderate Assistance-helper does LESS THAN HALF the effort. [place] 

lifts, holds or supports trunk or limbs, but provides less than half the effort.


2-Substantial/Maximal Assistance-helper does MORE THAN HALF the effort. [place] 

lifts or holds trunk or limbs and provides more than half the effort.


0-Bzponexlo-ttywrf does ALL the effort. Patient does none of the effort to 

complete the activity. Or, the assistance of 2 or more helpers is required for 

the patient to complete the  


    activity.


If activity was not attempted, code reason:


7-Patient Refused.


9-Not Applicable-not attempted and the patient did not perform the activity 

before the current illness, exacerbation or injury.


10-Not Attempted due to Environmental Limitations-(lack of equipment, weather 

restraints, etc.).


88-Not Attempted due to Medical Conditions or Safety Concerns.


Self Care:  Independent


Functional Cognition:  Independent


Drive Self:  No





OT Current Status


Subjective


Pt agreeable to eval. Reports pain in all extremities. No numerical value given.





Appearance


Left sitting upright in bed, all needs within reach.





Mental Status/Objective


Patient Orientation:  Person, Place, Situation


Attachments:  Jean Catheter, IV, Oxygen, Telemetry





Current


Glasses/Contacts:  Yes


Hearing Aids:  No


Dentures/Partials:  No


Hand Dominance:  Right


Upper Extremity ROM


WNL


Upper Extremity Strength


4/5 grossly





ADL-Treatment


Eating (QC):  5


Oral Hygiene (QC):  5


Toileting Hygiene (QC):  1 (jean catheter)


Pt sitting upright in bed. He refuses to sit EOB (despite encouragement) to 

complete adls and thus grooming tasks completed at bed level with set up. 

Appears to fatigue easily with minimal activity and requires cues for PLB. 

Significant edema exhibited in residual limb.





Education


OT Patient Education:  Correct positioning, Progress toward Goal/Update tx plan,

Purpose of tx/functional activities, Rehab process


Teaching Recipient:  Patient


Teaching Methods:  Discussion


Response to Teaching:  Verbalize Understanding





OT Long Term Goals


Long Term Goals


Time Frame:  Jan 11, 2022


Toileting Hygiene (QC):  4


Shower/Bathe Self (QC):  4


Upper Body Dressing (QC):  5


Lower Body Dressing (QC):  4


On/Off Footwear (QC):  4


Transfer to/from w/c with CGA-MIn A.


1=Demonstrate adherence to instructed precautions during ADL tasks.


2=Patient will verbalize/demonstrate understanding of assistive 

devices/modifications for ADL.


3=Patient will improve strength/tolerance for activity to enable patient to 

perform ADL's.





OT Education/Plan


Problem List/Assessment


Assessment:  Decreased Activ Tolerance, Edema, Impaired I ADL's, Impaired Self-

Care Skills





Discharge Recommendations


Plan/Recommendations:  Continue POC


Comment


ongoing assessment





Treatment Plan/Plan of Care


Treatment,Training & Education:  Yes


Patient would benefit from OT for education, treatment and training to promote 

independence in ADL's, mobility, safety and/or upper extremity function for 

ADL's.


Plan of Care:  ADL Retraining, Functional Mobility, UE Funct Exercise/Act, W/C 

Management Training


Treatment Duration:  Jan 11, 2022


Frequency:  3 times per week


Estimated Hrs Per Day:  .25 hour per day


Rehab Potential:  Poor





Time/GCodes


Start Time:  09:58


Stop Time:  10:08


Total Time Billed (hr/min):  10


Billed Treatment Time


1 visit


Yaritza Álvarez OT                Dec 21, 2021 11:27

## 2021-12-21 NOTE — PROGRESS NOTE - CARDIOLOGY
Cardiology SOAP Progress Note


Subjective:


Shortness of breath present but better


No cp or palp or syncope


No n/v/d


Gen weakness and malaise present





Objective:


I&O/Vital Signs











 12/21/21 12/21/21 12/21/21 12/21/21





 07:56 09:36 09:42 10:40


 


Temp 36.4   


 


Pulse 68   


 


Resp 16   


 


B/P (MAP) 134/67 (89)   


 


Pulse Ox 95 93  


 


O2 Delivery Nasal Cannula Nasal Cannula Nasal Cannula 


 


O2 Flow Rate  4.00 4.00 


 


    





 12/21/21 12/21/21 12/21/21 





 11:53 16:00 16:31 


 


Temp 36.4 36.6  


 


Pulse 66   


 


Resp 17   


 


B/P (MAP) 128/96 (107)   


 


Pulse Ox 92   


 


O2 Delivery Room Air  Nasal Cannula 


 


O2 Flow Rate 4.00  4.00 














 12/21/21





 00:00


 


Intake Total 2280 ml


 


Output Total 800 ml


 


Balance 1480 ml








Weight (Pounds):  400


Weight (Ounces):  4.0


Weight (Calculated Kilograms):  181.256087


Constitutional:  AAO x 3, other (morbidly obese)


Respiratory:  chest expansion is symmetric, chest is bilaterally symmetric, ot

her (fair air entry)


Cardiovascular:  regular rate-rhythm; No JVD; S1 and S2


Gastrointestional:  soft, round, audible bowel sounds


Extremities:  other (L AKA, missing 1st toe on R foot)


Neurologic/Psychiatric:  other (appears to be able to move all limbs)


Skin:  normal color, warm/dry, other (R LE with great toe amputation; skin is 

red, leathery in appearance with thick, patchy, scaling)





Results/Procedures:


Labs


Laboratory Tests


12/20/21 20:44: Glucometer 217H


12/21/21 05:23: Glucometer 109


12/21/21 07:10: 


White Blood Count 6.9, Red Blood Count 3.76L, Hemoglobin 9.3L, Hematocrit 32L, 

Mean Corpuscular Volume 84, Mean Corpuscular Hemoglobin 25, Mean Corpuscular 

Hemoglobin Concent 29L, Red Cell Distribution Width 16.3H, Platelet Count 340, 

Mean Platelet Volume 9.8, Immature Granulocyte % (Auto) 0, Neutrophils (%) 

(Auto) 69, Lymphocytes (%) (Auto) 13, Monocytes (%) (Auto) 10, Eosinophils (%) 

(Auto) 7, Basophils (%) (Auto) 1, Neutrophils # (Auto) 4.7, Lymphocytes # (Auto)

0.9L, Monocytes # (Auto) 0.7, Eosinophils # (Auto) 0.5H, Basophils # (Auto) 0.1,

Immature Granulocyte # (Auto) 0.0, Sodium Level 138, Potassium Level 4.3, 

Chloride Level 103, Carbon Dioxide Level 27, Anion Gap 8, Blood Urea Nitrogen 

20H, Creatinine 1.40H, Estimat Glomerular Filtration Rate 53, BUN/Creatinine 

Ratio 14, Glucose Level 106H, Calcium Level 8.1L, Corrected Calcium 9.2, Total 

Bilirubin 0.4, Aspartate Amino Transf (AST/SGOT) 14, Alanine Aminotransferase 

(ALT/SGPT) 18, Alkaline Phosphatase 87, Total Protein 6.7, Albumin 2.6L


12/21/21 10:36: Glucometer 135H


12/21/21 16:01: Glucometer 110





Microbiology


12/19/21 Blood Culture - Preliminary, Resulted


           No growth


12/19/21 Urine Culture - Final, Complete


           Mixed Bacterial Ya





Laboratory Tests


12/20/21 10:55








12/21/21 07:10











A/P:


Assessment:





Respiratory distress 


- likely multi-factorial - acute on chronic diastolic CHF and obesity 

hypoventilation syndrome and ?pneumonia





Acute on chronic diastolic CHF


- Echocardiogram of 11-26-21 by Dr. Myers showed mod concentric hypertrophy.  

LVEF 50-55%.  Grade 2 diastolic dysfunction.  RA dilated.  Mild MR.  PASP 15-20 

mmhg





PAD


- h/o peripheral intervention by Dr. Persaud at [facility] in April 2021 - details 

unknown





H/O L AKA





HTN





HLD





DM 2





Morbid obesity


- BMI 55.1





Non-compliance


Plan:





Change diuretics to oral starting tomorrow


Management of respiratory distress per Medical services


Monitor lab closely


Replace electrolytes as indicated











JEREMIAH SLAUGHTER MD FACP [facility]   Dec 21, 2021 19:54

## 2021-12-21 NOTE — PROGRESS NOTE - HOSPITALIST
ROSALES LOPEZ 12/21/21 1047:


Subjective


HPI/CC On Admission


Date Seen by Provider:  Dec 21, 2021


Time Seen by Provider:  08:00


CC: SOB





HPI: This is a 51yoWM who has had multiple hospital stays due to acute on 

chronic CHF with obesity hypoventilation syndrome. He was given IV diuresis and 

he remains a do not intubate, do not resuscitate. Cardiology was consulted. Labs

were ordered, they are pending currently. May be able to transfer to fourth 

floor. PT and OT ordered and wound care for the right great toe amputation site.


Subjective/Events-last exam


Patient was sleeping when I went to see him and was hard to awaken. Did state he

was feeling better and had no complaints. Patient did test positive for MRSA. 

Will start Vanc. Nurse states patient had purulent drainage for his big toe 

amputation site. No bowel movement yet. Will start bowel regimen. Patient states

he lives alone at home and is wheel chair bound. Will be moved to 4th floor 

today.





Review of Systems


Pulmonary:  No Dyspnea


Cardiovascular:  No: Chest Pain





Focused Exam


Lactate Level


12/19/21 17:10: Lactic Acid Level 1.81





Time of Focused Exam:  19:55





Objective


Exam


Vital Signs





Vital Signs








  Date Time  Temp Pulse Resp B/P (MAP) Pulse Ox O2 Delivery O2 Flow Rate FiO2


 


12/21/21 10:40        


 


12/21/21 09:42      Nasal Cannula 4.00 


 


12/21/21 09:36     93   


 


12/21/21 07:56 36.4 68 16     





Capillary Refill : Less Than 3 Seconds


General Appearance:  No Apparent Distress, Obese


Respiratory:  Lungs Clear, Normal Breath Sounds, No Accessory Muscle Use, No 

Respiratory Distress


Cardiovascular:  Regular Rate, Rhythm, Normal Peripheral Pulses


Rectal:  Deferred


Neurologic/Psychiatric:  Alert, Oriented x3, Normal Mood/Affect





Results/Procedures


Lab


Laboratory Tests


12/20/21 10:55








12/21/21 07:10








Patient resulted labs reviewed.


Imaging:  Reviewed Imaging Films, Reviewed Imaging Report





Assessment/Plan


Assessment and Plan


Assess & Plan/Chief Complaint


Assessment


Acute on Chronic Congestive heart failure exacerbation


Acute on chronic respiratory failure


Obesity hypoventilation syndrome 


Obstructive sleep apnea


History of Peripheral artery disease


T2 Diabetes Mellitus 


  - Left Above the knee amputation 


  - Right hallux amputation, site draining purulent exudate


Hypertension


Hyperlipidemia 








Plan


IV Lasix


Insulin


Continue home medications


Continue oxygen therapy 


Monitor labs


Consult cardiology 


Consult wound care


PT/OT


Lovenox for DVT prophylaxis 


To the floor





SENDY OLIVARES DO 12/22/21 0603:


Subjective


Subjective/Events-last exam


Pt doing a little better


More awake today


Pus coming from his toe


Transferring to 4th floor


Vancomycin started


Blood cultures show staph


Creatinine 1.4


Oxygen on 5 liters


Still lethargic


Needs BM so started laxatives


Review of Systems


General:  Fatigue, Malaise


Neurological:  Weakness





Objective


Exam


General Appearance:  No Apparent Distress, WD/WN, Chronically ill, Obese


Respiratory:  Lungs Clear, Normal Breath Sounds


Cardiovascular:  Regular Rate, Rhythm


Neurologic/Psychiatric:  Alert, Oriented x3





Assessment/Plan


Assessment and Plan


Assess & Plan/Chief Complaint


Supportive care


Transfer to fourth floor





Supervisory-Addendum Brief


Verification & Attestation


Participated in pt care:  history, MDM, physical


Personally performed:  exam, history, MDM, supervision of care


Care discussed with:  Medical Student


Procedures:  n/a


Results interpretation:  Verified all documentation


Verification and Attestation of Medical Student E/M Service





A medical student performed and documented this service in my presence. I 

reviewed and verified all information documented by the medical student and made

modifications to such information, when appropriate. I personally performed the 

physical exam and medical decision making. 





 Sendy Olivares, Dec 22, 2021,06:02











ROSALES LOPEZ                 Dec 21, 2021 10:47


SENDY OLIVARES DO                Dec 22, 2021 06:03

## 2021-12-21 NOTE — DIAGNOSTIC IMAGING REPORT
Portable erect AP chest at 6:32.



Indication: Congestive failure



The appearance of the chest has improved somewhat since the prior

exam of 12/19/2021 as the central pulmonary vascularity does not

seem as prominent in both lungs, do appear somewhat better

aerated. I suspect that there is still an element of mild

pulmonary congestion present. The heart is stable in size. The

mediastinum is not widened. The osseous structures are intact. 



Impression:  The appearance of the chest has improved since the

prior exam as the pulmonary edema noted previously has

diminished. There is still an element of congestive failure

present however. A followup study would recommended for continued

evaluation.



Dictated by: 



  Dictated on workstation # [id number]

## 2021-12-22 VITALS — DIASTOLIC BLOOD PRESSURE: 68 MMHG | SYSTOLIC BLOOD PRESSURE: 110 MMHG

## 2021-12-22 VITALS — SYSTOLIC BLOOD PRESSURE: 151 MMHG | DIASTOLIC BLOOD PRESSURE: 67 MMHG

## 2021-12-22 VITALS — SYSTOLIC BLOOD PRESSURE: 136 MMHG | DIASTOLIC BLOOD PRESSURE: 84 MMHG

## 2021-12-22 VITALS — DIASTOLIC BLOOD PRESSURE: 92 MMHG | SYSTOLIC BLOOD PRESSURE: 134 MMHG

## 2021-12-22 VITALS — SYSTOLIC BLOOD PRESSURE: 138 MMHG | DIASTOLIC BLOOD PRESSURE: 75 MMHG

## 2021-12-22 LAB
ALBUMIN SERPL-MCNC: 2.8 GM/DL (ref 3.2–4.5)
ALP SERPL-CCNC: 85 U/L (ref 40–136)
ALT SERPL-CCNC: 19 U/L (ref 0–55)
BASOPHILS # BLD AUTO: 0 10^3/UL (ref 0–0.1)
BASOPHILS NFR BLD AUTO: 1 % (ref 0–10)
BILIRUB SERPL-MCNC: 0.3 MG/DL (ref 0.1–1)
BUN/CREAT SERPL: 14
CALCIUM SERPL-MCNC: 8.6 MG/DL (ref 8.5–10.1)
CHLORIDE SERPL-SCNC: 103 MMOL/L (ref 98–107)
CHOLEST SERPL-MCNC: 135 MG/DL (ref ?–200)
CO2 SERPL-SCNC: 26 MMOL/L (ref 21–32)
CREAT SERPL-MCNC: 1.36 MG/DL (ref 0.6–1.3)
EOSINOPHIL # BLD AUTO: 0.5 10^3/UL (ref 0–0.3)
EOSINOPHIL NFR BLD AUTO: 7 % (ref 0–10)
GFR SERPLBLD BASED ON 1.73 SQ M-ARVRAT: 55 ML/MIN
GLUCOSE SERPL-MCNC: 127 MG/DL (ref 70–105)
HCT VFR BLD CALC: 33 % (ref 40–54)
HDLC SERPL-MCNC: 32 MG/DL (ref 40–60)
HGB BLD-MCNC: 9.7 G/DL (ref 13.3–17.7)
LYMPHOCYTES # BLD AUTO: 1 10^3/UL (ref 1–4)
LYMPHOCYTES NFR BLD AUTO: 14 % (ref 12–44)
MANUAL DIFFERENTIAL PERFORMED BLD QL: NO
MCH RBC QN AUTO: 25 PG (ref 25–34)
MCHC RBC AUTO-ENTMCNC: 29 G/DL (ref 32–36)
MCV RBC AUTO: 84 FL (ref 80–99)
MONOCYTES # BLD AUTO: 0.7 10^3/UL (ref 0–1)
MONOCYTES NFR BLD AUTO: 10 % (ref 0–12)
NEUTROPHILS # BLD AUTO: 4.9 10^3/UL (ref 1.8–7.8)
NEUTROPHILS NFR BLD AUTO: 69 % (ref 42–75)
PLATELET # BLD: 332 10^3/UL (ref 130–400)
PMV BLD AUTO: 9.9 FL (ref 9–12.2)
POTASSIUM SERPL-SCNC: 4.4 MMOL/L (ref 3.6–5)
PROT SERPL-MCNC: 7 GM/DL (ref 6.4–8.2)
SODIUM SERPL-SCNC: 137 MMOL/L (ref 135–145)
TRIGL SERPL-MCNC: 82 MG/DL (ref ?–150)
VLDLC SERPL CALC-MCNC: 16 MG/DL (ref 5–40)
WBC # BLD AUTO: 7.1 10^3/UL (ref 4.3–11)

## 2021-12-22 RX ADMIN — POLYETHYLENE GLYCOL (3350) SCH GM: 17 POWDER, FOR SOLUTION ORAL at 10:19

## 2021-12-22 RX ADMIN — VANCOMYCIN HYDROCHLORIDE SCH MLS/HR: 500 INJECTION, POWDER, LYOPHILIZED, FOR SOLUTION INTRAVENOUS at 10:55

## 2021-12-22 RX ADMIN — DOCUSATE SODIUM SCH MG: 100 CAPSULE ORAL at 20:47

## 2021-12-22 RX ADMIN — INSULIN ASPART SCH UNIT: 100 INJECTION, SOLUTION INTRAVENOUS; SUBCUTANEOUS at 12:06

## 2021-12-22 RX ADMIN — ASPIRIN SCH MG: 325 TABLET, COATED ORAL at 10:20

## 2021-12-22 RX ADMIN — POLYETHYLENE GLYCOL (3350) SCH GM: 17 POWDER, FOR SOLUTION ORAL at 20:34

## 2021-12-22 RX ADMIN — DOCUSATE SODIUM AND SENNOSIDES SCH EA: 8.6; 5 TABLET, FILM COATED ORAL at 10:20

## 2021-12-22 RX ADMIN — INSULIN ASPART SCH UNIT: 100 INJECTION, SOLUTION INTRAVENOUS; SUBCUTANEOUS at 16:46

## 2021-12-22 RX ADMIN — LACTULOSE SCH GM: 20 SOLUTION ORAL at 10:19

## 2021-12-22 RX ADMIN — FUROSEMIDE SCH MG: 40 TABLET ORAL at 10:21

## 2021-12-22 RX ADMIN — ENOXAPARIN SODIUM SCH MG: 100 INJECTION SUBCUTANEOUS at 10:20

## 2021-12-22 RX ADMIN — POLYETHYLENE GLYCOL (3350) SCH GM: 17 POWDER, FOR SOLUTION ORAL at 20:47

## 2021-12-22 RX ADMIN — INSULIN ASPART SCH UNIT: 100 INJECTION, SOLUTION INTRAVENOUS; SUBCUTANEOUS at 05:54

## 2021-12-22 RX ADMIN — ENOXAPARIN SODIUM SCH MG: 100 INJECTION SUBCUTANEOUS at 20:48

## 2021-12-22 RX ADMIN — DOCUSATE SODIUM SCH MG: 100 CAPSULE ORAL at 20:33

## 2021-12-22 RX ADMIN — LACTULOSE SCH GM: 20 SOLUTION ORAL at 20:47

## 2021-12-22 RX ADMIN — LISINOPRIL SCH MG: 20 TABLET ORAL at 10:21

## 2021-12-22 RX ADMIN — VANCOMYCIN HYDROCHLORIDE SCH MLS/HR: 500 INJECTION, POWDER, LYOPHILIZED, FOR SOLUTION INTRAVENOUS at 22:32

## 2021-12-22 RX ADMIN — LISINOPRIL SCH MG: 20 TABLET ORAL at 10:22

## 2021-12-22 RX ADMIN — LACTULOSE SCH GM: 20 SOLUTION ORAL at 20:34

## 2021-12-22 RX ADMIN — INSULIN ASPART SCH UNIT: 100 INJECTION, SOLUTION INTRAVENOUS; SUBCUTANEOUS at 20:48

## 2021-12-22 RX ADMIN — DOCUSATE SODIUM AND SENNOSIDES SCH EA: 8.6; 5 TABLET, FILM COATED ORAL at 21:00

## 2021-12-22 RX ADMIN — INSULIN ASPART SCH UNIT: 100 INJECTION, SOLUTION INTRAVENOUS; SUBCUTANEOUS at 12:05

## 2021-12-22 RX ADMIN — DOCUSATE SODIUM SCH MG: 100 CAPSULE ORAL at 10:20

## 2021-12-22 NOTE — CARDIOLOGY PROGRESS NOTE
Progress Note-Cardiology


Events since last exam


Date Seen by Provider:  Dec 22, 2021


Time Seen by Provider:  19:39


Events since last exam


We are following him due to heart failure.  He states that his breathing is 

improving.  He denies chest discomfort, palpitations, or syncope.  He has 

chronic peripheral edema.





Certain portions of this document may have been dictated utilizing voice 

recognition technology.  Inherent to this technology, typographical and 

grammatical errors may exist.  As much as I am diligent to identify and correct 

these mistakes, some errors may remain in the document.





Vitals


Last set of Vitals Signs





Vital Signs








 12/22/21





 15:48


 


Temp 36.2


 


Pulse 66


 


Resp 20


 


B/P (MAP) 138/75 (96)


 


Pulse Ox 99


 


O2 Delivery Nasal Cannula


 


O2 Flow Rate 5.00











Labs


Labs


Laboratory Tests


12/22/21 07:20














Exam


Vital Signs





Vital Signs








  Date Time  Temp Pulse Resp B/P (MAP) Pulse Ox O2 Delivery O2 Flow Rate FiO2


 


12/22/21 15:48 36.2 66 20 138/75 (96) 99 Nasal Cannula 5.00 








Physical Exam


He is laying in bed comfortably.  He is morbidly obese.  I did not examine the 

patient any further because he is on isolation and I could not locate his nurse 

to determine what precautions are being taken and why.


Labs





Laboratory Tests








Test


 12/21/21


20:12 12/22/21


05:28 12/22/21


07:20 12/22/21


11:22 Range/Units


 


 


Glucometer 96  121 H  162 H   MG/DL


 


White Blood Count


 


 


 7.1 


 


 4.3-11.0


10^3/uL


 


Red Blood Count


 


 


 3.96 L


 


 4.30-5.52


10^6/uL


 


Hemoglobin   9.7 L  13.3-17.7  g/dL


 


Hematocrit   33 L  40-54  %


 


Mean Corpuscular Volume   84   80-99  fL


 


Mean Corpuscular Hemoglobin   25   25-34  pg


 


Mean Corpuscular Hemoglobin


Concent 


 


 29 L


 


 32-36  g/dL





 


Red Cell Distribution Width   16.1 H  10.0-14.5  %


 


Platelet Count


 


 


 332 


 


 130-400


10^3/uL


 


Mean Platelet Volume   9.9   9.0-12.2  fL


 


Immature Granulocyte % (Auto)   0    %


 


Neutrophils (%) (Auto)   69   42-75  %


 


Lymphocytes (%) (Auto)   14   12-44  %


 


Monocytes (%) (Auto)   10   0-12  %


 


Eosinophils (%) (Auto)   7   0-10  %


 


Basophils (%) (Auto)   1   0-10  %


 


Neutrophils # (Auto)


 


 


 4.9 


 


 1.8-7.8


10^3/uL


 


Lymphocytes # (Auto)


 


 


 1.0 


 


 1.0-4.0


10^3/uL


 


Monocytes # (Auto)


 


 


 0.7 


 


 0.0-1.0


10^3/uL


 


Eosinophils # (Auto)


 


 


 0.5 H


 


 0.0-0.3


10^3/uL


 


Basophils # (Auto)


 


 


 0.0 


 


 0.0-0.1


10^3/uL


 


Immature Granulocyte # (Auto)


 


 


 0.0 


 


 0.0-0.1


10^3/uL


 


Sodium Level   137   135-145  MMOL/L


 


Potassium Level   4.4   3.6-5.0  MMOL/L


 


Chloride Level   103     MMOL/L


 


Carbon Dioxide Level   26   21-32  MMOL/L


 


Anion Gap   8   5-14  MMOL/L


 


Blood Urea Nitrogen   19 H  7-18  MG/DL


 


Creatinine


 


 


 1.36 H


 


 0.60-1.30


MG/DL


 


Estimat Glomerular Filtration


Rate 


 


 55 


 


  





 


BUN/Creatinine Ratio   14    


 


Glucose Level   127 H    MG/DL


 


Calcium Level   8.6   8.5-10.1  MG/DL


 


Corrected Calcium   9.6   8.5-10.1  MG/DL


 


Total Bilirubin   0.3   0.1-1.0  MG/DL


 


Aspartate Amino Transf


(AST/SGOT) 


 


 13 


 


 5-34  U/L





 


Alanine Aminotransferase


(ALT/SGPT) 


 


 19 


 


 0-55  U/L





 


Alkaline Phosphatase   85     U/L


 


Total Protein   7.0   6.4-8.2  GM/DL


 


Albumin   2.8 L  3.2-4.5  GM/DL


 


Test


 12/22/21


15:58 


 


 


 Range/Units


 


 


Glucometer 191 H      MG/DL











Diagnosis/Problems


Diagnosis/Problems





(1) Acute on chronic diastolic heart failure


Assessment & Plan:  Symptomatically improving.  He is now on oral Lasix.  I will

obtain a follow-up chest x-ray in the morning.  Hopefully, he will be ready for 

discharge soon.





(2) Primary hypertension


Assessment & Plan:  Blood pressure is reasonably well controlled with his 

current dose of lisinopril.





(3) Mixed hyperlipidemia


Assessment & Plan:  Continue atorvastatin.  I have added a lipid panel to blood 

work from this morning.





(4) Morbid obesity


Status:  Acute


Assessment & Plan:  This is most likely contributing to his chronic cardiac and 

noncardiac conditions.  He would be a good candidate for bariatric surgery.














YAA LYN JR, MD         Dec 22, 2021 19:43

## 2021-12-22 NOTE — PHYSICAL THERAPY DAILY NOTE
PT Daily Note-Current


Subjective


Pt. in bed, states he really doesnt feel he can attempt to get out of bed " 

besides I dont think they want me too"  This PTA assures pt. it will be the best

thing for him to be up in chair eventually but pt. feels his LEs need elevated 

at present time.  Agrees to bed mob and ther ex





Pain





   Location:  No Pain Reported





Mental Status


Patient Orientation:  Normal For Age


Attachments:  Oxygen





Transfers


SCALE: Activities may be completed with or without assistive devices.





6-Indepedent-patient completes the activity by him/herself with no assistance 

from a helper.


5-Set-up or Clean-up Assistance-helper sets up or cleans up; patient completes 

activity. [place] assists only prior to or  


    following the activity.


4-Supervision or Touching Assistance-helper provides verbal cues and/or 

touching/steadying and/or contact guard assistance as patient completes 

activity. Assistance may be provided   


    throughout the activity or intermittently.


3-Partial/Moderate Assistance-helper does LESS THAN HALF the effort. [place] 

lifts, holds or supports trunk or limbs, but provides less than half the effort.


2-Substantial/Maximal Assistance-helper does MORE THAN HALF the effort. [place] 

lifts or holds trunk or limbs and provides more than half the effort.


9-Lrpnbwqdo-qrpdlm does ALL the effort. Patient does none of the effort to 

complete the activity. Or, the assistance of 2 or more helpers is required for 

the patient to complete the  


    activity.


If activity was not attempted, code reason:


7-Patient Refused.


9-Not Applicable-not attempted and the patient did not perform the activity befo

re the current illness, exacerbation or injury.


10-Not Attempted due to Environmental Limitations-(lack of equipment, weather re

straints, etc.).


88-Not Attempted due to Medical Conditions or Safety Concerns.


Roll Left & Right (QC):  6


pt. rolled left and right x 2 indep, pt. also pushed self up in bed as well as 

scooted to center of bed with VCs only.





Exercises


Supine Ex:  Ankle pumps (rigt ), Quad Set, Rolling, Glut sets, Heel Slides, 

Scooting, Straight leg raise, Hip abd/add


Supine Reps:  15





Assessment


Current Status:  Good Progress


needs encouragement to participate





PT Long Term Goals


Long Term Goals


PT Long Term Goals Time Frame:  Dec 31, 2021


Roll Left & Right (QC):  4


Sit to Lying (QC):  4


Lying-Sitting on Side/Bed(QC):  4


Sit to Stand (QC):  4


Chair/Bed-to-Chair Xfer(QC):  4


Wheel 50 feet with 2 turns (QC:  5


Type:  Manual


Wheel 150 feet:  5


Type:  Manual





PT Plan


Treatment/Plan


Treatment Plan:  Continue Plan of Care


Treatment Plan:  Bed Mobility, Education, Functional Activity Cosmo, Functional 

Strength, Group Therapy, Gait, Safety, Therapeutic Exercise, Transfers


Treatment Duration:  Jan 28, 2022


Frequency:  6 times per week


Estimated Hrs Per Day:  .25 hour per day





Safety Risks/Education


Patient Education:  Correct Positioning, Safety Issues


Teaching Recipient:  Patient


Teaching Methods:  Demonstration, Discussion


Response to Teaching:  Verbalize Understanding, Return Demonstration





Time/GCodes


Time In:  905


Time Out:  925


Total Billed Treatment Time:  20


Total Billed Treatment


1,EX20m











LIUDMILA SALDIVAR PTA             Dec 22, 2021 09:32

## 2021-12-22 NOTE — PROGRESS NOTE - HOSPITALIST
Subjective


HPI/CC On Admission


Date Seen by Provider:  Dec 22, 2021


Time Seen by Provider:  09:30


CC: SOB





HPI: This is a 51yoWM who has had multiple hospital stays due to acute on 

chronic CHF with obesity hypoventilation syndrome. He was given IV diuresis and 

he remains a do not intubate, do not resuscitate. Cardiology was consulted. Labs

were ordered, they are pending currently. May be able to transfer to fourth 

floor. PT and OT ordered and wound care for the right great toe amputation site.


Subjective/Events-last exam


Pt doing a little better


More alert


Holding insulin due to decreased glucose


Creatinine 1.36


Hgb 9.7


Overall doing well 


 Needs nursing home at discharge





Review of Systems


General:  Fatigue, Malaise





Focused Exam


Lactate Level





Time of Focused Exam:  19:55





Objective


Exam


Vital Signs





Vital Signs








  Date Time  Temp Pulse Resp B/P (MAP) Pulse Ox O2 Delivery O2 Flow Rate FiO2


 


12/23/21 04:33 36.4 80 22 176/78 (110) 97 Nasal Cannula 5.00 





Capillary Refill : Less Than 3 Seconds


General Appearance:  No Apparent Distress, WD/WN, Chronically ill, Obese


Respiratory:  Lungs Clear, Normal Breath Sounds


Cardiovascular:  Regular Rate, Rhythm


Neurologic/Psychiatric:  Alert, Oriented x3, Depressed Affect





Results/Procedures


Lab


Laboratory Tests


12/22/21 07:20








Patient resulted labs reviewed.


Imaging:  Reviewed Imaging Films, Reviewed Imaging Report





Assessment/Plan


Assessment and Plan


Assess & Plan/Chief Complaint











Assessment:


Acute on Chronic Congestive heart failure exacerbation


Acute on chronic respiratory failure


Obesity hypoventilation syndrome 


Obstructive sleep apnea


History of Peripheral artery disease


T2 Diabetes Mellitus 


  - Left Above the knee amputation 


  - Right hallux amputation, site draining purulent exudate


Hypertension


Hyperlipidemia 








Plan


IV Lasix


Insulin


Continue home medications


Continue oxygen therapy 


Monitor labs


Consult cardiology 


Consult wound care


PT/OT


Lovenox for DVT prophylaxis 


Needs nursing home





Diagnosis/Problems


Diagnosis/Problems





(1) CHF (congestive heart failure)


Status:  Acute


(2) Acute respiratory insufficiency


Status:  Acute


(3) Morbid obesity


Status:  Acute


(4) Bilateral pleural effusion


Status:  Acute


(5) Hypoxia


(6) Respiratory failure


(7) Non-compliance


Status:  Acute


(8) HTN (hypertension)


Status:  Chronic


(9) Diabetes mellitus, insulin dependent (IDDM), uncontrolled


Status:  Acute











JACK OLIVARES DO                Dec 22, 2021 06:49

## 2021-12-23 VITALS — DIASTOLIC BLOOD PRESSURE: 85 MMHG | SYSTOLIC BLOOD PRESSURE: 143 MMHG

## 2021-12-23 VITALS — SYSTOLIC BLOOD PRESSURE: 147 MMHG | DIASTOLIC BLOOD PRESSURE: 62 MMHG

## 2021-12-23 VITALS — SYSTOLIC BLOOD PRESSURE: 153 MMHG | DIASTOLIC BLOOD PRESSURE: 89 MMHG

## 2021-12-23 VITALS — DIASTOLIC BLOOD PRESSURE: 94 MMHG | SYSTOLIC BLOOD PRESSURE: 166 MMHG

## 2021-12-23 VITALS — SYSTOLIC BLOOD PRESSURE: 189 MMHG | DIASTOLIC BLOOD PRESSURE: 86 MMHG

## 2021-12-23 VITALS — SYSTOLIC BLOOD PRESSURE: 176 MMHG | DIASTOLIC BLOOD PRESSURE: 78 MMHG

## 2021-12-23 LAB
ALBUMIN SERPL-MCNC: 2.9 GM/DL (ref 3.2–4.5)
ALP SERPL-CCNC: 94 U/L (ref 40–136)
ALT SERPL-CCNC: 19 U/L (ref 0–55)
BASOPHILS # BLD AUTO: 0.1 10^3/UL (ref 0–0.1)
BASOPHILS NFR BLD AUTO: 1 % (ref 0–10)
BILIRUB SERPL-MCNC: 0.3 MG/DL (ref 0.1–1)
BUN/CREAT SERPL: 15
CALCIUM SERPL-MCNC: 9.3 MG/DL (ref 8.5–10.1)
CHLORIDE SERPL-SCNC: 103 MMOL/L (ref 98–107)
CO2 SERPL-SCNC: 24 MMOL/L (ref 21–32)
CREAT SERPL-MCNC: 1.28 MG/DL (ref 0.6–1.3)
EOSINOPHIL # BLD AUTO: 0.5 10^3/UL (ref 0–0.3)
EOSINOPHIL NFR BLD AUTO: 7 % (ref 0–10)
GFR SERPLBLD BASED ON 1.73 SQ M-ARVRAT: 59 ML/MIN
GLUCOSE SERPL-MCNC: 212 MG/DL (ref 70–105)
HCT VFR BLD CALC: 35 % (ref 40–54)
HGB BLD-MCNC: 10.3 G/DL (ref 13.3–17.7)
LYMPHOCYTES # BLD AUTO: 0.9 10^3/UL (ref 1–4)
LYMPHOCYTES NFR BLD AUTO: 12 % (ref 12–44)
MANUAL DIFFERENTIAL PERFORMED BLD QL: NO
MCH RBC QN AUTO: 25 PG (ref 25–34)
MCHC RBC AUTO-ENTMCNC: 30 G/DL (ref 32–36)
MCV RBC AUTO: 83 FL (ref 80–99)
MONOCYTES # BLD AUTO: 0.6 10^3/UL (ref 0–1)
MONOCYTES NFR BLD AUTO: 8 % (ref 0–12)
NEUTROPHILS # BLD AUTO: 5.4 10^3/UL (ref 1.8–7.8)
NEUTROPHILS NFR BLD AUTO: 72 % (ref 42–75)
PLATELET # BLD: 359 10^3/UL (ref 130–400)
PMV BLD AUTO: 10.3 FL (ref 9–12.2)
POTASSIUM SERPL-SCNC: 4.7 MMOL/L (ref 3.6–5)
PROT SERPL-MCNC: 7.3 GM/DL (ref 6.4–8.2)
SODIUM SERPL-SCNC: 137 MMOL/L (ref 135–145)
WBC # BLD AUTO: 7.5 10^3/UL (ref 4.3–11)

## 2021-12-23 RX ADMIN — DOCUSATE SODIUM AND SENNOSIDES SCH EA: 8.6; 5 TABLET, FILM COATED ORAL at 08:25

## 2021-12-23 RX ADMIN — POLYETHYLENE GLYCOL (3350) SCH GM: 17 POWDER, FOR SOLUTION ORAL at 08:25

## 2021-12-23 RX ADMIN — INSULIN ASPART SCH UNIT: 100 INJECTION, SOLUTION INTRAVENOUS; SUBCUTANEOUS at 16:33

## 2021-12-23 RX ADMIN — INSULIN ASPART SCH UNIT: 100 INJECTION, SOLUTION INTRAVENOUS; SUBCUTANEOUS at 11:57

## 2021-12-23 RX ADMIN — DOCUSATE SODIUM SCH MG: 100 CAPSULE ORAL at 09:17

## 2021-12-23 RX ADMIN — INSULIN ASPART SCH UNIT: 100 INJECTION, SOLUTION INTRAVENOUS; SUBCUTANEOUS at 06:29

## 2021-12-23 RX ADMIN — FUROSEMIDE SCH MG: 40 TABLET ORAL at 08:25

## 2021-12-23 RX ADMIN — INSULIN ASPART SCH UNIT: 100 INJECTION, SOLUTION INTRAVENOUS; SUBCUTANEOUS at 20:51

## 2021-12-23 RX ADMIN — VANCOMYCIN HYDROCHLORIDE SCH MLS/HR: 500 INJECTION, POWDER, LYOPHILIZED, FOR SOLUTION INTRAVENOUS at 20:59

## 2021-12-23 RX ADMIN — POLYETHYLENE GLYCOL (3350) SCH GM: 17 POWDER, FOR SOLUTION ORAL at 20:29

## 2021-12-23 RX ADMIN — DOCUSATE SODIUM SCH MG: 100 CAPSULE ORAL at 20:29

## 2021-12-23 RX ADMIN — HYDROCODONE BITARTRATE AND ACETAMINOPHEN PRN EA: 5; 325 TABLET ORAL at 20:51

## 2021-12-23 RX ADMIN — VANCOMYCIN HYDROCHLORIDE SCH MLS/HR: 500 INJECTION, POWDER, LYOPHILIZED, FOR SOLUTION INTRAVENOUS at 09:42

## 2021-12-23 RX ADMIN — DOCUSATE SODIUM AND SENNOSIDES SCH EA: 8.6; 5 TABLET, FILM COATED ORAL at 20:30

## 2021-12-23 RX ADMIN — INSULIN ASPART SCH UNIT: 100 INJECTION, SOLUTION INTRAVENOUS; SUBCUTANEOUS at 16:34

## 2021-12-23 RX ADMIN — ANTACID TABLETS PRN MG: 500 TABLET, CHEWABLE ORAL at 12:26

## 2021-12-23 RX ADMIN — LACTULOSE SCH GM: 20 SOLUTION ORAL at 08:25

## 2021-12-23 RX ADMIN — ENOXAPARIN SODIUM SCH MG: 100 INJECTION SUBCUTANEOUS at 20:51

## 2021-12-23 RX ADMIN — ENOXAPARIN SODIUM SCH MG: 100 INJECTION SUBCUTANEOUS at 08:24

## 2021-12-23 RX ADMIN — ASPIRIN SCH MG: 325 TABLET, COATED ORAL at 08:24

## 2021-12-23 RX ADMIN — LISINOPRIL SCH MG: 20 TABLET ORAL at 08:25

## 2021-12-23 RX ADMIN — LACTULOSE SCH GM: 20 SOLUTION ORAL at 20:29

## 2021-12-23 NOTE — PROGRESS NOTE - HOSPITALIST
Subjective


HPI/CC On Admission


Date Seen by Provider:  Dec 23, 2021


Time Seen by Provider:  11:30


CC: SOB





HPI: This is a 51yoWM who has had multiple hospital stays due to acute on 

chronic CHF with obesity hypoventilation syndrome. He was given IV diuresis and 

he remains a do not intubate, do not resuscitate. Cardiology was consulted. Labs

were ordered, they are pending currently. May be able to transfer to fourth 

floor. PT and OT ordered and wound care for the right great toe amputation site.


Subjective/Events-last exam


Yordan Stewart


Pt doing well


Bowels are moving


Ready for DC tomorrow


[place] he is not going to go to a nursing home


Home care will be set up for tomorrow


Checked meds and labs





Review of Systems


General:  Fatigue, Malaise





Focused Exam


Time of Focused Exam:  19:55





Objective


Exam


Vital Signs





Vital Signs








  Date Time  Temp Pulse Resp B/P (MAP) Pulse Ox O2 Delivery O2 Flow Rate FiO2


 


12/24/21 04:58 36.0 69 20 146/80 (102) 91 Nasal Cannula 2.00 





Capillary Refill : Less Than 3 Seconds


General Appearance:  No Apparent Distress, WD/WN, Chronically ill, Obese


Respiratory:  Lungs Clear, Normal Breath Sounds


Cardiovascular:  Regular Rate, Rhythm


Neurologic/Psychiatric:  Alert, Oriented x3





Results/Procedures


Lab


Laboratory Tests


12/23/21 06:45








Patient resulted labs reviewed.


Imaging:  Reviewed Imaging Films, Reviewed Imaging Report





Assessment/Plan


Assessment and Plan


Assess & Plan/Chief Complaint











Assessment:


Acute on Chronic Congestive heart failure exacerbation


Acute on chronic respiratory failure


Obesity hypoventilation syndrome 


Obstructive sleep apnea


History of Peripheral artery disease


T2 Diabetes Mellitus 


  - Left Above the knee amputation 


  - Right hallux amputation, site draining purulent exudate


Hypertension


Hyperlipidemia 








Plan


IV Lasix


Insulin


Continue home medications


Continue oxygen therapy 


Monitor labs


Consult cardiology 


Consult wound care


PT/OT


Lovenox for DVT prophylaxis 


Needs nursing home





12/23/2021:


[place] about not going to nursing home


Discharge home tomorrow with home care





Diagnosis/Problems


Diagnosis/Problems





(1) CHF (congestive heart failure)


Status:  Acute


(2) Acute respiratory insufficiency


Status:  Acute


(3) Morbid obesity


Status:  Acute


(4) Bilateral pleural effusion


Status:  Acute


(5) Hypoxia


(6) Respiratory failure


(7) Non-compliance


Status:  Acute


(8) HTN (hypertension)


Status:  Chronic


(9) Diabetes mellitus, insulin dependent (IDDM), uncontrolled


Status:  Acute











JACK OLIVARES DO                Dec 23, 2021 10:42

## 2021-12-23 NOTE — PHYSICAL THERAPY DAILY NOTE
PT Daily Note-Current


Subjective


Patient in bed pre tx, agrees to PT, has no complaints of pain.  Radiology comes

to room just as PT is starting and needs him to get into the WC.  Up to this 

point PT has not been able to get patient out of bed.  This is a good chance to 

work on transfers and leg strength.





Appearance


Patient in WC post tx with radiology





Mental Status


Patient Orientation:  Person, Place, Situation


Attachments:  Oxygen





Transfers


SCALE: Activities may be completed with or without assistive devices.





6-Indepedent-patient completes the activity by him/herself with no assistance 

from a helper.


5-Set-up or Clean-up Assistance-helper sets up or cleans up; patient completes 

activity. [place] assists only prior to or  


    following the activity.


4-Supervision or Touching Assistance-helper provides verbal cues and/or 

touching/steadying and/or contact guard assistance as patient completes 

activity. Assistance may be provided   


    throughout the activity or intermittently.


3-Partial/Moderate Assistance-helper does LESS THAN HALF the effort. [place] 

lifts, holds or supports trunk or limbs, but provides less than half the effort.


2-Substantial/Maximal Assistance-helper does MORE THAN HALF the effort. [place] 

lifts or holds trunk or limbs and provides more than half the effort.


0-Qcmjrwshh-iwjwwu does ALL the effort. Patient does none of the effort to 

complete the activity. Or, the assistance of 2 or more helpers is required for 

the patient to complete the  


    activity.


If activity was not attempted, code reason:


7-Patient Refused.


9-Not Applicable-not attempted and the patient did not perform the activity 

before the current illness, exacerbation or injury.


10-Not Attempted due to Environmental Limitations-(lack of equipment, weather 

restraints, etc.).


88-Not Attempted due to Medical Conditions or Safety Concerns.


Roll Left & Right (QC):  4


Lying to Sitting/Side of Bed(Q:  4


Sit to Stand (QC):  4


Chair/Bed-to-Chair Xfer(QC):  4


Patient sits to the side of the bed with SBA, sit to stand CGA, transfers to WC 

with CGA.  Patient has to stand and transfer alf before standing again and 

completing the transfer.





Treatments


bed mobility and transfers





Assessment


Current Status:  Fair Progress


improving functional mobility





PT Long Term Goals


Long Term Goals


PT Long Term Goals Time Frame:  Dec 31, 2021


Roll Left & Right (QC):  4


Sit to Lying (QC):  4


Lying-Sitting on Side/Bed(QC):  4


Sit to Stand (QC):  4


Chair/Bed-to-Chair Xfer(QC):  4


Wheel 50 feet with 2 turns (QC:  5


Type:  Manual


Wheel 150 feet:  5


Type:  Manual





PT Plan


Problem List


Problem List:  Activity Tolerance, Functional Strength, Safety, Balance, Gait, 

Transfer, Bed Mobility, ROM





Treatment/Plan


Treatment Plan:  Continue Plan of Care


Treatment Plan:  Bed Mobility, Education, Functional Activity Cosmo, Functional 

Strength, Group Therapy, Gait, Safety, Therapeutic Exercise, Transfers


Treatment Duration:  Jan 28, 2022


Frequency:  6 times per week


Estimated Hrs Per Day:  .25 hour per day





Safety Risks/Education


Patient Education:  Transfer Techniques, Correct Positioning, Safety Issues


Teaching Recipient:  Patient


Teaching Methods:  Demonstration, Discussion


Response to Teaching:  Reinforcement Needed





Time/GCodes


Time In:  0851


Time Out:  0906


Total Billed Treatment Time:  15


Total Billed Treatment


1 visit


FA 15'











KRTEK,PAT PT                Dec 23, 2021 09:38

## 2021-12-23 NOTE — OCCUPATIONAL THER DAILY NOTE
OT Current Status-Daily Note


Subjective


Pt alert, lying in bed.  Pt stated that he had not been up OOB and stated that 

he should do this but not right now.  Pt agrees to OT.





Mental Status/Objective


Patient Orientation:  Person, Place, Time, Situation


Attachments:  IV, Oxygen (5L)





ADL-Treatment


After gathering supplies, pt able to complete oral care and washing face by 

self.


Therapy Code Descriptions/Definitions 





Functional [place] Measure:


0=Not Assessed/NA        4=Minimal Assistance


1=Total Assistance        5=Supervision or Setup


2=Maximal Assistance  6=Modified [place]


3=Moderate Assistance 7=Complete IndependenceSCALE: Activities may be completed 

with or without assistive devices.





6-Indepedent-patient completes the activity by him/herself with no assistance 

from a helper.


5-Set-up or Clean-up Assistance-helper sets up or cleans up; patient completes 

activity. [place] assists only prior to or  


    following the activity.


4-Supervision or Touching Assistance-helper provides verbal cues and/or 

touching/steadying and/or contact guard assistance as patient completes 

activity. Assistance may be provided   


    throughout the activity or intermittently.


3-Partial/Moderate Assistance-helper does LESS THAN HALF the effort. [place] 

lifts, holds or supports trunk or limbs, but provides less than half the effort.


2-Substantial/Maximal Assistance-helper does MORE THAN HALF the effort. [place] 

lifts or holds trunk or limbs and provides more than half the effort.


5-Qwzokcpww-kblhxz does ALL the effort. Patient does none of the effort to 

complete the activity. Or, the assistance of 2 or more helpers is required for 

the patient to complete the  


    activity.


If activity was not attempted, code reason:


7-Patient Refused.


9-Not Applicable-not attempted and the patient did not perform the activity 

before the current illness, exacerbation or injury.


10-Not Attempted due to Environmental Limitations-(lack of equipment, weather 

restraints, etc.).


88-Not Attempted due to Medical Conditions or Safety Concerns.


Oral Hygiene (QC):  5


Pt stated that he only takes sponge baths at home and cannot get into the bath 

tub.





Other Treatment


Skilled instruction for B UE medium resistance theraband exercises for correct 

technique.  Theraband and HEP left in room for pt's use.  Pt able to complete 1 

set 10 reps of 5 exercises without difficulty.  After session, pt sitting up in 

bed with call light/phone.  All needs met in room.





OT Long Term Goals


Long Term Goals


Time Frame:  Jan 11, 2022


Toileting Hygiene (QC):  4


Shower/Bathe Self (QC):  4


Upper Body Dressing (QC):  5


Lower Body Dressing (QC):  4


On/Off Footwear (QC):  4


Transfer to/from w/c with CGA-MIn A.


1=Demonstrate adherence to instructed precautions during ADL tasks.


2=Patient will verbalize/demonstrate understanding of assistive 

devices/modifications for ADL.


3=Patient will improve strength/tolerance for activity to enable patient to 

perform ADL's.





OT Education/Plan


Problem List/Assessment


Assessment:  Decreased Activ Tolerance, Impaired Self-Care Skills





Discharge Recommendations


Plan/Recommendations:  Continue POC





Treatment Plan/Plan of Care


Patient would benefit from OT for education, treatment and training to promote 

independence in ADL's, mobility, safety and/or upper extremity function for 

ADL's.


Plan of Care:  ADL Retraining, Functional Mobility, UE Funct Exercise/Act, W/C 

Management Training


Treatment Duration:  Jan 11, 2022


Frequency:  3 times per week


Estimated Hrs Per Day:  .25 hour per day


Rehab Potential:  Poor





Time/GCodes


Start Time:  08:04


Stop Time:  08:23


Total Time Billed (hr/min):  19


Billed Treatment Time


1 visit-ADL 1 (19 min)











LAURENT DE LUNA               Dec 23, 2021 09:44

## 2021-12-23 NOTE — DIAGNOSTIC IMAGING REPORT
INDICATION: Heart failure



EXAMINATION: Two-view chest 12/23/2021



FINDINGS:



3 views of the chest



There are small bilateral pleural effusions. There is bibasilar

infiltrate with increased interstitial markings throughout both

lungs consistent with edema. There is pulmonary vascular

congestion. There is cardiomegaly. No pneumothorax.



IMPRESSION:

1. Small bilateral pleural effusions with scattered infiltrates.

2. Pulmonary edema.



Dictated by: 



  Dictated on workstation # [id number]

## 2021-12-23 NOTE — D/C HH FACE TO FACE ORDER
D/C HH Face to Face Orders


Reconcile Patient Problems


Problems Reviewed?:  Yes





Instructions for Patient


HH


Patient Instructions/FollowUp:  


PCP 1 week


Physician to follow Patient:  [facility]


Discharge Diet for Home:  ADA Diet


Patient Problems:  


DM


Foot wound





Patient Data-Allergies,Ht & Wt


Patient Allergies:  


Coded Allergies:  


     meperidine HCl (Unverified  Allergy, Unknown, 11/26/11)


     shellfish derived (Unverified  Allergy, Unknown, 1/14/13)


   FROM UNCODED ALLERGIES


     Penicillins (Unverified  Adverse Reaction, Intermediate, NAUSEA, 11/26/11)


Height (Feet):  6


Height (Inches):  0.00


Weight (Pounds):  400


Weight (Ounces):  4.0





Home Health Need/Face to Face


Date of Face to Face:  Dec 23, 2021


Clinical Findings:  Generalized weakness and fatigue, Instability, Muscle 

weakness, Shortness of breath


I have seen Pt face-to-face:  Yes


Discharged To:  Home


Diagnosis/Conditions:  


DM


Patient is Homebound due to:  [place] fall risk due to instabilty, Muscle 

weakness, Non-weight bearing, Pain w/ambulation


Homebound Status


   Due to the above stated illness, injury or surgical procedure (medical 

condition or diagnosis) and associated clinical findings, the patient is 

homebound because of his/her inability to leave home except with aid of a 

supportive device and/or person AND leaving the home requires a considerable and

taxing effort or is medically contraindicated.


Pt req the following assistanc:  [place]





Home Health Nursing Orders


Home Health Services Order:  Nursing Services, Occupational Ther-Evaluate & 

Treat, Physical Therapy-Evaluate & Treat, Wound Care-Eval/Treat





Home Health Infusion Therapy


Line Start Date:  Dec 19, 2021


Certify Stmt


I certify that this patient is under my care and that I, a nurse practitioner or

a physician; a assistant working with me, had a face to face encounter that -

meets the physician face to face encounter requirements with this patient as 

dated.











JACK OLIVARES DO                Dec 23, 2021 12:49

## 2021-12-23 NOTE — CARDIOLOGY PROGRESS NOTE
Progress Note-Cardiology


Events since last exam


Date Seen by Provider:  Dec 23, 2021


Time Seen by Provider:  16:47


Events since last exam


He has a discharge order for 8 AM tomorrow.  As such, I will not see the patient

today.





Vitals


Last set of Vitals Signs





Vital Signs








 12/23/21 12/23/21





 11:31 16:16


 


Temp  36.0


 


Pulse  67


 


Resp  20


 


B/P (MAP)  143/85 (104)


 


Pulse Ox  98


 


O2 Delivery  Room Air


 


O2 Flow Rate 4.00 











Labs


Labs


Laboratory Tests


12/23/21 06:45














Exam


Vital Signs





Vital Signs








  Date Time  Temp Pulse Resp B/P (MAP) Pulse Ox O2 Delivery O2 Flow Rate FiO2


 


12/23/21 16:16 36.0 67 20 143/85 (104) 98 Room Air  


 


12/23/21 11:31       4.00 








Labs





Laboratory Tests








Test


 12/22/21


20:06 12/22/21


21:28 12/23/21


05:14 12/23/21


06:45 Range/Units


 


 


Glucometer 212 H  226 H    MG/DL


 


Vancomycin Level Trough


 


 28.2 *H


 


 


 10.0-20.0


UG/ML


 


White Blood Count


 


 


 


 7.5 


 4.3-11.0


10^3/uL


 


Red Blood Count


 


 


 


 4.21 L


 4.30-5.52


10^6/uL


 


Hemoglobin    10.3 L 13.3-17.7  g/dL


 


Hematocrit    35 L 40-54  %


 


Mean Corpuscular Volume    83  80-99  fL


 


Mean Corpuscular Hemoglobin    25  25-34  pg


 


Mean Corpuscular Hemoglobin


Concent 


 


 


 30 L


 32-36  g/dL





 


Red Cell Distribution Width    16.0 H 10.0-14.5  %


 


Platelet Count


 


 


 


 359 


 130-400


10^3/uL


 


Mean Platelet Volume    10.3  9.0-12.2  fL


 


Immature Granulocyte % (Auto)    0   %


 


Neutrophils (%) (Auto)    72  42-75  %


 


Lymphocytes (%) (Auto)    12  12-44  %


 


Monocytes (%) (Auto)    8  0-12  %


 


Eosinophils (%) (Auto)    7  0-10  %


 


Basophils (%) (Auto)    1  0-10  %


 


Neutrophils # (Auto)


 


 


 


 5.4 


 1.8-7.8


10^3/uL


 


Lymphocytes # (Auto)


 


 


 


 0.9 L


 1.0-4.0


10^3/uL


 


Monocytes # (Auto)


 


 


 


 0.6 


 0.0-1.0


10^3/uL


 


Eosinophils # (Auto)


 


 


 


 0.5 H


 0.0-0.3


10^3/uL


 


Basophils # (Auto)


 


 


 


 0.1 


 0.0-0.1


10^3/uL


 


Immature Granulocyte # (Auto)


 


 


 


 0.0 


 0.0-0.1


10^3/uL


 


Sodium Level    137  135-145  MMOL/L


 


Potassium Level    4.7  3.6-5.0  MMOL/L


 


Chloride Level    103    MMOL/L


 


Carbon Dioxide Level    24  21-32  MMOL/L


 


Anion Gap    10  5-14  MMOL/L


 


Blood Urea Nitrogen    19 H 7-18  MG/DL


 


Creatinine


 


 


 


 1.28 


 0.60-1.30


MG/DL


 


Estimat Glomerular Filtration


Rate 


 


 


 59 


  





 


BUN/Creatinine Ratio    15   


 


Glucose Level    212 H   MG/DL


 


Calcium Level    9.3  8.5-10.1  MG/DL


 


Corrected Calcium    10.2 H 8.5-10.1  MG/DL


 


Total Bilirubin    0.3  0.1-1.0  MG/DL


 


Aspartate Amino Transf


(AST/SGOT) 


 


 


 14 


 5-34  U/L





 


Alanine Aminotransferase


(ALT/SGPT) 


 


 


 19 


 0-55  U/L





 


Alkaline Phosphatase    94    U/L


 


Total Protein    7.3  6.4-8.2  GM/DL


 


Albumin    2.9 L 3.2-4.5  GM/DL


 


Test


 12/23/21


11:07 


 


 


 Range/Units


 


 


Glucometer 195 H      MG/DL

















YAA LYN JR, MD         Dec 23, 2021 16:47

## 2021-12-24 VITALS — SYSTOLIC BLOOD PRESSURE: 152 MMHG | DIASTOLIC BLOOD PRESSURE: 70 MMHG

## 2021-12-24 VITALS — DIASTOLIC BLOOD PRESSURE: 68 MMHG | SYSTOLIC BLOOD PRESSURE: 160 MMHG

## 2021-12-24 VITALS — SYSTOLIC BLOOD PRESSURE: 146 MMHG | DIASTOLIC BLOOD PRESSURE: 80 MMHG

## 2021-12-24 VITALS — SYSTOLIC BLOOD PRESSURE: 153 MMHG | DIASTOLIC BLOOD PRESSURE: 83 MMHG

## 2021-12-24 VITALS — SYSTOLIC BLOOD PRESSURE: 129 MMHG | DIASTOLIC BLOOD PRESSURE: 71 MMHG

## 2021-12-24 VITALS — DIASTOLIC BLOOD PRESSURE: 94 MMHG | SYSTOLIC BLOOD PRESSURE: 179 MMHG

## 2021-12-24 LAB
ALBUMIN SERPL-MCNC: 2.7 GM/DL (ref 3.2–4.5)
ALP SERPL-CCNC: 94 U/L (ref 40–136)
ALT SERPL-CCNC: 18 U/L (ref 0–55)
BASOPHILS # BLD AUTO: 0.1 10^3/UL (ref 0–0.1)
BASOPHILS NFR BLD AUTO: 1 % (ref 0–10)
BILIRUB SERPL-MCNC: 0.3 MG/DL (ref 0.1–1)
BUN/CREAT SERPL: 15
CALCIUM SERPL-MCNC: 9 MG/DL (ref 8.5–10.1)
CHLORIDE SERPL-SCNC: 103 MMOL/L (ref 98–107)
CO2 SERPL-SCNC: 26 MMOL/L (ref 21–32)
CREAT SERPL-MCNC: 1.12 MG/DL (ref 0.6–1.3)
EOSINOPHIL # BLD AUTO: 0.7 10^3/UL (ref 0–0.3)
EOSINOPHIL NFR BLD AUTO: 9 % (ref 0–10)
GFR SERPLBLD BASED ON 1.73 SQ M-ARVRAT: 69 ML/MIN
GLUCOSE SERPL-MCNC: 160 MG/DL (ref 70–105)
HCT VFR BLD CALC: 32 % (ref 40–54)
HGB BLD-MCNC: 9.5 G/DL (ref 13.3–17.7)
LYMPHOCYTES # BLD AUTO: 0.7 10^3/UL (ref 1–4)
LYMPHOCYTES NFR BLD AUTO: 10 % (ref 12–44)
MANUAL DIFFERENTIAL PERFORMED BLD QL: NO
MCH RBC QN AUTO: 24 PG (ref 25–34)
MCHC RBC AUTO-ENTMCNC: 29 G/DL (ref 32–36)
MCV RBC AUTO: 83 FL (ref 80–99)
MONOCYTES # BLD AUTO: 0.7 10^3/UL (ref 0–1)
MONOCYTES NFR BLD AUTO: 10 % (ref 0–12)
NEUTROPHILS # BLD AUTO: 5.1 10^3/UL (ref 1.8–7.8)
NEUTROPHILS NFR BLD AUTO: 71 % (ref 42–75)
PLATELET # BLD: 337 10^3/UL (ref 130–400)
PMV BLD AUTO: 10.6 FL (ref 9–12.2)
POTASSIUM SERPL-SCNC: 4.2 MMOL/L (ref 3.6–5)
PROT SERPL-MCNC: 6.8 GM/DL (ref 6.4–8.2)
SODIUM SERPL-SCNC: 137 MMOL/L (ref 135–145)
WBC # BLD AUTO: 7.3 10^3/UL (ref 4.3–11)

## 2021-12-24 RX ADMIN — VANCOMYCIN HYDROCHLORIDE SCH MLS/HR: 500 INJECTION, POWDER, LYOPHILIZED, FOR SOLUTION INTRAVENOUS at 09:54

## 2021-12-24 RX ADMIN — INSULIN ASPART SCH UNIT: 100 INJECTION, SOLUTION INTRAVENOUS; SUBCUTANEOUS at 17:26

## 2021-12-24 RX ADMIN — INSULIN ASPART SCH UNIT: 100 INJECTION, SOLUTION INTRAVENOUS; SUBCUTANEOUS at 11:30

## 2021-12-24 RX ADMIN — FUROSEMIDE SCH MG: 40 TABLET ORAL at 08:52

## 2021-12-24 RX ADMIN — LACTULOSE SCH GM: 20 SOLUTION ORAL at 20:53

## 2021-12-24 RX ADMIN — DOCUSATE SODIUM AND SENNOSIDES SCH EA: 8.6; 5 TABLET, FILM COATED ORAL at 08:52

## 2021-12-24 RX ADMIN — DOCUSATE SODIUM SCH MG: 100 CAPSULE ORAL at 08:52

## 2021-12-24 RX ADMIN — DOCUSATE SODIUM AND SENNOSIDES SCH EA: 8.6; 5 TABLET, FILM COATED ORAL at 20:52

## 2021-12-24 RX ADMIN — ASPIRIN SCH MG: 325 TABLET, COATED ORAL at 08:52

## 2021-12-24 RX ADMIN — VANCOMYCIN HYDROCHLORIDE SCH MLS/HR: 500 INJECTION, POWDER, LYOPHILIZED, FOR SOLUTION INTRAVENOUS at 20:54

## 2021-12-24 RX ADMIN — POLYETHYLENE GLYCOL (3350) SCH GM: 17 POWDER, FOR SOLUTION ORAL at 20:52

## 2021-12-24 RX ADMIN — LACTULOSE SCH GM: 20 SOLUTION ORAL at 08:53

## 2021-12-24 RX ADMIN — ENOXAPARIN SODIUM SCH MG: 100 INJECTION SUBCUTANEOUS at 20:52

## 2021-12-24 RX ADMIN — ENOXAPARIN SODIUM SCH MG: 100 INJECTION SUBCUTANEOUS at 08:53

## 2021-12-24 RX ADMIN — ANTACID TABLETS PRN MG: 500 TABLET, CHEWABLE ORAL at 21:45

## 2021-12-24 RX ADMIN — POLYETHYLENE GLYCOL (3350) SCH GM: 17 POWDER, FOR SOLUTION ORAL at 08:52

## 2021-12-24 RX ADMIN — INSULIN ASPART SCH UNIT: 100 INJECTION, SOLUTION INTRAVENOUS; SUBCUTANEOUS at 05:47

## 2021-12-24 RX ADMIN — INSULIN ASPART SCH UNIT: 100 INJECTION, SOLUTION INTRAVENOUS; SUBCUTANEOUS at 14:02

## 2021-12-24 RX ADMIN — INSULIN ASPART SCH UNIT: 100 INJECTION, SOLUTION INTRAVENOUS; SUBCUTANEOUS at 20:53

## 2021-12-24 RX ADMIN — INSULIN ASPART SCH UNIT: 100 INJECTION, SOLUTION INTRAVENOUS; SUBCUTANEOUS at 06:23

## 2021-12-24 RX ADMIN — DOCUSATE SODIUM SCH MG: 100 CAPSULE ORAL at 20:52

## 2021-12-24 RX ADMIN — LISINOPRIL SCH MG: 20 TABLET ORAL at 08:52

## 2021-12-24 NOTE — PHYSICAL THERAPY DAILY NOTE
PT Daily Note-Current


Subjective


Patient in bed pre tx, agrees to PT, agrees to get into WC at bedside, voices no

complaints of pain.





Appearance


Patient in WC post tx with nurse call, phone, tray, all needs met.





Mental Status


Patient Orientation:  Person, Place, Situation


Attachments:  Oxygen





Transfers


SCALE: Activities may be completed with or without assistive devices.





6-Indepedent-patient completes the activity by him/herself with no assistance 

from a helper.


5-Set-up or Clean-up Assistance-helper sets up or cleans up; patient completes 

activity. [place] assists only prior to or  


    following the activity.


4-Supervision or Touching Assistance-helper provides verbal cues and/or 

touching/steadying and/or contact guard assistance as patient completes 

activity. Assistance may be provided   


    throughout the activity or intermittently.


3-Partial/Moderate Assistance-helper does LESS THAN HALF the effort. [place] 

lifts, holds or supports trunk or limbs, but provides less than half the effort.


2-Substantial/Maximal Assistance-helper does MORE THAN HALF the effort. [place] 

lifts or holds trunk or limbs and provides more than half the effort.


0-Uqgnncavx-kawbmg does ALL the effort. Patient does none of the effort to 

complete the activity. Or, the assistance of 2 or more helpers is required for 

the patient to complete the  


    activity.


If activity was not attempted, code reason:


7-Patient Refused.


9-Not Applicable-not attempted and the patient did not perform the activity 

before the current illness, exacerbation or injury.


10-Not Attempted due to Environmental Limitations-(lack of equipment, weather 

restraints, etc.).


88-Not Attempted due to Medical Conditions or Safety Concerns.


Roll Left & Right (QC):  4


Sit to Lying (QC):  4


Lying to Sitting/Side of Bed(Q:  4


Sit to Stand (QC):  4


Chair/Bed-to-Chair Xfer(QC):  4


Patient has had a BM in bed, has to roll several times for cleaning and changing

pad, then a gown is donned.  Patient performs supine to sit with SBA, transfers 

to WC with CGA.  During the transfer patient stands and transfers assisted, 

stands again and gets his left residual limb and bottom in WC and then scoots 

around to get his whole bottom in the WC.





Treatments


rolling, transfers





Assessment


Current Status:  Poor Progress


no change in mobility





PT Long Term Goals


Long Term Goals


PT Long Term Goals Time Frame:  Dec 31, 2021


Roll Left & Right (QC):  4


Sit to Lying (QC):  4


Lying-Sitting on Side/Bed(QC):  4


Sit to Stand (QC):  4


Chair/Bed-to-Chair Xfer(QC):  4


Wheel 50 feet with 2 turns (QC:  5


Type:  Manual


Wheel 150 feet:  5


Type:  Manual





PT Plan


Problem List


Problem List:  Activity Tolerance, Functional Strength, Safety, Balance, Gait, 

Transfer, Bed Mobility, ROM





Treatment/Plan


Treatment Plan:  Continue Plan of Care


Treatment Plan:  Bed Mobility, Education, Functional Activity Cosmo, Functional 

Strength, Group Therapy, Gait, Safety, Therapeutic Exercise, Transfers


Treatment Duration:  Jan 28, 2022


Frequency:  6 times per week


Estimated Hrs Per Day:  .25 hour per day





Safety Risks/Education


Patient Education:  Transfer Techniques, Correct Positioning, Safety Issues


Teaching Recipient:  Patient


Teaching Methods:  Demonstration, Discussion


Response to Teaching:  Reinforcement Needed





Time/GCodes


Time In:  1007


Time Out:  1023


Total Billed Treatment Time:  16


Total Billed Treatment


1 visit


FA 16'











PAT DUENAS PT                Dec 24, 2021 10:37

## 2021-12-24 NOTE — OCCUPATIONAL THER DAILY NOTE
OT Current Status-Daily Note


Subjective


Pt sleeping in bed, woke to name.  Pt agrees to therapy.  No c/o pain only 

dizziness when sitting EOB, quickly passed.





Mental Status/Objective


Patient Orientation:  Person, Place, Time


Attachments:  IV, Oxygen





ADL-Treatment


Pt incontinent of bowel and urine.  Max A for cleansing buttocks, able to 

cleanse mya area sitting EOB.  Assist x2 to modified pivot transfer from EOB to

w/c for safety.  After session, pt sitting in w/c with call light/phone in 

reach.  All need met in room.


Therapy Code Descriptions/Definitions 





Functional [place] Measure:


0=Not Assessed/NA        4=Minimal Assistance


1=Total Assistance        5=Supervision or Setup


2=Maximal Assistance  6=Modified [place]


3=Moderate Assistance 7=Complete IndependenceSCALE: Activities may be completed 

with or without assistive devices.





6-Indepedent-patient completes the activity by him/herself with no assistance 

from a helper.


5-Set-up or Clean-up Assistance-helper sets up or cleans up; patient completes 

activity. [place] assists only prior to or  


    following the activity.


4-Supervision or Touching Assistance-helper provides verbal cues and/or 

touching/steadying and/or contact guard assistance as patient completes 

activity. Assistance may be provided   


    throughout the activity or intermittently.


3-Partial/Moderate Assistance-helper does LESS THAN HALF the effort. [place] 

lifts, holds or supports trunk or limbs, but provides less than half the effort.


2-Substantial/Maximal Assistance-helper does MORE THAN HALF the effort. [place] 

lifts or holds trunk or limbs and provides more than half the effort.


4-Bprdtegxw-pkqlmx does ALL the effort. Patient does none of the effort to 

complete the activity. Or, the assistance of 2 or more helpers is required for 

the patient to complete the  


    activity.


If activity was not attempted, code reason:


7-Patient Refused.


9-Not Applicable-not attempted and the patient did not perform the activity 

before the current illness, exacerbation or injury.


10-Not Attempted due to Environmental Limitations-(lack of equipment, weather 

restraints, etc.).


88-Not Attempted due to Medical Conditions or Safety Concerns.


Toileting Hygiene (QC):  2





OT Long Term Goals


Long Term Goals


Time Frame:  Jan 11, 2022


Toileting Hygiene (QC):  4


Shower/Bathe Self (QC):  4


Upper Body Dressing (QC):  5


Lower Body Dressing (QC):  4


On/Off Footwear (QC):  4


Transfer to/from w/c with CGA-MIn A.


1=Demonstrate adherence to instructed precautions during ADL tasks.


2=Patient will verbalize/demonstrate understanding of assistive samm

laxmi/modifications for ADL.


3=Patient will improve strength/tolerance for activity to enable patient to 

perform ADL's.





OT Education/Plan


Problem List/Assessment


Assessment:  Decreased Activ Tolerance, Impaired Self-Care Skills





Discharge Recommendations


Plan/Recommendations:  Continue POC





Treatment Plan/Plan of Care


Patient would benefit from OT for education, treatment and training to promote 

independence in ADL's, mobility, safety and/or upper extremity function for 

ADL's.


Plan of Care:  ADL Retraining, Functional Mobility, UE Funct Exercise/Act, W/C 

Management Training


Treatment Duration:  Jan 11, 2022


Frequency:  3 times per week


Estimated Hrs Per Day:  .25 hour per day


Rehab Potential:  Poor





Time/GCodes


Start Time:  10:02


Stop Time:  10:25


Total Time Billed (hr/min):  23


Billed Treatment Time


1 visit-ADL 2 (23 min)











LAURENT DE LUNA               Dec 24, 2021 10:40

## 2021-12-24 NOTE — DISCHARGE SUMMARY
Discharge Summary


Hospital Course


Problems/Dx:  


(1) Acute on chronic diastolic heart failure


(2) Primary hypertension


(3) Mixed hyperlipidemia


(4) Morbid obesity


Status:  Acute


Hospital Course


Date of Admission: Dec 19, 2021 at 19:55 


Admission Diagnosis :  





Family Physician/Provider: Noe Roque MD  





Date of Discharge: 12/24/21 


Discharge Diagnosis: [ ]








Hospital Course:


[ ]














Labs and Pending Lab Test:


Laboratory Tests


12/23/21 06:45: 


White Blood Count 7.5, Red Blood Count 4.21L, Hemoglobin 10.3L, Hematocrit 35L, 

Mean Corpuscular Volume 83, Mean Corpuscular Hemoglobin 25, Mean Corpuscular 

Hemoglobin Concent 30L, Red Cell Distribution Width 16.0H, Platelet Count 359, 

Mean Platelet Volume 10.3, Immature Granulocyte % (Auto) 0, Neutrophils (%) 

(Auto) 72, Lymphocytes (%) (Auto) 12, Monocytes (%) (Auto) 8, Eosinophils (%) 

(Auto) 7, Basophils (%) (Auto) 1, Neutrophils # (Auto) 5.4, Lymphocytes # (Auto)

0.9L, Monocytes # (Auto) 0.6, Eosinophils # (Auto) 0.5H, Basophils # (Auto) 0.1,

Immature Granulocyte # (Auto) 0.0, Sodium Level 137, Potassium Level 4.7, 

Chloride Level 103, Carbon Dioxide Level 24, Anion Gap 10, Blood Urea Nitrogen 

19H, Creatinine 1.28, Estimat Glomerular Filtration Rate 59, BUN/Creatinine 

Ratio 15, Glucose Level 212H, Calcium Level 9.3, Corrected Calcium 10.2H, Total 

Bilirubin 0.3, Aspartate Amino Transf (AST/SGOT) 14, Alanine Aminotransferase 

(ALT/SGPT) 19, Alkaline Phosphatase 94, Total Protein 7.3, Albumin 2.9L


12/23/21 11:07: Glucometer 195H


12/23/21 15:16: Glucometer 215H


12/23/21 20:23: Glucometer 212H


12/24/21 05:43: Glucometer 149H





Microbiology


12/19/21 Blood Culture - Preliminary, Resulted


           No growth


12/19/21 Urine Culture - Final, Complete


           Mixed Bacterial Ya





Home Meds


Active


Furosemide 40 Mg Tablet 40 Mg PO DAILY


HYDROcodone/APAP  5 MG/325 MG TAB (Acetaminophen/Hydrocodone Bitart) 1 Tab Tab 1

Ea PO Q4H PRN


Reported


Levemir Flextouch (Insulin Detemir) 100 Unit/1 Ml Insuln.pen 20 Unit SQ BID


Novolog Flexpen (Insulin Aspart) 300 Units/3 Ml Solution 5 Units SQ AC


Metformin HCl 1,000 Mg Tablet 1,000 Mg PO BID


Lisinopril 20 Mg Tablet 20 Mg PO DAILY


Atorvastatin Calcium 40 Mg Tablet 40 Mg PO DAILY


Doxycycline Hyclate 100 Mg Tablet 100 Mg PO BID


     FILLED 12- #28/14 DAY SUPPLY


Aspirin EC (Aspirin) 325 Mg Tablet.dr 325 Mg PO DAILY





Discharge Physical Examination


Vital Signs





Vital Signs








  Date Time  Temp Pulse Resp B/P (MAP) Pulse Ox O2 Delivery O2 Flow Rate FiO2


 


12/24/21 04:58 36.0 69 20 146/80 (102) 91 Nasal Cannula 2.00 








Allergies:  


Coded Allergies:  


     meperidine HCl (Unverified  Allergy, Unknown, 11/26/11)


     shellfish derived (Unverified  Allergy, Unknown, 1/14/13)


   FROM UNCODED ALLERGIES


     Penicillins (Unverified  Adverse Reaction, Intermediate, NAUSEA, 11/26/11)





Discharge Summary


Date of Admission


Dec 19, 2021 at 19:55


Date of Discharge





Discharge Date:  Dec 24, 2021


Admission Diagnosis


Assessment:


Acute on chronic respiratory failure


Obesity hypoventilation syndrome noncompliant with CPAP and BiPAP


Morbid obesity BMI 55


Right great toe amputation site wound


Congestive heart failure diastolic type


Noncompliance


DNR





Plan:


Supportive care


Wound care


Cardiology


Pulmonology


Discharge Diagnosis











Assessment:


Acute on Chronic Congestive heart failure exacerbation


Acute on chronic respiratory failure


Obesity hypoventilation syndrome 


Obstructive sleep apnea


History of Peripheral artery disease


T2 Diabetes Mellitus 


  - Left Above the knee amputation 


  - Right hallux amputation, site draining purulent exudate


Hypertension


Hyperlipidemia 








Plan


IV Lasix


Insulin


Continue home medications


Continue oxygen therapy 


Monitor labs


Consult cardiology 


Consult wound care


PT/OT


Lovenox for DVT prophylaxis 


Needs nursing home





12/23/2021:


[place] about not going to nursing home


Discharge home tomorrow with home care





(1) Acute on chronic diastolic heart failure


Assessment & Plan:  Symptomatically improving.  He is now on oral Lasix.  I will

obtain a follow-up chest x-ray in the morning.  Hopefully, he will be ready for 

discharge soon.





(2) Primary hypertension


Assessment & Plan:  Blood pressure is reasonably well controlled with his 

current dose of lisinopril.





(3) Mixed hyperlipidemia


Assessment & Plan:  Continue atorvastatin.  I have added a lipid panel to blood 

work from this morning.





(4) Morbid obesity


Status:  Acute


Assessment & Plan:  This is most likely contributing to his chronic cardiac and 

noncardiac conditions.  He would be a good candidate for bariatric surgery.














JACK OLIVARES DO                Dec 24, 2021 06:14 No

## 2021-12-24 NOTE — PROGRESS NOTE - HOSPITALIST
Subjective


HPI/CC On Admission


Date Seen by Provider:  Dec 24, 2021


Time Seen by Provider:  11:00


CC: SOB





HPI: This is a 51yoWM who has had multiple hospital stays due to acute on 

chronic CHF with obesity hypoventilation syndrome. He was given IV diuresis and 

he remains a do not intubate, do not resuscitate. Cardiology was consulted. Labs

were ordered, they are pending currently. May be able to transfer to fourth 

floor. PT and OT ordered and wound care for the right great toe amputation site.


Subjective/Events-last exam


Holding discharge


Patient required oxygen now


Severe sleep apnea obesity hypoventilation syndrome


Refuses to wear BiPAP


He is a DO NOT RESUSCITATE DO NOT INTUBATE


Patient is at high risk for sudden cardiac death and I did tell the nurse that 

might occur while in the hospital here





Review of Systems


General:  Fatigue, Malaise





Focused Exam


Time of Focused Exam:  19:55





Objective


Exam


Vital Signs





Vital Signs








  Date Time  Temp Pulse Resp B/P (MAP) Pulse Ox O2 Delivery O2 Flow Rate FiO2


 


12/24/21 16:02 36.3 71 21 179/94 (122) 97 Nasal Cannula 3.00 





Capillary Refill : Less Than 3 Seconds


General Appearance:  Chronically ill, Obese, Other (Apathetic)


Respiratory:  No Accessory Muscle Use, No Respiratory Distress, Decreased Breath

Sounds


Cardiovascular:  Regular Rate, Rhythm


Neurologic/Psychiatric:  Alert, Oriented x3, Depressed Affect





Results/Procedures


Lab


Laboratory Tests


12/24/21 06:21








Patient resulted labs reviewed.


Imaging:  Reviewed Imaging Films, Reviewed Imaging Report





Assessment/Plan


Assessment and Plan


Assess & Plan/Chief Complaint











Assessment:


Acute on Chronic Congestive heart failure exacerbation


Acute on chronic respiratory failure


Obesity hypoventilation syndrome 


Obstructive sleep apnea


History of Peripheral artery disease


T2 Diabetes Mellitus 


  - Left Above the knee amputation 


  - Right hallux amputation, site draining purulent exudate


Hypertension


Hyperlipidemia 








Plan


IV Lasix


Insulin


Continue home medications


Continue oxygen therapy 


Monitor labs


Consult cardiology 


Consult wound care


PT/OT


Lovenox for DVT prophylaxis 


Needs nursing home





12/23/2021:


Adamant about not going to nursing home


Discharge home tomorrow with home care





12/24/2021:


Holding discharge


Supportive care


Refuses BiPAP


Patient will be at high risk for sudden cardiac death and sudden respiratory 

failure but he is a DO NOT RESUSCITATE DO NOT INTUBATE





Diagnosis/Problems


Diagnosis/Problems





(1) CHF (congestive heart failure)


Status:  Acute


(2) Acute respiratory insufficiency


Status:  Acute


(3) Morbid obesity


Status:  Acute


(4) Bilateral pleural effusion


Status:  Acute


(5) Hypoxia


(6) Respiratory failure


(7) Non-compliance


Status:  Acute


(8) HTN (hypertension)


Status:  Chronic


(9) Diabetes mellitus, insulin dependent (IDDM), uncontrolled


Status:  Acute











JACK OLIVARES DO                Dec 24, 2021 11:46

## 2021-12-25 VITALS — DIASTOLIC BLOOD PRESSURE: 76 MMHG | SYSTOLIC BLOOD PRESSURE: 133 MMHG

## 2021-12-25 VITALS — SYSTOLIC BLOOD PRESSURE: 112 MMHG | DIASTOLIC BLOOD PRESSURE: 63 MMHG

## 2021-12-25 VITALS — SYSTOLIC BLOOD PRESSURE: 133 MMHG | DIASTOLIC BLOOD PRESSURE: 74 MMHG

## 2021-12-25 VITALS — DIASTOLIC BLOOD PRESSURE: 83 MMHG | SYSTOLIC BLOOD PRESSURE: 146 MMHG

## 2021-12-25 VITALS — DIASTOLIC BLOOD PRESSURE: 81 MMHG | SYSTOLIC BLOOD PRESSURE: 135 MMHG

## 2021-12-25 RX ADMIN — DOCUSATE SODIUM SCH MG: 100 CAPSULE ORAL at 09:21

## 2021-12-25 RX ADMIN — INSULIN ASPART SCH UNIT: 100 INJECTION, SOLUTION INTRAVENOUS; SUBCUTANEOUS at 17:55

## 2021-12-25 RX ADMIN — POLYETHYLENE GLYCOL (3350) SCH GM: 17 POWDER, FOR SOLUTION ORAL at 09:21

## 2021-12-25 RX ADMIN — INSULIN ASPART SCH UNIT: 100 INJECTION, SOLUTION INTRAVENOUS; SUBCUTANEOUS at 07:11

## 2021-12-25 RX ADMIN — FUROSEMIDE SCH MG: 10 INJECTION, SOLUTION INTRAVENOUS at 17:55

## 2021-12-25 RX ADMIN — INSULIN ASPART SCH UNIT: 100 INJECTION, SOLUTION INTRAVENOUS; SUBCUTANEOUS at 21:11

## 2021-12-25 RX ADMIN — ENOXAPARIN SODIUM SCH MG: 100 INJECTION SUBCUTANEOUS at 21:02

## 2021-12-25 RX ADMIN — DOCUSATE SODIUM AND SENNOSIDES SCH EA: 8.6; 5 TABLET, FILM COATED ORAL at 09:21

## 2021-12-25 RX ADMIN — DOCUSATE SODIUM AND SENNOSIDES SCH EA: 8.6; 5 TABLET, FILM COATED ORAL at 21:02

## 2021-12-25 RX ADMIN — ASPIRIN SCH MG: 325 TABLET, COATED ORAL at 09:21

## 2021-12-25 RX ADMIN — LACTULOSE SCH GM: 20 SOLUTION ORAL at 09:21

## 2021-12-25 RX ADMIN — INSULIN ASPART SCH UNIT: 100 INJECTION, SOLUTION INTRAVENOUS; SUBCUTANEOUS at 12:31

## 2021-12-25 RX ADMIN — ENOXAPARIN SODIUM SCH MG: 100 INJECTION SUBCUTANEOUS at 09:22

## 2021-12-25 RX ADMIN — INSULIN ASPART SCH UNIT: 100 INJECTION, SOLUTION INTRAVENOUS; SUBCUTANEOUS at 12:32

## 2021-12-25 RX ADMIN — LACTULOSE SCH GM: 20 SOLUTION ORAL at 20:41

## 2021-12-25 RX ADMIN — HYDROCODONE BITARTRATE AND ACETAMINOPHEN PRN EA: 5; 325 TABLET ORAL at 00:38

## 2021-12-25 RX ADMIN — POLYETHYLENE GLYCOL (3350) SCH GM: 17 POWDER, FOR SOLUTION ORAL at 20:40

## 2021-12-25 RX ADMIN — SODIUM CHLORIDE SCH MLS/HR: 900 INJECTION, SOLUTION INTRAVENOUS at 09:17

## 2021-12-25 RX ADMIN — INSULIN ASPART SCH UNIT: 100 INJECTION, SOLUTION INTRAVENOUS; SUBCUTANEOUS at 06:36

## 2021-12-25 RX ADMIN — DOCUSATE SODIUM SCH MG: 100 CAPSULE ORAL at 21:02

## 2021-12-25 RX ADMIN — SODIUM CHLORIDE SCH MLS/HR: 900 INJECTION, SOLUTION INTRAVENOUS at 20:42

## 2021-12-25 RX ADMIN — LISINOPRIL SCH MG: 20 TABLET ORAL at 09:21

## 2021-12-25 RX ADMIN — FUROSEMIDE SCH MG: 40 TABLET ORAL at 09:21

## 2021-12-25 RX ADMIN — FUROSEMIDE SCH MG: 10 INJECTION, SOLUTION INTRAVENOUS at 13:31

## 2021-12-25 NOTE — PROGRESS NOTE - HOSPITALIST
Subjective


HPI/CC On Admission


Date Seen by Provider:  Dec 25, 2021


Time Seen by Provider:  10:00


CC: SOB





HPI: This is a 51yoWM who has had multiple hospital stays due to acute on 

chronic CHF with obesity hypoventilation syndrome. He was given IV diuresis and 

he remains a do not intubate, do not resuscitate. Cardiology was consulted. Labs

were ordered, they are pending currently. May be able to transfer to fourth 

floor. PT and OT ordered and wound care for the right great toe amputation site.


Subjective/Events-last exam


Patient doing better


Feels less lethargic


Checked meds and labs


Bowels not moving does not want any mag citrate or suppository





Review of Systems


General:  Fatigue, Malaise





Focused Exam


Time of Focused Exam:  19:55





Objective


Exam


Vital Signs





Vital Signs








  Date Time  Temp Pulse Resp B/P (MAP) Pulse Ox O2 Delivery O2 Flow Rate FiO2


 


12/25/21 16:32 36.3 64 22 135/81 (99) 95 Room Air  


 


12/24/21 16:02       3.00 





Capillary Refill : Less Than 3 Seconds


General Appearance:  No Apparent Distress, WD/WN, Chronically ill, Obese


Respiratory:  Lungs Clear, Normal Breath Sounds


Cardiovascular:  Regular Rate, Rhythm


Neurologic/Psychiatric:  Alert, Oriented x3





Results/Procedures


Lab


Patient resulted labs reviewed.


Imaging:  Reviewed Imaging Films, Reviewed Imaging Report





Assessment/Plan


Assessment and Plan


Assess & Plan/Chief Complaint











Assessment:


Acute on Chronic Congestive heart failure exacerbation


Acute on chronic respiratory failure


Obesity hypoventilation syndrome 


Obstructive sleep apnea


History of Peripheral artery disease


T2 Diabetes Mellitus 


  - Left Above the knee amputation 


  - Right hallux amputation, site draining purulent exudate


Hypertension


Hyperlipidemia 








Plan


IV Lasix


Insulin


Continue home medications


Continue oxygen therapy 


Monitor labs


Consult cardiology 


Consult wound care


PT/OT


Lovenox for DVT prophylaxis 


Needs nursing home





12/23/2021:


Adamant about not going to nursing home


Discharge home tomorrow with home care





12/24/2021:


Holding discharge


Supportive care


Refuses BiPAP


Patient will be at high risk for sudden cardiac death and sudden respiratory 

failure but he is a DO NOT RESUSCITATE DO NOT INTUBATE





12/25/2021:


We will discharge


Patient high risk for sudden cardiac death due to CO2 narcosis





Diagnosis/Problems


Diagnosis/Problems





(1) CHF (congestive heart failure)


Status:  Acute


(2) Acute respiratory insufficiency


Status:  Acute


(3) Morbid obesity


Status:  Acute


(4) Bilateral pleural effusion


Status:  Acute


(5) Hypoxia


(6) Respiratory failure


(7) Non-compliance


Status:  Acute


(8) HTN (hypertension)


Status:  Chronic


(9) Diabetes mellitus, insulin dependent (IDDM), uncontrolled


Status:  Acute











JACK OLIVARES DO                Dec 25, 2021 06:05

## 2021-12-25 NOTE — CARDIOLOGY PROGRESS NOTE
Progress Note-Cardiology


Events since last exam


Date Seen by Provider:  Dec 25, 2021


Time Seen by Provider:  12:54


Events since last exam


I am following him due to heart failure.  He was supposed to be discharged 

yesterday so I did not see him then.  However, the discharge was put on hold.  

The plan is for possible discharge early next week.  He states his breathing is 

improved.  He denies chest discomfort, palpitations, or syncope.  He has 

persistent right lower extremity edema.





Certain portions of this document may have been dictated utilizing voice 

recognition technology.  Inherent to this technology, typographical and 

grammatical errors may exist.  As much as I am diligent to identify and correct 

these mistakes, some errors may remain in the document.





Vitals


Last set of Vitals Signs





Vital Signs








 12/24/21 12/25/21





 16:02 11:48


 


Temp  36.6


 


Pulse  66


 


Resp  20


 


B/P (MAP)  133/74 (93)


 


Pulse Ox  95


 


O2 Delivery  Room Air


 


O2 Flow Rate 3.00 











Exam


Vital Signs





Vital Signs








  Date Time  Temp Pulse Resp B/P (MAP) Pulse Ox O2 Delivery O2 Flow Rate FiO2


 


12/25/21 11:48 36.6 66 20 133/74 (93) 95 Room Air  


 


12/24/21 16:02       3.00 








Physical Exam


General: Alert. No acute distress.  He is morbidly obese.


Eye: No xanthelasma.


HENT: Normocephalic.


Neck: Jugular venous pressure does not appear elevated.


Respiratory: Lungs are clear to auscultation but decreased at the bases 

bilaterally. Respirations are non-labored. Breath sounds are equal. Symmetrical 

chest wall expansion.


Cardiovascular: Normal rate. Regular rhythm. No murmur. No gallop.  2+ right 

lower extremity edema.  Right great toe amputation.  Left above-knee amputation.


Gastrointestinal: Soft. Normal bowel sounds.


Skin: Warm. Dry.


Neurologic: Alert and oriented to person, place, time. Cranial nerves 3-11 

grossly intact.


Psychiatric: Cooperative. Appropriate mood & affect.


Labs





Laboratory Tests








Test


 12/24/21


15:34 12/24/21


20:05 12/25/21


05:59 Range/Units


 


 


Glucometer 195 H 159 H 143 H   MG/DL











Diagnosis/Problems


Diagnosis/Problems





(1) Acute on chronic diastolic heart failure


Assessment & Plan:  Symptomatically improving.  His chest x-ray from 12/23 

showed pulmonary edema.  I will change him back over to intravenous furosemide 

for at least the next 24-48 hours.  I will obtain a follow-up chest x-ray 

tomorrow.





(2) Primary hypertension


Assessment & Plan:  Blood pressure is reasonably well controlled with his 

current dose of lisinopril.





(3) Mixed hyperlipidemia


Assessment & Plan:  Continue atorvastatin.  His LDL level from this admission is

reasonably controlled.





(4) Morbid obesity


Status:  Acute


Assessment & Plan:  This is most likely contributing to his chronic cardiac and 

noncardiac conditions.  He would be a good candidate for bariatric surgery.














YAA LYN JR, MD         Dec 25, 2021 12:58

## 2021-12-26 VITALS — DIASTOLIC BLOOD PRESSURE: 85 MMHG | SYSTOLIC BLOOD PRESSURE: 156 MMHG

## 2021-12-26 VITALS — DIASTOLIC BLOOD PRESSURE: 76 MMHG | SYSTOLIC BLOOD PRESSURE: 124 MMHG

## 2021-12-26 VITALS — DIASTOLIC BLOOD PRESSURE: 83 MMHG | SYSTOLIC BLOOD PRESSURE: 160 MMHG

## 2021-12-26 VITALS — DIASTOLIC BLOOD PRESSURE: 86 MMHG | SYSTOLIC BLOOD PRESSURE: 143 MMHG

## 2021-12-26 VITALS — SYSTOLIC BLOOD PRESSURE: 135 MMHG | DIASTOLIC BLOOD PRESSURE: 82 MMHG

## 2021-12-26 VITALS — DIASTOLIC BLOOD PRESSURE: 80 MMHG | SYSTOLIC BLOOD PRESSURE: 135 MMHG

## 2021-12-26 LAB
ALBUMIN SERPL-MCNC: 2.8 GM/DL (ref 3.2–4.5)
ALP SERPL-CCNC: 92 U/L (ref 40–136)
ALT SERPL-CCNC: 17 U/L (ref 0–55)
BASOPHILS # BLD AUTO: 0.1 10^3/UL (ref 0–0.1)
BASOPHILS NFR BLD AUTO: 1 % (ref 0–10)
BILIRUB SERPL-MCNC: 0.3 MG/DL (ref 0.1–1)
BUN/CREAT SERPL: 16
CALCIUM SERPL-MCNC: 9.3 MG/DL (ref 8.5–10.1)
CHLORIDE SERPL-SCNC: 100 MMOL/L (ref 98–107)
CO2 SERPL-SCNC: 27 MMOL/L (ref 21–32)
CREAT SERPL-MCNC: 1.22 MG/DL (ref 0.6–1.3)
EOSINOPHIL # BLD AUTO: 0.6 10^3/UL (ref 0–0.3)
EOSINOPHIL NFR BLD AUTO: 8 % (ref 0–10)
GFR SERPLBLD BASED ON 1.73 SQ M-ARVRAT: 63 ML/MIN
GLUCOSE SERPL-MCNC: 172 MG/DL (ref 70–105)
HCT VFR BLD CALC: 34 % (ref 40–54)
HGB BLD-MCNC: 10.1 G/DL (ref 13.3–17.7)
LYMPHOCYTES # BLD AUTO: 1 10^3/UL (ref 1–4)
LYMPHOCYTES NFR BLD AUTO: 14 % (ref 12–44)
MANUAL DIFFERENTIAL PERFORMED BLD QL: NO
MCH RBC QN AUTO: 25 PG (ref 25–34)
MCHC RBC AUTO-ENTMCNC: 30 G/DL (ref 32–36)
MCV RBC AUTO: 81 FL (ref 80–99)
MONOCYTES # BLD AUTO: 0.8 10^3/UL (ref 0–1)
MONOCYTES NFR BLD AUTO: 11 % (ref 0–12)
NEUTROPHILS # BLD AUTO: 4.9 10^3/UL (ref 1.8–7.8)
NEUTROPHILS NFR BLD AUTO: 67 % (ref 42–75)
PLATELET # BLD: 391 10^3/UL (ref 130–400)
PMV BLD AUTO: 11.1 FL (ref 9–12.2)
POTASSIUM SERPL-SCNC: 4.1 MMOL/L (ref 3.6–5)
PROT SERPL-MCNC: 6.9 GM/DL (ref 6.4–8.2)
SODIUM SERPL-SCNC: 138 MMOL/L (ref 135–145)
WBC # BLD AUTO: 7.4 10^3/UL (ref 4.3–11)

## 2021-12-26 PROCEDURE — 5A09357 ASSISTANCE WITH RESPIRATORY VENTILATION, LESS THAN 24 CONSECUTIVE HOURS, CONTINUOUS POSITIVE AIRWAY PRESSURE: ICD-10-PCS | Performed by: INTERNAL MEDICINE

## 2021-12-26 RX ADMIN — INSULIN ASPART SCH UNIT: 100 INJECTION, SOLUTION INTRAVENOUS; SUBCUTANEOUS at 17:34

## 2021-12-26 RX ADMIN — ANTACID TABLETS PRN MG: 500 TABLET, CHEWABLE ORAL at 21:49

## 2021-12-26 RX ADMIN — LACTULOSE SCH GM: 20 SOLUTION ORAL at 09:20

## 2021-12-26 RX ADMIN — LACTULOSE SCH GM: 20 SOLUTION ORAL at 20:56

## 2021-12-26 RX ADMIN — INSULIN ASPART SCH UNIT: 100 INJECTION, SOLUTION INTRAVENOUS; SUBCUTANEOUS at 20:59

## 2021-12-26 RX ADMIN — VANCOMYCIN HYDROCHLORIDE SCH MLS/HR: 750 INJECTION, POWDER, LYOPHILIZED, FOR SOLUTION INTRAVENOUS at 09:28

## 2021-12-26 RX ADMIN — FUROSEMIDE SCH MG: 10 INJECTION, SOLUTION INTRAVENOUS at 17:36

## 2021-12-26 RX ADMIN — INSULIN ASPART SCH UNIT: 100 INJECTION, SOLUTION INTRAVENOUS; SUBCUTANEOUS at 11:28

## 2021-12-26 RX ADMIN — FUROSEMIDE SCH MG: 10 INJECTION, SOLUTION INTRAVENOUS at 06:56

## 2021-12-26 RX ADMIN — ENOXAPARIN SODIUM SCH MG: 100 INJECTION SUBCUTANEOUS at 20:59

## 2021-12-26 RX ADMIN — DOCUSATE SODIUM AND SENNOSIDES SCH EA: 8.6; 5 TABLET, FILM COATED ORAL at 20:55

## 2021-12-26 RX ADMIN — POLYETHYLENE GLYCOL (3350) SCH GM: 17 POWDER, FOR SOLUTION ORAL at 20:55

## 2021-12-26 RX ADMIN — DOCUSATE SODIUM AND SENNOSIDES SCH EA: 8.6; 5 TABLET, FILM COATED ORAL at 09:20

## 2021-12-26 RX ADMIN — LISINOPRIL SCH MG: 20 TABLET ORAL at 09:31

## 2021-12-26 RX ADMIN — DOCUSATE SODIUM SCH MG: 100 CAPSULE ORAL at 20:55

## 2021-12-26 RX ADMIN — INSULIN ASPART SCH UNIT: 100 INJECTION, SOLUTION INTRAVENOUS; SUBCUTANEOUS at 06:58

## 2021-12-26 RX ADMIN — ANTACID TABLETS PRN MG: 500 TABLET, CHEWABLE ORAL at 17:34

## 2021-12-26 RX ADMIN — VANCOMYCIN HYDROCHLORIDE SCH MLS/HR: 750 INJECTION, POWDER, LYOPHILIZED, FOR SOLUTION INTRAVENOUS at 20:55

## 2021-12-26 RX ADMIN — ENOXAPARIN SODIUM SCH MG: 100 INJECTION SUBCUTANEOUS at 09:30

## 2021-12-26 RX ADMIN — POLYETHYLENE GLYCOL (3350) SCH GM: 17 POWDER, FOR SOLUTION ORAL at 09:20

## 2021-12-26 RX ADMIN — INSULIN ASPART SCH UNIT: 100 INJECTION, SOLUTION INTRAVENOUS; SUBCUTANEOUS at 17:35

## 2021-12-26 RX ADMIN — DOCUSATE SODIUM SCH MG: 100 CAPSULE ORAL at 09:20

## 2021-12-26 RX ADMIN — ASPIRIN SCH MG: 325 TABLET, COATED ORAL at 09:31

## 2021-12-26 NOTE — CARDIOLOGY PROGRESS NOTE
Progress Note-Cardiology


Events since last exam


Date Seen by Provider:  Dec 26, 2021


Time Seen by Provider:  15:19


Events since last exam


I am following him due to heart failure.  When I went into his room, he was 

sleeping.  He woke to voice.  He states he is exhausted.  However, he has not 

gotten out of bed today.  He denies dyspnea at rest.  He denies chest 

discomfort, palpitations, or syncope.  No change in his chronic peripheral 

edema.





Certain portions of this document may have been dictated utilizing voice 

recognition technology.  Inherent to this technology, typographical and 

grammatical errors may exist.  As much as I am diligent to identify and correct 

these mistakes, some errors may remain in the document.





Vitals


Last set of Vitals Signs





Vital Signs








 12/26/21





 12:15


 


Temp 35.9


 


Pulse 75


 


Resp 20


 


B/P (MAP) 156/85 (108)


 


Pulse Ox 95


 


O2 Delivery Nasal Cannula


 


O2 Flow Rate 2.00











Labs


Labs


Laboratory Tests


12/26/21 05:20














Exam


Vital Signs





Vital Signs








  Date Time  Temp Pulse Resp B/P (MAP) Pulse Ox O2 Delivery O2 Flow Rate FiO2


 


12/26/21 12:15 35.9 75 20 156/85 (108) 95 Nasal Cannula 2.00 








Physical Exam


General: Alert. No acute distress.  He is morbidly obese.


Eye: No xanthelasma.


HENT: Normocephalic.


Neck: Jugular venous pressure does not appear elevated.


Respiratory: Lungs have some scattered wheezes and decreased at the bases 

bilaterally. Respirations are non-labored. Breath sounds are equal. Symmetrical 

chest wall expansion.


Cardiovascular: Normal rate. Regular rhythm. No murmur. No gallop.  2+ right 

lower extremity edema.  Right great toe amputation.  Left above-knee amputation.


Gastrointestinal: Soft. Normal bowel sounds.


Skin: Warm. Dry.


Neurologic: Alert and oriented to person, place, time. Cranial nerves 3-11 

grossly intact.


Psychiatric: Cooperative. Appropriate mood & affect.


Labs





Laboratory Tests








Test


 12/25/21


16:38 12/25/21


20:05 12/25/21


20:23 12/26/21


05:20 Range/Units


 


 


Glucometer 178 H  221 H    MG/DL


 


Vancomycin Level Trough


 


 20.1 H


 


 


 10.0-20.0


UG/ML


 


White Blood Count


 


 


 


 7.4 


 4.3-11.0


10^3/uL


 


Red Blood Count


 


 


 


 4.12 L


 4.30-5.52


10^6/uL


 


Hemoglobin    10.1 L 13.3-17.7  g/dL


 


Hematocrit    34 L 40-54  %


 


Mean Corpuscular Volume    81  80-99  fL


 


Mean Corpuscular Hemoglobin    25  25-34  pg


 


Mean Corpuscular Hemoglobin


Concent 


 


 


 30 L


 32-36  g/dL





 


Red Cell Distribution Width    16.0 H 10.0-14.5  %


 


Platelet Count


 


 


 


 391 


 130-400


10^3/uL


 


Mean Platelet Volume    11.1  9.0-12.2  fL


 


Immature Granulocyte % (Auto)    0   %


 


Neutrophils (%) (Auto)    67  42-75  %


 


Lymphocytes (%) (Auto)    14  12-44  %


 


Monocytes (%) (Auto)    11  0-12  %


 


Eosinophils (%) (Auto)    8  0-10  %


 


Basophils (%) (Auto)    1  0-10  %


 


Neutrophils # (Auto)


 


 


 


 4.9 


 1.8-7.8


10^3/uL


 


Lymphocytes # (Auto)


 


 


 


 1.0 


 1.0-4.0


10^3/uL


 


Monocytes # (Auto)


 


 


 


 0.8 


 0.0-1.0


10^3/uL


 


Eosinophils # (Auto)


 


 


 


 0.6 H


 0.0-0.3


10^3/uL


 


Basophils # (Auto)


 


 


 


 0.1 


 0.0-0.1


10^3/uL


 


Immature Granulocyte # (Auto)


 


 


 


 0.0 


 0.0-0.1


10^3/uL


 


Sodium Level    138  135-145  MMOL/L


 


Potassium Level    4.1  3.6-5.0  MMOL/L


 


Chloride Level    100    MMOL/L


 


Carbon Dioxide Level    27  21-32  MMOL/L


 


Anion Gap    11  5-14  MMOL/L


 


Blood Urea Nitrogen    19 H 7-18  MG/DL


 


Creatinine


 


 


 


 1.22 


 0.60-1.30


MG/DL


 


Estimat Glomerular Filtration


Rate 


 


 


 63 


  





 


BUN/Creatinine Ratio    16   


 


Glucose Level    172 H   MG/DL


 


Calcium Level    9.3  8.5-10.1  MG/DL


 


Corrected Calcium    10.3 H 8.5-10.1  MG/DL


 


Total Bilirubin    0.3  0.1-1.0  MG/DL


 


Aspartate Amino Transf


(AST/SGOT) 


 


 


 16 


 5-34  U/L





 


Alanine Aminotransferase


(ALT/SGPT) 


 


 


 17 


 0-55  U/L





 


Alkaline Phosphatase    92    U/L


 


Total Protein    6.9  6.4-8.2  GM/DL


 


Albumin    2.8 L 3.2-4.5  GM/DL


 


Test


 12/26/21


05:22 12/26/21


11:08 


 


 Range/Units


 


 


Glucometer 173 H 182 H     MG/DL











Diagnosis/Problems


Diagnosis/Problems





(1) Acute on chronic diastolic heart failure


Assessment & Plan:  Symptomatically improving.  His chest x-ray from 12/23 and 

again today showed pulmonary edema.  I recommend he continue intravenous 

furosemide for the time being.  I will obtain another follow-up chest x-ray 

tomorrow.





(2) Primary hypertension


Assessment & Plan:  Blood pressure is intermittently elevated.  If this 

persists, we may need to make some adjustment to his antihypertensive 

medication.





(3) Mixed hyperlipidemia


Assessment & Plan:  Continue atorvastatin.  His LDL level from this admission is

reasonably controlled.





(4) Morbid obesity


Status:  Acute


Assessment & Plan:  This is most likely contributing to his chronic cardiac and 

noncardiac conditions.  He would be a good candidate for bariatric surgery.














YAA LYN JR, MD         Dec 26, 2021 15:21

## 2021-12-26 NOTE — DIAGNOSTIC IMAGING REPORT
EXAMINATION: Chest 1 view



HISTORY: Heart failure



COMPARISON: 12/21/2021



FINDINGS: 



Heart size and pulmonary vasculature are stable. Diffuse

interstitial opacities seen throughout both lungs. No significant

pleural effusion or pneumothorax. The osseous structures are

intact.



IMPRESSION: 



1. Mild cardiomegaly with diffuse interstitial opacities can be

seen with pulmonary edema and heart failure. Differential

consideration could include atypical infection.



Dictated by: 



  Dictated on workstation # [id number]

## 2021-12-26 NOTE — PROGRESS NOTE - HOSPITALIST
Subjective


HPI/CC On Admission


Date Seen by Provider:  Dec 26, 2021


Time Seen by Provider:  11:00


CC: SOB





HPI: This is a 51yoWM who has had multiple hospital stays due to acute on 

chronic CHF with obesity hypoventilation syndrome. He was given IV diuresis and 

he remains a do not intubate, do not resuscitate. Cardiology was consulted. Labs

were ordered, they are pending currently. May be able to transfer to fourth 

floor. PT and OT ordered and wound care for the right great toe amputation site.


Subjective/Events-last exam


Patient doing pretty well


Lethargic most of the time


Had a long in-depth heart-to-heart conversation with him and he is not 

interested in any trach placement to bypass the obstruction component of his 

severe sleep apnea and he cannot tolerate BiPAP and he just wants to do what he 

can but life is not worth living if he should have to have a trach or go to a 

nursing home


Patient may very well be a candidate for hospice in order to be at home where he

wants to be 


Checked left ear at his request with otoscope but no evidence of any cerumen 

impaction otitis media





Review of Systems


General:  Fatigue, Malaise


HEENT:  Ear Pain





Focused Exam


Time of Focused Exam:  19:55





Objective


Exam


Vital Signs





Vital Signs








  Date Time  Temp Pulse Resp B/P (MAP) Pulse Ox O2 Delivery O2 Flow Rate FiO2


 


12/26/21 19:07 36.8 66 19 135/82 (99) 95 Nasal Cannula 2.00 





Capillary Refill : Less Than 3 Seconds


General Appearance:  No Apparent Distress, WD/WN, Chronically ill


Respiratory:  Lungs Clear, Normal Breath Sounds


Cardiovascular:  Regular Rate, Rhythm


Neurologic/Psychiatric:  Alert, Oriented x3





Results/Procedures


Lab


Laboratory Tests


12/26/21 05:20








Patient resulted labs reviewed.


Imaging:  Reviewed Imaging Films, Reviewed Imaging Report





Assessment/Plan


Assessment and Plan


Assess & Plan/Chief Complaint











Assessment:


Acute on Chronic Congestive heart failure exacerbation


Acute on chronic respiratory failure


Obesity hypoventilation syndrome 


Obstructive sleep apnea


History of Peripheral artery disease


T2 Diabetes Mellitus 


  - Left Above the knee amputation 


  - Right hallux amputation, site draining purulent exudate


Hypertension


Hyperlipidemia 








Plan


IV Lasix


Insulin


Continue home medications


Continue oxygen therapy 


Monitor labs


Consult cardiology 


Consult wound care


PT/OT


Lovenox for DVT prophylaxis 


Needs nursing home





12/23/2021:


Adamant about not going to nursing home


Discharge home tomorrow with home care





12/24/2021:


Holding discharge


Supportive care


Refuses BiPAP


Patient will be at high risk for sudden cardiac death and sudden respiratory 

failure but he is a DO NOT RESUSCITATE DO NOT INTUBATE





12/25/2021:


We will discharge


Patient high risk for sudden cardiac death due to CO2 narcosis





12/26/2021:


Patient declines trach placement and BiPAP and intubation and nursing home 

placement


Patient appears to be a candidate for hospice to prevent rehospitalization





Diagnosis/Problems


Diagnosis/Problems





(1) CHF (congestive heart failure)


Status:  Acute


(2) Acute respiratory insufficiency


Status:  Acute


(3) Morbid obesity


Status:  Acute


(4) Bilateral pleural effusion


Status:  Acute


(5) Hypoxia


(6) Respiratory failure


(7) Non-compliance


Status:  Acute


(8) HTN (hypertension)


Status:  Chronic


(9) Diabetes mellitus, insulin dependent (IDDM), uncontrolled


Status:  Acute











JACK OLIVARES DO                Dec 26, 2021 06:57

## 2021-12-27 VITALS — SYSTOLIC BLOOD PRESSURE: 119 MMHG | DIASTOLIC BLOOD PRESSURE: 75 MMHG

## 2021-12-27 VITALS — DIASTOLIC BLOOD PRESSURE: 76 MMHG | SYSTOLIC BLOOD PRESSURE: 147 MMHG

## 2021-12-27 VITALS — DIASTOLIC BLOOD PRESSURE: 75 MMHG | SYSTOLIC BLOOD PRESSURE: 150 MMHG

## 2021-12-27 VITALS — SYSTOLIC BLOOD PRESSURE: 147 MMHG | DIASTOLIC BLOOD PRESSURE: 76 MMHG

## 2021-12-27 VITALS — SYSTOLIC BLOOD PRESSURE: 142 MMHG | DIASTOLIC BLOOD PRESSURE: 69 MMHG

## 2021-12-27 LAB
BUN/CREAT SERPL: 16
CALCIUM SERPL-MCNC: 9.7 MG/DL (ref 8.5–10.1)
CHLORIDE SERPL-SCNC: 98 MMOL/L (ref 98–107)
CO2 SERPL-SCNC: 27 MMOL/L (ref 21–32)
CREAT SERPL-MCNC: 1.5 MG/DL (ref 0.6–1.3)
GFR SERPLBLD BASED ON 1.73 SQ M-ARVRAT: 49 ML/MIN
GLUCOSE SERPL-MCNC: 172 MG/DL (ref 70–105)
POTASSIUM SERPL-SCNC: 4.4 MMOL/L (ref 3.6–5)
SODIUM SERPL-SCNC: 137 MMOL/L (ref 135–145)

## 2021-12-27 RX ADMIN — DOCUSATE SODIUM SCH MG: 100 CAPSULE ORAL at 10:09

## 2021-12-27 RX ADMIN — INSULIN ASPART SCH UNIT: 100 INJECTION, SOLUTION INTRAVENOUS; SUBCUTANEOUS at 12:19

## 2021-12-27 RX ADMIN — LISINOPRIL SCH MG: 20 TABLET ORAL at 10:09

## 2021-12-27 RX ADMIN — INSULIN ASPART SCH UNIT: 100 INJECTION, SOLUTION INTRAVENOUS; SUBCUTANEOUS at 12:18

## 2021-12-27 RX ADMIN — LACTULOSE SCH GM: 20 SOLUTION ORAL at 10:08

## 2021-12-27 RX ADMIN — DOCUSATE SODIUM AND SENNOSIDES SCH EA: 8.6; 5 TABLET, FILM COATED ORAL at 10:53

## 2021-12-27 RX ADMIN — ENOXAPARIN SODIUM SCH MG: 100 INJECTION SUBCUTANEOUS at 10:09

## 2021-12-27 RX ADMIN — VANCOMYCIN HYDROCHLORIDE SCH MLS/HR: 750 INJECTION, POWDER, LYOPHILIZED, FOR SOLUTION INTRAVENOUS at 10:10

## 2021-12-27 RX ADMIN — FUROSEMIDE SCH MG: 10 INJECTION, SOLUTION INTRAVENOUS at 06:42

## 2021-12-27 RX ADMIN — POLYETHYLENE GLYCOL (3350) SCH GM: 17 POWDER, FOR SOLUTION ORAL at 10:53

## 2021-12-27 RX ADMIN — INSULIN ASPART SCH UNIT: 100 INJECTION, SOLUTION INTRAVENOUS; SUBCUTANEOUS at 06:41

## 2021-12-27 RX ADMIN — ASPIRIN SCH MG: 325 TABLET, COATED ORAL at 10:09

## 2021-12-27 NOTE — DIAGNOSTIC IMAGING REPORT
EXAMINATION: Chest 1 view



HISTORY: Heart failure. Shortness of breath. Cough.



COMPARISON: 12/26/2021.



FINDINGS: 



There is continued cardiomegaly with central pulmonary vascular

congestion and scattered interstitial and alveolar opacities

throughout the lungs, greatest in the perihilar regions. No focal

consolidation. No large pleural effusion or pneumothorax.



IMPRESSION: 



1. Stable cardiomegaly with central pulmonary vascular congestion

and mild pulmonary edema.



Dictated by: 



  Dictated on workstation # [id number]

## 2021-12-27 NOTE — DISCHARGE SUMMARY
Discharge Summary


Hospital Course


Was the Problem List Reviewed?:  Yes


Problems/Dx:  


(1) Acute on chronic diastolic heart failure


(2) Primary hypertension


(3) Mixed hyperlipidemia


(4) Morbid obesity


Status:  Acute


Hospital Course


Date of Admission: Dec 19, 2021 at 19:55 


Admission Diagnosis :  





Family Physician/Provider: Noe Roque MD  





Date of Discharge: 12/27/21 


Discharge Diagnosis: Acute on chronic heart failure, volume overload, acute on 

chronic respiratory failure, obesity hypoventilation syndrome, refuses trach 

placement and BiPAP and intubation








Hospital Course:


Hospital Course: Pt had an uneventful 9 day hospital course after he was 

admitted for CO2 narcosis. He is a DNI and DNR and he was noncompliant with 

biPAP. He ultimately recovered. Right lower extremity cellulitis managed with IV

antibiotics and wound care will be continued. [facility] will be 

reinstated and I recommended hospice enrollment. He is a DNR and that order was 

written and he was discharged in improved condition. Prognosis is extremely poor

and he should be a do not hospitalize.














Labs and Pending Lab Test:


Laboratory Tests


12/26/21 11:08: Glucometer 182H


12/26/21 16:39: Glucometer 198H


12/26/21 20:13: Glucometer 219H


12/27/21 05:43: Glucometer 174H


12/27/21 06:00: 


Sodium Level [Pending], Potassium Level [Pending], Chloride Level [Pending], 

Carbon Dioxide Level [Pending], Anion Gap [Pending], Blood Urea Nitrogen 

[Pending], Creatinine [Pending], BUN/Creatinine Ratio [Pending], Glucose Level 

[Pending], Calcium Level [Pending]





Microbiology


12/19/21 Blood Culture - Final, Complete


           No growth


12/19/21 Urine Culture - Final, Complete


           Mixed Bacterial Ya





Home Meds


Active


Potassium Chloride 10 Meq Tab.er.prt 10 Meq PO Q48H


Furosemide 40 Mg Tablet 40 Mg PO DAILY


HYDROcodone/APAP  5 MG/325 MG TAB (Acetaminophen/Hydrocodone Bitart) 1 Tab Tab 1

 Ea PO Q4H PRN


Reported


Levemir Flextouch (Insulin Detemir) 100 Unit/1 Ml Insuln.pen 20 Unit SQ BID


Novolog Flexpen (Insulin Aspart) 300 Units/3 Ml Solution 5 Units SQ AC


Metformin HCl 1,000 Mg Tablet 1,000 Mg PO BID


Lisinopril 20 Mg Tablet 20 Mg PO DAILY


Atorvastatin Calcium 40 Mg Tablet 40 Mg PO DAILY


Doxycycline Hyclate 100 Mg Tablet 100 Mg PO BID


     FILLED 12- #28/14 DAY SUPPLY


Aspirin EC (Aspirin) 325 Mg Tablet. 325 Mg PO DAILY


Assessment/Pt Instructions


PCP in 1 week


Discharge Planning:  <30 minutes discharge planning





Discharge Instructions


Discharge Diet:  Low Sodium Diet


Activity as Tolerated:  Yes





Discharge Physical Examination


Vital Signs





Vital Signs








  Date Time  Temp Pulse Resp B/P (MAP) Pulse Ox O2 Delivery O2 Flow Rate FiO2


 


12/27/21 04:06 36.8 71 19 142/69 (93) 95 Nasal Cannula 2.00 








General Appearance:  No Apparent Distress, WD/WN, Chronically ill, Obese


Allergies:  


Coded Allergies:  


     meperidine HCl (Unverified  Allergy, Unknown, 11/26/11)


     shellfish derived (Unverified  Allergy, Unknown, 1/14/13)


   FROM UNCODED ALLERGIES


     Penicillins (Unverified  Adverse Reaction, Intermediate, NAUSEA, 11/26/11)





Discharge Summary


Date of Admission


Dec 19, 2021 at 19:55


Date of Discharge





Discharge Date:  Dec 27, 2021


Admission Diagnosis


Assessment:


Acute on chronic respiratory failure


Obesity hypoventilation syndrome noncompliant with CPAP and BiPAP


Morbid obesity BMI 55


Right great toe amputation site wound


Congestive heart failure diastolic type


Noncompliance


DNR





Plan:


Supportive care


Wound care


Cardiology


Pulmonology


Discharge Diagnosis











Assessment:


Acute on Chronic Congestive heart failure exacerbation


Acute on chronic respiratory failure


Obesity hypoventilation syndrome 


Obstructive sleep apnea


History of Peripheral artery disease


T2 Diabetes Mellitus 


  - Left Above the knee amputation 


  - Right hallux amputation, site draining purulent exudate


Hypertension


Hyperlipidemia 








Plan


IV Lasix


Insulin


Continue home medications


Continue oxygen therapy 


Monitor labs


Consult cardiology 


Consult wound care


PT/OT


Lovenox for DVT prophylaxis 


Needs nursing home





12/23/2021:


Adamant about not going to nursing home


Discharge home tomorrow with home care





12/24/2021:


Holding discharge


Supportive care


Refuses BiPAP


Patient will be at high risk for sudden cardiac death and sudden respiratory 

failure but he is a DO NOT RESUSCITATE DO NOT INTUBATE





12/25/2021:


We will discharge


Patient high risk for sudden cardiac death due to CO2 narcosis





12/26/2021:


Patient declines trach placement and BiPAP and intubation and nursing home 

placement


Patient appears to be a candidate for hospice to prevent rehospitalization





(1) Acute on chronic diastolic heart failure


Assessment & Plan:  Symptomatically improving.  His chest x-ray from 12/23 and 

again today showed pulmonary edema.  I recommend he continue intravenous 

furosemide for the time being.  I will obtain another follow-up chest x-ray 

tomorrow.





(2) Primary hypertension


Assessment & Plan:  Blood pressure is intermittently elevated.  If this 

persists, we may need to make some adjustment to his antihypertensive 

medication.





(3) Mixed hyperlipidemia


Assessment & Plan:  Continue atorvastatin.  His LDL level from this admission is

 reasonably controlled.





(4) Morbid obesity


Status:  Acute


Assessment & Plan:  This is most likely contributing to his chronic cardiac and 

noncardiac conditions.  He would be a good candidate for bariatric surgery.














JACK OLIVARES DO                Dec 27, 2021 06:23

## 2021-12-28 ENCOUNTER — HOSPITAL ENCOUNTER (OUTPATIENT)
Dept: HOSPITAL 75 - WOUNDCARE | Age: 51
End: 2021-12-28
Attending: FAMILY MEDICINE
Payer: MEDICAID

## 2021-12-28 DIAGNOSIS — E66.01: ICD-10-CM

## 2021-12-28 DIAGNOSIS — L97.212: ICD-10-CM

## 2021-12-28 DIAGNOSIS — I70.234: ICD-10-CM

## 2021-12-28 DIAGNOSIS — Z91.19: ICD-10-CM

## 2021-12-28 DIAGNOSIS — E11.52: ICD-10-CM

## 2021-12-28 DIAGNOSIS — Z91.11: ICD-10-CM

## 2021-12-28 DIAGNOSIS — Z89.612: ICD-10-CM

## 2021-12-28 DIAGNOSIS — L97.412: Primary | ICD-10-CM

## 2021-12-28 DIAGNOSIS — E11.622: ICD-10-CM

## 2021-12-28 DIAGNOSIS — E11.65: ICD-10-CM

## 2021-12-28 DIAGNOSIS — L97.122: ICD-10-CM

## 2021-12-28 DIAGNOSIS — I89.0: ICD-10-CM

## 2021-12-28 PROCEDURE — 11045 DBRDMT SUBQ TISS EACH ADDL: CPT

## 2021-12-28 PROCEDURE — 11042 DBRDMT SUBQ TIS 1ST 20SQCM/<: CPT

## 2022-01-04 ENCOUNTER — HOSPITAL ENCOUNTER (OUTPATIENT)
Dept: HOSPITAL 75 - WOUNDCARE | Age: 52
End: 2022-01-04
Attending: FAMILY MEDICINE
Payer: MEDICAID

## 2022-01-04 DIAGNOSIS — E11.52: ICD-10-CM

## 2022-01-04 DIAGNOSIS — L97.212: ICD-10-CM

## 2022-01-04 DIAGNOSIS — E11.622: ICD-10-CM

## 2022-01-04 DIAGNOSIS — L97.122: ICD-10-CM

## 2022-01-04 DIAGNOSIS — Z89.612: ICD-10-CM

## 2022-01-04 DIAGNOSIS — E66.01: ICD-10-CM

## 2022-01-04 DIAGNOSIS — I70.234: ICD-10-CM

## 2022-01-04 DIAGNOSIS — L97.412: Primary | ICD-10-CM

## 2022-01-04 DIAGNOSIS — Z91.11: ICD-10-CM

## 2022-01-04 DIAGNOSIS — Z91.19: ICD-10-CM

## 2022-01-04 DIAGNOSIS — E11.65: ICD-10-CM

## 2022-01-04 DIAGNOSIS — I89.0: ICD-10-CM

## 2022-01-04 PROCEDURE — 11045 DBRDMT SUBQ TISS EACH ADDL: CPT

## 2022-01-04 PROCEDURE — 11042 DBRDMT SUBQ TIS 1ST 20SQCM/<: CPT

## 2022-01-11 ENCOUNTER — HOSPITAL ENCOUNTER (OUTPATIENT)
Dept: HOSPITAL 75 - WOUNDCARE | Age: 52
End: 2022-01-11
Attending: FAMILY MEDICINE
Payer: MEDICAID

## 2022-01-11 DIAGNOSIS — Z89.522: ICD-10-CM

## 2022-01-11 DIAGNOSIS — L97.412: ICD-10-CM

## 2022-01-11 DIAGNOSIS — E11.622: ICD-10-CM

## 2022-01-11 DIAGNOSIS — Z91.19: ICD-10-CM

## 2022-01-11 DIAGNOSIS — I89.0: ICD-10-CM

## 2022-01-11 DIAGNOSIS — E11.65: ICD-10-CM

## 2022-01-11 DIAGNOSIS — I70.234: Primary | ICD-10-CM

## 2022-01-11 DIAGNOSIS — E66.01: ICD-10-CM

## 2022-01-11 DIAGNOSIS — L97.122: ICD-10-CM

## 2022-01-11 DIAGNOSIS — L97.212: ICD-10-CM

## 2022-01-11 DIAGNOSIS — E11.52: ICD-10-CM

## 2022-01-11 DIAGNOSIS — Z91.11: ICD-10-CM

## 2022-01-11 DIAGNOSIS — Z89.612: ICD-10-CM

## 2022-01-11 PROCEDURE — 11042 DBRDMT SUBQ TIS 1ST 20SQCM/<: CPT

## 2022-01-11 PROCEDURE — 11045 DBRDMT SUBQ TISS EACH ADDL: CPT

## 2022-01-18 ENCOUNTER — HOSPITAL ENCOUNTER (OUTPATIENT)
Dept: HOSPITAL 75 - WOUNDCARE | Age: 52
End: 2022-01-18
Attending: FAMILY MEDICINE
Payer: MEDICAID

## 2022-01-18 DIAGNOSIS — E66.01: ICD-10-CM

## 2022-01-18 DIAGNOSIS — E11.622: ICD-10-CM

## 2022-01-18 DIAGNOSIS — Z91.11: ICD-10-CM

## 2022-01-18 DIAGNOSIS — E11.65: ICD-10-CM

## 2022-01-18 DIAGNOSIS — L97.212: ICD-10-CM

## 2022-01-18 DIAGNOSIS — I70.234: Primary | ICD-10-CM

## 2022-01-18 DIAGNOSIS — L97.122: ICD-10-CM

## 2022-01-18 DIAGNOSIS — I89.0: ICD-10-CM

## 2022-01-18 DIAGNOSIS — E11.52: ICD-10-CM

## 2022-01-18 DIAGNOSIS — Z91.19: ICD-10-CM

## 2022-01-18 PROCEDURE — 11045 DBRDMT SUBQ TISS EACH ADDL: CPT

## 2022-01-18 PROCEDURE — 11042 DBRDMT SUBQ TIS 1ST 20SQCM/<: CPT

## 2022-01-25 ENCOUNTER — HOSPITAL ENCOUNTER (OUTPATIENT)
Dept: HOSPITAL 75 - WOUNDCARE | Age: 52
End: 2022-01-25
Attending: FAMILY MEDICINE
Payer: MEDICAID

## 2022-01-25 DIAGNOSIS — Z91.11: ICD-10-CM

## 2022-01-25 DIAGNOSIS — E11.622: ICD-10-CM

## 2022-01-25 DIAGNOSIS — E66.01: ICD-10-CM

## 2022-01-25 DIAGNOSIS — I70.234: Primary | ICD-10-CM

## 2022-01-25 DIAGNOSIS — L97.222: ICD-10-CM

## 2022-01-25 DIAGNOSIS — E11.52: ICD-10-CM

## 2022-01-25 DIAGNOSIS — Z89.612: ICD-10-CM

## 2022-01-25 DIAGNOSIS — Z91.19: ICD-10-CM

## 2022-01-25 DIAGNOSIS — I89.0: ICD-10-CM

## 2022-01-25 DIAGNOSIS — L97.212: ICD-10-CM

## 2022-01-25 DIAGNOSIS — E11.65: ICD-10-CM

## 2022-01-25 PROCEDURE — 11045 DBRDMT SUBQ TISS EACH ADDL: CPT

## 2022-01-25 PROCEDURE — 11042 DBRDMT SUBQ TIS 1ST 20SQCM/<: CPT

## 2022-02-08 ENCOUNTER — HOSPITAL ENCOUNTER (OUTPATIENT)
Dept: HOSPITAL 75 - WOUNDCARE | Age: 52
End: 2022-02-08
Attending: FAMILY MEDICINE
Payer: MEDICAID

## 2022-02-08 ENCOUNTER — HOSPITAL ENCOUNTER (OUTPATIENT)
Dept: HOSPITAL 75 - LAB | Age: 52
End: 2022-02-08
Attending: FAMILY MEDICINE
Payer: MEDICAID

## 2022-02-08 DIAGNOSIS — L97.122: ICD-10-CM

## 2022-02-08 DIAGNOSIS — Z91.11: ICD-10-CM

## 2022-02-08 DIAGNOSIS — L97.212: ICD-10-CM

## 2022-02-08 DIAGNOSIS — E11.65: ICD-10-CM

## 2022-02-08 DIAGNOSIS — Z91.19: ICD-10-CM

## 2022-02-08 DIAGNOSIS — E11.622: Primary | ICD-10-CM

## 2022-02-08 DIAGNOSIS — E11.622: ICD-10-CM

## 2022-02-08 DIAGNOSIS — I89.0: ICD-10-CM

## 2022-02-08 DIAGNOSIS — E11.52: ICD-10-CM

## 2022-02-08 DIAGNOSIS — I70.234: Primary | ICD-10-CM

## 2022-02-08 DIAGNOSIS — Z89.612: ICD-10-CM

## 2022-02-08 DIAGNOSIS — E66.01: ICD-10-CM

## 2022-02-08 PROCEDURE — 83036 HEMOGLOBIN GLYCOSYLATED A1C: CPT

## 2022-02-08 PROCEDURE — 11042 DBRDMT SUBQ TIS 1ST 20SQCM/<: CPT

## 2022-02-08 PROCEDURE — 11045 DBRDMT SUBQ TISS EACH ADDL: CPT

## 2022-02-08 PROCEDURE — 84134 ASSAY OF PREALBUMIN: CPT

## 2022-02-08 PROCEDURE — 36415 COLL VENOUS BLD VENIPUNCTURE: CPT

## 2022-02-15 ENCOUNTER — HOSPITAL ENCOUNTER (OUTPATIENT)
Dept: HOSPITAL 75 - WOUNDCARE | Age: 52
End: 2022-02-15
Attending: FAMILY MEDICINE
Payer: MEDICAID

## 2022-02-15 DIAGNOSIS — E11.622: ICD-10-CM

## 2022-02-15 DIAGNOSIS — Z91.11: ICD-10-CM

## 2022-02-15 DIAGNOSIS — L97.212: ICD-10-CM

## 2022-02-15 DIAGNOSIS — E11.65: ICD-10-CM

## 2022-02-15 DIAGNOSIS — E66.01: ICD-10-CM

## 2022-02-15 DIAGNOSIS — E11.52: ICD-10-CM

## 2022-02-15 DIAGNOSIS — L97.122: ICD-10-CM

## 2022-02-15 DIAGNOSIS — I70.234: Primary | ICD-10-CM

## 2022-02-15 DIAGNOSIS — Z91.19: ICD-10-CM

## 2022-02-15 DIAGNOSIS — I89.0: ICD-10-CM

## 2022-02-15 DIAGNOSIS — Z89.612: ICD-10-CM

## 2022-02-15 PROCEDURE — 11042 DBRDMT SUBQ TIS 1ST 20SQCM/<: CPT

## 2022-02-15 PROCEDURE — 11045 DBRDMT SUBQ TISS EACH ADDL: CPT

## 2022-02-22 ENCOUNTER — HOSPITAL ENCOUNTER (INPATIENT)
Dept: HOSPITAL 75 - ER | Age: 52
LOS: 10 days | Discharge: NURSING, REHAB OR LONG-TERM CARE | DRG: 951 | End: 2022-03-04
Attending: FAMILY MEDICINE | Admitting: INTERNAL MEDICINE
Payer: MEDICAID

## 2022-02-22 VITALS — DIASTOLIC BLOOD PRESSURE: 93 MMHG | SYSTOLIC BLOOD PRESSURE: 142 MMHG

## 2022-02-22 VITALS — HEIGHT: 70.87 IN | WEIGHT: 315 LBS | BODY MASS INDEX: 44.1 KG/M2

## 2022-02-22 DIAGNOSIS — Z79.84: ICD-10-CM

## 2022-02-22 DIAGNOSIS — G89.29: ICD-10-CM

## 2022-02-22 DIAGNOSIS — Z20.822: ICD-10-CM

## 2022-02-22 DIAGNOSIS — K59.00: ICD-10-CM

## 2022-02-22 DIAGNOSIS — Z91.14: ICD-10-CM

## 2022-02-22 DIAGNOSIS — R53.81: ICD-10-CM

## 2022-02-22 DIAGNOSIS — M54.9: ICD-10-CM

## 2022-02-22 DIAGNOSIS — I25.10: ICD-10-CM

## 2022-02-22 DIAGNOSIS — E66.2: ICD-10-CM

## 2022-02-22 DIAGNOSIS — E11.65: ICD-10-CM

## 2022-02-22 DIAGNOSIS — E11.51: ICD-10-CM

## 2022-02-22 DIAGNOSIS — M19.90: ICD-10-CM

## 2022-02-22 DIAGNOSIS — Z79.82: ICD-10-CM

## 2022-02-22 DIAGNOSIS — Z79.899: ICD-10-CM

## 2022-02-22 DIAGNOSIS — I89.0: ICD-10-CM

## 2022-02-22 DIAGNOSIS — I11.0: ICD-10-CM

## 2022-02-22 DIAGNOSIS — I50.33: ICD-10-CM

## 2022-02-22 DIAGNOSIS — I42.9: ICD-10-CM

## 2022-02-22 DIAGNOSIS — Z79.4: ICD-10-CM

## 2022-02-22 DIAGNOSIS — Z51.5: Primary | ICD-10-CM

## 2022-02-22 DIAGNOSIS — E78.2: ICD-10-CM

## 2022-02-22 DIAGNOSIS — E78.00: ICD-10-CM

## 2022-02-22 DIAGNOSIS — Z66: ICD-10-CM

## 2022-02-22 DIAGNOSIS — Z89.612: ICD-10-CM

## 2022-02-22 DIAGNOSIS — J96.01: ICD-10-CM

## 2022-02-22 LAB
ALBUMIN SERPL-MCNC: 2.7 GM/DL (ref 3.2–4.5)
ALP SERPL-CCNC: 154 U/L (ref 40–136)
ALT SERPL-CCNC: 95 U/L (ref 0–55)
APTT PPP: YELLOW S
ARTERIAL PATENCY WRIST A: POSITIVE
BACTERIA #/AREA URNS HPF: NEGATIVE /HPF
BASE EXCESS STD BLDA CALC-SCNC: 5 MMOL/L (ref -2.5–2.5)
BASOPHILS # BLD AUTO: 0.1 10^3/UL (ref 0–0.1)
BASOPHILS NFR BLD AUTO: 1 % (ref 0–10)
BDY SITE: (no result)
BILIRUB SERPL-MCNC: 0.6 MG/DL (ref 0.1–1)
BILIRUB UR QL STRIP: NEGATIVE
BODY TEMPERATURE: 36
BUN/CREAT SERPL: 22
CALCIUM SERPL-MCNC: 8.7 MG/DL (ref 8.5–10.1)
CHLORIDE SERPL-SCNC: 97 MMOL/L (ref 98–107)
CO2 BLDA CALC-SCNC: 31.5 MMOL/L (ref 21–31)
CO2 SERPL-SCNC: 24 MMOL/L (ref 21–32)
CREAT SERPL-MCNC: 1.57 MG/DL (ref 0.6–1.3)
EOSINOPHIL # BLD AUTO: 0.1 10^3/UL (ref 0–0.3)
EOSINOPHIL NFR BLD AUTO: 2 % (ref 0–10)
FIBRINOGEN PPP-MCNC: CLEAR MG/DL
GFR SERPLBLD BASED ON 1.73 SQ M-ARVRAT: 53 ML/MIN
GLUCOSE SERPL-MCNC: 499 MG/DL (ref 70–105)
GLUCOSE UR STRIP-MCNC: (no result) MG/DL
HCT VFR BLD CALC: 33 % (ref 40–54)
HGB BLD-MCNC: 9.3 G/DL (ref 13.3–17.7)
INHALED O2 FLOW RATE: (no result) L/MIN
KETONES UR QL STRIP: NEGATIVE
LEUKOCYTE ESTERASE UR QL STRIP: NEGATIVE
LYMPHOCYTES # BLD AUTO: 0.8 10^3/UL (ref 1–4)
LYMPHOCYTES NFR BLD AUTO: 9 % (ref 12–44)
MANUAL DIFFERENTIAL PERFORMED BLD QL: NO
MCH RBC QN AUTO: 23 PG (ref 25–34)
MCHC RBC AUTO-ENTMCNC: 28 G/DL (ref 32–36)
MCV RBC AUTO: 81 FL (ref 80–99)
MONOCYTES # BLD AUTO: 0.5 10^3/UL (ref 0–1)
MONOCYTES NFR BLD AUTO: 6 % (ref 0–12)
NEUTROPHILS # BLD AUTO: 7.3 10^3/UL (ref 1.8–7.8)
NEUTROPHILS NFR BLD AUTO: 83 % (ref 42–75)
NITRITE UR QL STRIP: NEGATIVE
PCO2 BLDA: 49 MMHG (ref 35–45)
PH BLDA: 7.39 [PH] (ref 7.37–7.43)
PH UR STRIP: 7 [PH] (ref 5–9)
PLATELET # BLD: 395 10^3/UL (ref 130–400)
PMV BLD AUTO: 10.7 FL (ref 9–12.2)
PO2 BLDA: 121 MMHG (ref 79–93)
POTASSIUM SERPL-SCNC: 4.7 MMOL/L (ref 3.6–5)
PROT SERPL-MCNC: 6.9 GM/DL (ref 6.4–8.2)
PROT UR QL STRIP: (no result)
RBC #/AREA URNS HPF: (no result) /HPF
SAO2 % BLDA FROM PO2: 39 % (ref 94–100)
SODIUM SERPL-SCNC: 130 MMOL/L (ref 135–145)
SP GR UR STRIP: 1.01 (ref 1.02–1.02)
SQUAMOUS #/AREA URNS HPF: (no result) /HPF
VENTILATION MODE VENT: NO
WBC # BLD AUTO: 8.8 10^3/UL (ref 4.3–11)
WBC #/AREA URNS HPF: (no result) /HPF
YEAST #/AREA URNS HPF: (no result) /HPF

## 2022-02-22 PROCEDURE — 80053 COMPREHEN METABOLIC PANEL: CPT

## 2022-02-22 PROCEDURE — 94640 AIRWAY INHALATION TREATMENT: CPT

## 2022-02-22 PROCEDURE — 86141 C-REACTIVE PROTEIN HS: CPT

## 2022-02-22 PROCEDURE — 87088 URINE BACTERIA CULTURE: CPT

## 2022-02-22 PROCEDURE — 36415 COLL VENOUS BLD VENIPUNCTURE: CPT

## 2022-02-22 PROCEDURE — 85025 COMPLETE CBC W/AUTO DIFF WBC: CPT

## 2022-02-22 PROCEDURE — 71045 X-RAY EXAM CHEST 1 VIEW: CPT

## 2022-02-22 PROCEDURE — 81000 URINALYSIS NONAUTO W/SCOPE: CPT

## 2022-02-22 PROCEDURE — 85027 COMPLETE CBC AUTOMATED: CPT

## 2022-02-22 PROCEDURE — 82805 BLOOD GASES W/O2 SATURATION: CPT

## 2022-02-22 PROCEDURE — 85007 BL SMEAR W/DIFF WBC COUNT: CPT

## 2022-02-22 PROCEDURE — 83880 ASSAY OF NATRIURETIC PEPTIDE: CPT

## 2022-02-22 PROCEDURE — 87636 SARSCOV2 & INF A&B AMP PRB: CPT

## 2022-02-22 PROCEDURE — 82947 ASSAY GLUCOSE BLOOD QUANT: CPT

## 2022-02-22 RX ADMIN — MORPHINE SULFATE PRN MG: 4 INJECTION, SOLUTION INTRAMUSCULAR; INTRAVENOUS at 22:50

## 2022-02-22 NOTE — ED GENERAL
General


Chief Complaint:  Respiratory Problems


Stated Complaint:  SOA/CONSTIPATION


Nursing Triage Note:  


PT TO RM 6 BY WHEELCHAIR WITH COMPLAINT SOA, CONSTIPATION, RIGHT FOOT WOUND. 


STATES SYMPTOMS HAVE BEEN GOING ON FOR APPROX A MONTH


Source of Information:  Patient


Exam Limitations:  No Limitations





History of Present Illness


Date Seen by Provider:  2022


Time Seen by Provider:  11:36


Initial Comments


This 51-year-old gentleman presents to the emergency room via private vehicle 

and wheelchair with complaints of shortness of breath and constipation.  He 

reports these symptoms have been on going for weeks but have worsened.  He has 

multiple comorbidities including heart failure, diabetes, left AKA amputation 

with chronic wound, morbid obesity, and chronic right lower extremity wounds.  

He is independently mobile with a wheelchair and does transfer on his own with 

much difficulty.  He is hypoxic on presentation and does not normally wear ox

ygen supplementation at home.  He denies any chest pain or other acute pain.





Allergies and Home Medications


Allergies


Coded Allergies:  


     meperidine HCl (Unverified  Allergy, Unknown, 11)


     shellfish derived (Unverified  Allergy, Unknown, 13)


   FROM UNCODED ALLERGIES


     Penicillins (Unverified  Adverse Reaction, Intermediate, NAUSEA, 11)





Patient Home Medication List


Home Medication List Reviewed:  Yes


Aspirin (Aspirin EC) 325 Mg Tablet.dr, 325 MG PO DAILY, (Reported)


   Entered as Reported by: LENCHO KULKARNI on 12/15/16 1559


Atorvastatin Calcium (Atorvastatin Calcium) 40 Mg Tablet, 40 MG PO DAILY, 

(Reported)


   Entered as Reported by: KRISTY TOLEDO on 21 0932


Furosemide (Furosemide) 40 Mg Tablet, 40 MG PO DAILY


   Prescribed by: JACK OLIVARES on 21 1247


Hydrocodone Bit/Acetaminophen (HYDROcodone/APAP  5 MG/325 MG TAB) 1 Tab Tab, 1 

EA PO Q4H PRN for PAIN-MODERATE (5-7)


   Prescribed by: JACK OLIVARES on 21 1247


Insulin Aspart (Novolog Flexpen) 300 Units/3 Ml Solution, 5 UNITS SQ AC, 

(Reported)


   Entered as Reported by: KRISTY TOLEDO on 21 0932


Insulin Detemir (Levemir Flextouch) 100 Unit/1 Ml Insuln.pen, 20 UNIT SQ BID, 

(Reported)


   Entered as Reported by: KRISTY TOLEDO on 21


Lisinopril (Lisinopril) 20 Mg Tablet, 20 MG PO DAILY, (Reported)


   Entered as Reported by: KRISTY TOLEDO on 21


Metformin HCl (Metformin HCl) 1,000 Mg Tablet, 1,000 MG PO BID, (Reported)


   Entered as Reported by: KRISTY TOLEDO on 21


Potassium Chloride (Potassium Chloride) 10 Meq Tab.er.prt, 10 MEQ PO Q48H


   Prescribed by: JACK OLIVARES on 21 06





Review of Systems


Review of Systems


Constitutional:  weakness, other (Morbid obesity)


EENTM:  no symptoms reported


Respiratory:  see HPI


Cardiovascular:  see HPI


Gastrointestinal:  see HPI


Genitourinary:  no symptoms reported


Musculoskeletal:  see HPI


Skin:  see HPI


Psychiatric/Neurological:  No Symptoms Reported


Hematologic/Lymphatic:  No Symptoms Reported


Immunological/Allergic:  no symptoms reported





Past Medical-Social-Family Hx


Patient Social History


Tobacco Use?:  No


Use of E-Cig and/or Vaping dev:  No


Substance use?:  No


Alcohol Use?:  No


Pt feels they are or have been:  No





Immunizations Up To Date


Tetanus Booster (TDap):  Unknown


PED Vaccines UTD:  No


First/Initial COVID19 Vaccinat:  April


Second COVID19 Vaccination Keshawn:  May


Third COVID19 Vaccination Date:  April





Seasonal Allergies


Seasonal Allergies:  Yes





Past Medical History


Surgery/Hospitalization HX:  


LEFT ABOVE KNEE AMPUTATION, T2DM


Surgeries:  Yes


Abdominal, Amputation (Left AKA), Orthopedic


Respiratory:  Yes


Sleep Apnea


Currently Using CPAP:  No


Currently Using BIPAP:  No


Cardiac:  Yes


Cardiomyopathy, Chronic Edema/Swelling, High Cholesterol, Hypertension


Neurological:  Yes (SEVERAL CONCUSSIONS PER PATIENT)


Concussion


Reproductive Disorders:  No


Sexually Transmitted Disease:  No


HIV/AIDS:  No


Genitourinary:  No


Gastrointestinal:  Yes


Abdominal Hernia


Musculoskeletal:  Yes (lymphedema left leg)


Arthritis, Chronic Back Pain


Endocrine:  Yes


Diabetes, Non-Insulin dep


HEENT:  No


Loss of Vision:  Denies


Hearing Impairment:  Denies


Cancer:  No


Psychosocial:  Yes


Anxiety


Integumentary:  Yes (Chronic wounds of the BLE)


Recent Skin Changes


Blood Disorders:  No





Family Medical History





Arthritis


  19 FATHER, Onset:Unknown


Diabetes mellitus


  19 MOTHER, , Onset:Unknown


No Pertinent Family Hx





Physical Exam


Vital Signs





Vital Signs - First Documented








 22





 11:41


 


Temp 36.0


 


Pulse 92


 


Resp 19


 


B/P (MAP) 137/91 (106)


 


Pulse Ox 92


 


O2 Delivery Room Air


 


O2 Flow Rate 2.00





Capillary Refill : Less Than 3 Seconds


Height, Weight, BMI


Height: 6'0.00"


Weight: 400lbs. 4.0oz. [id number]; 49.00 BMI


Method:Stated


General Appearance:  WD/WN, Obese (Morbid obesity)


HEENT:  PERRL/EOMI, Normal ENT Inspection, Other (Mucous membranes somewhat dry)


Neck:  Normal Inspection


Respiratory:  Accessory Muscle Use, Decreased Breath Sounds


Cardiovascular:  Regular Rate, Rhythm, No Murmur, Other (Marked edema of the 

right lower extremity)


Gastrointestinal:  Non Tender, Soft


Extremity:  Pedal Edema, Swelling, Other (Chronic wounds of both lower 

extremities including the surgical stump on the left and multiple wounds on the 

right foot and lower leg)


Neurologic/Psychiatric:  Alert, Oriented x3, Normal Mood/Affect, CNs II-XII Norm

as Tested, Other (Hypersomnolence worsening with time)


Skin:  Normal Color, Warm/Dry, Other (See above)





Progress/Results/Core Measures


Suspected Sepsis


SIRS


Temperature: 


Pulse: 92 


Respiratory Rate: 19


 


Laboratory Tests


22 11:55: White Blood Count 8.8


Blood Pressure 137 /91 


Mean: 106


 


Laboratory Tests


22 11:55: 


Creatinine 1.57H, Platelet Count 395, Total Bilirubin 0.6








Results/Orders


Lab Results





Laboratory Tests








Test


 22


11:55 22


12:15 22


13:39 22


14:14 Range/Units


 


 


White Blood Count


 8.8 


 


 


 


 4.3-11.0


10^3/uL


 


Red Blood Count


 4.07 L


 


 


 


 4.30-5.52


10^6/uL


 


Hemoglobin 9.3 L    13.3-17.7  g/dL


 


Hematocrit 33 L    40-54  %


 


Mean Corpuscular Volume 81     80-99  fL


 


Mean Corpuscular Hemoglobin 23 L    25-34  pg


 


Mean Corpuscular Hemoglobin


Concent 28 L


 


 


 


 32-36  g/dL





 


Red Cell Distribution Width 18.0 H    10.0-14.5  %


 


Platelet Count


 395 


 


 


 


 130-400


10^3/uL


 


Mean Platelet Volume 10.7     9.0-12.2  fL


 


Immature Granulocyte % (Auto) 1      %


 


Neutrophils (%) (Auto) 83 H    42-75  %


 


Lymphocytes (%) (Auto) 9 L    12-44  %


 


Monocytes (%) (Auto) 6     0-12  %


 


Eosinophils (%) (Auto) 2     0-10  %


 


Basophils (%) (Auto) 1     0-10  %


 


Neutrophils # (Auto)


 7.3 


 


 


 


 1.8-7.8


10^3/uL


 


Lymphocytes # (Auto)


 0.8 L


 


 


 


 1.0-4.0


10^3/uL


 


Monocytes # (Auto)


 0.5 


 


 


 


 0.0-1.0


10^3/uL


 


Eosinophils # (Auto)


 0.1 


 


 


 


 0.0-0.3


10^3/uL


 


Basophils # (Auto)


 0.1 


 


 


 


 0.0-0.1


10^3/uL


 


Immature Granulocyte # (Auto)


 0.1 


 


 


 


 0.0-0.1


10^3/uL


 


Sodium Level 130 L    135-145  MMOL/L


 


Potassium Level 4.7     3.6-5.0  MMOL/L


 


Chloride Level 97 L      MMOL/L


 


Carbon Dioxide Level 24     21-32  MMOL/L


 


Anion Gap 9     5-14  MMOL/L


 


Blood Urea Nitrogen 34 H    7-18  MG/DL


 


Creatinine


 1.57 H


 


 


 


 0.60-1.30


MG/DL


 


Estimat Glomerular Filtration


Rate 53 


 


 


 


  





 


BUN/Creatinine Ratio 22      


 


Glucose Level 499 *H      MG/DL


 


Calcium Level 8.7     8.5-10.1  MG/DL


 


Corrected Calcium 9.7     8.5-10.1  MG/DL


 


Total Bilirubin 0.6     0.1-1.0  MG/DL


 


Aspartate Amino Transf


(AST/SGOT) 71 H


 


 


 


 5-34  U/L





 


Alanine Aminotransferase


(ALT/SGPT) 95 H


 


 


 


 0-55  U/L





 


Alkaline Phosphatase 154 H      U/L


 


C-Reactive Protein High


Sensitivity 8.41 H


 


 


 


 0.00-0.50


MG/DL


 


B-Type Natriuretic Peptide 1111.2 H    <100.0  PG/ML


 


Total Protein 6.9     6.4-8.2  GM/DL


 


Albumin 2.7 L    3.2-4.5  GM/DL


 


Influenza Type A (RT-PCR)  Not Detected    Not Detecte  


 


Influenza Type B (RT-PCR)  Not Detected    Not Detecte  


 


SARS-CoV-2 RNA (RT-PCR)  Not Detected    Not Detecte  


 


Urine Color   YELLOW    


 


Urine Clarity   CLEAR    


 


Urine pH   7.0   5-9  


 


Urine Specific Gravity   1.010 L  1.016-1.022  


 


Urine Protein   2+ H  NEGATIVE  


 


Urine Glucose (UA)   3+ H  NEGATIVE  


 


Urine Ketones   NEGATIVE   NEGATIVE  


 


Urine Nitrite   NEGATIVE   NEGATIVE  


 


Urine Bilirubin   NEGATIVE   NEGATIVE  


 


Urine Urobilinogen   0.2   < = 1.0  MG/DL


 


Urine Leukocyte Esterase   NEGATIVE   NEGATIVE  


 


Urine RBC (Auto)   TRACE-I H  NEGATIVE  


 


Urine RBC   NONE    /HPF


 


Urine WBC   NONE    /HPF


 


Urine Squamous Epithelial


Cells 


 


 0-2 


 


  /HPF





 


Urine Crystals   NONE    /LPF


 


Urine Bacteria   NEGATIVE    /HPF


 


Urine Casts   NONE    /LPF


 


Urine Mucus   NEGATIVE    /LPF


 


Urine Yeast   FEW H   /HPF


 


Urine Culture Indicated   YES    


 


Blood Gas Puncture Site    LEFT RADIAL   


 


Blood Gas Patient Temperature    36   


 


Arterial Blood pH    7.39  7.37-7.43  


 


Arterial Blood Partial


Pressure CO2 


 


 


 49 H


 35-45  MMHG





 


Arterial Blood Partial


Pressure O2 


 


 


 121 H


 79-93  MMHG





 


Arterial Blood HCO3    30 H 23-27  MMOL/L


 


Arterial Blood Total CO2


 


 


 


 31.5 H


 21.0-31.0


MMOL/L


 


Arterial Blood Oxygen


Saturation 


 


 


 39 L


   %





 


Arterial Blood Base Excess


 


 


 


 5.0 H


 -2.5-2.5


MMOL/L


 


[place] Test    POSITIVE   


 


Blood Gas Ventilator Setting    NO   


 


Blood Gas Inspired Oxygen    3 L   








Micro Results





Microbiology


22 Urine Culture - Final, Complete


          >=3 Gram Positive Isolates





My Orders





Orders - MARCK JOSEPH MD


Bnp [facility] (22 11:36)


Cbc With Automated Diff (22 11:36)


Comprehensive Metabolic Panel (22 11:36)


Chest 1 View, Ap/Pa Only (22 11:36)


Ed Iv/Invasive Line Start (22 11:36)


Hs C Reactive Protein (22 11:59)


Covid 19 Inhouse Test (22 11:59)


Influenza A And B By Pcr (22 11:59)


Ua Culture If Indicated (22 12:17)


Bipap (Bilevel) Set Up (22 13:33)


Albuterol/Ipra Inhalation Soln (Duoneb I (22 13:45)


Svn Small Volume Nebulizer (22 13:34)


Urine Culture (22 13:39)


Arterial Blood Gas (22 13:51)


Ed Admission (Communication) (22 16:26)





Medications Given in ED





Vital Signs/I&O











 22





 11:41 14:12


 


Temp 36.0 


 


Pulse 92 


 


Resp 19 


 


B/P (MAP) 137/91 (106) 


 


Pulse Ox 92 99


 


O2 Delivery Room Air Nasal Cannula


 


O2 Flow Rate 2.00 3.00





Capillary Refill : Less Than 3 Seconds








Blood Pressure Mean:                    106








Progress Note :  


   Time:  16:30


Progress Note


Patient was treated with DuoNeb and BiPAP.  He did not tolerate the BiPAP and 

refused it.  He physically pushed it away.  I discussed this with him and he 

states he will not use a BiPAP or CPAP.  This is consistent with what occurred 

on his last admission as well.  He was showing signs of significant apnea.  I 

discussed my concern about his refusal to use positive pressure airway support. 

We then discussed hospice as an option.  He was able to communicate his under

standing of the purpose of hospice.  The palliative care team was consulted.  

They were not able to consent him for hospice because they could not keep him 

awake long enough to have a satisfactory conversation about it.  I asked the 

patient if he had any family or friends he wanted me to discuss the situation 

with.  He said he did not.  I discussed the situation with Dr. Olivares who knows 

him well from his prior admissions.  Her recommendation based on her knowledge 

from prior conversations with him was to admit for comfort care since he could 

not be consented for hospice at home.  There were no satisfactory outpatient 

dispositions for him, and he was refusing the life-saving therapies available in

the hospital.  Plan for admission and comfort care was communicated to the 

patient, and he was agreeable.





Diagnostic Imaging





   Diagonstic Imaging:  Xray


   Plain Films/CT/US/NM/MRI:  chest


Comments


Chest x-ray viewed by me and report reviewed.  See report below:





NAME:   JANNA REYNOLDS


[facility] REC#:   [id number]


ACCOUNT#:   [id number]


PT STATUS:   REG ER


:   1970


PHYSICIAN:   MARCK JOSEPH MD


ADMIT DATE:   22/ER


***Draft***


Date of Exam:22





CHEST 1 VIEW, AP/PA ONLY








INDICATION: Shortness of air.





COMPARISON: 2021.





FINDINGS: Single frontal radiographic view of the chest was


obtained and again demonstrates moderate cardiomegaly and


moderate pulmonary vascular congestion. Lungs show low


inspiratory volumes. Lung bases are heavily obscured by overlying


soft tissue attenuation. There is no large effusion or


pneumothorax. Osseous structures show no gross acute


abnormalities.





IMPRESSION:


1. Obscuration of the lung bases secondary to overlying soft


tissue attenuation.


2. Moderate cardiomegaly and pulmonary vascular congestion.





  Dictated on workstation # [id number]





Dict:   22 1357


Trans:   22 1400


 [id number]





Interpreted by:     NILSA ALTMAN MD





Departure


Communication (Admissions)


Time/Spoke to Admitting Phy:  16:25


Dr. Olivares





Impression





   Primary Impression:  


   Respiratory failure with hypoxia


   Qualified Codes:  J96.01 - Acute respiratory failure with hypoxia


   Additional Impressions:  


   Congestive heart failure


   Qualified Codes:  I50.9 - Heart failure, unspecified


   Noncompliance


   Hyperglycemia


   Debility


Disposition:   ADMITTED AS INPATIENT


Condition:  Unchanged





Admissions


Decision to Admit Reason:  Admit from ER (General)


Decision to Admit/Date:  2022


Time/Decision to Admit Time:  16:25





Departure-Patient Inst.


Referrals:  


YAA LEE MD (PCP/Family)


Primary Care Physician





Copy


Copies To 1:   YAA LEE MD, JOSHUA T MD        2022 12:21

## 2022-02-22 NOTE — HISTORY & PHYSICAL-HOSPITALIST
History of Present Illness


HPI/Chief Complaint


Chief complaint: End-of-life care





History of present illness: This is a 51-year-old white male with end stage 

obesity hypoventilation syndrome noncompliant with BiPAP and was very clear on 

last admit for DO NOT RESUSCITATE and do not initiate aggressive care who 

presented to the ER with failure to thrive and apneic episodes.  Decision was 

made to implement comfort care due to prior conversations I have had with the 

patient regarding his end-stage sleep apnea due to noncompliance with CPAP.  

Currently he is experiencing sonorous breath sounds and will be supportive for 

end-of-life care.


Source:  RN/MD, old records


Exam Limitations:  clinical condition


Date Seen


22


Time Seen by a Provider:  18:00


Attending Physician


Sendy Gibson David F MD


Referring Physician





Date of Admission


2022 at 16:27





Home Medications & Allergies


Home Medications


Reviewed patient Home Medication Reconciliation performed by pharmacy medication

reconciliations technician and/or nursing.


Patients Allergies have been reviewed.





Allergies





Allergies


Coded Allergies


  meperidine HCl (Unverified Allergy, Unknown, 11)


  shellfish derived (Unverified Allergy, Unknown, 13)


    FROM UNCODED ALLERGIES


  Penicillins (Unverified Adverse Reaction, Intermediate, NAUSEA, 11)








Past Medical-Social-Family Hx


Patient Social History


Marrital Status:  single


Employed/Student:  unemployed


Tobacco Use?:  No


Smoking Status:  Never a Smoker


Use of E-Cig and/or Vaping dev:  Yes


Substance use?:  No


Alcohol Use?:  No


Pt feels they are or have been:  No





Immunizations Up To Date


First/Initial COVID19 Vaccinat:  MODERNA


Second COVID19 Vaccination Keshawn:  MODERNA


Tetanus Booster (TDap):  Unknown


Hepatitis A:  No


Hepatitis B:  No


PED Vaccines UTD:  No


Date of Pneumonia Vaccine:  2010





Seasonal Allergies


Seasonal Allergies:  Yes





Current Status


Advance Directives:  Yes


Advance Directive Location:  Home


Communicates:  Verbally


Primary Language:  English


Preferred Spoken Language:  English


Is interpretation needed?:  No


Sensory deficits:  Vision impairment


Implanted or [employer] D:  None





Past Medical History


Surgeries:  Abdominal, Amputation (Left AKA), Orthopedic


Sleep Apnea


Currently Using CPAP:  No


Currently Using BIPAP:  No


Cardiomyopathy, Chronic Edema/Swelling, High Cholesterol, Hypertension


Concussion


Sexually Transmitted Disease:  No


HIV/AIDS:  No


Abdominal Hernia


Arthritis, Chronic Back Pain


Diabetes, Non-Insulin dep


Loss of Vision:  Denies


Hearing Impairment:  Denies


Anxiety


Recent Skin Changes


Blood Disorders:  No





PMHx:


DMII


HTN


HLD


Morbid obesity


Diabetic foot ulcers


Sleep apnea





SurgHx:


Left BKA for necrotizing wound





Family Medical History





Arthritis


  19 FATHER, Onset:Unknown


Diabetes mellitus


  19 MOTHER, , Onset:Unknown


No Pertinent Family Hx





Review of Systems


ROS-Unable to Obtain:  Comatose


Constitutional:  see HPI





Physical Exam


Physical Exam


Vital Signs





Vital Signs - First Documented








 22





 11:41


 


Temp 36.0


 


Pulse 92


 


Resp 19


 


B/P (MAP) 137/91 (106)


 


Pulse Ox 92


 


O2 Delivery Room Air


 


O2 Flow Rate 2.00





Capillary Refill : Less Than 3 Seconds


Height, Weight, BMI


Height: 6'0.00"


Weight: 400lbs. 4.0oz. 181.884018ku; 57.09 BMI


Method:Stated


General Appearance:  Chronically ill, Obese, Other (Okay)





Results


Results/Procedures


Labs


Laboratory Tests


22 11:55








Patient resulted labs reviewed.





Assessment/Plan


Admission Diagnosis


Assessment:


End-of-life care


Severe obesity hypoventilation syndrome noncompliant with BiPAP and CPAP


DNR





Plan:


Comfort care protocol


Admission Status:  Observation











SENDY GIBSON DO                2022 19:55

## 2022-02-22 NOTE — DIAGNOSTIC IMAGING REPORT
INDICATION: Shortness of air.



COMPARISON: 12/27/2021.



FINDINGS: Single frontal radiographic view of the chest was

obtained and again demonstrates moderate cardiomegaly and

moderate pulmonary vascular congestion. Lungs show low

inspiratory volumes. Lung bases are heavily obscured by overlying

soft tissue attenuation. There is no large effusion or

pneumothorax. Osseous structures show no gross acute

abnormalities.



IMPRESSION:

1. Obscuration of the lung bases secondary to overlying soft

tissue attenuation.

2. Moderate cardiomegaly and pulmonary vascular congestion.



Dictated by: 



  Dictated on workstation # [id number]

## 2022-02-23 NOTE — PROGRESS NOTE - HOSPITALIST
Subjective


HPI/CC On Admission


Date Seen by Provider:  Feb 23, 2022


Time Seen by Provider:  11:30


Chief complaint: End-of-life care





History of present illness: This is a 51-year-old white male with end stage 

obesity hypoventilation syndrome noncompliant with BiPAP and was very clear on 

last admit for DO NOT RESUSCITATE and do not initiate aggressive care who 

presented to the ER with failure to thrive and apneic episodes.  Decision was 

made to implement comfort care due to prior conversations I have had with the 

patient regarding his end-stage sleep apnea due to noncompliance with CPAP.  

Currently he is experiencing sonorous breath sounds and will be supportive for 

end-of-life care.


Subjective/Events-last exam


Pt remains nearly comatose


Apneic episodes noted


Pt doesn't meet criteria for skilled care and has nowhere to go so he will 

remain in the hospital for comfort care protocol





Review of Systems


Neurological:  Confusion





Objective


Exam


Vital Signs





Vital Signs








  Date Time  Temp Pulse Resp B/P (MAP) Pulse Ox O2 Delivery O2 Flow Rate FiO2


 


2/23/22 20:43      Nasal Cannula 4.00 


 


2/22/22 17:05  76 16 142/93 97   


 


2/22/22 11:41 36.0       





Capillary Refill : Less Than 3 Seconds


General Appearance:  No Apparent Distress, WD/WN, Chronically ill, Obese, Other 

(Semicomatose)


Respiratory:  Accessory Muscle Use





Results/Procedures


Lab


Patient resulted labs reviewed.





Assessment/Plan


Assessment and Plan


Assess & Plan/Chief Complaint


End-of-life care











JACK OLIVARES DO                Feb 23, 2022 12:19

## 2022-02-24 RX ADMIN — DOCUSATE SODIUM AND SENNOSIDES SCH EA: 8.6; 5 TABLET, FILM COATED ORAL at 20:53

## 2022-02-24 RX ADMIN — MORPHINE SULFATE PRN MG: 4 INJECTION, SOLUTION INTRAMUSCULAR; INTRAVENOUS at 09:41

## 2022-02-24 RX ADMIN — LACTULOSE SCH GM: 20 SOLUTION ORAL at 11:25

## 2022-02-24 RX ADMIN — LACTULOSE SCH GM: 20 SOLUTION ORAL at 20:53

## 2022-02-24 RX ADMIN — FUROSEMIDE SCH MG: 10 INJECTION, SOLUTION INTRAVENOUS at 17:19

## 2022-02-24 RX ADMIN — POLYETHYLENE GLYCOL (3350) SCH GM: 17 POWDER, FOR SOLUTION ORAL at 11:25

## 2022-02-24 RX ADMIN — POLYETHYLENE GLYCOL (3350) SCH GM: 17 POWDER, FOR SOLUTION ORAL at 20:53

## 2022-02-24 RX ADMIN — DOCUSATE SODIUM AND SENNOSIDES SCH EA: 8.6; 5 TABLET, FILM COATED ORAL at 11:25

## 2022-02-24 RX ADMIN — FUROSEMIDE SCH MG: 10 INJECTION, SOLUTION INTRAVENOUS at 11:25

## 2022-02-24 NOTE — WOUND CARE ASSESSMENT
Wound Care Assessment


Date Seen by Provider:  Feb 24, 2022


Time Seen by Provider:  11:12


Chief Complaint


Multiple wounds b/l ELLA Farr is a 51 year old gentleman well know to me in the outpatient clinic. He 

has a h/o DM2 (last A1C 12.3), peripheral arterial disease (worsened on latest 

testing), CAD with congestive heart failure, Anemia of chronic disease, Protein 

energy malnutrition (albumin 2.7), and morbid obesity. Yordan is noncompliant in 

all aspects of his care and is often deceptive about this fact. He has fallen 

into a pattern of refusing to take his home medications, developing CHF 

symptoms, coming to the hospital for diuresis and going home to repeat the same 

again. His last visit to the clinic was on 2/15/22. He asked me for a refill on 

his lasix at that time and I declined. This medication should be managed and 

monitored by his primary physician or cardiologist and I stressed the importance

of called them for a refill and seeing them in follow up at that visit. On past 

hospital admissions he has been prescribed antibiotics and has failed to pick 

them up from the pharmacy. This holds true for his daily diabetic medications 

and glucose monitoring supplies as well. He is extremely apathetic in his 

medical care. I do not know if this is due to depression or just severe lack of 

motivation. We have  repeatedly recommended follow up with his primary and 

vascular surgery (he has failed to do so with both on numerous occasions). We 

have recommended continuous glucose monitoring in effort to improve glycemic 

control and Dr. Roque's office has recommended as well (we have discussed this

together) and yet Yordan has failed to pursue. Yordan's one area of compliance is 

in his follow up in the outpatient wound care center. He has stated that his 

friend takes him to the mall for lunch and to get groceries on these days. I 

suspect this is his reason for coming to our office visits. Yulis wounds are 

chronic and will not heal with his current lifestyle choices and medical 

noncompliance. I have placed inpatient orders for his wounds but in an outpa

tient setting I have given him 2 options:


1. Hospice care: to provide in home wound care and comfort measures to assist 

him with his cardiac ailments


2. Aggressive lifestyle change and follow up with myself, primary physician and 

vascular surgeon. If Yordan were to choose this option, I would be following up 

closely with his pharmacy, primary physician and surgeon to make certain Yordan 

is making the changes necessary to get his health back on track (if possible). 


Yordan has opted for hospice care and we will support him in this decision.


Past Medical History:  Admits Diabetes Type II, Admits Heart Disease, Admits 

Peripheral Artery Disease


PEM, anemia of chronic disease


Smoking Status:  Never a Smoker


Review of Systems


General:  Fatigue


Pulmonary:  Dyspnea


Cardiovascular:  Orthopnea, Paroxysmal Noc. Dyspnea, Edema


Gastrointestinal:  Constipation





Exam





Vital Signs








  Date Time  Temp Pulse Resp B/P (MAP) Pulse Ox O2 Delivery O2 Flow Rate FiO2


 


2/24/22 08:30      Nasal Cannula 4.00 


 


2/22/22 17:05  76 16 142/93 97   


 


2/22/22 11:41 36.0       





Capillary Refill : Less Than 3 Seconds


General Appearance:  mild distress (SOA with conversation), obese


Cardiovascular:  other (3+ edema RLE)


Respiratory:  respiratory distress (SOA with conversation)


Extremities:  pedal edema (brawny edema)


Neurologic/Psychiatric:  alert, normal mood/affect, oriented x 3


Skin Character:  drainage, erythema, scales, thickening


Wound #1: LLE stump: 5.9x3.2x0.3 cm. The epithelialization is none. There is no 

tunneling or undermining. Drainage is medium and serosanguinous. Granulation is 

medium and pink, necrotic is small and slough. Wound margins show epibole.


Wound #2: RLE 1 great toe stump: 2x2x0.2 cm. The epithelialization is none. 

There is no tunneling or undermining. Drainage is small and serous. Granulation 

is medium and pink, necrotic is medium and slough. Wound margins show epibole.


Wound #3: RLE post. calf: 6.4x4.5x0.3. The epithelialization is none, there is 

no tunneling or undermining. Drainage is large and serosanguinous. Granulation 

is small and pink, necrotic is large and slough. Wound margins show epibole.





Results





Microbiology


2/22/22 Urine Culture - Final, Complete


          >=3 Gram Positive Isolates





Microbiology


2/22/22 Urine Culture - Final, Complete


          >=3 Gram Positive Isolates





Assessment/Plan/Dx


Assessment:





1. Multiple chronic ulcers


2. Poorly controlled DM2


3. PAD-untreated


4. PEM


5. CAD with CHF 


6. Chronic lymphedema


7. Morbid obesity


8. Medical noncompliance





Plan: 





1. Cleanse wounds daily with Vashe. Cover with Aquacel Ag and gauze (or ABD) and

tape. Change daily


2. Barrier ointment to periwounds as indicated


3. Strongly urge hospice care prior to d/c from hospital with plan for in home 

wound care and comfort care from this point forward at Yordan's request. 


4. If he should change his mind and his lifestyle choices, I would be happy to 

see him again for his wounds in an outpatient setting (with close monitoring of 

compliance)











ES LUBIN MD            Feb 24, 2022 11:21

## 2022-02-24 NOTE — PROGRESS NOTE - HOSPITALIST
Subjective


HPI/CC On Admission


Date Seen by Provider:  Feb 24, 2022


Time Seen by Provider:  11:00


Chief complaint: End-of-life care





History of present illness: This is a 51-year-old white male with end stage 

obesity hypoventilation syndrome noncompliant with BiPAP and was very clear on 

last admit for DO NOT RESUSCITATE and do not initiate aggressive care who 

presented to the ER with failure to thrive and apneic episodes.  Decision was 

made to implement comfort care due to prior conversations I have had with the 

patient regarding his end-stage sleep apnea due to noncompliance with CPAP.  

Currently he is experiencing sonorous breath sounds and will be supportive for 

end-of-life care.


Subjective/Events-last exam


Patient more alert at times


Needs to have a BM and once fluid off


Initiated aggressive bowel regimen and started Lasix 40 mg IV twice daily


Hospice at discharge





Review of Systems


General:  Fatigue, Malaise





Objective


Exam


Vital Signs





Vital Signs








  Date Time  Temp Pulse Resp B/P (MAP) Pulse Ox O2 Delivery O2 Flow Rate FiO2


 


2/24/22 21:03      Nasal Cannula 4.00 


 


2/22/22 17:05  76 16 142/93 97   


 


2/22/22 11:41 36.0       





Capillary Refill : Less Than 3 Seconds


General Appearance:  Chronically ill, Obese, Other (Semiobtunded)





Results/Procedures


Lab


Patient resulted labs reviewed.





Assessment/Plan


Assessment and Plan


Assess & Plan/Chief Complaint


Assessment:


End-stage obesity hypoventilation syndrome





Plan:


End-of-life care











JACK OLIVARES DO                Feb 24, 2022 10:50

## 2022-02-25 LAB
%HYPO/RBC NFR BLD AUTO: (no result) %
ALBUMIN SERPL-MCNC: 2.7 GM/DL (ref 3.2–4.5)
ALP SERPL-CCNC: 111 U/L (ref 40–136)
ALT SERPL-CCNC: 62 U/L (ref 0–55)
ANISOCYTOSIS BLD QL SMEAR: SLIGHT
BASOPHILS # BLD AUTO: 0.1 10^3/UL (ref 0–0.1)
BASOPHILS NFR BLD AUTO: 1 % (ref 0–10)
BILIRUB SERPL-MCNC: 0.5 MG/DL (ref 0.1–1)
BUN/CREAT SERPL: 13
CALCIUM SERPL-MCNC: 8.4 MG/DL (ref 8.5–10.1)
CHLORIDE SERPL-SCNC: 95 MMOL/L (ref 98–107)
CO2 SERPL-SCNC: 26 MMOL/L (ref 21–32)
CREAT SERPL-MCNC: 1.68 MG/DL (ref 0.6–1.3)
EOSINOPHIL # BLD AUTO: 0.2 10^3/UL (ref 0–0.3)
EOSINOPHIL NFR BLD AUTO: 3 % (ref 0–10)
EOSINOPHIL NFR BLD MANUAL: 2 %
GFR SERPLBLD BASED ON 1.73 SQ M-ARVRAT: 49 ML/MIN
GLUCOSE SERPL-MCNC: 414 MG/DL (ref 70–105)
HCT VFR BLD CALC: 32 % (ref 40–54)
HGB BLD-MCNC: 9.2 G/DL (ref 13.3–17.7)
LYMPHOCYTES # BLD AUTO: 0.4 10^3/UL (ref 1–4)
LYMPHOCYTES NFR BLD AUTO: 5 % (ref 12–44)
MANUAL DIFFERENTIAL PERFORMED BLD QL: YES
MCH RBC QN AUTO: 23 PG (ref 25–34)
MCHC RBC AUTO-ENTMCNC: 29 G/DL (ref 32–36)
MCV RBC AUTO: 80 FL (ref 80–99)
MONOCYTES # BLD AUTO: 0.7 10^3/UL (ref 0–1)
MONOCYTES NFR BLD AUTO: 9 % (ref 0–12)
MONOCYTES NFR BLD: 8 %
NEUTROPHILS # BLD AUTO: 6.4 10^3/UL (ref 1.8–7.8)
NEUTROPHILS NFR BLD AUTO: 82 % (ref 42–75)
NEUTS BAND NFR BLD MANUAL: 84 %
PLATELET # BLD: 325 10^3/UL (ref 130–400)
PMV BLD AUTO: 9.5 FL (ref 9–12.2)
POLYCHROMASIA BLD QL SMEAR: SLIGHT
POTASSIUM SERPL-SCNC: 4.7 MMOL/L (ref 3.6–5)
PROT SERPL-MCNC: 6.8 GM/DL (ref 6.4–8.2)
SODIUM SERPL-SCNC: 130 MMOL/L (ref 135–145)
VARIANT LYMPHS NFR BLD MANUAL: 6 %
WBC # BLD AUTO: 7.8 10^3/UL (ref 4.3–11)

## 2022-02-25 RX ADMIN — LACTULOSE SCH GM: 20 SOLUTION ORAL at 21:31

## 2022-02-25 RX ADMIN — LACTULOSE SCH GM: 20 SOLUTION ORAL at 10:35

## 2022-02-25 RX ADMIN — DOCUSATE SODIUM AND SENNOSIDES SCH EA: 8.6; 5 TABLET, FILM COATED ORAL at 21:31

## 2022-02-25 RX ADMIN — FUROSEMIDE SCH MG: 10 INJECTION, SOLUTION INTRAVENOUS at 07:01

## 2022-02-25 RX ADMIN — DOCUSATE SODIUM AND SENNOSIDES SCH EA: 8.6; 5 TABLET, FILM COATED ORAL at 10:36

## 2022-02-25 RX ADMIN — BISACODYL SCH MG: 10 SUPPOSITORY RECTAL at 10:36

## 2022-02-25 RX ADMIN — BACLOFEN PRN MG: 10 TABLET ORAL at 14:32

## 2022-02-25 RX ADMIN — POLYETHYLENE GLYCOL (3350) SCH GM: 17 POWDER, FOR SOLUTION ORAL at 21:31

## 2022-02-25 RX ADMIN — FUROSEMIDE SCH MG: 40 TABLET ORAL at 17:19

## 2022-02-25 RX ADMIN — POLYETHYLENE GLYCOL (3350) SCH GM: 17 POWDER, FOR SOLUTION ORAL at 10:36

## 2022-02-25 NOTE — PROGRESS NOTE - HOSPITALIST
Subjective


HPI/CC On Admission


Date Seen by Provider:  Feb 25, 2022


Time Seen by Provider:  10:30


Chief complaint: End-of-life care





History of present illness: This is a 51-year-old white male with end stage 

obesity hypoventilation syndrome noncompliant with BiPAP and was very clear on 

last admit for DO NOT RESUSCITATE and do not initiate aggressive care who 

presented to the ER with failure to thrive and apneic episodes.  Decision was 

made to implement comfort care due to prior conversations I have had with the 

patient regarding his end-stage sleep apnea due to noncompliance with CPAP.  

Currently he is experiencing sonorous breath sounds and will be supportive for 

end-of-life care.


Subjective/Events-last exam


Patient doing well


Breathing is about the same


Lasix maintained


Needs BM more evacuation so mag citrate ordered





Review of Systems


Pulmonary:  Dyspnea





Objective


Exam


Vital Signs





Vital Signs








  Date Time  Temp Pulse Resp B/P (MAP) Pulse Ox O2 Delivery O2 Flow Rate FiO2


 


2/25/22 20:13      Nasal Cannula 4.00 


 


2/22/22 17:05  76 16 142/93 97   


 


2/22/22 11:41 36.0       





Capillary Refill : Less Than 3 Seconds


General Appearance:  No Apparent Distress, WD/WN, Chronically ill, Obese


Respiratory:  Lungs Clear


Cardiovascular:  Regular Rate, Rhythm


Neurologic/Psychiatric:  Alert, Oriented x3





Results/Procedures


Lab


Laboratory Tests


2/25/22 06:54








Patient resulted labs reviewed.





Assessment/Plan


Assessment and Plan


Assess & Plan/Chief Complaint


Assessment:


End-stage obesity hypoventilation syndrome





Plan:


End-of-life care





2/25/22:


Monitor closely


[facility]                Feb 25, 2022 06:20

## 2022-02-26 RX ADMIN — OXYCODONE HYDROCHLORIDE AND ACETAMINOPHEN PRN TAB: 5; 325 TABLET ORAL at 20:52

## 2022-02-26 RX ADMIN — BISACODYL SCH MG: 10 SUPPOSITORY RECTAL at 07:43

## 2022-02-26 RX ADMIN — POLYETHYLENE GLYCOL (3350) SCH GM: 17 POWDER, FOR SOLUTION ORAL at 07:43

## 2022-02-26 RX ADMIN — LACTULOSE SCH GM: 20 SOLUTION ORAL at 07:42

## 2022-02-26 RX ADMIN — DOCUSATE SODIUM AND SENNOSIDES SCH EA: 8.6; 5 TABLET, FILM COATED ORAL at 19:34

## 2022-02-26 RX ADMIN — FUROSEMIDE SCH MG: 40 TABLET ORAL at 16:17

## 2022-02-26 RX ADMIN — LACTULOSE SCH GM: 20 SOLUTION ORAL at 19:34

## 2022-02-26 RX ADMIN — POLYETHYLENE GLYCOL (3350) SCH GM: 17 POWDER, FOR SOLUTION ORAL at 19:34

## 2022-02-26 RX ADMIN — FUROSEMIDE SCH MG: 40 TABLET ORAL at 06:24

## 2022-02-26 RX ADMIN — DOCUSATE SODIUM AND SENNOSIDES SCH EA: 8.6; 5 TABLET, FILM COATED ORAL at 07:43

## 2022-02-26 NOTE — PROGRESS NOTE - HOSPITALIST
Subjective


HPI/CC On Admission


Date Seen by Provider:  Feb 26, 2022


Time Seen by Provider:  12:15


Chief complaint: End-of-life care





History of present illness: This is a 51-year-old white male with end stage 

obesity hypoventilation syndrome noncompliant with BiPAP and was very clear on 

last admit for DO NOT RESUSCITATE and do not initiate aggressive care who 

presented to the ER with failure to thrive and apneic episodes.  Decision was 

made to implement comfort care due to prior conversations I have had with the 

patient regarding his end-stage sleep apnea due to noncompliance with CPAP.  

Currently he is experiencing sonorous breath sounds and will be supportive for 

end-of-life care.


Subjective/Events-last exam


Patient was prepared to leave but has no one to take care of him at home and his

friend can only transport him


Patient needs nursing home and he is willing to do that


Hospice will be needed also


Patient had evacuation of bowels and urinated last night all over the bed





Review of Systems


General:  Fatigue, Malaise





Objective


Exam


Vital Signs





Vital Signs








  Date Time  Temp Pulse Resp B/P (MAP) Pulse Ox O2 Delivery O2 Flow Rate FiO2


 


2/26/22 20:00      Nasal Cannula 4.00 


 


2/26/22 08:00     97   


 


2/22/22 17:05  76 16 142/93    


 


2/22/22 11:41 36.0       





Capillary Refill : Less Than 3 Seconds


General Appearance:  No Apparent Distress, WD/WN, Chronically ill, Obese


Respiratory:  Lungs Clear, Normal Breath Sounds


Cardiovascular:  Regular Rate, Rhythm


Neurologic/Psychiatric:  Alert, Oriented x3





Results/Procedures


Lab


Patient resulted labs reviewed.





Assessment/Plan


Assessment and Plan


Assess & Plan/Chief Complaint


Assessment:


End-stage obesity hypoventilation syndrome





Plan:


End-of-life care





2/25/22:


Monitor closely


Hospice at [place]





2/26/2022:


Needs nursing home











[facility] OLIVARES DO                Feb 26, 2022 14:18

## 2022-02-26 NOTE — DISCHARGE SUMMARY
Discharge Summary


Hospital Course


Hospital Course


Date of Admission: 2022 at 13:23 


Admission Diagnosis :  





Family Physician/Provider: Noe Roque MD  





Date of Discharge: 22 


Discharge Diagnosis: [ ]








Hospital Course:


[ ]














Labs and Pending Lab Test:


Laboratory Tests


22 15:35: Glucometer 207H


22 21:00: Glucometer 268H


22 06:23: Glucometer 235H





Microbiology


22 Urine Culture - Final, Complete


          >=3 Gram Positive Isolates





Home Meds


Active


Stool Softener-Laxative Tablet (Sennosides/Docusate Sodium) 1 Each Tablet 2 Ea 

PO BID


Lactulose 20 Gm/30 Ml Solution 10 Gm PO BID


Baclofen 10 Mg Tablet 10 Mg PO TID PRN


Potassium Chloride 10 Meq Tab.er.prt 10 Meq PO Q48H


Furosemide 40 Mg Tablet 40 Mg PO DAILY


HYDROcodone/APAP  5 MG/325 MG TAB (Acetaminophen/Hydrocodone Bitart) 1 Tab Tab 1

Ea PO Q4H PRN


Levemir Flextouch (Insulin Detemir) 100 Unit/1 Ml Insuln.pen 20 Unit SQ BID


Novolog Flexpen (Insulin Aspart) 300 Units/3 Ml Solution 5 Units SQ AC


Reported


Aspirin EC (Aspirin) 325 Mg Tablet.dr 325 Mg PO DAILY





Discharge Physical Examination


Vital Signs





Vital Signs








  Date Time  Temp Pulse Resp B/P (MAP) Pulse Ox O2 Delivery O2 Flow Rate FiO2


 


22 11:33      Nasal Cannula 4.00 


 


22 08:00     97   


 


22 17:05  76 16 142/93    


 


22 11:41 36.0       








Allergies:  


Coded Allergies:  


     meperidine HCl (Unverified  Allergy, Unknown, 11)


     shellfish derived (Unverified  Allergy, Unknown, 13)


   FROM UNCODED ALLERGIES


     Penicillins (Unverified  Adverse Reaction, Intermediate, NAUSEA, 11)





Discharge Summary


Date of Admission


2022 at 13:23


Date of Discharge





Discharge Date:  2022


Admission Diagnosis


Assessment:


End-of-life care


Severe obesity hypoventilation syndrome noncompliant with BiPAP and CPAP


DNR





Plan:


Comfort care protocol


Comfort Measures/


End of Life Care:  Pallative Care





Discharge Diagnosis


Assessment:


End-stage obesity hypoventilation syndrome





Plan:


End-of-life care





22:


Monitor closely


[facility]                2022 11:36

## 2022-02-26 NOTE — PROGRESS NOTE - HOSPITALIST
Subjective


HPI/CC On Admission


Date Seen by Provider:  Feb 26, 2022


Chief complaint: End-of-life care





History of present illness: This is a 51-year-old white male with end stage 

obesity hypoventilation syndrome noncompliant with BiPAP and was very clear on 

last admit for DO NOT RESUSCITATE and do not initiate aggressive care who 

presented to the ER with failure to thrive and apneic episodes.  Decision was 

made to implement comfort care due to prior conversations I have had with the 

patient regarding his end-stage sleep apnea due to noncompliance with CPAP.  

Currently he is experiencing sonorous breath sounds and will be supportive for 

end-of-life care.





Objective


Exam


Vital Signs





Vital Signs








  Date Time  Temp Pulse Resp B/P (MAP) Pulse Ox O2 Delivery O2 Flow Rate FiO2


 


2/26/22 11:33      Nasal Cannula 4.00 


 


2/26/22 08:00     97   


 


2/22/22 17:05  76 16 142/93    


 


2/22/22 11:41 36.0       





Capillary Refill : Less Than 3 Seconds





Results/Procedures


Lab


Patient resulted labs reviewed.





Assessment/Plan


Assessment and Plan


Assess & Plan/Chief Complaint


Assessment:


End-stage obesity hypoventilation syndrome





Plan:


End-of-life care





2/25/22:


Monitor closely


[facility]                Feb 26, 2022 07:34

## 2022-02-27 LAB
ALBUMIN SERPL-MCNC: 2.5 GM/DL (ref 3.2–4.5)
ALP SERPL-CCNC: 97 U/L (ref 40–136)
ALT SERPL-CCNC: 39 U/L (ref 0–55)
BASOPHILS # BLD AUTO: 0 10^3/UL (ref 0–0.1)
BASOPHILS NFR BLD AUTO: 0 % (ref 0–10)
BILIRUB SERPL-MCNC: 0.4 MG/DL (ref 0.1–1)
BUN/CREAT SERPL: 13
CALCIUM SERPL-MCNC: 8.1 MG/DL (ref 8.5–10.1)
CHLORIDE SERPL-SCNC: 98 MMOL/L (ref 98–107)
CO2 SERPL-SCNC: 28 MMOL/L (ref 21–32)
CREAT SERPL-MCNC: 1.35 MG/DL (ref 0.6–1.3)
EOSINOPHIL # BLD AUTO: 0.3 10^3/UL (ref 0–0.3)
EOSINOPHIL NFR BLD AUTO: 5 % (ref 0–10)
GFR SERPLBLD BASED ON 1.73 SQ M-ARVRAT: 64 ML/MIN
GLUCOSE SERPL-MCNC: 329 MG/DL (ref 70–105)
HCT VFR BLD CALC: 32 % (ref 40–54)
HGB BLD-MCNC: 9.2 G/DL (ref 13.3–17.7)
LYMPHOCYTES # BLD AUTO: 0.6 X 10^3 (ref 1–4)
LYMPHOCYTES NFR BLD AUTO: 9 % (ref 12–44)
MANUAL DIFFERENTIAL PERFORMED BLD QL: NO
MCH RBC QN AUTO: 23 PG (ref 25–34)
MCHC RBC AUTO-ENTMCNC: 29 G/DL (ref 32–36)
MCV RBC AUTO: 80 FL (ref 80–99)
MONOCYTES # BLD AUTO: 0.7 X 10^3 (ref 0–1)
MONOCYTES NFR BLD AUTO: 10 % (ref 0–12)
NEUTROPHILS # BLD AUTO: 5.3 X 10^3 (ref 1.8–7.8)
NEUTROPHILS NFR BLD AUTO: 76 % (ref 42–75)
PLATELET # BLD: 306 10^3/UL (ref 130–400)
PMV BLD AUTO: 9.4 FL (ref 9–12.2)
POTASSIUM SERPL-SCNC: 3.8 MMOL/L (ref 3.6–5)
PROT SERPL-MCNC: 6.4 GM/DL (ref 6.4–8.2)
SODIUM SERPL-SCNC: 134 MMOL/L (ref 135–145)
WBC # BLD AUTO: 7 10^3/UL (ref 4.3–11)

## 2022-02-27 RX ADMIN — LACTULOSE SCH GM: 20 SOLUTION ORAL at 19:32

## 2022-02-27 RX ADMIN — BISACODYL SCH MG: 10 SUPPOSITORY RECTAL at 07:56

## 2022-02-27 RX ADMIN — DOCUSATE SODIUM AND SENNOSIDES SCH EA: 8.6; 5 TABLET, FILM COATED ORAL at 19:32

## 2022-02-27 RX ADMIN — FUROSEMIDE SCH MG: 40 TABLET ORAL at 17:08

## 2022-02-27 RX ADMIN — DOCUSATE SODIUM AND SENNOSIDES SCH EA: 8.6; 5 TABLET, FILM COATED ORAL at 21:11

## 2022-02-27 RX ADMIN — FUROSEMIDE SCH MG: 40 TABLET ORAL at 06:05

## 2022-02-27 RX ADMIN — OXYCODONE HYDROCHLORIDE AND ACETAMINOPHEN PRN TAB: 5; 325 TABLET ORAL at 06:05

## 2022-02-27 RX ADMIN — BACLOFEN PRN MG: 10 TABLET ORAL at 23:29

## 2022-02-27 RX ADMIN — OXYCODONE HYDROCHLORIDE AND ACETAMINOPHEN PRN TAB: 5; 325 TABLET ORAL at 21:05

## 2022-02-27 RX ADMIN — POLYETHYLENE GLYCOL (3350) SCH GM: 17 POWDER, FOR SOLUTION ORAL at 07:56

## 2022-02-27 RX ADMIN — DIPHENHYDRAMINE HYDROCHLORIDE, ZINC ACETATE SCH GM: 2; .1 CREAM TOPICAL at 13:50

## 2022-02-27 RX ADMIN — DOCUSATE SODIUM AND SENNOSIDES SCH EA: 8.6; 5 TABLET, FILM COATED ORAL at 07:56

## 2022-02-27 RX ADMIN — POLYETHYLENE GLYCOL (3350) SCH GM: 17 POWDER, FOR SOLUTION ORAL at 19:32

## 2022-02-27 RX ADMIN — DIPHENHYDRAMINE HYDROCHLORIDE, ZINC ACETATE SCH GM: 2; .1 CREAM TOPICAL at 21:05

## 2022-02-27 RX ADMIN — LACTULOSE SCH GM: 20 SOLUTION ORAL at 07:56

## 2022-02-27 NOTE — PROGRESS NOTE - HOSPITALIST
Subjective


HPI/CC On Admission


Date Seen by Provider:  Feb 27, 2022


Time Seen by Provider:  12:15


Chief complaint: End-of-life care





History of present illness: This is a 51-year-old white male with end stage 

obesity hypoventilation syndrome noncompliant with BiPAP and was very clear on 

last admit for DO NOT RESUSCITATE and do not initiate aggressive care who 

presented to the ER with failure to thrive and apneic episodes.  Decision was 

made to implement comfort care due to prior conversations I have had with the 

patient regarding his end-stage sleep apnea due to noncompliance with CPAP.  

Currently he is experiencing sonorous breath sounds and will be supportive for 

end-of-life care.


Subjective/Events-last exam


Patient doing the same


Bowel movements are good voiding well


Benadryl cream will be given for itchiness


Needs nursing home





Review of Systems


General:  Fatigue, Malaise





Objective


Exam


Vital Signs





Vital Signs








  Date Time  Temp Pulse Resp B/P (MAP) Pulse Ox O2 Delivery O2 Flow Rate FiO2


 


2/27/22 20:00      Nasal Cannula 4.00 


 


2/27/22 07:52     97   


 


2/22/22 17:05  76 16 142/93    


 


2/22/22 11:41 36.0       





Capillary Refill : Less Than 3 Seconds


General Appearance:  No Apparent Distress, WD/WN, Chronically ill, Obese


Respiratory:  Lungs Clear, Normal Breath Sounds


Cardiovascular:  Regular Rate, Rhythm


Neurologic/Psychiatric:  Alert, Oriented x3, No Motor/Sensory Deficits, Normal 

Mood/Affect





Results/Procedures


Lab


Laboratory Tests


2/27/22 09:00








Patient resulted labs reviewed.





Assessment/Plan


Assessment and Plan


Assess & Plan/Chief Complaint


Assessment:


End-stage obesity hypoventilation syndrome





Plan:


End-of-life care





2/25/22:


Monitor closely


Hospice at [place]





2/26/2022:


Needs nursing home





2/27/2022:


Needs nursing home











[facility] DO                Feb 27, 2022 08:28

## 2022-02-28 RX ADMIN — OXYCODONE HYDROCHLORIDE AND ACETAMINOPHEN PRN TAB: 5; 325 TABLET ORAL at 01:24

## 2022-02-28 RX ADMIN — LACTULOSE SCH GM: 20 SOLUTION ORAL at 10:38

## 2022-02-28 RX ADMIN — FUROSEMIDE SCH MG: 40 TABLET ORAL at 14:25

## 2022-02-28 RX ADMIN — DIPHENHYDRAMINE HYDROCHLORIDE, ZINC ACETATE SCH APPLIC: 2; .1 CREAM TOPICAL at 21:03

## 2022-02-28 RX ADMIN — FUROSEMIDE SCH MG: 40 TABLET ORAL at 06:12

## 2022-02-28 RX ADMIN — LACTULOSE SCH GM: 20 SOLUTION ORAL at 21:02

## 2022-02-28 RX ADMIN — FUROSEMIDE SCH MG: 40 TABLET ORAL at 20:10

## 2022-02-28 RX ADMIN — DOCUSATE SODIUM AND SENNOSIDES SCH EA: 8.6; 5 TABLET, FILM COATED ORAL at 21:03

## 2022-02-28 RX ADMIN — DIPHENHYDRAMINE HYDROCHLORIDE, ZINC ACETATE SCH APPLIC: 2; .1 CREAM TOPICAL at 14:24

## 2022-02-28 RX ADMIN — DIPHENHYDRAMINE HYDROCHLORIDE, ZINC ACETATE SCH APPLIC: 2; .1 CREAM TOPICAL at 09:07

## 2022-02-28 RX ADMIN — OXYCODONE HYDROCHLORIDE AND ACETAMINOPHEN PRN TAB: 5; 325 TABLET ORAL at 21:30

## 2022-02-28 RX ADMIN — OXYCODONE HYDROCHLORIDE AND ACETAMINOPHEN PRN TAB: 5; 325 TABLET ORAL at 06:13

## 2022-02-28 RX ADMIN — BISACODYL SCH MG: 10 SUPPOSITORY RECTAL at 10:38

## 2022-02-28 RX ADMIN — POLYETHYLENE GLYCOL (3350) SCH GM: 17 POWDER, FOR SOLUTION ORAL at 21:02

## 2022-02-28 RX ADMIN — DOCUSATE SODIUM AND SENNOSIDES SCH EA: 8.6; 5 TABLET, FILM COATED ORAL at 10:37

## 2022-02-28 RX ADMIN — POLYETHYLENE GLYCOL (3350) SCH GM: 17 POWDER, FOR SOLUTION ORAL at 10:37

## 2022-02-28 NOTE — PROGRESS NOTE
Subjective


Subjective/Events-last exam


Afebrile, appears short of breath this morning, falling asleep during 

conversation. Attempted to d/c to home with hospice over the weekend, was unable

to even get out of the hospital and required tremendous effort to get back to 

hospital bed.





Objective


Exam


Last Set of Vital Signs





Vital Signs








  Date Time  Temp Pulse Resp B/P (MAP) Pulse Ox O2 Delivery O2 Flow Rate FiO2


 


2/28/22 09:46      Nasal Cannula 4.00 


 


2/27/22 07:52     97   


 


2/22/22 17:05  76 16 142/93    


 


2/22/22 11:41 36.0       





Capillary Refill : Less Than 3 Seconds


I&O











Intake and Output 


 


 2/27/22





 23:59


 


Intake Total 3165 ml


 


Balance 3165 ml


 


 


 


Intake Oral 3165 ml


 


# Voids 9








General:  Other (drowsy but arousable)


Lungs:  Other (decreased air movement, increased work of breathing)


Heart:  Regular Rate, No Murmurs


Neuro:  Normal Speech


Psych/Mental Status:  Mental Status NL





Results/Procedures


Lab


Laboratory Tests


2/27/22 21:08: Glucometer 302H





Microbiology


2/22/22 Urine Culture - Final, Complete


          >=3 Gram Positive Isolates





Assessment/Plan


Assessment/Plan





(1) Respiratory failure with hypoxia


Status:  Acute


Assessment & Plan:  Secondary to CHF and obesity hypoventilation. Does not want 

to use CPAP or BiPAP although he has observed apnea, and has declined use since 

admission, admitted with comfort goals given declination of the recommended 

medical treatment.


Pt with severe debility and as documented by other physicians, he again confirms

to me that he would prefer to focus on comfort goals at this time given his 

marked debility and worsening respiratory failure. Is unable to go home with 

hospice due to high assistance needs, working on placement.


Qualifiers:  


   Qualified Codes:  J96.01 - Acute respiratory failure with hypoxia


(2) Congestive heart failure


Status:  Acute


Assessment & Plan:  Echocardiogram of 11-26-21 by Dr. Myers showed mod 

concentric hypertrophy.  LVEF 50-55%.  Grade 2 diastolic dysfunction.  RA 

dilated.  Mild MR.  PASP 15-20 mmhg


-Lasix BID, continues to have worsening edema, will increase lasix.


Qualifiers:  


   Qualified Codes:  I50.33 - Acute on chronic diastolic (congestive) heart 

failure


(3) Debility


Status:  Acute


(4) Diabetes mellitus, insulin dependent (IDDM), uncontrolled


Status:  Chronic


(5) Mixed hyperlipidemia


Status:  Chronic


(6) Chronic wound of extremity


Status:  Chronic


Assessment & Plan:  History of left TKA and right toe amputation, continues to 

have extremity wounds, appreciate Wound Care recommendations, dressings being 

done, but healing not anticipated given his marked elevation in A1c, severe 

edema and his difficulty in working toward improving these.





(7) Acute renal insufficiency


Status:  Acute


(8) Peripheral arterial disease


Status:  Chronic











ENRIQUE FRANK MD             Feb 28, 2022 13:32

## 2022-03-01 PROCEDURE — 0T9B70Z DRAINAGE OF BLADDER WITH DRAINAGE DEVICE, VIA NATURAL OR ARTIFICIAL OPENING: ICD-10-PCS | Performed by: UROLOGY

## 2022-03-01 RX ADMIN — OXYCODONE HYDROCHLORIDE AND ACETAMINOPHEN PRN TAB: 5; 325 TABLET ORAL at 21:38

## 2022-03-01 RX ADMIN — OXYCODONE HYDROCHLORIDE AND ACETAMINOPHEN PRN TAB: 5; 325 TABLET ORAL at 11:07

## 2022-03-01 RX ADMIN — DIPHENHYDRAMINE HYDROCHLORIDE, ZINC ACETATE SCH APPLIC: 2; .1 CREAM TOPICAL at 10:51

## 2022-03-01 RX ADMIN — DIPHENHYDRAMINE HYDROCHLORIDE, ZINC ACETATE SCH APPLIC: 2; .1 CREAM TOPICAL at 21:38

## 2022-03-01 RX ADMIN — POLYETHYLENE GLYCOL (3350) SCH GM: 17 POWDER, FOR SOLUTION ORAL at 10:52

## 2022-03-01 RX ADMIN — DOCUSATE SODIUM AND SENNOSIDES SCH EA: 8.6; 5 TABLET, FILM COATED ORAL at 10:52

## 2022-03-01 RX ADMIN — FUROSEMIDE SCH MG: 40 TABLET ORAL at 09:36

## 2022-03-01 RX ADMIN — DIPHENHYDRAMINE HYDROCHLORIDE, ZINC ACETATE SCH APPLIC: 2; .1 CREAM TOPICAL at 12:12

## 2022-03-01 RX ADMIN — DOCUSATE SODIUM AND SENNOSIDES SCH EA: 8.6; 5 TABLET, FILM COATED ORAL at 21:46

## 2022-03-01 RX ADMIN — FUROSEMIDE SCH MG: 40 TABLET ORAL at 21:38

## 2022-03-01 RX ADMIN — LACTULOSE SCH GM: 20 SOLUTION ORAL at 21:46

## 2022-03-01 RX ADMIN — POLYETHYLENE GLYCOL (3350) SCH GM: 17 POWDER, FOR SOLUTION ORAL at 21:46

## 2022-03-01 RX ADMIN — FUROSEMIDE SCH MG: 40 TABLET ORAL at 12:16

## 2022-03-01 RX ADMIN — BISACODYL SCH MG: 10 SUPPOSITORY RECTAL at 10:52

## 2022-03-01 RX ADMIN — LACTULOSE SCH GM: 20 SOLUTION ORAL at 10:52

## 2022-03-01 NOTE — CONSULTATION REPORT
DATE OF SERVICE:  03/01/2022



ATTENDING PHYSICIAN:

Dr. Marin.



SUMMARY:

A 51-year-old white man with severe obesity hypoventilation syndrome with

noncompliance treatment, who is a DNR and on comfort care.  He has been having

severe edema and anasarca causing marked swelling of the genitalia and inability

to insert Dawson catheter.  He has been able to void, but hard to keep track of

his output, especially with the diuresis.  I was asked to try to put a catheter

in him.  I examined him.  He does have severe large edema of the genitalia,

unable to see the penis at all.  The penis is retracted all the way in secondary

to the edema, anasarca and obesity.  I am not sure I will be able to put a

catheter in him, but we will try.  I explained to the patient that the procedure

and we will get the catheter kit and try to insert a Dawson.





Job ID: [id number]

DocumentID: [id number]

Dictated Date:  03/01/2022 10:29:44

Transcription Date: 03/01/2022 15:02:02

Dictated By: ELIAS TAWIL, MD

## 2022-03-01 NOTE — OPERATIVE REPORT
DATE OF SERVICE:  03/01/2022



PREOPERATIVE DIAGNOSES:

Severe genital edema and anasarca and inability to insert Dawson catheter by the

nursing staff with need for catheter for diuresis and management of urine

output.



POSTOPERATIVE DIAGNOSES:

Severe genital edema and anasarca and inability to insert Dawson catheter by the

nursing staff with need for catheter for diuresis and management of urine

output.



PROCEDURE PERFORMED:

Insertion of a Dawson catheter.



SURGEON:

Elias Tawil, MD



ANESTHESIA:

Local.



COMPLICATIONS:

None.



DESCRIPTION OF PROCEDURE:

With the genitalia being prepped and draped in the usual sterile fashion and the

lidocaine jelly injected inside, tried to feel the penis that was retracted all

the way inside.  I could feel the urethral meatus, but of course could not get

hold on any penis at all with severe edema.  I went ahead and prepped and draped

the genitalia.  First, I tried a straight Dawson catheter, I could not pass it,

then I tried a coude catheter and was to feel with the finger of the meatus and

the tip of the coude.  I was able to insert into the urethral meatus and then

passed it all the way easily to the bladder draining a large amount of clear

urine from it.  The balloon was inflated to 10 mL was no problem.  There was no

bleeding.  Catheter was secured.  The patient tolerated the procedure and

anesthesia well, remained in his bed in stable condition.





Job ID: [id number]

DocumentID: [id number]

Dictated Date:  03/01/2022 11:03:53

Transcription Date: 03/01/2022 19:05:25

Dictated By: ELIAS TAWIL, MD

## 2022-03-01 NOTE — PROGRESS NOTE
Subjective


Subjective/Events-last exam


States he is doing okay, continues to have severe scrotal swelling, but is 

passing urine.





Objective


Exam


Last Set of Vital Signs





Vital Signs








  Date Time  Temp Pulse Resp B/P (MAP) Pulse Ox O2 Delivery O2 Flow Rate FiO2


 


2/28/22 20:31      Nasal Cannula 4.00 


 


2/27/22 07:52     97   





Capillary Refill : Less Than 3 Seconds


I&O











Intake and Output 


 


 3/1/22





 00:00


 


Intake Total 2460 ml


 


Balance 2460 ml


 


 


 


Intake Oral 2460 ml


 


# Voids 9


 


# Bowel Movements 1








General:  Alert, No Acute Distress


Lungs:  Clear to Auscultation, Normal Air Movement


Heart:  Regular Rate, No Murmurs


Psych/Mental Status:  Mood NL





Results/Procedures


Lab


Laboratory Tests


2/28/22 20:10: Glucometer 390H





Microbiology


2/22/22 Urine Culture - Final, Complete


          >=3 Gram Positive Isolates





Assessment/Plan


Assessment/Plan





(1) Respiratory failure with hypoxia


Status:  Acute


Assessment & Plan:  Secondary to CHF and obesity hypoventilation. Does not want 

to use CPAP or BiPAP although he has observed apnea, and has declined use since 

admission, admitted with comfort goals given declination of the recommended 

medical treatment.


Pt with severe debility and as documented by other physicians, he again confirms

to me that he would prefer to focus on comfort goals at this time given his 

marked debility and worsening respiratory failure. Is unable to go home with 

hospice due to high assistance needs, working on placement.


Qualifiers:  


   Qualified Codes:  J96.01 - Acute respiratory failure with hypoxia


(2) Congestive heart failure


Status:  Acute


Assessment & Plan:  Echocardiogram of 11-26-21 by Dr. Myers showed mod 

concentric hypertrophy.  LVEF 50-55%.  Grade 2 diastolic dysfunction.  RA 

dilated.  Mild MR.  PASP 15-20 mmhg


-Lasix BID, continues to have worsening edema, will increase lasix.


Qualifiers:  


   Qualified Codes:  I50.33 - Acute on chronic diastolic (congestive) heart 

failure


(3) Debility


Status:  Acute


(4) Diabetes mellitus, insulin dependent (IDDM), uncontrolled


Status:  Chronic


(5) Mixed hyperlipidemia


Status:  Chronic


(6) Chronic wound of extremity


Status:  Chronic


Assessment & Plan:  History of left TKA and right toe amputation, continues to 

have extremity wounds, appreciate Wound Care recommendations, dressings being 

done, but healing not anticipated given his marked elevation in A1c, severe 

edema and his difficulty in working toward improving these.





(7) Acute renal insufficiency


Status:  Acute


(8) Peripheral arterial disease


Status:  Chronic


(9) Goals of care, counseling/discussion


Status:  Acute


Assessment & Plan:  Continues to desire comfort care only, will d/c all meds 

other than those that associated with alleviating symptoms, working on nursing 

facility placement with hospice.














ENRIQUE FRANK MD              Mar 1, 2022 11:08

## 2022-03-02 RX ADMIN — POLYETHYLENE GLYCOL (3350) SCH GM: 17 POWDER, FOR SOLUTION ORAL at 10:24

## 2022-03-02 RX ADMIN — DIPHENHYDRAMINE HYDROCHLORIDE, ZINC ACETATE SCH APPLIC: 2; .1 CREAM TOPICAL at 12:40

## 2022-03-02 RX ADMIN — LACTULOSE SCH GM: 20 SOLUTION ORAL at 20:36

## 2022-03-02 RX ADMIN — LACTULOSE SCH GM: 20 SOLUTION ORAL at 10:23

## 2022-03-02 RX ADMIN — FUROSEMIDE SCH MG: 40 TABLET ORAL at 20:36

## 2022-03-02 RX ADMIN — FUROSEMIDE SCH MG: 40 TABLET ORAL at 10:24

## 2022-03-02 RX ADMIN — DOCUSATE SODIUM AND SENNOSIDES SCH EA: 8.6; 5 TABLET, FILM COATED ORAL at 20:36

## 2022-03-02 RX ADMIN — DOCUSATE SODIUM AND SENNOSIDES SCH EA: 8.6; 5 TABLET, FILM COATED ORAL at 10:24

## 2022-03-02 RX ADMIN — FUROSEMIDE SCH MG: 40 TABLET ORAL at 12:40

## 2022-03-02 RX ADMIN — BISACODYL SCH MG: 10 SUPPOSITORY RECTAL at 10:24

## 2022-03-02 RX ADMIN — DIPHENHYDRAMINE HYDROCHLORIDE, ZINC ACETATE SCH APPLIC: 2; .1 CREAM TOPICAL at 20:37

## 2022-03-02 RX ADMIN — POLYETHYLENE GLYCOL (3350) SCH GM: 17 POWDER, FOR SOLUTION ORAL at 20:36

## 2022-03-02 RX ADMIN — DIPHENHYDRAMINE HYDROCHLORIDE, ZINC ACETATE SCH APPLIC: 2; .1 CREAM TOPICAL at 10:25

## 2022-03-02 NOTE — PROGRESS NOTE - UROLOGY
Progress Note-Urology


Progress Notes/Assess & Plan


Progress/Assessment & Plan


LANDA DRAINIG WELL. WE WILL SEE PRN


Final Diagnosis


GENITAL EDEMA











TAWIL,ELIAS A MD                Mar 2, 2022 11:05

## 2022-03-02 NOTE — PROGRESS NOTE
Subjective


Subjective/Events-last exam


Appears short of breath, states he is feeling okay.





Objective


Exam


Last Set of Vital Signs





Vital Signs








  Date Time  Temp Pulse Resp B/P (MAP) Pulse Ox O2 Delivery O2 Flow Rate FiO2


 


3/2/22 08:00     97 Nasal Cannula 4.00 





Capillary Refill : Less Than 3 Seconds


I&O











Intake and Output 


 


 3/2/22





 00:00


 


Intake Total 1694 ml


 


Output Total 3600 ml


 


Balance -1906 ml


 


 


 


Intake Oral 1694 ml


 


Output Urine Total 3600 ml


 


# Voids 4








General:  Alert


Lungs:  Other (increaed work of breathing, using abdominal muscles)


Psych/Mental Status:  Mental Status NL, Mood NL





Results/Procedures


Lab





Microbiology


2/22/22 Urine Culture - Final, Complete


          >=3 Gram Positive Isolates





Assessment/Plan


Assessment/Plan





(1) Respiratory failure with hypoxia


Status:  Acute


Assessment & Plan:  Secondary to CHF and obesity hypoventilation. Does not want 

to use CPAP or BiPAP although he has observed apnea, and has declined use since 

admission, admitted with comfort goals given declination of the recommended 

medical treatment.


Pt with severe debility and as documented by other physicians, he again confirms

to me that he would prefer to focus on comfort goals at this time given his 

marked debility and worsening respiratory failure. Is unable to go home with 

hospice due to high assistance needs, working on placement.


Qualifiers:  


   Qualified Codes:  J96.01 - Acute respiratory failure with hypoxia


(2) Congestive heart failure


Status:  Acute


Assessment & Plan:  Echocardiogram of 11-26-21 by Dr. Myers showed mod 

concentric hypertrophy.  LVEF 50-55%.  Grade 2 diastolic dysfunction.  RA d

ilated.  Mild MR.  PASP 15-20 mmhg


-Lasix BID, continues to have worsening edema, increased lasix to TID.


Qualifiers:  


   Qualified Codes:  I50.33 - Acute on chronic diastolic (congestive) heart 

failure


(3) Debility


Status:  Acute


(4) Diabetes mellitus, insulin dependent (IDDM), uncontrolled


Status:  Chronic


(5) Mixed hyperlipidemia


Status:  Chronic


(6) Chronic wound of extremity


Status:  Chronic


Assessment & Plan:  History of left TKA and right toe amputation, continues to 

have extremity wounds, appreciate Wound Care recommendations, dressings being 

done, but healing not anticipated given his marked elevation in A1c, severe 

edema and his difficulty in working toward improving these.





(7) Acute renal insufficiency


Status:  Acute


(8) Peripheral arterial disease


Status:  Chronic


(9) Goals of care, counseling/discussion


Status:  Acute


Assessment & Plan:  Continues to desire comfort care only, will d/c all meds 

other than those that associated with alleviating symptoms, working on nursing 

facility placement with hospice.














ENRIQUE FRANK MD              Mar 2, 2022 15:48

## 2022-03-03 RX ADMIN — DIPHENHYDRAMINE HYDROCHLORIDE, ZINC ACETATE SCH APPLIC: 2; .1 CREAM TOPICAL at 13:15

## 2022-03-03 RX ADMIN — DOCUSATE SODIUM AND SENNOSIDES SCH EA: 8.6; 5 TABLET, FILM COATED ORAL at 20:28

## 2022-03-03 RX ADMIN — BACLOFEN PRN MG: 10 TABLET ORAL at 10:54

## 2022-03-03 RX ADMIN — LACTULOSE SCH GM: 20 SOLUTION ORAL at 20:28

## 2022-03-03 RX ADMIN — BACLOFEN PRN MG: 10 TABLET ORAL at 20:35

## 2022-03-03 RX ADMIN — FUROSEMIDE SCH MG: 40 TABLET ORAL at 13:15

## 2022-03-03 RX ADMIN — FUROSEMIDE SCH MG: 40 TABLET ORAL at 20:28

## 2022-03-03 RX ADMIN — FUROSEMIDE SCH MG: 40 TABLET ORAL at 09:35

## 2022-03-03 RX ADMIN — DIPHENHYDRAMINE HYDROCHLORIDE, ZINC ACETATE SCH APPLIC: 2; .1 CREAM TOPICAL at 20:28

## 2022-03-03 RX ADMIN — LACTULOSE SCH GM: 20 SOLUTION ORAL at 09:34

## 2022-03-03 RX ADMIN — BISACODYL SCH MG: 10 SUPPOSITORY RECTAL at 09:35

## 2022-03-03 RX ADMIN — DOCUSATE SODIUM AND SENNOSIDES SCH EA: 8.6; 5 TABLET, FILM COATED ORAL at 09:35

## 2022-03-03 RX ADMIN — DIPHENHYDRAMINE HYDROCHLORIDE, ZINC ACETATE SCH APPLIC: 2; .1 CREAM TOPICAL at 09:35

## 2022-03-03 RX ADMIN — POLYETHYLENE GLYCOL (3350) SCH GM: 17 POWDER, FOR SOLUTION ORAL at 09:34

## 2022-03-03 RX ADMIN — POLYETHYLENE GLYCOL (3350) SCH GM: 17 POWDER, FOR SOLUTION ORAL at 20:28

## 2022-03-03 NOTE — PROGRESS NOTE
Subjective


Subjective/Events-last exam


States he is feeling okay, appears less short of breath when wearing oxygen, 

thinks swelling has decreased some.





Objective


Exam


Last Set of Vital Signs





Vital Signs








  Date Time  Temp Pulse Resp B/P (MAP) Pulse Ox O2 Delivery O2 Flow Rate FiO2


 


3/3/22 11:19      Nasal Cannula 3.50 


 


3/2/22 08:00     97   





Capillary Refill : Less Than 3 Seconds


I&O











Intake and Output 


 


 3/3/22





 00:00


 


Intake Total 2530 ml


 


Output Total 4300 ml


 


Balance -1770 ml


 


 


 


Intake Oral 2530 ml


 


Output Urine Total 4300 ml








General:  Alert


Psych/Mental Status:  Mood NL





Results/Procedures


Lab





Microbiology


2/22/22 Urine Culture - Final, Complete


          >=3 Gram Positive Isolates





Assessment/Plan


Assessment/Plan





(1) Respiratory failure with hypoxia


Status:  Acute


Assessment & Plan:  Secondary to CHF and obesity hypoventilation. Does not want 

to use CPAP or BiPAP although he has observed apnea, and has declined use since 

admission, admitted with comfort goals given declination of the recommended 

medical treatment.


Pt with severe debility and as documented by other physicians, he again confirms

to me that he would prefer to focus on comfort goals at this time given his 

marked debility and worsening respiratory failure. Is unable to go home with 

hospice due to high assistance needs, working on placement.


Qualifiers:  


   Qualified Codes:  J96.01 - Acute respiratory failure with hypoxia


(2) Congestive heart failure


Status:  Acute


Assessment & Plan:  Echocardiogram of 11-26-21 by Dr. Myers showed mod 

concentric hypertrophy.  LVEF 50-55%.  Grade 2 diastolic dysfunction.  RA 

dilated.  Mild MR.  PASP 15-20 mmhg


-Lasix BID, continues to have worsening edema, increased lasix to TID.


Qualifiers:  


   Qualified Codes:  I50.33 - Acute on chronic diastolic (congestive) heart 

failure


(3) Debility


Status:  Acute


(4) Diabetes mellitus, insulin dependent (IDDM), uncontrolled


Status:  Chronic


(5) Mixed hyperlipidemia


Status:  Chronic


(6) Chronic wound of extremity


Status:  Chronic


Assessment & Plan:  History of left TKA and right toe amputation, continues to 

have extremity wounds, appreciate Wound Care recommendations, dressings being 

done, but healing not anticipated given his marked elevation in A1c, severe e

mike and his difficulty in working toward improving these.





(7) Acute renal insufficiency


Status:  Acute


(8) Peripheral arterial disease


Status:  Chronic


(9) Goals of care, counseling/discussion


Status:  Acute


Assessment & Plan:  Continues to desire comfort care only, will d/c all meds 

other than those that associated with alleviating symptoms, working on nursing 

facility placement with hospice.














ENRIQUE FRANK MD              Mar 3, 2022 14:59

## 2022-03-04 RX ADMIN — POLYETHYLENE GLYCOL (3350) SCH GM: 17 POWDER, FOR SOLUTION ORAL at 08:41

## 2022-03-04 RX ADMIN — BISACODYL SCH MG: 10 SUPPOSITORY RECTAL at 08:41

## 2022-03-04 RX ADMIN — LACTULOSE SCH GM: 20 SOLUTION ORAL at 08:40

## 2022-03-04 RX ADMIN — FUROSEMIDE SCH MG: 40 TABLET ORAL at 07:49

## 2022-03-04 RX ADMIN — DOCUSATE SODIUM AND SENNOSIDES SCH EA: 8.6; 5 TABLET, FILM COATED ORAL at 08:41

## 2022-03-04 NOTE — DISCHARGE SUMMARY
Discharge Summary


Hospital Course


Problems/Diagnosis:  


(1) Respiratory failure with hypoxia


Status:  Acute


Assessment & Plan:  Secondary to CHF and obesity hypoventilation. Does not want 

to use CPAP or BiPAP although he has observed apnea, and has declined use since 

admission, admitted with comfort goals given declination of the recommended 

medical treatment.


Pt with severe debility and as documented by other physicians, he again confirms

to me that he would prefer to focus on comfort goals at this time given his 

marked debility and worsening respiratory failure. Is unable to go home with 

hospice due to high assistance needs, discharged to nursing facility.


Qualifiers:  


   Qualified Codes:  J96.01 - Acute respiratory failure with hypoxia


(2) Congestive heart failure


Status:  Acute


Assessment & Plan:  Echocardiogram of 11-26-21 by Dr. Myers showed mod 

concentric hypertrophy.  LVEF 50-55%.  Grade 2 diastolic dysfunction.  RA 

dilated.  Mild MR.  PASP 15-20 mmhg


-Lasix BID, continues to have worsening edema, increased lasix to TID.


Qualifiers:  


   Qualified Codes:  I50.33 - Acute on chronic diastolic (congestive) heart 

failure


(3) Debility


Status:  Acute


(4) Diabetes mellitus, insulin dependent (IDDM), uncontrolled


Status:  Chronic


(5) Mixed hyperlipidemia


Status:  Chronic


(6) Chronic wound of extremity


Status:  Chronic


Assessment & Plan:  History of left TKA and right toe amputation, continues to 

have extremity wounds, appreciate Wound Care recommendations, dressings being 

done, but healing not anticipated given his marked elevation in A1c, severe 

edema and his difficulty in working toward improving these.





(7) Acute renal insufficiency


Status:  Acute


(8) Peripheral arterial disease


Status:  Chronic


(9) Goals of care, counseling/discussion


Status:  Acute


Assessment & Plan:  Continues to desire comfort care only, will d/c all meds 

other than those that associated with alleviating symptoms, unable to manage at 

home, discharged to nursing facility with hospice services.





Hospital Course


Date of Admission: Feb 23, 2022 at 13:23 


Admission Diagnosis :  





Family Physician/Provider: Noe Roque MD  





Date of Discharge: 3/4/22 


Discharge Diagnosis: 


See discharge diagnoses








Hospital Course:


See discharge diagnoses














Labs and Pending Lab Test:


Laboratory Tests


3/4/22 07:50: SARS-CoV-2 RNA (RT-PCR) Not Detected





Microbiology


2/22/22 Urine Culture - Final, Complete


          >=3 Gram Positive Isolates





Home Meds


Active


Stool Softener-Laxative Tablet (Sennosides/Docusate Sodium) 1 Each Tablet 2 Ea 

PO BID


Lactulose 20 Gm/30 Ml Solution 10 Gm PO BID


Baclofen 10 Mg Tablet 10 Mg PO TID PRN


Potassium Chloride 10 Meq Tab.er.prt 10 Meq PO Q48H


Furosemide 40 Mg Tablet 40 Mg PO DAILY


HYDROcodone/APAP  5 MG/325 MG TAB (Acetaminophen/Hydrocodone Bitart) 1 Tab Tab 1

Ea PO Q4H PRN


Assessment/Pt DC Instructions


Follow up via hospice.


Discharge Diet:  No Restrictions


Activity as Tolerated:  Yes





Discharge Physical Examination


Allergies:  


Coded Allergies:  


     meperidine HCl (Unverified  Allergy, Unknown, 11/26/11)


     shellfish derived (Unverified  Allergy, Unknown, 1/14/13)


   FROM UNCODED ALLERGIES


     Penicillins (Unverified  Adverse Reaction, Intermediate, NAUSEA, 11/26/11)


General Appearance:  No Apparent Distress, Obese


Neurologic/Psychiatric:  Alert, Normal Mood/Affect











ENRIQUE FRANK MD              Mar 4, 2022 12:08

## 2022-03-14 NOTE — DIAGNOSTIC IMAGING REPORT
INDICATION: Right lower leg ulcers.



TECHNIQUE: Right leg arterial Doppler study performed in the

routine fashion with color flow Doppler and waveform analysis.



Comparison made to previous study of 05/04/2021.



FINDINGS: The right common femoral artery shows monophasic flow.

There appears to be triphasic flow in the profunda. Flow is

monophasic throughout the SFA, popliteal artery, and anterior

tibial artery. There is moderate velocity elevation in the SFA

proximally and in the mid portion as well as in the common

femoral artery. These findings may represent underlying stenosis.

The velocities in the common femoral artery and SFA appear to be

increased compared to the prior study.



IMPRESSION:



Abnormal monophasic flow, suggesting inflow disease, with

increasing velocities in the common femoral artery and SFA

compared to the prior study; this may represent worsening

stenosis. Consider CTA for further evaluation as clinically

warranted.



Dictated by: 



  Dictated on workstation # [id number]
4 = No assist / stand by assistance

## 2022-04-22 ENCOUNTER — HOSPITAL ENCOUNTER (EMERGENCY)
Dept: HOSPITAL 75 - ER | Age: 52
Discharge: HOME | End: 2022-04-22
Payer: MEDICAID

## 2022-04-22 VITALS
SYSTOLIC BLOOD PRESSURE: 158 MMHG | SYSTOLIC BLOOD PRESSURE: 158 MMHG | DIASTOLIC BLOOD PRESSURE: 81 MMHG | DIASTOLIC BLOOD PRESSURE: 81 MMHG

## 2022-04-22 VITALS — HEIGHT: 71.97 IN | BODY MASS INDEX: 42.66 KG/M2 | WEIGHT: 315 LBS

## 2022-04-22 DIAGNOSIS — Z46.6: Primary | ICD-10-CM

## 2022-04-22 DIAGNOSIS — Z88.0: ICD-10-CM

## 2022-04-22 DIAGNOSIS — E66.9: ICD-10-CM

## 2022-04-22 LAB
APTT PPP: YELLOW S
BACTERIA #/AREA URNS HPF: (no result) /HPF
BILIRUB UR QL STRIP: NEGATIVE
FIBRINOGEN PPP-MCNC: CLEAR MG/DL
GLUCOSE UR STRIP-MCNC: NEGATIVE MG/DL
KETONES UR QL STRIP: NEGATIVE
LEUKOCYTE ESTERASE UR QL STRIP: (no result)
NITRITE UR QL STRIP: NEGATIVE
PH UR STRIP: 8.5 [PH] (ref 5–9)
PROT UR QL STRIP: (no result)
RBC #/AREA URNS HPF: (no result) /HPF
SP GR UR STRIP: 1.01 (ref 1.02–1.02)
WBC #/AREA URNS HPF: (no result) /HPF

## 2022-04-22 PROCEDURE — 87088 URINE BACTERIA CULTURE: CPT

## 2022-04-22 PROCEDURE — 81000 URINALYSIS NONAUTO W/SCOPE: CPT

## 2022-04-22 PROCEDURE — 51702 INSERT TEMP BLADDER CATH: CPT

## 2022-04-22 PROCEDURE — 87077 CULTURE AEROBIC IDENTIFY: CPT

## 2022-04-22 PROCEDURE — 87186 SC STD MICRODIL/AGAR DIL: CPT

## 2022-04-22 NOTE — ED GU-MALE
General


Chief Complaint:  Catheter/Drain/Tube Problems


Stated Complaint:  CATH CHANGE


Source:  patient


Exam Limitations:  no limitations





History of Present Illness


Date Seen by Provider:  2022


Time Seen by Provider:  13:07


Initial Comments


52-year-old male who is on hospice with [facility] hospice at [facility]

rehabilitation for chronic respiratory failure presents to ER with reports of 

needing Dawson catheter exchanged as it is past due.  He reports some occasional 

suprapubic cramping but nothing current.  No fever no chills no nausea or 

concern of infection.


Timing/Duration:  just prior to arrival


Severity/Quality:  mild


Location:  suprapubic


Radiation:  none


Activities at Onset:  none


Prior Genitourinary Problems:  none





Allergies and Home Medications


Allergies


Coded Allergies:  


     meperidine HCl (Unverified  Allergy, Unknown, 11)


     shellfish derived (Unverified  Allergy, Unknown, 13)


   FROM UNCODED ALLERGIES


     Penicillins (Unverified  Adverse Reaction, Intermediate, NAUSEA, 11)





Patient Home Medication List


Home Medication List Reviewed:  Yes


Baclofen (Baclofen) 10 Mg Tablet, 10 MG PO TID PRN for MUSCLE SPASMS


   Prescribed by: JACK OLIVARES on 22


Furosemide (Furosemide) 40 Mg Tablet, 40 MG PO DAILY


   Prescribed by: JACK OLIVARES on 22


Hydrocodone Bit/Acetaminophen (HYDROcodone/APAP  5 MG/325 MG TAB) 1 Tab Tab, 1 

EA PO Q4H PRN for PAIN-MODERATE (5-7)


   Prescribed by: JACK OLIVARES on 22


Lactulose (Lactulose) 20 Gm/30 Ml Solution, 10 GM PO BID


   Prescribed by: JACK OLIVARES on 22


Potassium Chloride (Potassium Chloride) 10 Meq Tab.er.prt, 10 MEQ PO Q48H


   Prescribed by: JACK OLIVARES on 22


Sennosides/Docusate Sodium (Stool Softener-Laxative Tablet) 1 Each Tablet, 2 EA 

PO BID


   Prescribed by: JACK OLIVARES on 22





Review of Systems


Review of Systems


Constitutional:  see HPI


EENTM:  see HPI


Respiratory:  no symptoms reported


Cardiovascular:  no symptoms reported


Genitourinary:  no symptoms reported


Musculoskeletal:  no symptoms reported


Skin:  no symptoms reported


Psychiatric/Neurological:  No Symptoms Reported


Endocrine:  No Symptoms Reported


Hematologic/Lymphatic:  No Symptoms Reported





Past Medical-Social-Family Hx


Immunizations Up To Date


Tetanus Booster (TDap):  Unknown


PED Vaccines UTD:  No


First/Initial COVID19 Vaccinat:  MODERNA


Second COVID19 Vaccination Keshawn:  MODERNA


Third COVID19 Vaccination Date:  April





Seasonal Allergies


Seasonal Allergies:  Yes





Past Medical History


Surgery/Hospitalization HX:  


LEFT ABOVE KNEE AMPUTATION, T2DM


Surgeries:  Yes


Abdominal, Amputation, Orthopedic


Respiratory:  Yes


Sleep Apnea


Currently Using CPAP:  No


Currently Using BIPAP:  No


Cardiac:  Yes


Cardiomyopathy, Chronic Edema/Swelling, High Cholesterol, Hypertension


Neurological:  Yes (SEVERAL CONCUSSIONS PER PATIENT)


Concussion


Reproductive Disorders:  No


Sexually Transmitted Disease:  No


HIV/AIDS:  No


Genitourinary:  No


Gastrointestinal:  Yes


Abdominal Hernia


Musculoskeletal:  Yes (lymphedema left leg)


Arthritis, Chronic Back Pain


Endocrine:  Yes


Diabetes, Non-Insulin dep


HEENT:  No


Loss of Vision:  Denies


Hearing Impairment:  Denies


Cancer:  No


Psychosocial:  Yes


Anxiety


Integumentary:  Yes (Chronic wounds of the BLE)


Recent Skin Changes


Blood Disorders:  No





Family Medical History





Arthritis


  19 FATHER, Onset:Unknown


Diabetes mellitus


  19 MOTHER, , Onset:Unknown


No Pertinent Family Hx





Physical Exam


Vital Signs





Vital Signs - First Documented








 22





 12:55


 


Temp 36.7


 


Pulse 82


 


Resp 16


 


B/P (MAP) 157/91 (113)





Capillary Refill :


Height, Weight, BMI


Height: 6'0.00"


Weight: 400lbs. 4.0oz. [id number]; 57.09 BMI


Method:Stated


General Appearance:  WD/WN, no apparent distress, obese, other (Alert and 

oriented no distress conversational and appropriate.  Wearing his baseline home 

oxygen)


Neck:  non-tender, full range of motion


Cardiovascular:  regular rate, rhythm, no murmur


Respiratory:  no respiratory distress, no accessory muscle use


Gastrointestinal:  normal bowel sounds, non tender, soft


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  We were able to deflate the current 

catheter remove it and reinsert another 16 [place] Dawson catheter.)


Extremities:  normal range of motion, non-tender, other (Left above-the-knee 

amputation with some open wound of the distal stump with granulation tissue in 

the wound bed but no surrounding puslike drainage or cellulitis)


Neurologic/Psychiatric:  alert, normal mood/affect, oriented x 3


Skin:  normal color, warm/dry





Progress/Results/Core Measures


Suspected Sepsis


SIRS


Temperature: 


Pulse:  


Respiratory Rate: 


 


Blood Pressure  / 


Mean:





Results/Orders


Lab Results





Laboratory Tests








Test


 22


13:04 Range/Units


 


 


Urine Color YELLOW   


 


Urine Clarity CLEAR   


 


Urine pH 8.5  5-9  


 


Urine Specific Gravity 1.015 L 1.016-1.022  


 


Urine Protein 2+ H NEGATIVE  


 


Urine Glucose (UA) NEGATIVE  NEGATIVE  


 


Urine Ketones NEGATIVE  NEGATIVE  


 


Urine Nitrite NEGATIVE  NEGATIVE  


 


Urine Bilirubin NEGATIVE  NEGATIVE  


 


Urine Urobilinogen 0.2  < = 1.0  MG/DL


 


Urine Leukocyte Esterase 3+ H NEGATIVE  


 


Urine RBC (Auto) 1+ H NEGATIVE  


 


Urine RBC 2-5 H  /HPF


 


Urine WBC 25-50 H  /HPF


 


Urine Crystals NONE   /LPF


 


Urine Bacteria MODERATE H  /HPF


 


Urine Casts NONE   /LPF


 


Urine Mucus NEGATIVE   /LPF


 


Urine Culture Indicated YES   








My Orders





Orders - LATOSHA AKERS


Ua Culture If Indicated (22 13:10)


Dawson Cath (22 13:10)


Urine Culture (22 13:04)





Vital Signs/I&O











 22





 12:55


 


Temp 36.7


 


Pulse 82


 


Resp 16


 


B/P (MAP) 157/91 (113)





Capillary Refill :





Departure


Impression





   Primary Impression:  


   Urinary catheter (Dawson) change required


Disposition:  01 HOME, SELF-CARE


Condition:  Stable





Departure-Patient Inst.


Decision time for Depature:  13:09


Referrals:  


YAA LEE MD (PCP/Family)


Primary Care Physician


Patient Instructions:  How to Care for Your Dawson Catheter


Scripts


Cefuroxime Axetil (Cefuroxime) 250 Mg Tablet


250 MG PO BID, #14 TAB


   Prov: LATOSHA AKERS         22











LATOSHA AKERS             2022 13:10

## 2022-05-25 ENCOUNTER — HOSPITAL ENCOUNTER (EMERGENCY)
Dept: HOSPITAL 75 - ER | Age: 52
Discharge: HOME | End: 2022-05-25
Payer: MEDICAID

## 2022-05-25 DIAGNOSIS — R69: Primary | ICD-10-CM

## 2022-05-25 PROCEDURE — 99281 EMR DPT VST MAYX REQ PHY/QHP: CPT

## 2022-05-25 NOTE — ED SUTURE REMOVAL/WOUND CHECK
Suture/Wound Re-check


Suture Removal/Wound Recheck :  


Progress


This patient was checked into the emergency room due to miscommunication or 

misunderstanding.  He is not having an emergency problem but only needs a 

routine catheter change.  He was not seen or evaluated by an ER provider.  I did

make a phone call on the patient's behalf and discussed the situation with Dr. Tawil.  The catheter is reportedly functioning normally.  Therefore, Dr. Tawil 

and I concur it would be better for him to wait until next week to have the 

catheter replaced when Dr. Tawil is available.  If there are any complications 

or difficulties replacing the catheter at this time, there is not a urologist in

town to assist.  This is a NO CHARGE visit as the patient was not seen or 

examined by the ER provider.  He was dropped off at the ER my mistake or because

of misunderstanding.





Physical Exam


Vital Signs


Capillary Refill : Less Than 3 Seconds


General Appearance:  other (Not examined by the ER provider)





Departure


Impression





   Primary Impression:  


   NO CHARGE - NOTE ONLY


Disposition:  01 HOME, SELF-CARE


Condition:  Stable





Departure-Patient Inst.


Referrals:  


YAA LEE MD (PCP/Family)


Primary Care Physician











MARCK JOSEPH MD        May 25, 2022 08:32

## 2022-06-20 ENCOUNTER — HOSPITAL ENCOUNTER (INPATIENT)
Dept: HOSPITAL 75 - ER | Age: 52
LOS: 9 days | DRG: 853 | End: 2022-06-29
Attending: INTERNAL MEDICINE | Admitting: FAMILY MEDICINE
Payer: MEDICAID

## 2022-06-20 VITALS — HEIGHT: 71.97 IN | BODY MASS INDEX: 42.66 KG/M2 | WEIGHT: 315 LBS

## 2022-06-20 DIAGNOSIS — R00.1: ICD-10-CM

## 2022-06-20 DIAGNOSIS — I50.43: ICD-10-CM

## 2022-06-20 DIAGNOSIS — L03.115: ICD-10-CM

## 2022-06-20 DIAGNOSIS — D64.9: ICD-10-CM

## 2022-06-20 DIAGNOSIS — E11.9: ICD-10-CM

## 2022-06-20 DIAGNOSIS — J96.22: ICD-10-CM

## 2022-06-20 DIAGNOSIS — Z91.013: ICD-10-CM

## 2022-06-20 DIAGNOSIS — Z89.612: ICD-10-CM

## 2022-06-20 DIAGNOSIS — J96.21: ICD-10-CM

## 2022-06-20 DIAGNOSIS — Z20.822: ICD-10-CM

## 2022-06-20 DIAGNOSIS — T87.89: ICD-10-CM

## 2022-06-20 DIAGNOSIS — Z88.0: ICD-10-CM

## 2022-06-20 DIAGNOSIS — N39.0: ICD-10-CM

## 2022-06-20 DIAGNOSIS — L97.129: ICD-10-CM

## 2022-06-20 DIAGNOSIS — B95.62: ICD-10-CM

## 2022-06-20 DIAGNOSIS — I70.238: ICD-10-CM

## 2022-06-20 DIAGNOSIS — T83.511A: ICD-10-CM

## 2022-06-20 DIAGNOSIS — J44.0: ICD-10-CM

## 2022-06-20 DIAGNOSIS — Z87.891: ICD-10-CM

## 2022-06-20 DIAGNOSIS — N17.9: ICD-10-CM

## 2022-06-20 DIAGNOSIS — N18.9: ICD-10-CM

## 2022-06-20 DIAGNOSIS — I42.9: ICD-10-CM

## 2022-06-20 DIAGNOSIS — I89.0: ICD-10-CM

## 2022-06-20 DIAGNOSIS — I21.A1: ICD-10-CM

## 2022-06-20 DIAGNOSIS — Z91.19: ICD-10-CM

## 2022-06-20 DIAGNOSIS — E46: ICD-10-CM

## 2022-06-20 DIAGNOSIS — I70.241: ICD-10-CM

## 2022-06-20 DIAGNOSIS — Z66: ICD-10-CM

## 2022-06-20 DIAGNOSIS — I13.0: ICD-10-CM

## 2022-06-20 DIAGNOSIS — E66.2: ICD-10-CM

## 2022-06-20 DIAGNOSIS — E11.40: ICD-10-CM

## 2022-06-20 DIAGNOSIS — B37.2: ICD-10-CM

## 2022-06-20 DIAGNOSIS — E78.2: ICD-10-CM

## 2022-06-20 DIAGNOSIS — L97.812: ICD-10-CM

## 2022-06-20 DIAGNOSIS — F41.9: ICD-10-CM

## 2022-06-20 DIAGNOSIS — J18.9: ICD-10-CM

## 2022-06-20 DIAGNOSIS — A41.9: Primary | ICD-10-CM

## 2022-06-20 DIAGNOSIS — E11.22: ICD-10-CM

## 2022-06-20 DIAGNOSIS — Z88.8: ICD-10-CM

## 2022-06-20 LAB
ALBUMIN SERPL-MCNC: 2.8 GM/DL (ref 3.2–4.5)
ALP SERPL-CCNC: 103 U/L (ref 40–136)
ALT SERPL-CCNC: 57 U/L (ref 0–55)
AMORPH SED URNS QL MICRO: (no result) /LPF
ANISOCYTOSIS BLD QL SMEAR: (no result)
APTT BLD: 32 SEC (ref 24–35)
APTT PPP: YELLOW S
BACTERIA #/AREA URNS HPF: (no result) /HPF
BASOPHILS # BLD AUTO: 0 10^3/UL (ref 0–0.1)
BASOPHILS NFR BLD AUTO: 0 % (ref 0–10)
BILIRUB SERPL-MCNC: 0.7 MG/DL (ref 0.1–1)
BILIRUB UR QL STRIP: NEGATIVE
BUN/CREAT SERPL: 16
CALCIUM SERPL-MCNC: 8.6 MG/DL (ref 8.5–10.1)
CHLORIDE SERPL-SCNC: 93 MMOL/L (ref 98–107)
CK MB SERPL-MCNC: 6.4 NG/ML (ref ?–6.6)
CK SERPL-CCNC: 249 U/L (ref 30–200)
CO2 SERPL-SCNC: 24 MMOL/L (ref 21–32)
CREAT SERPL-MCNC: 1.72 MG/DL (ref 0.6–1.3)
EOSINOPHIL # BLD AUTO: 0 10^3/UL (ref 0–0.3)
EOSINOPHIL NFR BLD AUTO: 0 % (ref 0–10)
EOSINOPHIL NFR BLD MANUAL: 1 %
ERYTHROCYTE [SEDIMENTATION RATE] IN BLOOD: 80 MM/HR (ref 0–30)
FIBRINOGEN PPP-MCNC: CLEAR MG/DL
GFR SERPLBLD BASED ON 1.73 SQ M-ARVRAT: 47 ML/MIN
GLUCOSE SERPL-MCNC: 221 MG/DL (ref 70–105)
GLUCOSE UR STRIP-MCNC: NEGATIVE MG/DL
HCT VFR BLD CALC: 29 % (ref 40–54)
HGB BLD-MCNC: 8.4 G/DL (ref 13.3–17.7)
INR PPP: 1.4 (ref 0.8–1.4)
KETONES UR QL STRIP: (no result)
LEUKOCYTE ESTERASE UR QL STRIP: (no result)
LYMPHOCYTES # BLD AUTO: 0.4 10^3/UL (ref 1–4)
LYMPHOCYTES NFR BLD AUTO: 4 % (ref 12–44)
MAGNESIUM SERPL-MCNC: 2 MG/DL (ref 1.6–2.4)
MANUAL DIFFERENTIAL PERFORMED BLD QL: YES
MCH RBC QN AUTO: 25 PG (ref 25–34)
MCHC RBC AUTO-ENTMCNC: 29 G/DL (ref 32–36)
MCV RBC AUTO: 87 FL (ref 80–99)
MONOCYTES # BLD AUTO: 0.4 10^3/UL (ref 0–1)
MONOCYTES NFR BLD AUTO: 4 % (ref 0–12)
MONOCYTES NFR BLD: 2 %
NEUTROPHILS # BLD AUTO: 8.4 10^3/UL (ref 1.8–7.8)
NEUTROPHILS NFR BLD AUTO: 91 % (ref 42–75)
NEUTS BAND NFR BLD MANUAL: 92 %
NITRITE UR QL STRIP: NEGATIVE
PH UR STRIP: 6.5 [PH] (ref 5–9)
PLATELET # BLD: 346 10^3/UL (ref 130–400)
PMV BLD AUTO: 10.1 FL (ref 9–12.2)
POLYCHROMASIA BLD QL SMEAR: (no result)
POTASSIUM SERPL-SCNC: 4.2 MMOL/L (ref 3.6–5)
PROT SERPL-MCNC: 7.2 GM/DL (ref 6.4–8.2)
PROT UR QL STRIP: (no result)
PROTHROMBIN TIME: 17.8 SEC (ref 12.2–14.7)
RENAL EPI CELLS #/AREA URNS HPF: (no result) /HPF
SODIUM SERPL-SCNC: 140 MMOL/L (ref 135–145)
SP GR UR STRIP: >=1.03 (ref 1.02–1.02)
SQUAMOUS #/AREA URNS HPF: (no result) /HPF
T4 FREE SERPL-MCNC: 1 NG/DL (ref 0.7–1.48)
TARGETS BLD QL SMEAR: (no result)
TSH SERPL DL<=0.05 MIU/L-ACNC: 5.08 UIU/ML (ref 0.35–4.94)
VARIANT LYMPHS NFR BLD MANUAL: 5 %
WBC # BLD AUTO: 9.3 10^3/UL (ref 4.3–11)

## 2022-06-20 PROCEDURE — 87636 SARSCOV2 & INF A&B AMP PRB: CPT

## 2022-06-20 PROCEDURE — 84145 PROCALCITONIN (PCT): CPT

## 2022-06-20 PROCEDURE — 87088 URINE BACTERIA CULTURE: CPT

## 2022-06-20 PROCEDURE — 82550 ASSAY OF CK (CPK): CPT

## 2022-06-20 PROCEDURE — 82553 CREATINE MB FRACTION: CPT

## 2022-06-20 PROCEDURE — 96365 THER/PROPH/DIAG IV INF INIT: CPT

## 2022-06-20 PROCEDURE — 81000 URINALYSIS NONAUTO W/SCOPE: CPT

## 2022-06-20 PROCEDURE — 72125 CT NECK SPINE W/O DYE: CPT

## 2022-06-20 PROCEDURE — 36410 VNPNXR 3YR/> PHY/QHP DX/THER: CPT

## 2022-06-20 PROCEDURE — 87205 SMEAR GRAM STAIN: CPT

## 2022-06-20 PROCEDURE — 83880 ASSAY OF NATRIURETIC PEPTIDE: CPT

## 2022-06-20 PROCEDURE — 86141 C-REACTIVE PROTEIN HS: CPT

## 2022-06-20 PROCEDURE — 85007 BL SMEAR W/DIFF WBC COUNT: CPT

## 2022-06-20 PROCEDURE — 87077 CULTURE AEROBIC IDENTIFY: CPT

## 2022-06-20 PROCEDURE — 70450 CT HEAD/BRAIN W/O DYE: CPT

## 2022-06-20 PROCEDURE — 85730 THROMBOPLASTIN TIME PARTIAL: CPT

## 2022-06-20 PROCEDURE — 80202 ASSAY OF VANCOMYCIN: CPT

## 2022-06-20 PROCEDURE — 87186 SC STD MICRODIL/AGAR DIL: CPT

## 2022-06-20 PROCEDURE — 87081 CULTURE SCREEN ONLY: CPT

## 2022-06-20 PROCEDURE — 80053 COMPREHEN METABOLIC PANEL: CPT

## 2022-06-20 PROCEDURE — 83735 ASSAY OF MAGNESIUM: CPT

## 2022-06-20 PROCEDURE — 87040 BLOOD CULTURE FOR BACTERIA: CPT

## 2022-06-20 PROCEDURE — 84100 ASSAY OF PHOSPHORUS: CPT

## 2022-06-20 PROCEDURE — 94760 N-INVAS EAR/PLS OXIMETRY 1: CPT

## 2022-06-20 PROCEDURE — 80074 ACUTE HEPATITIS PANEL: CPT

## 2022-06-20 PROCEDURE — 85652 RBC SED RATE AUTOMATED: CPT

## 2022-06-20 PROCEDURE — 94640 AIRWAY INHALATION TREATMENT: CPT

## 2022-06-20 PROCEDURE — 36415 COLL VENOUS BLD VENIPUNCTURE: CPT

## 2022-06-20 PROCEDURE — 85027 COMPLETE CBC AUTOMATED: CPT

## 2022-06-20 PROCEDURE — 84439 ASSAY OF FREE THYROXINE: CPT

## 2022-06-20 PROCEDURE — 94660 CPAP INITIATION&MGMT: CPT

## 2022-06-20 PROCEDURE — 76937 US GUIDE VASCULAR ACCESS: CPT

## 2022-06-20 PROCEDURE — 75635 CT ANGIO ABDOMINAL ARTERIES: CPT

## 2022-06-20 PROCEDURE — 93005 ELECTROCARDIOGRAM TRACING: CPT

## 2022-06-20 PROCEDURE — 85025 COMPLETE CBC W/AUTO DIFF WBC: CPT

## 2022-06-20 PROCEDURE — 80061 LIPID PANEL: CPT

## 2022-06-20 PROCEDURE — 93041 RHYTHM ECG TRACING: CPT

## 2022-06-20 PROCEDURE — 96375 TX/PRO/DX INJ NEW DRUG ADDON: CPT

## 2022-06-20 PROCEDURE — 71045 X-RAY EXAM CHEST 1 VIEW: CPT

## 2022-06-20 PROCEDURE — 76000 FLUOROSCOPY <1 HR PHYS/QHP: CPT

## 2022-06-20 PROCEDURE — 84484 ASSAY OF TROPONIN QUANT: CPT

## 2022-06-20 PROCEDURE — 83605 ASSAY OF LACTIC ACID: CPT

## 2022-06-20 PROCEDURE — 87070 CULTURE OTHR SPECIMN AEROBIC: CPT

## 2022-06-20 PROCEDURE — 84443 ASSAY THYROID STIM HORMONE: CPT

## 2022-06-20 PROCEDURE — 82947 ASSAY GLUCOSE BLOOD QUANT: CPT

## 2022-06-20 PROCEDURE — 78315 BONE IMAGING 3 PHASE: CPT

## 2022-06-20 PROCEDURE — 82805 BLOOD GASES W/O2 SATURATION: CPT

## 2022-06-20 PROCEDURE — 85610 PROTHROMBIN TIME: CPT

## 2022-06-20 PROCEDURE — 93306 TTE W/DOPPLER COMPLETE: CPT

## 2022-06-20 PROCEDURE — 96361 HYDRATE IV INFUSION ADD-ON: CPT

## 2022-06-20 NOTE — ED GENERAL
General


Stated Complaint:  WEAKNESS, NAUSEA


Source of Information:  Patient (SOMEWHAT LIMITED HISTORIAN), Nursing Home 

Records, Old Records





History of Present Illness


Date Seen by Provider:  2022


Time Seen by Provider:  21:27


Initial Comments


[facility], WITH MULTIPLE FIREFIGHTERS 

ALSO HERE TO ASSIST WITH LIFT/TRANSFER OF PT


PT WAS SITTING ON TOILET WITH DOOR CLOSED, HAD BM, AND WAS HAVING DRY HEAVES AND

ENDED UP ON THE FLOOR--WAS NOT WITNESSED AND PT IS NOT SURE IF HE HAD LOSS OF 

CONSCIOUSNESS OR NOT, OR IF HE HIT HIS HEAD


PT HAS CHRONIC NAUSEA


NURSING STAFF REPORTED TO EMS  THAT PT IS TIRED TONIGHT. PT REPORTEDLY HAS NOT 

HAD ANY OF HIS NIGHT TIME MEDICATIONS--PT TAKES SEVERAL SEDATING MEDICATIONS. 


PT HAS NO PAIN COMPLAINTS





PT IS INSULIN DEPENDENT DIABETIC, HAS NOT HAD LONG ACTING INSULIN TODAY


BLOOD SUGAR HAS NOT BEEN CHECKED THIS EVENING BY NH STAFF OR EMS


TEMP HAS NOT BEEN CHECKED BY NH STAFF OR EMS





PT HAS CHRONIC INDWELLING LANDA CATHETER


PT HAS CHRONIC RIGHT LEG WOUNDS, AND HAS CHRONIC WOUND TO LEFT AKA STUMP. 





PT HAS COPD AND IS ON O2 AT 4L/NC CONTINUOUSLY


NO C/O SHORTNESS OF BREATH


NO CHEST PAIN 


NO COUGH





PT IS DNR/DNI


PT WAS PLACED ON HOSPICE AND PLACED IN [facility] AFTER LAST ADMIT

HERE, --2022--FOR THIS SAME PROBLEM. 


HOSPICE WAS RECENTLY DISCONTINUED AS PT WAS APPARENTLY IMPROVING, PER NURSING 

HOME STAFF. 


--SPOKE WITH NURSING HOME NURSE, AND SHE STATES THAT SHE LAST SAW THE PATENT

ON 22 AND WHEN SHE CAME ON DUTY TONIGHT, SHE NOTED THAT PT HAS 

"GONE [place]" SIGNIFICANTLY SINCE FRIDAY--UNABLE TO ELABORATE ON HIS 

CONDITION. SHE REPORTS THAT PT HAD "REFUSED TO DO ANYTHING UNTIL TONIGHT" 

--AGAIN SHE CANNOT ELABORATE ON THIS





PCP: DR. LEE, [facility]





Allergies and Home Medications


Allergies


Coded Allergies:  


     meperidine HCl (Unverified  Allergy, Unknown, 11)


     shellfish derived (Unverified  Allergy, Unknown, 13)


   FROM UNCODED ALLERGIES


     Penicillins (Unverified  Adverse Reaction, Intermediate, NAUSEA, 11)





Patient Home Medication List


Home Medication List Reviewed:  Yes


Baclofen (Baclofen) 10 Mg Tablet, 10 MG PO TID PRN for MUSCLE SPASMS


   Prescribed by: JACK OLIVARES on 22


Cefuroxime Axetil (Cefuroxime) 250 Mg Tablet, 250 MG PO BID


   Prescribed by: LATOSHA AKERS on 22


Furosemide (Furosemide) 40 Mg Tablet, 40 MG PO DAILY


   Prescribed by: JACK OLIVARES on 22


Hydrocodone Bit/Acetaminophen (HYDROcodone/APAP  5 MG/325 MG TAB) 1 Tab Tab, 1 

EA PO Q4H PRN for PAIN-MODERATE (5-7)


   Prescribed by: JACK OLIVARES on 22


Lactulose (Lactulose) 20 Gm/30 Ml Solution, 10 GM PO BID


   Prescribed by: JACK OLIVARES on 22


Potassium Chloride (Potassium Chloride) 10 Meq Tab.er.prt, 10 MEQ PO Q48H


   Prescribed by: JACK OLIVARES on 22


Sennosides/Docusate Sodium (Stool Softener-Laxative Tablet) 1 Each Tablet, 2 EA 

PO BID


   Prescribed by: JACK OLIVARES on 22





Review of Systems


Review of Systems


Constitutional:  see HPI, malaise, weakness


EENTM:  no symptoms reported


Respiratory:  no symptoms reported, see HPI


Cardiovascular:  no symptoms reported


Gastrointestinal:  see HPI; No abdominal pain; nausea


Genitourinary:  see HPI


Musculoskeletal:  see HPI


Skin:  see HPI


Psychiatric/Neurological:  No Symptoms Reported; Denies Headache


Hematologic/Lymphatic:  No Symptoms Reported


Immunological/Allergic:  no symptoms reported





Past Medical-Social-Family Hx


Immunizations Up To Date


Tetanus Booster (TDap):  Unknown


PED Vaccines UTD:  No


First/Initial COVID19 Vaccinat:  MODERNA


Second COVID19 Vaccination Keshawn:  MODERNA


Third COVID19 Vaccination Date:  April





Seasonal Allergies


Seasonal Allergies:  Yes





Past Medical History


Surgery/Hospitalization HX:  


LEFT ABOVE KNEE AMPUTATION, T2DM


Surgeries:  Yes


Abdominal, Amputation, Orthopedic


Respiratory:  Yes (O2 AT 4L/NC CONTINUOUSLY;CHRONIC RESP FAILURE/OBESITY 

HYPOVENTILATION)


Pneumonia, Sleep Apnea, COPD


Currently Using CPAP:  No (REFUSES CPAP)


Currently Using BIPAP:  No


Cardiac:  Yes (LEFT AKA D/T PAD; ALSO HAS R LEG PAD; CHF; )


Cardiomyopathy, Chronic Edema/Swelling, High Cholesterol, Hypertension, 

Peripheral Vascular


Neurological:  Yes (SEVERAL CONCUSSIONS PER PATIENT)


Concussion, Neuropathy


Reproductive Disorders:  No


Sexually Transmitted Disease:  No


HIV/AIDS:  No


Genitourinary:  No


Gastrointestinal:  Yes (CHRONIC NAUSEA)


Abdominal Hernia, Chronic Constipation


Musculoskeletal:  Yes (LEFT AKA D/T PAD/GANGRENE/CHRONIC LEG-FOOT ULCERS;CHRONIC

PAIN )


Amputee, Arthritis, Chronic Back Pain


Endocrine:  Yes (MORBID OBESITY)


Diabetes, Insulin dep


HEENT:  No


Loss of Vision:  Denies


Hearing Impairment:  Denies


Cancer:  No


Psychosocial:  Yes


Anxiety


Integumentary:  Yes (CHRONIC DIABETIC LEG AND FOOT WOUNDS; GANGRENE LEFT LEG-S/P

AKA)


Blood Disorders:  Yes (ANEMIA)





Family Medical History





Arthritis


  19 FATHER, Onset:Unknown


Diabetes mellitus


  19 MOTHER, , Onset:Unknown


No Pertinent Family Hx





Physical Exam


Vital Signs





Vital Signs - First Documented




















Capillary Refill :


Height, Weight, BMI


Height: 6'0.00"


Weight: 400lbs. 4.0oz. 181.529629yd; 49.00 BMI


Method:Stated


General Appearance:  No Apparent Distress, Obese (MORBIDLY OBESE), Other 

(SOMEWHAT LETHARGIC, MALODOROUS. )


HEENT:  PERRL/EOMI


Neck:  Normal Inspection


Respiratory:  Normal Breath Sounds, No Accessory Muscle Use, No Respiratory 

Distress


Cardiovascular:  Regular Rate, Rhythm


Gastrointestinal:  Non Tender, Soft


Genital/Rectal:  Other (LANDA CATHETER IN PLACE, BUT UNABLE TO VISUALIZE GE

NITALIA DUE TO MASSIVE PANNUS, ALSO APPEARS TO HAVE SOME DIFFUSE EDEMA /ANASARCA

TO LOWER ABDOMEN AND GENITAL AREA. LANDA WITH GROSSLY PURULENT URINE. )


Extremity:  Other (LEFT AKA, WITH CHRONIC APPEARING WOUND TO STUMP. SLIGHT 

SEROSANGUINOUS DRAINAGE.    LEFT LEG WITH EXTENSIVE CHRONIC VENOUS STASIS 

CHANGES, EXTENSIVE SCALING AND MULTIPLE CHRONIC APPEARING LEG WOUNDS, SOME WITH 

SLIGHT BLEEING OR SEROSANGUINOUS DRAINAGE. ENTIRE RIGHT LOWER LEG WITH DIFFUSE 

ERYTHEMA. UNABLE TO DETERMINE DEGREE OF EDEMA DUE TO MORBID OBESITY. )


Neurologic/Psychiatric:  Alert, Oriented x3, No Motor/Sensory Deficits (GROSSLY 

INTACT. ), CNs II-XII Norm as Tested


Skin:  Warm/Dry, Pallor, Other (AS ABOVE)





Focused Exam


Sepsis Stage:  Sepsis


Possible Source:  Other (MULTIPLE POSSIBLE SITES--PULMONARY, UTI, SKIN/SOFT 

TISSUE)


Lactate Level


22 22:00: Lactic Acid Level 7.81*H





Time of Focused Exam:  23:00


Respiratory:  Normal Breath Sounds


Cardiovascular:  Regular Rate, Rhythm


Capillary Refill:  Less Than 3 Seconds


Skin:  normal color, warm/dry, ulcerations


Lactic Acid Level





Laboratory Tests








Test


 22


22:00


 


Lactic Acid Level


 7.81 MMOL/L


(0.50-2.00)  *H








Within 3hrs of presentation:  Admin fluids (LIMITED FLUIDS DUE TO CHG/FLUID 

OVERLOAD), Admin ABX, Blood cultures prior to ABX's, Focus exam, Lactate level





Progress/Results/Core Measures


Suspected Sepsis


SIRS


Temperature: 


Pulse:  


Respiratory Rate: 


 


Laboratory Tests


22 22:00: White Blood Count 9.3


Blood Pressure  / 


Mean: 


 





22 22:00: Lactic Acid Level 7.81*H


Laboratory Tests


22 22:00: 


Creatinine 1.72H, INR Comment 1.4, Platelet Count 346, Total Bilirubin 0.7








Results/Orders


Lab Results





Laboratory Tests








Test


 22


22:00 22


22:28 22


22:31 22


22:43 Range/Units


 


 


White Blood Count


 9.3 


 


 


 


 4.3-11.0


10^3/uL


 


Red Blood Count


 3.34 L


 


 


 


 4.30-5.52


10^6/uL


 


Hemoglobin 8.4 L    13.3-17.7  g/dL


 


Hematocrit 29 L    40-54  %


 


Mean Corpuscular Volume 87     80-99  fL


 


Mean Corpuscular Hemoglobin 25     25-34  pg


 


Mean Corpuscular Hemoglobin


Concent 29 L


 


 


 


 32-36  g/dL





 


Red Cell Distribution Width 19.8 H    10.0-14.5  %


 


Platelet Count


 346 


 


 


 


 130-400


10^3/uL


 


Mean Platelet Volume 10.1     9.0-12.2  fL


 


Immature Granulocyte % (Auto) 1      %


 


Neutrophils (%) (Auto) 91 H    42-75  %


 


Lymphocytes (%) (Auto) 4 L    12-44  %


 


Monocytes (%) (Auto) 4     0-12  %


 


Eosinophils (%) (Auto) 0     0-10  %


 


Basophils (%) (Auto) 0     0-10  %


 


Neutrophils # (Auto)


 8.4 H


 


 


 


 1.8-7.8


10^3/uL


 


Lymphocytes # (Auto)


 0.4 L


 


 


 


 1.0-4.0


10^3/uL


 


Monocytes # (Auto)


 0.4 


 


 


 


 0.0-1.0


10^3/uL


 


Eosinophils # (Auto)


 0.0 


 


 


 


 0.0-0.3


10^3/uL


 


Basophils # (Auto)


 0.0 


 


 


 


 0.0-0.1


10^3/uL


 


Immature Granulocyte # (Auto)


 0.1 


 


 


 


 0.0-0.1


10^3/uL


 


Neutrophils % (Manual) 92      %


 


Lymphocytes % (Manual) 5      %


 


Monocytes % (Manual) 2      %


 


Eosinophils % (Manual) 1      %


 


Polychromasia MARKED      


 


Anisocytosis MARKED      


 


Target Cells MODERATE      


 


Erythrocyte Sedimentation Rate 80 H    0-30  MM/HR


 


Prothrombin Time 17.8 H    12.2-14.7  SEC


 


INR Comment 1.4     0.8-1.4  


 


Activated Partial


Thromboplast Time 32 


 


 


 


 24-35  SEC





 


Sodium Level 140     135-145  MMOL/L


 


Potassium Level 4.2     3.6-5.0  MMOL/L


 


Chloride Level 93 L      MMOL/L


 


Carbon Dioxide Level 24     21-32  MMOL/L


 


Anion Gap 23 H    5-14  MMOL/L


 


Blood Urea Nitrogen 28 H    7-18  MG/DL


 


Creatinine


 1.72 H


 


 


 


 0.60-1.30


MG/DL


 


Estimat Glomerular Filtration


Rate 47 


 


 


 


  





 


BUN/Creatinine Ratio 16      


 


Glucose Level 221 H      MG/DL


 


Lactic Acid Level


 7.81 *H


 


 


 


 0.50-2.00


MMOL/L


 


Calcium Level 8.6     8.5-10.1  MG/DL


 


Corrected Calcium 9.6     8.5-10.1  MG/DL


 


Magnesium Level 2.0     1.6-2.4  MG/DL


 


Total Bilirubin 0.7     0.1-1.0  MG/DL


 


Aspartate Amino Transf


(AST/SGOT) 78 H


 


 


 


 5-34  U/L





 


Alanine Aminotransferase


(ALT/SGPT) 57 H


 


 


 


 0-55  U/L





 


Alkaline Phosphatase 103       U/L


 


Total Creatine Kinase 249 H      U/L


 


Creatine Kinase MB 6.4     <6.6  NG/ML


 


Troponin I 0.325 *H    <0.028  NG/ML


 


C-Reactive Protein High


Sensitivity 7.33 H


 


 


 


 0.00-0.50


MG/DL


 


B-Type Natriuretic Peptide 980.9 H    <100.0  PG/ML


 


Total Protein 7.2     6.4-8.2  GM/DL


 


Albumin 2.8 L    3.2-4.5  GM/DL


 


Procalcitonin 0.25 H    <0.10  NG/ML


 


Free Thyroxine


 1.00 


 


 


 


 0.70-1.48


NG/DL


 


TSH Ellery Testing


 5.08 H


 


 


 


 0.35-4.94


UIU/ML


 


Glucometer  233 H     MG/DL


 


Urine Color   YELLOW    


 


Urine Clarity   CLEAR    


 


Urine pH   6.5   5-9  


 


Urine Specific Gravity   >=1.030   1.016-1.022  


 


Urine Protein   2+ H  NEGATIVE  


 


Urine Glucose (UA)   NEGATIVE   NEGATIVE  


 


Urine Ketones   TRACE H  NEGATIVE  


 


Urine Nitrite   NEGATIVE   NEGATIVE  


 


Urine Bilirubin   NEGATIVE   NEGATIVE  


 


Urine Urobilinogen   1.0   < = 1.0  MG/DL


 


Urine Leukocyte Esterase   2+ H  NEGATIVE  


 


Urine RBC (Auto)   3+ H  NEGATIVE  


 


Urine RBC   10-25 H   /HPF


 


Urine WBC   10-25 H   /HPF


 


Urine Squamous Epithelial


Cells 


 


 NONE 


 


  /HPF





 


Urine Renal Epithelial Cells   NONE    /HPF


 


Urine Crystals   PRESENT H   /LPF


 


Urine Amorphous Sediment


 


 


 LARGE BEST


URATES H 


  /LPF





 


Urine Bacteria   LARGE H   /HPF


 


Urine Casts   NONE    /LPF


 


Urine Mucus   NEGATIVE    /LPF


 


Urine Culture Indicated


 


 


 CULTURE


PENDING 


  





 


Influenza Type A (RT-PCR)    Not Detected  Not Detecte  


 


Influenza Type B (RT-PCR)    Not Detected  Not Detecte  


 


SARS-CoV-2 RNA (RT-PCR)    Not Detected  Not Detecte  


 


Test


 22


22:45 


 


 


 Range/Units


 


 


Blood Gas Puncture Site LEFT RADIAL      


 


Blood Gas Patient Temperature 36.1      


 


Arterial Blood pH 7.36 L    7.37-7.43  


 


Arterial Blood Partial


Pressure CO2 56 H


 


 


 


 35-45  MMHG





 


Arterial Blood Partial


Pressure O2 30 *L


 


 


 


 79-93  MMHG





 


Arterial Blood HCO3 31 H    23-27  MMOL/L


 


Arterial Blood Total CO2


 32.8 H


 


 


 


 21.0-31.0


MMOL/L


 


Arterial Blood Oxygen


Saturation 38 L


 


 


 


   %





 


Arterial Blood Base Excess


 5.9 H


 


 


 


 -2.5-2.5


MMOL/L


 


[place] Test POSITIVE      


 


Blood Gas Ventilator Setting NO      


 


Blood Gas Inspired Oxygen 10      








My Orders





Orders - DHARA GALE DO


Accucheck Stat ONCE (22 21:29)


Ed Iv/Invasive Line Start (22 21:29)


Ekg Tracing (22 21:29)


O2 (22 21:29)


Monitor-Rhythm Ecg Trace Only (22 21:29)


Cbc With Automated Diff (22 21:29)


Comprehensive Metabolic Panel (22 21:29)


Procalcitonin (Pct) (22 21:29)


Hs C Reactive Protein (22:)


Erythrocyte Sedimentation Rate (22:)


Blood Culture (22:)


Ekg Tracing (22)


Chest 1 View, Ap/Pa Only (22:)


Covid 19 Inhouse Test (22:)


Urinalysis (22:)


Urine Culture (22)


Protime With Inr (22:)


Partial Thromboplastin Time (22:)


Ed Iv/Invasive Line Start (22:)


Ed Iv/Invasive Line Start (22)


Vital Signs Adult Sepsis Patie Q15M (22:)


O2 (22:)


Remove Rings In Anticipation O (22:)


Lactic Acid Analyzer (22:)


Ct Head/Cervical Spine Wo (22:)


Bnp Pettis (22:)


Creatine Kinase (22:)


Creatine Kinase Mb (22:)


Magnesium (22:)


Thyroid Analyzer (22:)


Troponin I Amy (22:)


Ed Iv/Invasive Line Start (22:)


Lactated Ringers (Lr 1000 Ml Iv Solution (22:30)


Ondansetron Injection (Zofran Injectio (22:30)


Influenza A And B By Pcr (22:)


Isolation Central Supply Req (22:)


Manual Differential (22 22:00)


Rt Request For Service (22 22:33)


Free T4 (Free Thyroxine) (22 22:00)


Cefepime Injection (Maxipime Injection) (22 23:00)


Enoxaparin Injection (Lovenox Injection) (22 23:00)


Methylprednisolone Sod Succ (Solu-Medrol (22 23:00)


Furosemide  Injection (Lasix  Injection) (22 23:15)





Medications Given in ED





Current Medications








 Medications  Dose


 Ordered  Sig/Marvin


 Route  Start Time


 Stop Time Status Last Admin


Dose Admin


 


 Cefepime HCl 1000


 mg/Sodium Chloride  50 ml @ 


 100 mls/hr  ONCE  ONCE


 IV  22 23:00


 22 23:29 DC 22 23:28


100 MLS/HR


 


 Furosemide  80 mg  ONCE  ONCE


 IVP  22 23:15


 22 23:16 DC 22 23:28


80 MG


 


 Lactated Ringer's  1,000 ml @ 


 0 mls/hr  Q0M ONCE


 IV  22 21:30


 22 21:33 DC 22 21:49


0 MLS/HR


 


 Methylprednisolone


 Sodium Succinate  125 mg  ONCE  ONCE


 IVP  22 23:00


 22 23:01 DC 22 23:28


125 MG


 


 Ondansetron HCl  4 mg  ONCE  ONCE


 IVP  22 21:30


 22 21:33 DC 22 21:49


4 MG








Vital Signs/I&O











 22





 21:27 21:27


 


Temp 35.7 


 


Pulse 89 


 


Resp 23 


 


B/P (MAP) 128/87 (101) 


 


Pulse Ox 99 99


 


O2 Delivery Nasal Cannula Nasal Cannula


 


O2 Flow Rate 4.00 4.00





Capillary Refill :


Progress Note :  


Progress Note


PLACED IN ISOLATION ROOM


PPE WORN


COVID AND FLU TESTING DONE





SEPSIS PROTOCOL INITIATED





PT PLACED ON O2 AT 4L/NC AND O2 SATS UP TO 99% WHILE AWAKE AND RESPIRATIONS ARE 

NON-LABORED


PT IS VERY DROWSY/SOMNOLENT, AND QUICKLY FALLS ASLEEP, EVEN DURING PROCEDURES 

SUCH AS ATTEMPTS TO OBTAIN ABG'S AND IV STICKS. 


WHILE SLEEPING, PT HAS VERY LABORED BREATHING AND SNOROUS BREATHING  AND O2 SATS

NOTED TO DROP INTO 50'S. 


PT QUICKLY WAKES TO VERBAL AND TACTILE STIMULI AND O2 SATS IMMEDIATELY GO BACK 

UP TO THE UPPER 90'S AND RESPIRATIONS ARE NO LONGER LABORED. 





PT ADAMANTLY REFUSES CPAP AND BIPAP--STATES "NASAL CANULA ONLY"


I STRONGLY URGED HIM TO TRY CPAP / BIPAP AND HE REPEATEDLY REFUSED--HE HAS SAID 

SAME TO RT STAFF AS WELL AS NURSING STAFF. 


PT WAS ADVISED ON BENEFITS OF USING IT AS WELL AS RISKS OF NOT USING IT, INCLUDI

NG DEATH, AND PT CONTINUES TO ADMANTLY REFUSE CPAP OR BIPAP.





ECG


Initial ECG Impression Date:  2022


Initial ECG Impression Time:  21:46


Initial ECG Rate:  89


Initial ECG Rhythm:  Normal Sinus


Initial ECG Impression:  Nonspecific Changes


Initial ECG Comparisson:  Unchanged





Diagnostic Imaging





Comments


CXR--PER RADIOLOGIST REPORT AT 


FINDINGS:





There is cardiomegaly. There is mild pulmonary vasculature


congestion. There is atelectasis versus mild infiltrate in the


medial right lung base. Remaining lungs appear clear. No


effusions or pneumothorax.





IMPRESSION:


1. Pulmonary vascular congestion with minimal right base


atelectasis versus mild infiltrate. Correlate with symptoms.








CT HEAD/CERVICAL SPINE--PER RADIOLOGIST REPORT AT 


FINDINGS:





CT brain:





There is a focus of encephalomalacia in the anterior frontal


parietal lobe with a similar process in the posterior right


parietal lobe extending into the occipital lobe. There is a


larger area of encephalomalacia within the left cerebellum with a


smaller focus in the right cerebellum. There is no acute


hemorrhage or infarct. There is no mass, mass effect or midline


shift. There is no hydrocephalus. The calvarium is intact. The


paranasal sinuses and mastoid air cells clear.





IMPRESSION:


1. Chronic findings with no acute intracranial process.








CT CERVICAL SPINE:





There is reversal of the normal curvature which could be


positional or due to muscle spasm. There is intervertebral space


narrowing and endplate sclerosis with anterior posterior spurring


most marked at C6-C7. Motion artifact limits evaluation. No


obvious fractures appreciated. The lung apices clear.


Prevertebral soft tissues unremarkable.





IMPRESSION:


1. Degenerative findings with no definite acute fracture seen


however examination is limited by motion artifact. If there is


persistent neck pain repeat imaging when patient is able to


cooperate recommended.


   Reviewed:  Reviewed by Me





Departure


Communication (Admissions)


8--SPOKE WITH DR. FRANK, ACCEPTS PT FOR ADMIT





Impression





   Primary Impression:  


   Acute on chronic respiratory failure with hypoxia and hypercapnia


   Additional Impressions:  


   Obesity hypoventilation syndrome


   Sleep related hypoxia


   IDDM (insulin dependent diabetes mellitus)


   Sepsis


   Pneumonia


   UTI (urinary tract infection)


   Cellulitis of right leg


   CHRONIC RIGHT LEG WOUNDS


   CHRONIC WOUND LEFT LEG AKA STUMP


   Acute on chronic congestive heart failure


   Elevated troponin


   Chronic renal insufficiency


   Chronic anemia


   Hypersomnolence


Disposition:   ADMITTED AS INPATIENT


Condition:  Stable





Admissions


Decision to Admit Reason:  Admit from ER (General)


Decision to Admit/Date:  2022


Time/Decision to Admit Time:  23:25





Departure-Patient Inst.


Referrals:  


YAA LEE MD (PCP/Family)


Primary Care Physician











DHARA GALE DO                 2022 21:43

## 2022-06-20 NOTE — DIAGNOSTIC IMAGING REPORT
INDICATION:  Weakness and nausea.



EXAMINATION:  Chest 06/20/2022 



COMPARISON:  02/22/2022



FINDINGS:



There is cardiomegaly. There is mild pulmonary vasculature

congestion. There is atelectasis versus mild infiltrate in the

medial right lung base. Remaining lungs appear clear. No

effusions or pneumothorax.



IMPRESSION:

1. Pulmonary vascular congestion with minimal right base

atelectasis versus mild infiltrate. Correlate with symptoms.



Dictated by: 



  Dictated on workstation # [id number]

## 2022-06-20 NOTE — DIAGNOSTIC IMAGING REPORT
PROCEDURE: CT head and CT cervical spine without contrast.



TECHNIQUE: Multiple contiguous axial images were obtained through

the brain and cervical spine without the use of intravenous

contrast. Sagittal and coronal reformations through the cervical

spine were then performed. Auto Exposure Controls were utilized

during the CT exam to meet ALARA standards for radiation dose

reduction. 



INDICATION:  Weakness and nausea.



EXAMINATION:  CT brain and CT cervical spine 06/20/2022.



FINDINGS:



CT brain:



There is a focus of encephalomalacia in the anterior frontal

parietal lobe with a similar process in the posterior right

parietal lobe extending into the occipital lobe. There is a

larger area of encephalomalacia within the left cerebellum with a

smaller focus in the right cerebellum. There is no acute

hemorrhage or infarct. There is no mass, mass effect or midline

shift. There is no hydrocephalus. The calvarium is intact. The

paranasal sinuses and mastoid air cells clear.



IMPRESSION:

1. Chronic findings with no acute intracranial process.





CT CERVICAL SPINE:



There is reversal of the normal curvature which could be

positional or due to muscle spasm. There is intervertebral space

narrowing and endplate sclerosis with anterior posterior spurring

most marked at C6-C7. Motion artifact limits evaluation. No

obvious fractures appreciated. The lung apices clear.

Prevertebral soft tissues unremarkable.



IMPRESSION:

1. Degenerative findings with no definite acute fracture seen

however examination is limited by motion artifact. If there is

persistent neck pain repeat imaging when patient is able to

cooperate recommended.



 



Dictated by: 



  Dictated on workstation # [id number]

## 2022-06-21 VITALS — SYSTOLIC BLOOD PRESSURE: 168 MMHG | DIASTOLIC BLOOD PRESSURE: 79 MMHG

## 2022-06-21 VITALS — DIASTOLIC BLOOD PRESSURE: 100 MMHG | SYSTOLIC BLOOD PRESSURE: 146 MMHG

## 2022-06-21 VITALS — SYSTOLIC BLOOD PRESSURE: 128 MMHG | DIASTOLIC BLOOD PRESSURE: 87 MMHG

## 2022-06-21 VITALS — DIASTOLIC BLOOD PRESSURE: 98 MMHG | SYSTOLIC BLOOD PRESSURE: 146 MMHG

## 2022-06-21 LAB
ALBUMIN SERPL-MCNC: 3 GM/DL (ref 3.2–4.5)
ALP SERPL-CCNC: 118 U/L (ref 40–136)
ALT SERPL-CCNC: 179 U/L (ref 0–55)
ARTERIAL PATENCY WRIST A: POSITIVE
BASE EXCESS STD BLDA CALC-SCNC: 5.9 MMOL/L (ref -2.5–2.5)
BASOPHILS # BLD AUTO: 0 10^3/UL (ref 0–0.1)
BASOPHILS NFR BLD AUTO: 0 % (ref 0–10)
BDY SITE: (no result)
BILIRUB SERPL-MCNC: 0.6 MG/DL (ref 0.1–1)
BODY TEMPERATURE: 36.1
BUN/CREAT SERPL: 18
CALCIUM SERPL-MCNC: 8.7 MG/DL (ref 8.5–10.1)
CHLORIDE SERPL-SCNC: 94 MMOL/L (ref 98–107)
CHOLEST SERPL-MCNC: 160 MG/DL (ref ?–200)
CO2 BLDA CALC-SCNC: 32.8 MMOL/L (ref 21–31)
CO2 SERPL-SCNC: 29 MMOL/L (ref 21–32)
CREAT SERPL-MCNC: 1.73 MG/DL (ref 0.6–1.3)
EOSINOPHIL # BLD AUTO: 0 10^3/UL (ref 0–0.3)
EOSINOPHIL NFR BLD AUTO: 0 % (ref 0–10)
GFR SERPLBLD BASED ON 1.73 SQ M-ARVRAT: 47 ML/MIN
GLUCOSE SERPL-MCNC: 254 MG/DL (ref 70–105)
HCT VFR BLD CALC: 32 % (ref 40–54)
HDLC SERPL-MCNC: 31 MG/DL (ref 40–60)
HGB BLD-MCNC: 9.1 G/DL (ref 13.3–17.7)
INHALED O2 FLOW RATE: 10 L/MIN
LYMPHOCYTES # BLD AUTO: 0.3 10^3/UL (ref 1–4)
LYMPHOCYTES NFR BLD AUTO: 3 % (ref 12–44)
MAGNESIUM SERPL-MCNC: 2.1 MG/DL (ref 1.6–2.4)
MANUAL DIFFERENTIAL PERFORMED BLD QL: NO
MCH RBC QN AUTO: 25 PG (ref 25–34)
MCHC RBC AUTO-ENTMCNC: 29 G/DL (ref 32–36)
MCV RBC AUTO: 87 FL (ref 80–99)
MONOCYTES # BLD AUTO: 0.4 10^3/UL (ref 0–1)
MONOCYTES NFR BLD AUTO: 4 % (ref 0–12)
NEUTROPHILS # BLD AUTO: 9.7 10^3/UL (ref 1.8–7.8)
NEUTROPHILS NFR BLD AUTO: 93 % (ref 42–75)
PCO2 BLDA: 56 MMHG (ref 35–45)
PH BLDA: 7.36 [PH] (ref 7.37–7.43)
PHOSPHATE SERPL-MCNC: 5 MG/DL (ref 2.3–4.7)
PLATELET # BLD: 363 10^3/UL (ref 130–400)
PMV BLD AUTO: 10 FL (ref 9–12.2)
PO2 BLDA: 30 MMHG (ref 79–93)
POTASSIUM SERPL-SCNC: 4.6 MMOL/L (ref 3.6–5)
PROT SERPL-MCNC: 7.9 GM/DL (ref 6.4–8.2)
SAO2 % BLDA FROM PO2: 38 % (ref 94–100)
SODIUM SERPL-SCNC: 137 MMOL/L (ref 135–145)
TRIGL SERPL-MCNC: 71 MG/DL (ref ?–150)
VENTILATION MODE VENT: NO
VLDLC SERPL CALC-MCNC: 14 MG/DL (ref 5–40)
WBC # BLD AUTO: 10.5 10^3/UL (ref 4.3–11)

## 2022-06-21 RX ADMIN — ENOXAPARIN SODIUM SCH MG: 150 INJECTION SUBCUTANEOUS at 03:29

## 2022-06-21 RX ADMIN — METHYLPREDNISOLONE SODIUM SUCCINATE SCH MG: 40 INJECTION, POWDER, FOR SOLUTION INTRAMUSCULAR; INTRAVENOUS at 21:08

## 2022-06-21 RX ADMIN — SODIUM CHLORIDE SCH MLS/HR: 900 INJECTION INTRAVENOUS at 05:31

## 2022-06-21 RX ADMIN — METHYLPREDNISOLONE SODIUM SUCCINATE SCH MG: 40 INJECTION, POWDER, FOR SOLUTION INTRAMUSCULAR; INTRAVENOUS at 09:33

## 2022-06-21 RX ADMIN — SODIUM CHLORIDE SCH MLS/HR: 900 INJECTION, SOLUTION INTRAVENOUS at 00:06

## 2022-06-21 RX ADMIN — SODIUM CHLORIDE SCH MLS/HR: 900 INJECTION INTRAVENOUS at 11:17

## 2022-06-21 RX ADMIN — SODIUM CHLORIDE SCH MLS/HR: 900 INJECTION, SOLUTION INTRAVENOUS at 09:33

## 2022-06-21 RX ADMIN — IPRATROPIUM BROMIDE AND ALBUTEROL SULFATE SCH ML: .5; 3 SOLUTION RESPIRATORY (INHALATION) at 14:33

## 2022-06-21 RX ADMIN — VANCOMYCIN HYDROCHLORIDE SCH MLS/HR: 500 INJECTION, POWDER, LYOPHILIZED, FOR SOLUTION INTRAVENOUS at 17:06

## 2022-06-21 RX ADMIN — SODIUM CHLORIDE SCH MLS/HR: 900 INJECTION, SOLUTION INTRAVENOUS at 01:14

## 2022-06-21 RX ADMIN — IPRATROPIUM BROMIDE AND ALBUTEROL SULFATE SCH ML: .5; 3 SOLUTION RESPIRATORY (INHALATION) at 21:09

## 2022-06-21 RX ADMIN — SODIUM CHLORIDE SCH MLS/HR: 900 INJECTION INTRAVENOUS at 17:06

## 2022-06-21 RX ADMIN — ENOXAPARIN SODIUM SCH MG: 150 INJECTION SUBCUTANEOUS at 14:25

## 2022-06-21 NOTE — TELE-ICU CONSULT
History of Present Illness


History of Present Illness


Date Seen by Provider:  Jun 21, 2022


Time Seen by Provider:  10:34


Date of Admission


(Tele-ICU Physician ,  consultation) 





Available chart/ vitals / labs / Images reviewed 


H&P is from ER notes 


Patient's information available about PMH, Shx, Fhx  allergy reviewed in EMR.  


ROS as per chart and RN report  





Now in ICU, hemodynamically stable  


Video assessment done using  teleICU camera, rest of exam as per RN  





Discussed with RN. 





Consultants:  jimmy





Hospital course: 


(6/21) 52yr old male admitted after a fall with PNA, UTI, and cardiomegaly, 





 





A/P 


Chronic resp failure 


- chronic hypercarbia and hypoxia


- started on steroids in ER - ? doseing as per bedside MD 


- fully AC with lovenox 150 bid -FOR ELEV TROP VS SUSPECTED PE  - REASSESS AFTER

CLINICAL EVAL . PATIENT IS VERY SOMNOLENT , POSSIBLE FALL ASLEEP ON TOILET AND 

FALL - REISK/ BENEFITS OF FULL  TO ADRESS 





ID 


- UTI( chrtonic jean ) vs PNA vs skin wound /cellulitis


- cefepime / \vanco started - need follow cx - recent abx 





MARIO/CKD


- mild worsening 





Elevated transamin


-





Elev trop


-- fully AC with lovenox 150 bid - as per PCP 





IDDM


- iss 





s/p fall 


- CTH and neck - no injuru





Anemia


- stable ( monitor on AC - fully AC with lovenox 150 bid





QUINCY


- as per chart -  REFUSES CPAP AND BIPAP--STATES "NASAL CANULA ONLY"





Morbid obesity








CHRONIC RIGHT LEG WOUNDS, AND HAS CHRONIC WOUND TO LEFT AKA STUMP.





PT IS DNR/DNI, HOSPICE WAS RECENTLY DISCONTINUED AS PT WAS APPARENTLY IMPROVING





  


Lines :    (Central Line Necessity Reviewed) 


Jean: CHRONIC INDWELLING JEAN CATHETER


OG: 


Nutrition:  


Analgesia:  


Anxiety/ delirium  





VTE Prophylaxis:  - fully AC with lovenox 150 bid


Stress Ulcer Prophylaxis:  











Plans in collaboration with  bedside consultants and IM MDs. 


Discussed with RN to reach out if any questions or concerns 


A total of 33  minutes of critical care time was devoted to this patient today, 

required to treat and/or prevent further deterioration of critical care 

condition ( as above ) .





Allergies and Home Medications


Allergies


Coded Allergies:  


     meperidine HCl (Unverified  Allergy, Unknown, 11/26/11)


     shellfish derived (Unverified  Allergy, Unknown, 1/14/13)


   FROM UNCODED ALLERGIES


     Penicillins (Unverified  Adverse Reaction, Intermediate, NAUSEA, 11/26/11)





Home Medications


Baclofen 10 Mg Tablet, 10 MG PO Q8H PRN for MUSCLE SPASMS, (Reported)


Collagenase 250 Unit/Gram Oint..gm., 1 APPLIC TP DAILY, (Reported)


   APPLY TO: RIGHT TOR AMPUTATION SITE RIGHT ANTERIOR LEG RIGHT REAR CALF LEFT 

ABOVE THE KNEE AMPUTATION 


Diphenhydramine HCl 25 Mg Tablet, 25 MG PO Q6H PRN for ITCHING, (Reported)


Furosemide 40 Mg Tablet, 40 MG PO DAILY, (Reported)


Hydrocodone/Acetaminophen 5 Mg-325 Mg Tablet, 1 EA PO Q4H PRN for PAIN-MODERATE 

(5-7), (Reported)


Hyoscyamine Sulfate 0.125 Mg Tab.rapdis, 0.125 MG SL Q6H PRN for EXCESSIVE 

SECRETIONS, (Reported)


Insulin Detemir 100 Unit/Ml (3 Ml) Insuln.pen, 25 UNIT SQ BID, (Reported)


Lactulose 10 Gram/15 Ml Solution, 10 GM PO BID, (Reported)


Lorazepam 0.5 Mg Tablet, 0.5 MG PO Q6H PRN for ANXIETY/AGITATION, (Reported)


Morphine Sulfate 100 Mg/5 Ml (20 Mg/Ml) Solution, 10 MG PO EVERY 2 HOURS PRN for

SHORTNESS OF BREATH/PAIN, (Reported)


Ondansetron 4 Mg Tab.rapdis, 4 MG PO Q6H PRN for NAUSEA/VOMITING-1ST LINE, 

(Reported)


Potassium Chloride 10 Meq Tab.er.prt, 10 MEQ PO Q48H, (Reported)


   GIVE WITH 4OZ TO 8OZ OF WATER 


Sennosides/Docusate Sodium 8.6 Mg-50 Mg Tablet, 2 EACH PO BID, (Reported)





Past Medical/Social/Family Hx


Patient Social History


Tobacco Use?:  No


Smoking Status:  Former Smoker


Use of E-Cig and/or Vaping dev:  No


Substance use?:  No


Alcohol Use?:  No





Immunizations Up To Date


Influenza Vaccine Up-to-Date:  Yes; Up-to-Date


First/Initial COVID19 Vaccinat:  MODERNA


Second COVID19 Vaccination Keshawn:  MODERNA


Tetanus Booster (TDap):  Unknown


Hepatitis A:  No


Hepatitis B:  No


TB Skin Test:  None


Date of Pneumonia Vaccine:  Jan 31, 2010





Current Status


Communicates:  Verbally


Primary Language:  English


Preferred Spoken Language:  English


Is interpretation needed?:  No





Past Medical History





PMHx:


DMII


HTN


HLD


Morbid obesity


Diabetic foot ulcers


Sleep apnea





SurgHx:


Left BKA for necrotizing wound





Review of Systems


Constitutional:  see HPI





Focused Exam


Lactate Level


6/21/22 02:25: Lactic Acid Level 2.87*H


6/21/22 06:00: Lactic Acid Level 2.69*H


6/21/22 09:07: Lactic Acid Level 3.23*H


Height, Weight, BMI


Height: 6'0.00"


Weight: 400lbs. 4.0oz. 181.011232tz; 52.25 BMI


Method:Stated


Time of Focused Exam:  23:00


Lactic Acid Level





Laboratory Tests








Test


 6/21/22


09:07


 


Lactic Acid Level


 3.23 MMOL/L


(0.50-2.00)  *H











Exam


Exam


Patient acknowledged, consented, and participated in this virtual visit which 

was conducted using real time audio/video


Vital Signs








  Date Time  Temp Pulse Resp B/P (MAP) Pulse Ox O2 Delivery O2 Flow Rate FiO2


 


6/21/22 09:34  87 16 129/102 100 OxyMask 8.00 


 


6/21/22 09:00  88  133/70    


 


6/21/22 08:55      OxyMask 10.00 


 


6/21/22 08:53  88      


 


6/21/22 08:00  84  160/111    


 


6/21/22 07:00  90 23 96/81 100 OxyMask 10.00 


 


6/21/22 06:56     100 OxyMask 10.00 


 


6/21/22 06:00  90 21 154/94 100 OxyMask 10.00 


 


6/21/22 05:00  91 19 135/112 100 OxyMask 10.00 


 


6/21/22 04:00      OxyMask 10.00 


 


6/21/22 04:00  92 19 105/91 100 OxyMask 10.00 


 


6/21/22 03:47 35.7 89   99   32


 


6/21/22 03:45  90 17 174/55 100 OxyMask 10.00 


 


6/21/22 03:35  91 13 167/97 100 OxyMask 10.00 


 


6/21/22 03:15  90 9 175/142 100 OxyMask 10.00 


 


6/21/22 03:04  91  187/117  OxyMask 10.00 


 


6/21/22 02:45  91 16 171/119 100 OxyMask 10.00 


 


6/21/22 02:30  92 15 145/114 100 OxyMask 10.00 


 


6/21/22 02:17  93 22 138/114 100 OxyMask 10.00 


 


6/21/22 02:13  94      


 


6/21/22 02:08      OxyMask 10.00 


 


6/21/22 02:08 36.4       


 


6/21/22 01:20  92 12 160/96 99 OxyMask 10.00 


 


6/20/22 23:35     85  50.00 


 


6/20/22 21:27     99 Nasal Cannula 4.00 


 


6/20/22 21:27 35.7 89 23 128/87 (101) 99 Nasal Cannula 4.00 














I & O 


 


 6/21/22





 07:00


 


Intake Total 1750 ml


 


Output Total 1050 ml


 


Balance 700 ml








Height & Weight


Height: 6'0.00"


Weight: 400lbs. 4.0oz. 181.496350dz; 52.25 BMI


Method:Stated


General Appearance:  No Apparent Distress, Obese (MORBIDLY OBESE), Other 

(SOMEWHAT LETHARGIC, MALODOROUS. )


HEENT:  PERRL/EOMI


Neck:  Normal Inspection


Respiratory:  Normal Breath Sounds


Cardiovascular:  Regular Rate, Rhythm


Capillary Refill:  Less Than 3 Seconds


Extremity:  Other (LEFT AKA, WITH CHRONIC APPEARING WOUND TO STUMP. SLIGHT 

SEROSANGUINOUS DRAINAGE.    LEFT LEG WITH EXTENSIVE CHRONIC VENOUS STASIS 

CHANGES, EXTENSIVE SCALING AND MULTIPLE CHRONIC APPEARING LEG WOUNDS, SOME WITH 

SLIGHT BLEEING OR SEROSANGUINOUS DRAINAGE. ENTIRE RIGHT LOWER LEG WITH DIFFUSE 

ERYTHEMA. UNABLE TO DETERMINE DEGREE OF EDEMA DUE TO MORBID OBESITY. )


Neurologic/Psychiatric:  Alert, Oriented x3, No Motor/Sensory Deficits (GROSSLY 

INTACT. ), CNs II-XII Norm as Tested


Skin:  Warm/Dry, Pallor, Other (AS ABOVE)





Results


Lab


Laboratory Tests


6/20/22 22:00








6/21/22 02:25








6/21/22 06:00











Assessment/Plan


Assessment/Plan


1











AGUILA GASTON MD         Jun 21, 2022 10:34

## 2022-06-21 NOTE — PHYSICIAN QUERY CLARIFICATION
PQ-Intro New Diagnosis


Admission/Discharge


Admission Date: Jun 20, 2022 at 23:25 


Discharge Date:  


 Dr. Gibson,


The medical record reflects the following clinical scenario:





History/Risk Factors:


sepsis, UTI, acute > chronic respiratory failure w/hypoxia/hypercapnia, CHF





Clinical Findings:


Troponin 0.463





Treatment:


IV Cefepime, IV Vancomycin, IV Lasix





Question:  What condition best reflects the above clinical scenario? 


Please document a response in the Progress Noter or Discharge Summary.





1. MI type 2





2. Elevated troponin only





3. Other, with explanation of the clinical findings.





4. Clinically undetermined, no explanation for the clinical findings.





PHYSICIAN RESPONSE


What condition reflects above:  1


In responding to this query, please exercise your independent professional 

judgment.  The purpose of this communication is to more accurately reflect the 

complexity of your patients condition. The fact that a question is asked does 

not imply that any particular answer is desired or expected.  





Thank you for your timely response to this clarification.      


   


Requestors name: [ ]   





Phone # [ ]








THIS PHYSICIAN QUERY FORM IS A PERMANENT PART OF THE MEDICAL RECORD











LETITIA LOPES                 Jun 21, 2022 09:18


JACK GIBSON DO                Jun 21, 2022 17:37

## 2022-06-21 NOTE — WOUND CARE ASSESSMENT
Wound Care Assessment


Date Seen by Provider:  Jun 21, 2022


Time Seen by Provider:  12:00


Chief Complaint


Bilateral LE chronic ulcers


HPI


Yordan is a 52 year old male well known to both my inpatient and outpatient wound

care services. We have struggled with Yordan's care for some time due to his 

extreme apathy and noncompliance with all aspects of his care. Yordan in the past

has refused to take all his medications. when asked about this on current 

admission he states that it was "just easier not to worry about it". After his 

last admission he was discharged on hospice care to [facility]. 

Once Yordan began taking his medications (because the facility was providing them

to him), his status began to improved and his hospice care was discontinued. 

Today Yordan states that he would like his wounds to be gone because he wants to 

leave the facility because it is a "shithole". He states that they feed him 

"rotten food" and as a result he must order in food ([employer] primarily) because 

he just can't tolerate what they serve. When I reminded Yordan of his struggles 

with noncompliance when living at home in the past, he states that he does not 

care, and wishes to return anyway. Yordan's LLE stump wound has improved since I 

last saw him with a signifant decrease in size. However, his right lower e

xtremity has greatly worsened. His ulceration is now circumferential and 

encompassing the entirety of his RLE from the knee to foot. There is copious 

foul smelling purulent drainage with areas of deep ulceration with slough and 

necrotic material. I will put measurements for the deeper ulcerations in my 

wound assessment below. Yordan has a h/o atherosclerosis to bilateral LE. We have

attempted to get him to see vascular surgery in the past but he no showed for 

these visits repeatedly. I strongly suspect osteomyelitis with associated 

peripheral arterial disease. He is scheduled for vascular evaluation during this

admission. I will check a triple phase bone scan to determine the extent of 

osteomyelitis. Yordan is too obese for our MRI machine and with the extent of his

ulceration, it would require multiple scans regardless. Yulis wound healing 

will be first and foremost compromised by his compliance issues, but also by 

poorly controlled diabetes (last A1C 12.3), PEM (last prealbumin 13.9), severe 

lymphedema (unwilling to elevate), massive obesity, PAD (with failure to follow 

up), CHF, and SOPD on continuous oxygen. Yordan also has anemia, chronic kidney 

disease, elevated LFT's and elevated troponin on this stay. He is currently 

covered with Vanc and Cefepime. He remains on systemic steroids for his 

respiratory status (which will also compromise his wound healing). I have over 

many months attempted many methods to motvate Yordan to lifestyle change. I have 

been unsuccessful in all attempts.


Past Medical History:  Admits Diabetes Type II, Admits Heart Disease, Admits 

Peripheral Artery Disease


COPD (O2 dependent), QUINCY (noncompliant with CPAP or biPAP in past), obesity, 

lymphedema, CHF


Smoking Status:  Former Smoker


Recreational Drug Use:  No


Alcohol Use:  Denies Use


Review of Systems


General:  Other (Massive obesity)


Pulmonary:  Dyspnea


Cardiovascular:  Paroxysmal Noc. Dyspnea, Edema


Gastrointestinal:  Nausea


Genitourinary:  Incontinence


Neurological:  Weakness





Exam





Vital Signs








  Date Time  Temp Pulse Resp B/P (MAP) Pulse Ox O2 Delivery O2 Flow Rate FiO2


 


6/21/22 14:34     95 High Flow N/C 8.00 


 


6/21/22 12:00 36.4 91 20 136/84    


 


6/21/22 03:47        32





Capillary Refill : Less Than 3 Seconds


General Appearance:  no apparent distress, obese, other (Yordan is lying naked in

his room. Leg not elevated and dangling off edge of bed)


HEENT:  other (hearing wnl)


Neck:  full range of motion


Respiratory:  no respiratory distress, no accessory muscle use


Extremities:  pedal edema (Massive brawny edema from knee down on right. ), 

swelling, other (Left AKA with failed stump incision)


Neurologic/Psychiatric:  alert, normal mood/affect, oriented x 3


Skin Character:  drainage (Copious serous and purulent drainage from knee down 

RLE), erythema (From knee down RLE), scales (brawny edema with scaling), 

swelling (massive edema), thickening


Wound assessments:





1. Left stump ulcer: 5.4x3.2x0.3cm. The epithelialization is none. There is no 

tunneling or undermining. Drainage is large and purulent. Granulation is none. 

necrotic is large and slough. The margins show epibole.


2. R. dosral foot: 6.5x5.9x0.1cm. The epithelialization is none. There is no 

tunneling or undermining. Drainage is large and purulent. Granulation is none. 

necrotic is large and slough. The margins are flat


3. Post. R. Calf: 6.4x4.5x0.4cm. The epithelialization is none. There is no 

tunneling or undermining. Drainage is large and purulent. Granulation is none. 

necrotic is large and slough. The margins show epibole.


4. R. 2nd toe: There is degloving of this digit with significant maceration 

associated


5. The entirety of the R. lower leg below the knee is weeping serous and 

purulent foul smelling drainage. There is signifcant maceration throughout with 

several areas of deeper ulceration. There is brawny edema that is non-pitting.





Results


Laboratory Tests


6/20/22 22:00: 


White Blood Count 9.3, Red Blood Count 3.34L, Hemoglobin 8.4L, Hematocrit 29L, 

Mean Corpuscular Volume 87, Mean Corpuscular Hemoglobin 25, Mean Corpuscular 

Hemoglobin Concent 29L, Red Cell Distribution Width 19.8H, Platelet Count 346, 

Mean Platelet Volume 10.1, Immature Granulocyte % (Auto) 1, Neutrophils (%) 

(Auto) 91H, Lymphocytes (%) (Auto) 4L, Monocytes (%) (Auto) 4, Eosinophils (%) 

(Auto) 0, Basophils (%) (Auto) 0, Neutrophils # (Auto) 8.4H, Lymphocytes # 

(Auto) 0.4L, Monocytes # (Auto) 0.4, Eosinophils # (Auto) 0.0, Basophils # 

(Auto) 0.0, Immature Granulocyte # (Auto) 0.1, Neutrophils % (Manual) 92, 

Lymphocytes % (Manual) 5, Monocytes % (Manual) 2, Eosinophils % (Manual) 1, 

Polychromasia MARKED, Anisocytosis MARKED, Target Cells MODERATE, Erythrocyte 

Sedimentation Rate 80H, Prothrombin Time 17.8H, INR Comment 1.4, Activated 

Partial Thromboplast Time 32, Sodium Level 140, Potassium Level 4.2, Chloride 

Level 93L, Carbon Dioxide Level 24, Anion Gap 23H, Blood Urea Nitrogen 28H, 

Creatinine 1.72H, Estimat Glomerular Filtration Rate 47, BUN/Creatinine Ratio 

16, Glucose Level 221H, Lactic Acid Level 7.81*H, Calcium Level 8.6, Corrected 

Calcium 9.6, Magnesium Level 2.0, Total Bilirubin 0.7, Aspartate Amino Transf 

(AST/SGOT) 78H, Alanine Aminotransferase (ALT/SGPT) 57H, Alkaline Phosphatase 

103, Total Creatine Kinase 249H, Creatine Kinase MB 6.4, Troponin I 0.325*H, C-

Reactive Protein High Sensitivity 7.33H, B-Type Natriuretic Peptide 980.9H, Tot

al Protein 7.2, Albumin 2.8L, Procalcitonin 0.25H, Free Thyroxine 1.00, TSH 

Randle Testing 5.08H


6/20/22 22:28: Glucometer 233H


6/20/22 22:31: 


Urine Color YELLOW, Urine Clarity CLEAR, Urine pH 6.5, Urine Specific Gravity 

>=1.030, Urine Protein 2+H, Urine Glucose (UA) NEGATIVE, Urine Ketones TRACEH, 

Urine Nitrite NEGATIVE, Urine Bilirubin NEGATIVE, Urine Urobilinogen 1.0, Urine 

Leukocyte Esterase 2+H, Urine RBC (Auto) 3+H, Urine RBC 10-25H, Urine WBC 10-25H

, Urine Squamous Epithelial Cells NONE, Urine Renal Epithelial Cells NONE, Urine

Crystals PRESENTH, Urine Amorphous Sediment LARGE BEST URATESH, Urine Bacteria 

LARGEH, Urine Casts NONE, Urine Mucus NEGATIVE, Urine Culture Indicated CULTURE 

PENDING


6/20/22 22:43: 


Influenza Type A (RT-PCR) Not Detected, Influenza Type B (RT-PCR) Not Detected, 

SARS-CoV-2 RNA (RT-PCR) Not Detected


6/20/22 22:45: 


Blood Gas Puncture Site LEFT RADIAL, Blood Gas Patient Temperature 36.1, 

Arterial Blood pH 7.36L, Arterial Blood Partial Pressure CO2 56H, Arterial Blood

Partial Pressure O2 30*L, Arterial Blood HCO3 31H, Arterial Blood Total CO2 

32.8H, Arterial Blood Oxygen Saturation 38L, Arterial Blood Base Excess 5.9H, 

[place] Test POSITIVE, Blood Gas Ventilator Setting NO, Blood Gas Inspired Oxygen 

10


6/21/22 00:15: Lactic Acid Level 4.98*H


6/21/22 02:25: 


Lactic Acid Level 2.87*H, White Blood Count 10.5, Red Blood Count 3.67L, 

Hemoglobin 9.1L, Hematocrit 32L, Mean Corpuscular Volume 87, Mean Corpuscular 

Hemoglobin 25, Mean Corpuscular Hemoglobin Concent 29L, Red Cell Distribution 

Width 19.9H, Platelet Count 363, Mean Platelet Volume 10.0, Immature Granulocyte

% (Auto) 1, Neutrophils (%) (Auto) 93H, Lymphocytes (%) (Auto) 3L, Monocytes (%)

(Auto) 4, Eosinophils (%) (Auto) 0, Basophils (%) (Auto) 0, Neutrophils # (Auto)

9.7H, Lymphocytes # (Auto) 0.3L, Monocytes # (Auto) 0.4, Eosinophils # (Auto) 

0.0, Basophils # (Auto) 0.0, Immature Granulocyte # (Auto) 0.1


6/21/22 06:00: 


Lactic Acid Level 2.69*H, Sodium Level 137, Potassium Level 4.6, Chloride Level 

94L, Carbon Dioxide Level 29, Anion Gap 14, Blood Urea Nitrogen 31H, Creatinine 

1.73H, Estimat Glomerular Filtration Rate 47, BUN/Creatinine Ratio 18, Glucose 

Level 254H, Calcium Level 8.7, Corrected Calcium 9.5, Phosphorus Level 5.0H, 

Magnesium Level 2.1, Total Bilirubin 0.6, Aspartate Amino Transf (AST/SGOT) 208H

, Alanine Aminotransferase (ALT/SGPT) 179H, Alkaline Phosphatase 118, Troponin I

0.463*H, Total Protein 7.9, Albumin 3.0L


6/21/22 09:07: Lactic Acid Level 3.23*H





Assessment/Plan/Dx


Assessment:





1. Non-pressure ulcer RLE with fat layer exposed


2. Cellulitis RLE with high suspicion for osteomyelitis


3. Atherosclerosis of bilateral LE with ulceration


4. Stump failure with chronic full thickness ulceration L. AKA


5. Cutaneous candidiasis


6. Massive lymphedema


7. Morbid obesity


8. DM2 with poor control


9. PEM


10. CKD stage 3


11. COPD and CHF with O2 dependence


12. Anemia (chronic)


13. Medical noncompliance in all aspects of care





Plan:





1. Cleanse daily with vashe. Silver alginate to stump ulcer to cover with ABD 

and change daily. Barrier ointment twice daily to RLE ulcer and encourage 

elevation. There is too much drainage for any dressing to RLE at this point. 


2. Agree with broad spectum antibiotics. Check triple phase bone scan to rule 

out osteomyelitis. If confirmed, surgical consult would be reasonable after 

arterial status evaluated and patient medically stabilized. High risk for flap 

failure (as indicated by poor result on left).


3. Agree with cardiology evaluation and input.


4. See above dressing orders.


5. Diflucan ordered


6. Elevation recommended. Patient declines


7. Weight loss recommended. Patient declines


8. Improved glycemic control recommended and primary team working on this. 

Patient declines dietary changes as outpatient


9. High protein low carbohydrate diet adviseable. Patient declines dietary 

assistance as outpatient.


10. Defer to primary team. This is likely multifactorial and chronic in nature


11. Defer to primary team. Declines CPAP or BiPAP


12. Defer to primary team. 


13. Compliance encouraged again. I am not optimistic for improvement in this 

area in light of patient's current unwillingness for lifestyle change











ES LUBIN MD            Jun 21, 2022 15:58

## 2022-06-21 NOTE — HISTORY & PHYSICAL-HOSPITALIST
History of Present Illness


HPI/Chief Complaint


CC: Acute on chronic respiratory failure





HPI: This is a 52 yr old male who was previously on hospice but fired them two 

weeks ago. He presented to the ICU with right lower extremity leg wound, 

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 Non compliant with oxygen and CPAP due 

to obesity hypoventilation syndrome. He remains a DNR. IV fluids will continue 

but monitoring for volume overload. Pt has a poor prognosis. Will move to 4th 

floor and continue supportive care.


Source:  patient


Exam Limitations:  no limitations


Date Seen


22


Time Seen by a Provider:  09:00


Attending Physician


Noe Roque MD


PCP


Admitting Physician:


Guerline Marin MD 








Attending Physician:


Guerline Marin MD


Referring Physician





Date of Admission


2022 at 23:25





Home Medications & Allergies


Home Medications


Reviewed patient Home Medication Reconciliation performed by pharmacy medication

reconciliations technician and/or nursing.


Patients Allergies have been reviewed.





Allergies





Allergies


Coded Allergies


  meperidine HCl (Unverified Allergy, Unknown, 11)


  shellfish derived (Unverified Allergy, Unknown, 13)


    FROM UNCODED ALLERGIES


  Penicillins (Unverified Adverse Reaction, Intermediate, NAUSEA, 11)








Past Medical-Social-Family Hx


Patient Social History


Marrital Status:  single


Employed/Student:  unemployed


Tobacco Use?:  No


Smoking Status:  Former Smoker


Use of E-Cig and/or Vaping dev:  No


Substance use?:  No


Alcohol Use?:  No





Immunizations Up To Date


First/Initial COVID19 Vaccinat:  MODERNA


Second COVID19 Vaccination Keshawn:  MODERNA


Tetanus Booster (TDap):  Unknown


Hepatitis A:  No


Hepatitis B:  No


PED Vaccines UTD:  No


Date of Pneumonia Vaccine:  2010





Seasonal Allergies


Seasonal Allergies:  Yes





Current Status


Communicates:  Verbally


Primary Language:  English


Preferred Spoken Language:  English


Is interpretation needed?:  No





Past Medical History


Surgeries:  Abdominal, Amputation, Orthopedic


Pneumonia, Sleep Apnea, COPD


Currently Using CPAP:  No (REFUSES CPAP)


Currently Using BIPAP:  No


Cardiomyopathy, Chronic Edema/Swelling, High Cholesterol, Hypertension, 

Peripheral Vascular


Concussion, Neuropathy


Sexually Transmitted Disease:  No


HIV/AIDS:  No


Abdominal Hernia, Chronic Constipation


Amputee, Arthritis, Chronic Back Pain


Diabetes, Insulin dep


Loss of Vision:  Denies


Hearing Impairment:  Denies


Anxiety


Blood Disorders:  Yes (ANEMIA)





PMHx:


DMII


HTN


HLD


Morbid obesity


Diabetic foot ulcers


Sleep apnea





SurgHx:


Left BKA for necrotizing wound





Family Medical History





Arthritis


  19 FATHER, Onset:Unknown


Diabetes mellitus


  19 MOTHER, , Onset:Unknown


No Pertinent Family Hx





Review of Systems


Constitutional:  see HPI, malaise, weakness


EENTM:  no symptoms reported


Respiratory:  dyspnea on exertion, short of breath


Cardiovascular:  no symptoms reported


Gastrointestinal:  no symptoms reported


Genitourinary:  no symptoms reported


Musculoskeletal:  no symptoms reported


Skin:  no symptoms reported; No change in hair/nails


Psychiatric/Neurological:  No Symptoms Reported


All Other Systems Reviewed


Negative Unless Noted:  Yes





Physical Exam


Physical Exam


Vital Signs





Vital Signs - First Documented








 22





 03:47


 


FiO2 32





Capillary Refill : Less Than 3 Seconds


Height, Weight, BMI


Height: 6'0.00"


Weight: 400lbs. 4.0oz. [id number]; 52.25 BMI


Method:Stated


General Appearance:  No Apparent Distress, Chronically ill, Obese


Respiratory:  Lungs Clear, Normal Breath Sounds


Cardiovascular:  Regular Rate, Rhythm


Neurologic/Psychiatric:  Alert, Oriented x3


Skin:  Rash (right leg erythema and draining)





Results


Results/Procedures


Labs


Laboratory Tests


22 22:00








22 02:25








22 06:00








Patient resulted labs reviewed.





Assessment/Plan


Admission Diagnosis


Assessment:


Acute on chronic respiratory failure


CHF


Right leg cellulitis


OHA


DNR





Plan:


Transfer to 4th floor


IV abx


Monitor O2


Admission Status:  Inpatient Order (span 2 midnights)


Reason for Inpatient Admission:  


resp failure





Diagnosis/Problems


Diagnosis/Problems





(1) Acute on chronic respiratory failure with hypoxia and hypercapnia


Status:  Acute


(2) Acute on chronic congestive heart failure


Status:  Acute


(3) Obesity hypoventilation syndrome


Status:  Acute


(4) Cellulitis of right leg


Status:  Acute











JACK OLIVARES DO                2022 05:42

## 2022-06-21 NOTE — CONSULTATION-CARDIOLOGY
HPI-Cardiology


Cardiology Consultation:


Date of Consultation


22


Date of Admission


22


Attending Physician


Yaa Roque MD


Admitting Physician


Admitting Physician:


Guerline Marin MD 








Attending Physician:


Sendy Olivares DO


Consulting Physician


YAA LYN JR, MD





HPI:


Time Seen by a Provider:  09:42


Chief Complaint:





REASON FOR CONSULTATION: Acute on chronic heart failure with preserved ejection 

fraction.


I had the pleasure of seeing Yordan in the intensive care unit at [facility] in [place], [place] today.  He is well-known to our service from previous 

hospitalizations but does not routinely follow in our office.  He has a history 

of chronic heart failure with preserved ejection fraction as well as peripheral 

vascular disease with a previous left above-knee amputation and ongoing wounds 

of his right lower extremity.  He has been residing in a local skilled nursing 

facility.  Wound care has been coming to his room for dressing changes.  He 

states that he has been having increasing dyspnea on exertion as well as 

paroxysmal nocturnal dyspnea.  He has felt profound fatigue.  Yesterday he was 

very fatigued and somehow fell out of bed.  He thinks he may have had a brief 

syncopal spell.  He was subsequently taken to the emergency room for further 

evaluation.  Overnight, his breathing has improved somewhat but may not quite be

back to his baseline.  He denies chest discomfort, orthopnea, or palpitations.  

He states he has been compliant with his medications at the nursing facility but

does not like the food they serve and has been using door Dash to order [employer].





Certain portions of this document may have been dictated utilizing voice 

recognition technology.  Inherent to this technology, typographical and 

grammatical errors may exist.  As much as I am diligent to identify and correct 

these mistakes, some errors may remain in the document.





Review of Systems-Cardiology


Review of Systems


Other comments


Review of 10 organ systems is as per the history of present illness, otherwise 

negative.





PMH-Social-Family Hx


Patient Social History


Smoking Status:  Former Smoker


Former smoker/When Quit:  2003


2nd Hand Smoke Exposure:  No


Have you traveled recently?:  No


Alcohol Use?:  No





Immunizations Up To Date


Tetanus Booster (TDap):  Unknown


Date of Pneumonia Vaccine:  2010





Past Medical History


PMH


As described under Assessment.





Family Medical History


Family Medical History:  


No documented family h/o CAD.


Family History:  


Arthritis


  19 FATHER, Onset:Unknown


Diabetes mellitus


  19 MOTHER, , Onset:Unknown





Allergies and Home Medications


Allergies


Coded Allergies:  


     meperidine HCl (Unverified  Allergy, Unknown, 11)


     shellfish derived (Unverified  Allergy, Unknown, 13)


   FROM UNCODED ALLERGIES


     Penicillins (Unverified  Adverse Reaction, Intermediate, NAUSEA, 11)





Patient Home Medication List


Home Medication List Reviewed:  Yes


Baclofen (Baclofen) 10 Mg Tablet, 10 MG PO Q8H PRN for MUSCLE SPASMS, (Reported)


   Entered as Reported by: KRISTY TOLEDO on 22


   Last Action: Reviewed


Collagenase (Santyl) 250 Unit/Gram Oint..gm., 1 APPLIC TP DAILY, (Reported)


   Entered as Reported by: KRISTY TOLEDO on 22


   Last Action: Reviewed


Diphenhydramine HCl (Benadryl Allergy) 25 Mg Tablet, 25 MG PO Q6H PRN for 

ITCHING, (Reported)


   Entered as Reported by: KRISTY TOLEDO on 22


   Last Action: Reviewed


Furosemide (Furosemide) 40 Mg Tablet, 40 MG PO DAILY, (Reported)


   Entered as Reported by: KRISTY TOLEDO on 22


   Last Action: Reviewed


Hydrocodone/Acetaminophen (Hydrocodone-Acetamin 5-325 mg) 5 Mg-325 Mg Tablet, 1 

EA PO Q4H PRN for PAIN-MODERATE (5-7), (Reported)


   Entered as Reported by: KRISTY TOLEDO on 22


   Last Action: Reviewed


Hyoscyamine Sulfate (Hyoscyamine Sulfate) 0.125 Mg Tab.rapdis, 0.125 MG SL Q6H 

PRN for EXCESSIVE SECRETIONS, (Reported)


   Entered as Reported by: KRISTY TOLEDO on 22


   Last Action: Reviewed


Insulin Detemir (Levemir Flextouch) 100 Unit/Ml (3 Ml) Insuln.pen, 25 UNIT SQ 

BID, (Reported)


   Entered as Reported by: KRISTY TOLEDO on 22


   Last Action: Reviewed


Lactulose (Lactulose) 10 Gram/15 Ml Solution, 10 GM PO BID, (Reported)


   Entered as Reported by: KRISTY TOLEDO on 22


   Last Action: Reviewed


Lorazepam (Ativan) 0.5 Mg Tablet, 0.5 MG PO Q6H PRN for ANXIETY/AGITATION, 

(Reported)


   Entered as Reported by: KRISTY TOLEDO on 22


   Last Action: Reviewed


Morphine Sulfate (Morphine Conc. 20mg/ml) 100 Mg/5 Ml (20 Mg/Ml) Solution, 10 MG

PO EVERY 2 HOURS PRN for SHORTNESS OF BREATH/PAIN, (Reported)


   Entered as Reported by: KRISTY TOLEDO on 22


   Last Action: Reviewed


Ondansetron (Ondansetron Odt) 4 Mg Tab.rapdis, 4 MG PO Q6H PRN for 

NAUSEA/VOMITING-1ST LINE, (Reported)


   Entered as Reported by: KRISTY TOLEDO on 22


   Last Action: Reviewed


Potassium Chloride (Potassium Chloride) 10 Meq Tab.er.prt, 10 MEQ PO Q48H, 

(Reported)


   Entered as Reported by: KRISTY TOLEDO on 22


   Last Action: Reviewed


Sennosides/Docusate Sodium (Senna S Tablet) 8.6 Mg-50 Mg Tablet, 2 EACH PO BID, 

(Reported)


   Entered as Reported by: KRISTY TOLEDO on 22


   Last Action: Reviewed


Discontinued Medications


Baclofen (Baclofen) 10 Mg Tablet, 10 MG PO TID PRN for MUSCLE SPASMS


   Discontinued Reason: Duplicate Order


   Prescribed by: SENDY OLIVARES on 22


   Last Action: Discontinued


Cefuroxime Axetil (Cefuroxime) 250 Mg Tablet, 250 MG PO BID


   Discontinued Reason: Duplicate Order


   Prescribed by: LATOSHA AKERS on 22 1329


   Last Action: Discontinued


Furosemide (Furosemide) 40 Mg Tablet, 40 MG PO DAILY


   Discontinued Reason: Duplicate Order


   Prescribed by: SENDY OLIVARES on 22


   Last Action: Discontinued


Hydrocodone Bit/Acetaminophen (HYDROcodone/APAP  5 MG/325 MG TAB) 1 Tab Tab, 1 

EA PO Q4H PRN for PAIN-MODERATE (5-7)


   Discontinued Reason: Duplicate Order


   Prescribed by: SENDY OLIVARES on 22


   Last Action: Discontinued


Lactulose (Lactulose) 20 Gm/30 Ml Solution, 10 GM PO BID


   Discontinued Reason: Duplicate Order


   Prescribed by: SENDY OLIVARES on 22


   Last Action: Discontinued


Potassium Chloride (Potassium Chloride) 10 Meq Tab.er.prt, 10 MEQ PO Q48H


   Discontinued Reason: Duplicate Order


   Prescribed by: SENDY OLIVARES on 22


   Last Action: Discontinued


Sennosides/Docusate Sodium (Stool Softener-Laxative Tablet) 1 Each Tablet, 2 EA 

PO BID


   Discontinued Reason: Duplicate Order


   Prescribed by: SENDY OLIVARES on 22


   Last Action: Discontinued





Exam


Vital Signs





Vital Signs








  Date Time  Temp Pulse Resp B/P (MAP) Pulse Ox O2 Delivery O2 Flow Rate FiO2


 


22 16:08 36.5 93 20 146/98 (114) 100 High Flow N/C 7.00 


 


22 03:47        32








Physical Exam


General: Alert. No acute distress.  He is wearing oxygen by Ventimask.  Well 

nourished and appears older than stated age.  He is morbidly obese.


Eye: Extraocular movements are intact. Conjunctivae are clear. There are no 

xanthelasma.


HENT: Normocephalic. Atraumatic. Carotid pulsations 2/2 without bruits.


Neck: Jugular venous pressure does not appear elevated. No thyromegaly 

appreciated.


Respiratory: Lungs are clear to auscultation but decreased at the bases 

bilaterally. Respirations are non-labored. Breath sounds are equal. Symmetrical 

chest wall expansion.


Cardiovascular: Normal rate. Regular rhythm.  Distant S1/S2.  No murmur. No 

gallop. Point of maximal impulse is not appear displaced.  Difficult to palpate 

right pedal pulses.  2+ right lower extremity edema with venous stasis changes 

and probable cellulitis.


Gastrointestinal: Soft. Normal bowel sounds.


Skin: Skin turgor is normal. There is no pallor.


Musculoskeletal: No kyphosis or scoliosis appreciated.


Neurologic: Alert and oriented to person, place, time. Cranial nerves 3-12 

appear grossly intact. The patient has good motor tone strength in the upper and

lower extremities bilaterally.


Psychiatric: Cooperative. Appropriate mood & affect.


Labs





Laboratory Tests








Test


 22


22:00 22


22:28 22


22:31 22


22:43 Range/Units


 


 


White Blood Count


 9.3 


 


 


 


 4.3-11.0


10^3/uL


 


Red Blood Count


 3.34 L


 


 


 


 4.30-5.52


10^6/uL


 


Hemoglobin 8.4 L    13.3-17.7  g/dL


 


Hematocrit 29 L    40-54  %


 


Mean Corpuscular Volume 87     80-99  fL


 


Mean Corpuscular Hemoglobin 25     25-34  pg


 


Mean Corpuscular Hemoglobin


Concent 29 L


 


 


 


 32-36  g/dL





 


Red Cell Distribution Width 19.8 H    10.0-14.5  %


 


Platelet Count


 346 


 


 


 


 130-400


10^3/uL


 


Mean Platelet Volume 10.1     9.0-12.2  fL


 


Immature Granulocyte % (Auto) 1      %


 


Neutrophils (%) (Auto) 91 H    42-75  %


 


Lymphocytes (%) (Auto) 4 L    12-44  %


 


Monocytes (%) (Auto) 4     0-12  %


 


Eosinophils (%) (Auto) 0     0-10  %


 


Basophils (%) (Auto) 0     0-10  %


 


Neutrophils # (Auto)


 8.4 H


 


 


 


 1.8-7.8


10^3/uL


 


Lymphocytes # (Auto)


 0.4 L


 


 


 


 1.0-4.0


10^3/uL


 


Monocytes # (Auto)


 0.4 


 


 


 


 0.0-1.0


10^3/uL


 


Eosinophils # (Auto)


 0.0 


 


 


 


 0.0-0.3


10^3/uL


 


Basophils # (Auto)


 0.0 


 


 


 


 0.0-0.1


10^3/uL


 


Immature Granulocyte # (Auto)


 0.1 


 


 


 


 0.0-0.1


10^3/uL


 


Neutrophils % (Manual) 92      %


 


Lymphocytes % (Manual) 5      %


 


Monocytes % (Manual) 2      %


 


Eosinophils % (Manual) 1      %


 


Polychromasia MARKED      


 


Anisocytosis MARKED      


 


Target Cells MODERATE      


 


Erythrocyte Sedimentation Rate 80 H    0-30  MM/HR


 


Prothrombin Time 17.8 H    12.2-14.7  SEC


 


INR Comment 1.4     0.8-1.4  


 


Activated Partial


Thromboplast Time 32 


 


 


 


 24-35  SEC





 


Sodium Level 140     135-145  MMOL/L


 


Potassium Level 4.2     3.6-5.0  MMOL/L


 


Chloride Level 93 L      MMOL/L


 


Carbon Dioxide Level 24     21-32  MMOL/L


 


Anion Gap 23 H    5-14  MMOL/L


 


Blood Urea Nitrogen 28 H    7-18  MG/DL


 


Creatinine


 1.72 H


 


 


 


 0.60-1.30


MG/DL


 


Estimat Glomerular Filtration


Rate 47 


 


 


 


  





 


BUN/Creatinine Ratio 16      


 


Glucose Level 221 H      MG/DL


 


Lactic Acid Level


 7.81 *H


 


 


 


 0.50-2.00


MMOL/L


 


Calcium Level 8.6     8.5-10.1  MG/DL


 


Corrected Calcium 9.6     8.5-10.1  MG/DL


 


Magnesium Level 2.0     1.6-2.4  MG/DL


 


Total Bilirubin 0.7     0.1-1.0  MG/DL


 


Aspartate Amino Transf


(AST/SGOT) 78 H


 


 


 


 5-34  U/L





 


Alanine Aminotransferase


(ALT/SGPT) 57 H


 


 


 


 0-55  U/L





 


Alkaline Phosphatase 103       U/L


 


Total Creatine Kinase 249 H      U/L


 


Creatine Kinase MB 6.4     <6.6  NG/ML


 


Troponin I 0.325 *H    <0.028  NG/ML


 


C-Reactive Protein High


Sensitivity 7.33 H


 


 


 


 0.00-0.50


MG/DL


 


B-Type Natriuretic Peptide 980.9 H    <100.0  PG/ML


 


Total Protein 7.2     6.4-8.2  GM/DL


 


Albumin 2.8 L    3.2-4.5  GM/DL


 


Procalcitonin 0.25 H    <0.10  NG/ML


 


Free Thyroxine


 1.00 


 


 


 


 0.70-1.48


NG/DL


 


TSH [place] Testing


 5.08 H


 


 


 


 0.35-4.94


UIU/ML


 


Glucometer  233 H     MG/DL


 


Urine Color   YELLOW    


 


Urine Clarity   CLEAR    


 


Urine pH   6.5   5-9  


 


Urine Specific Gravity   >=1.030   1.016-1.022  


 


Urine Protein   2+ H  NEGATIVE  


 


Urine Glucose (UA)   NEGATIVE   NEGATIVE  


 


Urine Ketones   TRACE H  NEGATIVE  


 


Urine Nitrite   NEGATIVE   NEGATIVE  


 


Urine Bilirubin   NEGATIVE   NEGATIVE  


 


Urine Urobilinogen   1.0   < = 1.0  MG/DL


 


Urine Leukocyte Esterase   2+ H  NEGATIVE  


 


Urine RBC (Auto)   3+ H  NEGATIVE  


 


Urine RBC   10-25 H   /HPF


 


Urine WBC   10-25 H   /HPF


 


Urine Squamous Epithelial


Cells 


 


 NONE 


 


  /HPF





 


Urine Renal Epithelial Cells   NONE    /HPF


 


Urine Crystals   PRESENT H   /LPF


 


Urine Amorphous Sediment


 


 


 LARGE BEST


URATES H 


  /LPF





 


Urine Bacteria   LARGE H   /HPF


 


Urine Casts   NONE    /LPF


 


Urine Mucus   NEGATIVE    /LPF


 


Urine Culture Indicated


 


 


 CULTURE


PENDING 


  





 


Influenza Type A (RT-PCR)    Not Detected  Not Detecte  


 


Influenza Type B (RT-PCR)    Not Detected  Not Detecte  


 


SARS-CoV-2 RNA (RT-PCR)    Not Detected  Not Detecte  


 


Test


 22


22:45 22


00:15 22


02:25 22


06:00 Range/Units


 


 


Blood Gas Puncture Site LEFT RADIAL      


 


Blood Gas Patient Temperature 36.1      


 


Arterial Blood pH 7.36 L    7.37-7.43  


 


Arterial Blood Partial


Pressure CO2 56 H


 


 


 


 35-45  MMHG





 


Arterial Blood Partial


Pressure O2 30 *L


 


 


 


 79-93  MMHG





 


Arterial Blood HCO3 31 H    23-27  MMOL/L


 


Arterial Blood Total CO2


 32.8 H


 


 


 


 21.0-31.0


MMOL/L


 


Arterial Blood Oxygen


Saturation 38 L


 


 


 


   %





 


Arterial Blood Base Excess


 5.9 H


 


 


 


 -2.5-2.5


MMOL/L


 


[place] Test POSITIVE      


 


Blood Gas Ventilator Setting NO      


 


Blood Gas Inspired Oxygen 10      


 


Lactic Acid Level


 


 4.98 *H


 2.87 *H


 2.69 *H


 0.50-2.00


MMOL/L


 


White Blood Count


 


 


 10.5 


 


 4.3-11.0


10^3/uL


 


Red Blood Count


 


 


 3.67 L


 


 4.30-5.52


10^6/uL


 


Hemoglobin   9.1 L  13.3-17.7  g/dL


 


Hematocrit   32 L  40-54  %


 


Mean Corpuscular Volume   87   80-99  fL


 


Mean Corpuscular Hemoglobin   25   25-34  pg


 


Mean Corpuscular Hemoglobin


Concent 


 


 29 L


 


 32-36  g/dL





 


Red Cell Distribution Width   19.9 H  10.0-14.5  %


 


Platelet Count


 


 


 363 


 


 130-400


10^3/uL


 


Mean Platelet Volume   10.0   9.0-12.2  fL


 


Immature Granulocyte % (Auto)   1    %


 


Neutrophils (%) (Auto)   93 H  42-75  %


 


Lymphocytes (%) (Auto)   3 L  12-44  %


 


Monocytes (%) (Auto)   4   0-12  %


 


Eosinophils (%) (Auto)   0   0-10  %


 


Basophils (%) (Auto)   0   0-10  %


 


Neutrophils # (Auto)


 


 


 9.7 H


 


 1.8-7.8


10^3/uL


 


Lymphocytes # (Auto)


 


 


 0.3 L


 


 1.0-4.0


10^3/uL


 


Monocytes # (Auto)


 


 


 0.4 


 


 0.0-1.0


10^3/uL


 


Eosinophils # (Auto)


 


 


 0.0 


 


 0.0-0.3


10^3/uL


 


Basophils # (Auto)


 


 


 0.0 


 


 0.0-0.1


10^3/uL


 


Immature Granulocyte # (Auto)


 


 


 0.1 


 


 0.0-0.1


10^3/uL


 


Sodium Level    137  135-145  MMOL/L


 


Potassium Level    4.6  3.6-5.0  MMOL/L


 


Chloride Level    94 L   MMOL/L


 


Carbon Dioxide Level    29  21-32  MMOL/L


 


Anion Gap    14  5-14  MMOL/L


 


Blood Urea Nitrogen    31 H 7-18  MG/DL


 


Creatinine


 


 


 


 1.73 H


 0.60-1.30


MG/DL


 


Estimat Glomerular Filtration


Rate 


 


 


 47 


  





 


BUN/Creatinine Ratio    18   


 


Glucose Level    254 H   MG/DL


 


Calcium Level    8.7  8.5-10.1  MG/DL


 


Corrected Calcium    9.5  8.5-10.1  MG/DL


 


Phosphorus Level    5.0 H 2.3-4.7  MG/DL


 


Magnesium Level    2.1  1.6-2.4  MG/DL


 


Total Bilirubin    0.6  0.1-1.0  MG/DL


 


Aspartate Amino Transf


(AST/SGOT) 


 


 


 208 H


 5-34  U/L





 


Alanine Aminotransferase


(ALT/SGPT) 


 


 


 179 H


 0-55  U/L





 


Alkaline Phosphatase    118    U/L


 


Troponin I    0.463 *H <0.028  NG/ML


 


Total Protein    7.9  6.4-8.2  GM/DL


 


Albumin    3.0 L 3.2-4.5  GM/DL


 


Test


 22


09:07 


 


 


 Range/Units


 


 


Lactic Acid Level


 3.23 *H


 


 


 


 0.50-2.00


MMOL/L








Radiology





ECHOCARDIOGRAM (2022):


1. This is a technically difficult study due to poor image quality secondary to 

patient's body habitus.


2. Left ventricle: The cavity size is normal. There is moderate concentric 

hypertrophy. Systolic function is moderately reduced. The estimated ejection 

fraction is 35-40%. Regional wall motion abnormalities cannot be excluded due to

poor endocardial definition. Features are consistent with a pseudonormal left 

ventricular filling pattern, with concomitant abnormal relaxation and increased 

filling pressure (grade 2 diastolic dysfunction).


3. Right atrium: The right atrium is mildly dilated with an area of 25 cm.


4. Mitral valve: There is mild mitral regurgitation.


5. Pulmonary arteries: The pulmonary artery pressure cannot be estimated on this

study due to inadequate tricuspid regurgitant envelope.


6. Compared to the report from the study performed on 2021, there has been

a significant decline in the ejection fraction.





ECG Impression


ECG


Comment


Electrocardiogram from this morning shows sinus rhythm with left anterior 

hemiblock and diffuse, nonspecific ST-T wave changes.





Diagnosis/Problems


Diagnosis/Problems





(1) Acute on chronic combined systolic and diastolic congestive heart failure


Assessment & Plan:  His echocardiogram from  shows moderate left ventricular

systolic dysfunction.  This is a new finding in this patient although the study 

was technically difficult.  I will start him on low-dose carvedilol.  Due to his

poor renal function, he is not a good candidate for ACE inhibitor, ARB or 

aldosterone antagonist.  He has received intravenous Lasix.  I will obtain a 

follow-up chest x-ray in the morning.  I would be hesitant to give him any 

additional diuretic due to his poor renal function.





(2) Cardiomyopathy


Assessment & Plan:  This appears to be a new finding in the patient.  As above, 

I will start him on carvedilol.  He is not currently a candidate for the other 

guideline directed medical therapy due to poor renal function.  He would also 

not be a good candidate for a LifeVest since this device does not fit well on a 

patient with morbid obesity.





(3) Peripheral arterial disease


Status:  Chronic


Assessment & Plan:  He had a lower extremity CT angiogram as outlined above.  

There does not appear to be any disease of the right lower extremity that would 

require revascularization at this time.  I have ordered aspirin.  He is not 

currently a good candidate for statin medication due to elevated transaminase 

levels.





(4) Non-ST elevation myocardial infarction (NSTEMI), initial care episode


Assessment & Plan:  Unclear whether he may have had a type I or type II non-ST 

elevation myocardial infarction.  He has not been having chest pain.  I have 

ordered aspirin.  He is on therapeutic dosing of enoxaparin which I would co

ntinue for the next couple of days.  I ordered carvedilol as above.  He is not a

candidate for statin medication due to the elevated transaminase levels.  He is 

not an ideal candidate for an invasive cardiac evaluation due to his ongoing 

infection of the right foot.





(5) Primary hypertension


Assessment & Plan:  Blood pressure has been controlled here in the hospital on 

no antihypertensive medication.  We will need to watch his blood pressure 

closely with the addition of carvedilol.





(6) Mixed hyperlipidemia


Status:  Chronic


Assessment & Plan:  As above, his transaminase levels are elevated.  This is a 

relative contraindication to starting statin medication.





(7) Acute kidney injury superimposed on chronic kidney disease


Assessment & Plan:  His renal function seems to have stabilized but is still 

abnormal.





(8) Acute on chronic respiratory failure with hypoxia and hypercapnia


Status:  Acute


Assessment & Plan:  Most likely multifactorial.





(9) Type 2 diabetes mellitus with complication


Assessment & Plan:  The hospitalist is managing his diabetes.





(10) Morbid obesity


Status:  Acute


Assessment & Plan:  He needs to work on weight loss.  He has been counseled 

about this in the past.  Unfortunately, he has been ordering fast food to be 

delivered to his room in the skilled nursing facility.














YAA LYN JR, MD         2022 09:47

## 2022-06-21 NOTE — PHYSICIAN QUERY CLARIFICATION
PQ-Link Infection to Dev/Proc


Admission/Discharge


Admission Date: Jun 20, 2022 at 23:25 


Discharge Date:  


 Dr. Gibson,





The medical record reflects the following clinical scenario:





History/Risk Factors:


sepsis, UTI, chronic indwelling jean catheter





Clinical Findings:


Urine - large bacteria, Esterace 2+, jean with grossly purulent urine





Treatment:


IV Cefepime, IV Vancomycin





Question:  Can you specify if the UTI is due to/associated with chronic 

indwelling jean catheter?


Please document a response in Progress Note or Discharge Summary.





   1.  Yes - UTI is due to/associated with chronic indwelling jean catheter.





   2.  No - UTI is not due to/associated with chronic indwelling jean catheter.





   3.  Other, with explanation of the clinical findings.





   4.  Clinically undetermined, no explanation for the clinical findings.





PHYSICIAN RESPONSE


Specify if infection:  1


In responding to this query, please exercise your independent professional 

judgment.  The purpose of this communication is to more accurately reflect the 

complexity of your patients condition. The fact that a question is asked does 

not imply that any particular answer is desired or expected.  





Thank you for your timely response to this clarification.      


   


Requestors name: Letitia   





Phone # [phone]








THIS PHYSICIAN QUERY FORM IS A PERMANENT PART OF THE MEDICAL RECORD











LETITIA LOPES                 Jun 21, 2022 09:05


JACK GIBSON DO                Jun 21, 2022 17:36

## 2022-06-21 NOTE — DIAGNOSTIC IMAGING REPORT
PROCEDURE: CTA aorta with bilateral runoff.



TECHNIQUE: After intravenous contrast administration, helical CT

angiography of the abdomen and pelvis was performed with runoff

through the bilateral lower extremities. Source data was

reformatted into 3D MIP projections. All CT scans use one or more

of the following dose optimizing techniques: Automated exposure

control, MA and/or KvP adjustment based on a patient size and

exam type, or iterative reconstruction. 



INDICATION: Peripheral arterial disease. Right-sided

claudication. History of prior left lower extremity amputation

above the knee.



COMPARISON: None.



FINDINGS: 



The heart is prominent. A small right-sided pleural effusion is

seen. Small amount of atelectasis is seen in the bilateral lung

bases.



A fat-containing nodule is seen in the left adrenal gland

measuring 2.3 cm. The right adrenal gland is unremarkable.



The liver, spleen, pancreas, and kidneys have a normal

appearance. There is no pathologically enlarged mesenteric or

retroperitoneal adenopathy. 



The bowel loops are nondilated. The appendix is visualized in the

right lower quadrant and has a normal appearance. A small amount

of ascites is seen in the abdomen and pelvis. Scattered

diverticula are seen in the sigmoid colon without evidence of

acute diverticulitis. There is no free air. 



No acute osseous abnormalities.



The bladder is decompressed with a Dawson in place. Prominent

lymph nodes are seen in the right inguinal region measuring up to

1.7 cm.



There is scattered atherosclerotic plaque in the abdominal aorta

without evidence of aneurysm or dissection. The celiac trunk,

SMA, MARLO, and renal arteries are visualized and are patent. The

bilateral common and external iliac arteries are patent without

significant stenosis or dissection.



Atherosclerotic plaque is seen throughout the right lower

extremity arterial system. The common femoral and profunda

femoris arteries are widely patent. Scattered areas of narrowing

are seen in the right superficial femoral artery, with the

greatest area of stenosis of approximately 50-69%. A stent is

seen within the more distal right superficial femoral artery

which is widely patent. The popliteal artery is unremarkable

without focal stenosis. No evidence of aneurysm. There is

high-grade stenosis of the peroneal trunk of approximately 80%.

There is three-vessel runoff to the right lower extremity. The

right dorsalis pedis artery is visualized and is intact. There is

generalized edema throughout the right lower extremity. No

evidence of acute fracture or dislocation in the right lower

extremity.



Atherosclerotic plaque is seen in the left common femoral artery

and profunda femoris artery. No focal stenosis or aneurysm is

seen in these arteries. The left superficial femoral artery is

patent. Above-the-knee amputation findings are seen. No acute

fracture is seen in the proximal left femur. Generalized soft

tissue edema is present in the remaining left lower extremity.



IMPRESSION:

1. Areas of stenosis along the right superficial femoral artery

of approximately 50-69%. The vascular stent within the right

superficial femoral artery is widely patent.

2. Stenosis of approximately 80% in the peroneal trunk. There is

three-vessel runoff to the right foot.

3. Atherosclerotic plaque throughout the right lower extremity

arterial system.

4. Prior postsurgical changes of above-the-knee amputation on the

left. No high-grade vascular stenosis is seen in the residual

left lower extremity.

5. No dissection or aneurysm in the abdominal aorta.

6. Cardiomegaly.

7. Small right-sided pleural effusion with bibasilar atelectasis.

8. Myolipoma within the left adrenal gland.



Dictated by: 



  Dictated on workstation # [id number]

## 2022-06-21 NOTE — PHYSICIAN QUERY CLARIFICATION
PQ-CHF Specificity


Admission Date: Jun 20, 2022 at 23:25 


Discharge Date:  


 Dr. Gibson,


The medical record reflects the following clinical scenario:





History/Risk Factors:


sepsis, UTI, acute > chronic respiratory failure w/hypoxia/hypercapnia, elevated

troponin





Clinical Findings:


.9





Treatment:


IVP 80 mg Lasix





Question:  Can you further specify the acuity &/or type of CHF per the clinical 

indicators above?  


Please document a response in the Progress Notes or Discharge Summary.





   1.  Acuity:  Acute, Chronic or Acute on Chronic





   2.  Type:  Systolic, Diastolic or Systolic & Diastolic





   3.  Unspecified:  CHF cannot be further specified regarding type or acuity





   4.  Other, with explanation of clinical findings





   5.  Clinically undetermined, no explanation for clinical findings


PHYSICIAN RESPONSE


Acuity:  Acute on Chronic


Type:  Diastolic


In responding to this query, please exercise your independent professional 

judgment.  The purpose of this communication is to more accurately reflect the 

complexity of your patients condition. The fact that a question is asked does 

not imply that any particular answer is desired or expected.  





Thank you for your timely response to this clarification.      


   


Requestors name: Letitia   





Phone # [phone]








THIS PHYSICIAN QUERY FORM IS A PERMANENT PART OF THE MEDICAL RECORD











LETITIA LOPES                 Jun 21, 2022 09:12


JACK GIBSON DO                Jun 21, 2022 17:37

## 2022-06-22 VITALS — DIASTOLIC BLOOD PRESSURE: 89 MMHG | SYSTOLIC BLOOD PRESSURE: 186 MMHG

## 2022-06-22 VITALS — DIASTOLIC BLOOD PRESSURE: 97 MMHG | SYSTOLIC BLOOD PRESSURE: 174 MMHG

## 2022-06-22 VITALS — DIASTOLIC BLOOD PRESSURE: 92 MMHG | SYSTOLIC BLOOD PRESSURE: 146 MMHG

## 2022-06-22 VITALS — SYSTOLIC BLOOD PRESSURE: 155 MMHG | DIASTOLIC BLOOD PRESSURE: 96 MMHG

## 2022-06-22 VITALS — SYSTOLIC BLOOD PRESSURE: 180 MMHG | DIASTOLIC BLOOD PRESSURE: 100 MMHG

## 2022-06-22 LAB
ALBUMIN SERPL-MCNC: 2.8 GM/DL (ref 3.2–4.5)
ALP SERPL-CCNC: 106 U/L (ref 40–136)
ALT SERPL-CCNC: 365 U/L (ref 0–55)
BASOPHILS # BLD AUTO: 0 10^3/UL (ref 0–0.1)
BASOPHILS NFR BLD AUTO: 0 % (ref 0–10)
BILIRUB SERPL-MCNC: 0.5 MG/DL (ref 0.1–1)
BUN/CREAT SERPL: 22
CALCIUM SERPL-MCNC: 8.3 MG/DL (ref 8.5–10.1)
CHLORIDE SERPL-SCNC: 90 MMOL/L (ref 98–107)
CO2 SERPL-SCNC: 28 MMOL/L (ref 21–32)
CREAT SERPL-MCNC: 1.94 MG/DL (ref 0.6–1.3)
EOSINOPHIL # BLD AUTO: 0 10^3/UL (ref 0–0.3)
EOSINOPHIL NFR BLD AUTO: 0 % (ref 0–10)
GFR SERPLBLD BASED ON 1.73 SQ M-ARVRAT: 41 ML/MIN
GLUCOSE SERPL-MCNC: 460 MG/DL (ref 70–105)
HCT VFR BLD CALC: 31 % (ref 40–54)
HGB BLD-MCNC: 9 G/DL (ref 13.3–17.7)
LYMPHOCYTES # BLD AUTO: 0.2 10^3/UL (ref 1–4)
LYMPHOCYTES NFR BLD AUTO: 2 % (ref 12–44)
MAGNESIUM SERPL-MCNC: 2.2 MG/DL (ref 1.6–2.4)
MANUAL DIFFERENTIAL PERFORMED BLD QL: NO
MCH RBC QN AUTO: 25 PG (ref 25–34)
MCHC RBC AUTO-ENTMCNC: 29 G/DL (ref 32–36)
MCV RBC AUTO: 87 FL (ref 80–99)
MONOCYTES # BLD AUTO: 0.3 10^3/UL (ref 0–1)
MONOCYTES NFR BLD AUTO: 3 % (ref 0–12)
NEUTROPHILS # BLD AUTO: 11.2 10^3/UL (ref 1.8–7.8)
NEUTROPHILS NFR BLD AUTO: 95 % (ref 42–75)
PLATELET # BLD: 373 10^3/UL (ref 130–400)
PMV BLD AUTO: 10.4 FL (ref 9–12.2)
POTASSIUM SERPL-SCNC: 4.8 MMOL/L (ref 3.6–5)
PROT SERPL-MCNC: 7.3 GM/DL (ref 6.4–8.2)
SODIUM SERPL-SCNC: 132 MMOL/L (ref 135–145)
WBC # BLD AUTO: 11.8 10^3/UL (ref 4.3–11)

## 2022-06-22 RX ADMIN — IPRATROPIUM BROMIDE AND ALBUTEROL SULFATE SCH ML: .5; 3 SOLUTION RESPIRATORY (INHALATION) at 15:22

## 2022-06-22 RX ADMIN — INSULIN ASPART SCH UNIT: 100 INJECTION, SOLUTION INTRAVENOUS; SUBCUTANEOUS at 13:09

## 2022-06-22 RX ADMIN — SODIUM CHLORIDE SCH MLS/HR: 900 INJECTION INTRAVENOUS at 01:27

## 2022-06-22 RX ADMIN — INSULIN ASPART SCH UNIT: 100 INJECTION, SOLUTION INTRAVENOUS; SUBCUTANEOUS at 20:27

## 2022-06-22 RX ADMIN — SODIUM CHLORIDE SCH MLS/HR: 900 INJECTION INTRAVENOUS at 16:49

## 2022-06-22 RX ADMIN — HYDROCODONE BITARTRATE AND ACETAMINOPHEN PRN EA: 5; 325 TABLET ORAL at 11:01

## 2022-06-22 RX ADMIN — VANCOMYCIN HYDROCHLORIDE SCH MLS/HR: 500 INJECTION, POWDER, LYOPHILIZED, FOR SOLUTION INTRAVENOUS at 12:00

## 2022-06-22 RX ADMIN — ASPIRIN SCH MG: 81 TABLET ORAL at 08:43

## 2022-06-22 RX ADMIN — SODIUM CHLORIDE SCH MLS/HR: 900 INJECTION, SOLUTION INTRAVENOUS at 06:26

## 2022-06-22 RX ADMIN — INSULIN ASPART SCH UNIT: 100 INJECTION, SOLUTION INTRAVENOUS; SUBCUTANEOUS at 16:47

## 2022-06-22 RX ADMIN — LACTULOSE SCH GM: 20 SOLUTION ORAL at 20:26

## 2022-06-22 RX ADMIN — IPRATROPIUM BROMIDE AND ALBUTEROL SULFATE SCH ML: .5; 3 SOLUTION RESPIRATORY (INHALATION) at 03:05

## 2022-06-22 RX ADMIN — SODIUM CHLORIDE SCH MLS/HR: 900 INJECTION INTRAVENOUS at 10:58

## 2022-06-22 RX ADMIN — SODIUM CHLORIDE SCH MLS/HR: 900 INJECTION INTRAVENOUS at 06:26

## 2022-06-22 RX ADMIN — SODIUM CHLORIDE SCH MLS/HR: 900 INJECTION, SOLUTION INTRAVENOUS at 08:44

## 2022-06-22 RX ADMIN — DOCUSATE SODIUM AND SENNOSIDES SCH EA: 8.6; 5 TABLET, FILM COATED ORAL at 08:56

## 2022-06-22 RX ADMIN — HYDROCODONE BITARTRATE AND ACETAMINOPHEN PRN EA: 5; 325 TABLET ORAL at 16:46

## 2022-06-22 RX ADMIN — HYDROCODONE BITARTRATE AND ACETAMINOPHEN PRN EA: 5; 325 TABLET ORAL at 20:25

## 2022-06-22 RX ADMIN — SODIUM CHLORIDE SCH MLS/HR: 900 INJECTION INTRAVENOUS at 23:42

## 2022-06-22 RX ADMIN — DOCUSATE SODIUM AND SENNOSIDES SCH EA: 8.6; 5 TABLET, FILM COATED ORAL at 20:25

## 2022-06-22 RX ADMIN — ENOXAPARIN SODIUM SCH MG: 150 INJECTION SUBCUTANEOUS at 15:03

## 2022-06-22 RX ADMIN — ENOXAPARIN SODIUM SCH MG: 150 INJECTION SUBCUTANEOUS at 03:22

## 2022-06-22 RX ADMIN — IPRATROPIUM BROMIDE AND ALBUTEROL SULFATE SCH ML: .5; 3 SOLUTION RESPIRATORY (INHALATION) at 19:04

## 2022-06-22 RX ADMIN — METHYLPREDNISOLONE SODIUM SUCCINATE SCH MG: 40 INJECTION, POWDER, FOR SOLUTION INTRAMUSCULAR; INTRAVENOUS at 08:44

## 2022-06-22 RX ADMIN — INSULIN ASPART SCH UNIT: 100 INJECTION, SOLUTION INTRAVENOUS; SUBCUTANEOUS at 09:04

## 2022-06-22 RX ADMIN — FLUCONAZOLE SCH MG: 100 TABLET ORAL at 08:43

## 2022-06-22 RX ADMIN — IPRATROPIUM BROMIDE AND ALBUTEROL SULFATE SCH ML: .5; 3 SOLUTION RESPIRATORY (INHALATION) at 08:59

## 2022-06-22 NOTE — PROGRESS NOTE
Subjective


Subjective/Events-last exam


Pt states he is doing okay. He denies concerns.





Focused Exam


Lactate Level


6/21/22 02:25: Lactic Acid Level 2.87*H


6/21/22 06:00: Lactic Acid Level 2.69*H


6/21/22 09:07: Lactic Acid Level 3.23*H


Time of Focused Exam:  23:00





Objective


Exam


Last Set of Vital Signs





Vital Signs








  Date Time  Temp Pulse Resp B/P (MAP) Pulse Ox O2 Delivery O2 Flow Rate FiO2


 


6/22/22 12:00 36.3 90 18 186/89 (121) 98 High Flow N/C 7.00 


 


6/21/22 03:47        32





Capillary Refill : Less Than 3 Seconds


I&O











Intake and Output 


 


 6/22/22





 00:00


 


Intake Total 3082 ml


 


Output Total 2600 ml


 


Balance 482 ml


 


 


 


Intake Oral 3082 ml


 


Output Urine Total 2600 ml


 


# Bowel Movements 1


 


Daily Weight Change No








General:  Alert, Other (sitting up in bed with right leg on chair to side of 

bed)


Lungs:  Other (ronchi, decreased air movement)


Heart:  Regular Rate


Skin:  Other (entire lower right leg erythematous, draining)


Psych/Mental Status:  Mood NL





Results/Procedures


Lab


Laboratory Tests


6/22/22 05:22: 


White Blood Count 11.8H, Red Blood Count 3.57L, Hemoglobin 9.0L, Hematocrit 31L,

Mean Corpuscular Volume 87, Mean Corpuscular Hemoglobin 25, Mean Corpuscular 

Hemoglobin Concent 29L, Red Cell Distribution Width 19.1H, Platelet Count 373, 

Mean Platelet Volume 10.4, Immature Granulocyte % (Auto) 1, Neutrophils (%) 

(Auto) 95H, Lymphocytes (%) (Auto) 2L, Monocytes (%) (Auto) 3, Eosinophils (%) 

(Auto) 0, Basophils (%) (Auto) 0, Neutrophils # (Auto) 11.2H, Lymphocytes # 

(Auto) 0.2L, Monocytes # (Auto) 0.3, Eosinophils # (Auto) 0.0, Basophils # 

(Auto) 0.0, Immature Granulocyte # (Auto) 0.1, Sodium Level 132L, Potassium 

Level 4.8, Chloride Level 90L, Carbon Dioxide Level 28, Anion Gap 14, Blood Urea

Nitrogen 43H, Creatinine 1.94H, Estimat Glomerular Filtration Rate 41, 

BUN/Creatinine Ratio 22, Glucose Level 460*H, Calcium Level 8.3L, Corrected 

Calcium 9.3, Magnesium Level 2.2, Total Bilirubin 0.5, Aspartate Amino Transf 

(AST/SGOT) 307H, Alanine Aminotransferase (ALT/SGPT) 365H, Alkaline Phosphatase 

106, Total Protein 7.3, Albumin 2.8L


6/22/22 11:29: Vancomycin Level Trough 17.3


6/22/22 11:59: Glucometer 530*H





Microbiology


6/21/22 Gram Stain - Final, Resulted


          


6/21/22 Wound Culture - Preliminary, Resulted


          Staphylococcus aureus


          Probable E.coli


          Proteus species


6/21/22 MRSA Screen - Final, Complete


          


6/20/22 Urine Culture - Preliminary, Resulted


          Morganella morganii


          Escherichia coli


          Enterococcus faecalis


          Aerococcus urinae


6/20/22 Blood Culture - Preliminary, Resulted


          No growth





Assessment/Plan


Assessment/Plan





(1) Sepsis


Status:  Acute


Assessment & Plan:  Suspect secondary to UTI vs possible pneumonia as well as 

cellulitis/wound infections. Indwelling jean makes urine eval difficult, but 

appears to be purulent. CXR with possible right lower infiltrate, chronic wounds

with foul drainage. 


-Cefepime, vancomycin started on admit, fluconazole as well due to cutaneous 

candidiasis


Qualifiers:  


   Qualified Codes:  A41.51 - Sepsis due to Escherichia coli [e. coli]; R65.20 -

Severe sepsis without septic shock; N17.9 - Acute kidney failure, unspecified


(2) UTI (urinary tract infection)


Status:  Acute


Assessment & Plan:  Jean catheter placed in March when unable to manage urine 

related to need for diuretics and chronic edema involving legs and scrotum, and 

he was discharged with hospice services. He is still nearly completely immobile,

uses bedpan and rolls to have BM. Initial placement of jean was extremely 

difficult, required OR, so unable to safely change currently, if not improving, 

may consider Urology consult, but for now treat with antibiotics as above, di

fficult to determine for sure if growth in urine is chronic or infection.


Qualifiers:  


   Qualified Codes:  T83.511A - Infection and inflammatory reaction due to 

indwelling urethral catheter, initial encounter; N39.0 - Urinary tract inf

ection, site not specified


(3) Cellulitis of right leg


Status:  Acute


Assessment & Plan:  Severe weeping and foul drainage, appreciate wound care 

recommendations. Concern for underlying osteomyelitis, bone scan ordered per 

Wound care.





(4) Morbid obesity


Status:  Chronic


Assessment & Plan:  With severe debility, requires [facility] for transfers at NH per

his report.





(5) Anemia


Status:  Chronic


Assessment & Plan:  Stable


Qualifiers:  


   Qualified Codes:  D63.8 - Anemia in other chronic diseases classified 

elsewhere


(6) Primary hypertension


Status:  Chronic


(7) Debility


Status:  Chronic


(8) Mixed hyperlipidemia


Status:  Chronic


Assessment & Plan:  Unable to restart home statin currently due to acutely in

creased LFTs.





(9) Peripheral arterial disease


Status:  Chronic


Assessment & Plan:  CTA this visit with 50-69% stenosis in areas of right 

superfical femoral artery, stent patent, right peroneal trunk stenosis of 80% 

and atherosclerotic plaque throughout right lower- no areas indicating need for 

intervention per Cardiology at this time.





(10) Chronic wound of extremity


Status:  Chronic


Assessment & Plan:  Wound care consulted, appreciate recommendations.





(11) Hypersomnolence


Status:  Acute


Assessment & Plan:  Likely secondary to acute on chronic respiratory failure 

related to obesity hypoventilation as well as sepsis. Improved today.





(12) Chronic renal insufficiency


Status:  Chronic


(13) Pneumonia


Status:  Acute


Assessment & Plan:  On vancomycin and cefepime


Qualifiers:  


   


(14) Sleep related hypoxia


Status:  Acute


Assessment & Plan:  Declines CPAP and BiPAP





(15) Elevated troponin


Status:  Acute


Assessment & Plan:  Appreciate Cardiology recommendations, possible NSTEMI.





(16) Non-ST elevation myocardial infarction (NSTEMI), initial care episode


Status:  Acute


Assessment & Plan:  Possible, appreciate Cardiology recommendations, on 

therapeutic enoxaparin dosing for now.





(17) Obesity hypoventilation syndrome


Status:  Chronic


Assessment & Plan:  Pt declines non-invasive positive pressure airway 

management.





(18) Transaminitis


Status:  Acute


Assessment & Plan:  Uncertain etiology, new and worsening. Check hepatitis 

panel, possibly sepsis related. Holding statin.





(19) Chronic indwelling Jean catheter


Status:  Chronic


(20) Acute on chronic respiratory failure with hypoxia and hypercapnia


Status:  Acute


Assessment & Plan:  Improved somewhat since admission, still requiring  7 lpm 

supplemental oxygen today and does not know his baseline use.





(21) Type 2 diabetes mellitus with complication


Status:  Chronic


Assessment & Plan:  Uncontrolled and with CKD and wounds. Increase sliding scale

to highest dose due to persistent severe hyperglycemia. Resume home levemir.





(22) Acute kidney injury superimposed on chronic kidney disease


Status:  Acute


Assessment & Plan:  Possibly secondary to sepsis as well as contrast use, 

monitor closely.





(23) Cardiomyopathy


Status:  Acute


Assessment & Plan:  Apparently new, appreciate Cardiology recommendations.





(24) Acute on chronic combined systolic and diastolic congestive heart failure


Status:  Acute


Assessment & Plan:  History of chronic CHF with preserved EF, but now with low 

EF, Cardiology consulted and diuresis being attempted, but difficult due to 

renal dysfunction. Unable to use ACEI/ARB due to current renal function. Beta 

blocker started. Appreciate Cardiology recommendations.





(25) S/P AKA (above knee amputation)


Status:  Chronic


Assessment & Plan:  With stump wound and history of infection. Appreciate Wound 

care recommendations.


Qualifiers:  


   Qualified Codes:  Z89.612 - Acquired absence of left leg above knee


(26) Goals of care, counseling/discussion


Status:  Acute


Assessment & Plan:  Discussed his multitude of issues as noted above and his 

previous comfort care goals, he states he feels things have improved since he 

left the hospital last time and he does want to try to treat his infection and 

current issues, but would not want to move to dialysis if kidneys continue to f

ail.














ENRIQUE FRANK MD             Jun 22, 2022 14:05

## 2022-06-22 NOTE — DIAGNOSTIC IMAGING REPORT
INDICATION: 

Heart failure.



EXAMINATION:

Portable chest at 9:28 AM.



FINDINGS:

There is cardiomegaly with pulmonary vascular congestion. There

are no infiltrates, effusions, or pneumothoraces.



IMPRESSION: 

Pulmonary venous hypertension.



Dictated by: 



  Dictated on workstation # [id number]

## 2022-06-23 VITALS — DIASTOLIC BLOOD PRESSURE: 83 MMHG | SYSTOLIC BLOOD PRESSURE: 141 MMHG

## 2022-06-23 VITALS — SYSTOLIC BLOOD PRESSURE: 164 MMHG | DIASTOLIC BLOOD PRESSURE: 81 MMHG

## 2022-06-23 VITALS — DIASTOLIC BLOOD PRESSURE: 86 MMHG | SYSTOLIC BLOOD PRESSURE: 152 MMHG

## 2022-06-23 VITALS — SYSTOLIC BLOOD PRESSURE: 129 MMHG | DIASTOLIC BLOOD PRESSURE: 79 MMHG

## 2022-06-23 VITALS — SYSTOLIC BLOOD PRESSURE: 163 MMHG | DIASTOLIC BLOOD PRESSURE: 84 MMHG

## 2022-06-23 VITALS — DIASTOLIC BLOOD PRESSURE: 72 MMHG | SYSTOLIC BLOOD PRESSURE: 158 MMHG

## 2022-06-23 LAB
%HYPO/RBC NFR BLD AUTO: SLIGHT %
ALBUMIN SERPL-MCNC: 2.7 GM/DL (ref 3.2–4.5)
ALP SERPL-CCNC: 90 U/L (ref 40–136)
ALT SERPL-CCNC: 299 U/L (ref 0–55)
ANISOCYTOSIS BLD QL SMEAR: SLIGHT
BASO STIPL BLD QL SMEAR: SLIGHT
BASOPHILS # BLD AUTO: 0 10^3/UL (ref 0–0.1)
BASOPHILS NFR BLD AUTO: 0 % (ref 0–10)
BASOPHILS NFR BLD MANUAL: 0 %
BILIRUB SERPL-MCNC: 0.5 MG/DL (ref 0.1–1)
BUN/CREAT SERPL: 31
CALCIUM SERPL-MCNC: 8.5 MG/DL (ref 8.5–10.1)
CHLORIDE SERPL-SCNC: 96 MMOL/L (ref 98–107)
CO2 SERPL-SCNC: 25 MMOL/L (ref 21–32)
CREAT SERPL-MCNC: 1.56 MG/DL (ref 0.6–1.3)
DACRYOCYTES BLD QL SMEAR: SLIGHT
EOSINOPHIL # BLD AUTO: 0 10^3/UL (ref 0–0.3)
EOSINOPHIL NFR BLD AUTO: 0 % (ref 0–10)
EOSINOPHIL NFR BLD MANUAL: 0 %
GFR SERPLBLD BASED ON 1.73 SQ M-ARVRAT: 53 ML/MIN
GLUCOSE SERPL-MCNC: 188 MG/DL (ref 70–105)
HCT VFR BLD CALC: 31 % (ref 40–54)
HELMET CELLS BLD QL SMEAR: SLIGHT
HGB BLD-MCNC: 8.9 G/DL (ref 13.3–17.7)
LYMPHOCYTES # BLD AUTO: 0.5 10^3/UL (ref 1–4)
LYMPHOCYTES NFR BLD AUTO: 4 % (ref 12–44)
MAGNESIUM SERPL-MCNC: 2.2 MG/DL (ref 1.6–2.4)
MANUAL DIFFERENTIAL PERFORMED BLD QL: YES
MCH RBC QN AUTO: 25 PG (ref 25–34)
MCHC RBC AUTO-ENTMCNC: 29 G/DL (ref 32–36)
MCV RBC AUTO: 86 FL (ref 80–99)
MONOCYTES # BLD AUTO: 0.6 10^3/UL (ref 0–1)
MONOCYTES NFR BLD AUTO: 5 % (ref 0–12)
MONOCYTES NFR BLD: 2 %
NEUTROPHILS # BLD AUTO: 11.4 10^3/UL (ref 1.8–7.8)
NEUTROPHILS NFR BLD AUTO: 91 % (ref 42–75)
NEUTS BAND NFR BLD MANUAL: 96 %
OVALOCYTES BLD QL SMEAR: SLIGHT
PLATELET # BLD: 355 10^3/UL (ref 130–400)
PMV BLD AUTO: 10.6 FL (ref 9–12.2)
POIKILOCYTOSIS BLD QL SMEAR: SLIGHT
POLYCHROMASIA BLD QL SMEAR: SLIGHT
POTASSIUM SERPL-SCNC: 4.2 MMOL/L (ref 3.6–5)
PROT SERPL-MCNC: 6.9 GM/DL (ref 6.4–8.2)
SODIUM SERPL-SCNC: 134 MMOL/L (ref 135–145)
TARGETS BLD QL SMEAR: SLIGHT
VARIANT LYMPHS NFR BLD MANUAL: 2 %
WBC # BLD AUTO: 12.5 10^3/UL (ref 4.3–11)

## 2022-06-23 RX ADMIN — SODIUM CHLORIDE SCH MLS/HR: 900 INJECTION, SOLUTION INTRAVENOUS at 16:33

## 2022-06-23 RX ADMIN — LACTULOSE SCH GM: 20 SOLUTION ORAL at 20:56

## 2022-06-23 RX ADMIN — SODIUM CHLORIDE SCH MLS/HR: 900 INJECTION INTRAVENOUS at 11:56

## 2022-06-23 RX ADMIN — INSULIN ASPART SCH UNIT: 100 INJECTION, SOLUTION INTRAVENOUS; SUBCUTANEOUS at 16:33

## 2022-06-23 RX ADMIN — ASPIRIN SCH MG: 81 TABLET ORAL at 09:10

## 2022-06-23 RX ADMIN — INSULIN ASPART SCH UNIT: 100 INJECTION, SOLUTION INTRAVENOUS; SUBCUTANEOUS at 22:10

## 2022-06-23 RX ADMIN — INSULIN ASPART SCH UNIT: 100 INJECTION, SOLUTION INTRAVENOUS; SUBCUTANEOUS at 11:56

## 2022-06-23 RX ADMIN — VANCOMYCIN HYDROCHLORIDE SCH MLS/HR: 500 INJECTION, POWDER, LYOPHILIZED, FOR SOLUTION INTRAVENOUS at 06:35

## 2022-06-23 RX ADMIN — IPRATROPIUM BROMIDE AND ALBUTEROL SULFATE SCH ML: .5; 3 SOLUTION RESPIRATORY (INHALATION) at 02:32

## 2022-06-23 RX ADMIN — CLOPIDOGREL BISULFATE SCH MG: 75 TABLET, FILM COATED ORAL at 09:09

## 2022-06-23 RX ADMIN — DOCUSATE SODIUM AND SENNOSIDES SCH EA: 8.6; 5 TABLET, FILM COATED ORAL at 09:10

## 2022-06-23 RX ADMIN — IPRATROPIUM BROMIDE AND ALBUTEROL SULFATE SCH ML: .5; 3 SOLUTION RESPIRATORY (INHALATION) at 10:40

## 2022-06-23 RX ADMIN — LACTULOSE SCH GM: 20 SOLUTION ORAL at 09:10

## 2022-06-23 RX ADMIN — SODIUM CHLORIDE SCH MLS/HR: 900 INJECTION INTRAVENOUS at 16:33

## 2022-06-23 RX ADMIN — HYDROCODONE BITARTRATE AND ACETAMINOPHEN PRN EA: 5; 325 TABLET ORAL at 09:10

## 2022-06-23 RX ADMIN — ENOXAPARIN SODIUM SCH MG: 150 INJECTION SUBCUTANEOUS at 03:30

## 2022-06-23 RX ADMIN — AMLODIPINE BESYLATE SCH MG: 5 TABLET ORAL at 22:10

## 2022-06-23 RX ADMIN — SODIUM CHLORIDE SCH MLS/HR: 900 INJECTION INTRAVENOUS at 06:03

## 2022-06-23 RX ADMIN — DOCUSATE SODIUM AND SENNOSIDES SCH EA: 8.6; 5 TABLET, FILM COATED ORAL at 20:55

## 2022-06-23 RX ADMIN — SODIUM CHLORIDE SCH MLS/HR: 900 INJECTION INTRAVENOUS at 23:30

## 2022-06-23 RX ADMIN — IPRATROPIUM BROMIDE AND ALBUTEROL SULFATE SCH ML: .5; 3 SOLUTION RESPIRATORY (INHALATION) at 20:38

## 2022-06-23 RX ADMIN — INSULIN ASPART SCH UNIT: 100 INJECTION, SOLUTION INTRAVENOUS; SUBCUTANEOUS at 06:04

## 2022-06-23 RX ADMIN — FLUCONAZOLE SCH MG: 100 TABLET ORAL at 09:10

## 2022-06-23 RX ADMIN — IPRATROPIUM BROMIDE AND ALBUTEROL SULFATE SCH ML: .5; 3 SOLUTION RESPIRATORY (INHALATION) at 16:23

## 2022-06-23 RX ADMIN — SODIUM CHLORIDE SCH MLS/HR: 900 INJECTION, SOLUTION INTRAVENOUS at 04:53

## 2022-06-23 NOTE — DIAGNOSTIC IMAGING REPORT
BONE SCAN 3 PHASE



INDICATION: Chronic ulcer of the right lower leg.



COMPARISON: None available.



TECHNIQUE: Three-phase scintigraphic imaging of the right lower

leg was performed after the intravenous administration 26.6 mCi

of technetium-99m medronate.



FINDINGS: There is no abnormal blood flow within the right lower

extremity. Additionally, there is no abnormal radiotracer uptake

in the blood pool or delayed phase.



IMPRESSION: No scintigraphic features of osteomyelitis in the

right lower leg.



Dictated by: 



  Dictated on workstation # [id number]

## 2022-06-23 NOTE — PROGRESS NOTE
ALTAGRACIA ZUÑIGA 6/23/22 1524:


Subjective


Date Seen by a Provider:  Jun 23, 2022


Time Seen by a Provider:  09:00


Subjective/Events-last exam


Stting in bed alert when seen today. He reports sOB is improving and denies CP 

or palpitations at this time. He reports good appetite and no issues with urin

ation or defecation. He had no questions or concerns today. Labs are continuing 

to improve except for WBC's which climbed from 11.8 yesterday to 12.5.


Review of Systems


General:  No Chills; Appetite (good)


HEENT:  No Head Aches, No Sore Throat


Pulmonary:  Dyspnea (reports improving); No Cough


Cardiovascular:  No: Chest Pain, Palpitations


Gastrointestinal:  No: Nausea, Vomiting


Genitourinary:  No Dysuria, No Hematuria





Focused Exam


Lactate Level


6/21/22 02:25: Lactic Acid Level 2.87*H


6/21/22 06:00: Lactic Acid Level 2.69*H


6/21/22 09:07: Lactic Acid Level 3.23*H


Time of Focused Exam:  23:00





Objective


Exam


Last Set of Vital Signs





Vital Signs








  Date Time  Temp Pulse Resp B/P (MAP) Pulse Ox O2 Delivery O2 Flow Rate FiO2


 


6/23/22 11:23 35.9 74 20 158/72 (100) 96 High Flow N/C 4.50 


 


6/21/22 03:47        32





Capillary Refill : Less Than 3 Seconds


I&O











Intake and Output 


 


 6/23/22





 00:00


 


Intake Total 3490 ml


 


Output Total 2250 ml


 


Balance 1240 ml


 


 


 


Intake Oral 2290 ml


 


IV Total 1200 ml


 


Output Urine Total 2250 ml








General:  Alert, Oriented X3, No Acute Distress


Lungs:  Clear to Auscultation, Normal Air Movement


Heart:  Regular Rate, No Murmurs


Extremities:  Other (Above the knee amputation on left side. Right leg swollen 

and erythematous with chronic ulcerations. Missing first digit of right foot.)


Skin:  Other (see extremities above. Rest of skin appears warm and dry.)


Neuro:  Normal Speech


Psych/Mental Status:  Mental Status NL





Results


Lab


Laboratory Tests


6/22/22 15:47: Glucometer 433*H


6/22/22 20:16: Glucometer 335H


6/23/22 05:27: Glucometer 195H


6/23/22 06:53: 


White Blood Count 12.5H, Red Blood Count 3.58L, Hemoglobin 8.9L, Hematocrit 31L,

Mean Corpuscular Volume 86, Mean Corpuscular Hemoglobin 25, Mean Corpuscular 

Hemoglobin Concent 29L, Red Cell Distribution Width 18.6H, Platelet Count 355, 

Mean Platelet Volume 10.6, Immature Granulocyte % (Auto) 1, Neutrophils (%) 

(Auto) 91H, Lymphocytes (%) (Auto) 4L, Monocytes (%) (Auto) 5, Eosinophils (%) 

(Auto) 0, Basophils (%) (Auto) 0, Neutrophils # (Auto) 11.4H, Lymphocytes # 

(Auto) 0.5L, Monocytes # (Auto) 0.6, Eosinophils # (Auto) 0.0, Basophils # 

(Auto) 0.0, Immature Granulocyte # (Auto) 0.1, Neutrophils % (Manual) 96, 

Lymphocytes % (Manual) 2, Monocytes % (Manual) 2, Eosinophils % (Manual) 0, Ba

sophils % (Manual) 0, Polychromasia SLIGHT, Hypochromasia SLIGHT, Poikilocytosis

SLIGHT, Basophilic Stippling SLIGHT, Anisocytosis SLIGHT, Target Cells SLIGHT, 

Tear Drop Cells SLIGHT, Helmet Cells SLIGHT, Elliptocytes SLIGHT, Sodium Level 

134L, Potassium Level 4.2, Chloride Level 96L, Carbon Dioxide Level 25, Anion 

Gap 13, Blood Urea Nitrogen 48H, Creatinine 1.56H, Estimat Glomerular Filtration

Rate 53, BUN/Creatinine Ratio 31, Glucose Level 188H, Calcium Level 8.5, 

Corrected Calcium 9.5, Magnesium Level 2.2, Total Bilirubin 0.5, Aspartate Amino

Transf (AST/SGOT) 104H, Alanine Aminotransferase (ALT/SGPT) 299H, Alkaline 

Phosphatase 90, Total Protein 6.9, Albumin 2.7L


6/23/22 10:59: Glucometer 265H





Microbiology


6/21/22 Gram Stain - Final, Resulted


          


6/21/22 Wound Culture - Preliminary, Resulted


          Mixed Bacterial Ya


          Staphylococcus aureus


          Gram Negative Sebastián


6/21/22 MRSA Screen - Final, Complete


          


6/20/22 Urine Culture - Preliminary, Resulted


          Morganella morganii


          Escherichia coli


          Enterococcus faecalis


          Aerococcus urinae


6/20/22 Blood Culture - Preliminary, Resulted


          No growth





Assessment/Plan


Assessment/Plan


Assess & Plan/Chief Complaint


Sepsis


-HR, BP, and RR are stable today with 100% O2 saturation on 5L O2. liver enzymes

and creatinine are improving. He is currently on day 3 of broad spectrum 

coverage with Vancomycin and cefepime. Source is currently unknown but 

possibilities include UTI vs. Pneumonia Vs. cellulitis.





UTI


-has catheter in place since march. Ideally needs to be replaced but the last 

one had to be placed in the OR due to extreme difficulty with placement. Fear 

that if removed will be unable to replace. Broad spectrum ABX for now and will 

consider urology consult if necessary.





Cellulitis of right leg


-broad spectrum abx as well as fluconazole cream. no change since yesterday. 





Possible NSTEMI


-Patient is on aspirin and clopidogrel at this time. Cardiology is directing 

care at this time.





acute on chronic combined systolic/diastolic heart failure


-Currently on carvedilol and being managed by cardiology





lactic acidosis


-Rest of labs are improving at this time. Did not repeat today.





Morbid obesity


-Likely has signficant impact on overall health. Continue to recommend healthy 

diet and weigh tloss if possible. 





Primary hypertension


-Currently on carvediol and being managed by cardiology.





Chronic renal insufficiency


-Creatinine is improving at this time. Will continue to monitor and be cautious 

to start therapy that may worsen renal function.





PAD


per cardiology no areas needing intervention at this time. Statins continue to 

be held due to elevated transaminases.





Pneumonia


-Day 3 of vancomycin and cefepime. Lungs sound clear today and patient appears 

to be improving at this time. Continue supplemental oxygen and attempt to ween 

off as tolerated





GUERLINE MARIN MD 6/23/22 1900:


Supervisory-Addendum Brief


Verification & Attestation


Participated in pt care:  history, MDM, physical


Personally performed:  exam, history, MDM, supervision of care


Care discussed with:  Medical Student


Procedures:  n/a


Verification and Attestation of Medical Student E/M Service





I reviewed and verified all information documented by the medical student and 

made modifications to such information, when appropriate. I personally performed

the history, physical exam and medical decision making. 





 Guerline Marin, Jun 23, 2022,19:00











ALTAGRACIA ZUÑIGA                Jun 23, 2022 15:24


GUERLINE MARIN MD             Jun 23, 2022 19:00

## 2022-06-23 NOTE — CARDIOLOGY PROGRESS NOTE
Progress Note-Cardiology


Events since last exam


Date Seen by Provider:  Jun 23, 2022


Time Seen by Provider:  12:23


Events since last exam


I am following him due to heart failure and elevated troponin level.  He was 

sitting up in bed eating lunch.  He denies chest discomfort, dyspnea, 

palpitations, or syncope.  No change in his chronic right lower extremity edema.





Certain portions of this document may have been dictated utilizing voice 

recognition technology.  Inherent to this technology, typographical and gr

ammatical errors may exist.  As much as I am diligent to identify and correct 

these mistakes, some errors may remain in the document.





Vitals


Last set of Vitals Signs





Vital Signs








 6/21/22 6/23/22





 03:47 11:23


 


Temp  35.9


 


Pulse  74


 


Resp  20


 


B/P (MAP)  158/72 (100)


 


Pulse Ox  96


 


O2 Delivery  High Flow N/C


 


O2 Flow Rate  4.50


 


FiO2 32 











Labs


Labs


Laboratory Tests


6/23/22 06:53














Exam


Vital Signs





Vital Signs








  Date Time  Temp Pulse Resp B/P (MAP) Pulse Ox O2 Delivery O2 Flow Rate FiO2


 


6/23/22 11:23 35.9 74 20 158/72 (100) 96 High Flow N/C 4.50 


 


6/21/22 03:47        32








Physical Exam


General: Alert. No acute distress.  He appears older than his stated age.  He is

morbidly obese.


Eye: No xanthelasma.


HENT: Normocephalic.


Neck: Jugular venous pressure does not appear elevated.


Respiratory: Lungs are clear to auscultation but decreased at the bases 

bilaterally. Respirations are non-labored. Breath sounds are equal. Symmetrical 

chest wall expansion.


Cardiovascular: Normal rate. Regular rhythm.  Distant S1-S2.  No murmur. No ga

llop.  1+ right lower extremity edema with probable active cellulitis.  Left 

above-knee amputation.


Gastrointestinal: Soft. Normal bowel sounds.


Skin: Warm. Dry.


Neurologic: Alert and oriented to person, place, time. Cranial nerves 3-11 

grossly intact.


Psychiatric: Cooperative. Appropriate mood & affect.


Labs





Laboratory Tests








Test


 6/22/22


15:47 6/22/22


20:16 6/23/22


05:27 6/23/22


06:53 Range/Units


 


 


Glucometer 433 *H 335 H 195 H    MG/DL


 


White Blood Count


 


 


 


 12.5 H


 4.3-11.0


10^3/uL


 


Red Blood Count


 


 


 


 3.58 L


 4.30-5.52


10^6/uL


 


Hemoglobin    8.9 L 13.3-17.7  g/dL


 


Hematocrit    31 L 40-54  %


 


Mean Corpuscular Volume    86  80-99  fL


 


Mean Corpuscular Hemoglobin    25  25-34  pg


 


Mean Corpuscular Hemoglobin


Concent 


 


 


 29 L


 32-36  g/dL





 


Red Cell Distribution Width    18.6 H 10.0-14.5  %


 


Platelet Count


 


 


 


 355 


 130-400


10^3/uL


 


Mean Platelet Volume    10.6  9.0-12.2  fL


 


Immature Granulocyte % (Auto)    1   %


 


Neutrophils (%) (Auto)    91 H 42-75  %


 


Lymphocytes (%) (Auto)    4 L 12-44  %


 


Monocytes (%) (Auto)    5  0-12  %


 


Eosinophils (%) (Auto)    0  0-10  %


 


Basophils (%) (Auto)    0  0-10  %


 


Neutrophils # (Auto)


 


 


 


 11.4 H


 1.8-7.8


10^3/uL


 


Lymphocytes # (Auto)


 


 


 


 0.5 L


 1.0-4.0


10^3/uL


 


Monocytes # (Auto)


 


 


 


 0.6 


 0.0-1.0


10^3/uL


 


Eosinophils # (Auto)


 


 


 


 0.0 


 0.0-0.3


10^3/uL


 


Basophils # (Auto)


 


 


 


 0.0 


 0.0-0.1


10^3/uL


 


Immature Granulocyte # (Auto)


 


 


 


 0.1 


 0.0-0.1


10^3/uL


 


Neutrophils % (Manual)    96   %


 


Lymphocytes % (Manual)    2   %


 


Monocytes % (Manual)    2   %


 


Eosinophils % (Manual)    0   %


 


Basophils % (Manual)    0   %


 


Polychromasia    SLIGHT   


 


Hypochromasia    SLIGHT   


 


Poikilocytosis    SLIGHT   


 


Basophilic Stippling    SLIGHT   


 


Anisocytosis    SLIGHT   


 


Target Cells    SLIGHT   


 


Tear Drop Cells    SLIGHT   


 


Helmet Cells    SLIGHT   


 


Elliptocytes    SLIGHT   


 


Sodium Level    134 L 135-145  MMOL/L


 


Potassium Level    4.2  3.6-5.0  MMOL/L


 


Chloride Level    96 L   MMOL/L


 


Carbon Dioxide Level    25  21-32  MMOL/L


 


Anion Gap    13  5-14  MMOL/L


 


Blood Urea Nitrogen    48 H 7-18  MG/DL


 


Creatinine


 


 


 


 1.56 H


 0.60-1.30


MG/DL


 


Estimat Glomerular Filtration


Rate 


 


 


 53 


  





 


BUN/Creatinine Ratio    31   


 


Glucose Level    188 H   MG/DL


 


Calcium Level    8.5  8.5-10.1  MG/DL


 


Corrected Calcium    9.5  8.5-10.1  MG/DL


 


Magnesium Level    2.2  1.6-2.4  MG/DL


 


Total Bilirubin    0.5  0.1-1.0  MG/DL


 


Aspartate Amino Transf


(AST/SGOT) 


 


 


 104 H


 5-34  U/L





 


Alanine Aminotransferase


(ALT/SGPT) 


 


 


 299 H


 0-55  U/L





 


Alkaline Phosphatase    90    U/L


 


Total Protein    6.9  6.4-8.2  GM/DL


 


Albumin    2.7 L 3.2-4.5  GM/DL


 


Test


 6/23/22


10:59 


 


 


 Range/Units


 


 


Glucometer 265 H      MG/DL











Diagnosis/Problems


Diagnosis/Problems





(1) Acute on chronic combined systolic and diastolic congestive heart failure


Status:  Acute


Assessment & Plan:  His echocardiogram from 6/21 shows moderate left ventricular

systolic dysfunction.  This is a new finding in this patient although the study 

was technically difficult.  I started him on low-dose carvedilol which can be 

increased as tolerated.  Due to his poor renal function, he is not a good 

candidate for ACE inhibitor, ARB or aldosterone antagonist.  He has received 

intravenous Lasix.  His creatinine got worse with the diuretic which was then 

discontinued.  His creatinine now seems to have stabilized.  If his blood 

pressure remains good with the beta-blocker, then we could consider adding 

hydralazine and nitrates.





(2) Cardiomyopathy


Status:  Acute


Assessment & Plan:  This appears to be a new finding in the patient.  As above, 

I started him on carvedilol.  He is not currently a candidate for the other 

guideline directed medical therapy due to poor renal function.  He would also 

not be a good candidate for a LifeVest since this device does not fit well on a 

patient with morbid obesity. As above, if his blood pressure remains good with 

the beta blocker, we could consider adding hydralazine and nitrates.  At some 

point, we may want to consider a cardiac catheterization but since he seems to 

have active cellulitis, this is a relative contraindication to coronary stenting

if that was needed.





(3) Peripheral arterial disease


Status:  Chronic


Assessment & Plan:  He had a lower extremity CT angiogram on 6/21.  There does 

not appear to be any disease of the right lower extremity that would require 

revascularization at this time.  I recommend he continue on aspirin and clop

idogrel.  He is not currently a good candidate for statin medication due to 

elevated transaminase levels.





(4) Non-ST elevation myocardial infarction (NSTEMI), initial care episode


Status:  Acute


Assessment & Plan:  Unclear whether he may have had a type I or type II non-ST 

elevation myocardial infarction.  He has not been having chest pain.  I have 

ordered aspirin and clopidogrel.  He is on therapeutic dosing of enoxaparin 

which now has changed over to DVT prophylactic dose.  I ordered carvedilol as 

above.  He is not a candidate for statin medication due to the elevated 

transaminase levels.  He is not an ideal candidate for an invasive cardiac 

evaluation due to his ongoing infection of the right foot.





(5) Primary hypertension


Status:  Chronic


Assessment & Plan:  His blood pressures have been trending upwards.  I will 

increase the dose of carvedilol.





(6) Mixed hyperlipidemia


Status:  Chronic


Assessment & Plan:  As above, his transaminase levels are elevated.  This is a 

relative contraindication to starting statin medication.  Once the transaminase 

levels are improved, we can consider adding statin medication.





(7) Acute kidney injury superimposed on chronic kidney disease


Status:  Acute


Assessment & Plan:  His renal function had stabilized but then got worse as of 

6/22.  Diuretic has been discontinued.





(8) Acute on chronic respiratory failure with hypoxia and hypercapnia


Status:  Acute


Assessment & Plan:  Most likely multifactorial.





(9) Type 2 diabetes mellitus with complication


Status:  Chronic


Assessment & Plan:  The hospitalist is managing his diabetes.





(10) Morbid obesity


Status:  Chronic


Assessment & Plan:  He needs to work on weight loss.  He has been counseled 

about this in the past.  Unfortunately, he has been ordering fast food to be 

delivered to his room in the skilled nursing facility.














YAA LYN JR, MD         Jun 23, 2022 12:24

## 2022-06-24 VITALS — DIASTOLIC BLOOD PRESSURE: 86 MMHG | SYSTOLIC BLOOD PRESSURE: 144 MMHG

## 2022-06-24 VITALS — DIASTOLIC BLOOD PRESSURE: 81 MMHG | SYSTOLIC BLOOD PRESSURE: 172 MMHG

## 2022-06-24 VITALS — SYSTOLIC BLOOD PRESSURE: 143 MMHG | DIASTOLIC BLOOD PRESSURE: 88 MMHG

## 2022-06-24 VITALS — DIASTOLIC BLOOD PRESSURE: 72 MMHG | SYSTOLIC BLOOD PRESSURE: 147 MMHG

## 2022-06-24 VITALS — SYSTOLIC BLOOD PRESSURE: 172 MMHG | DIASTOLIC BLOOD PRESSURE: 81 MMHG

## 2022-06-24 VITALS — DIASTOLIC BLOOD PRESSURE: 72 MMHG | SYSTOLIC BLOOD PRESSURE: 151 MMHG

## 2022-06-24 VITALS — SYSTOLIC BLOOD PRESSURE: 145 MMHG | DIASTOLIC BLOOD PRESSURE: 83 MMHG

## 2022-06-24 LAB
ALBUMIN SERPL-MCNC: 2.7 GM/DL (ref 3.2–4.5)
ALP SERPL-CCNC: 83 U/L (ref 40–136)
ALT SERPL-CCNC: 252 U/L (ref 0–55)
BASOPHILS # BLD AUTO: 0 10^3/UL (ref 0–0.1)
BASOPHILS NFR BLD AUTO: 0 % (ref 0–10)
BILIRUB SERPL-MCNC: 0.4 MG/DL (ref 0.1–1)
BUN/CREAT SERPL: 27
CALCIUM SERPL-MCNC: 8.6 MG/DL (ref 8.5–10.1)
CHLORIDE SERPL-SCNC: 102 MMOL/L (ref 98–107)
CO2 SERPL-SCNC: 24 MMOL/L (ref 21–32)
CREAT SERPL-MCNC: 1.52 MG/DL (ref 0.6–1.3)
EOSINOPHIL # BLD AUTO: 0.2 10^3/UL (ref 0–0.3)
EOSINOPHIL NFR BLD AUTO: 2 % (ref 0–10)
GFR SERPLBLD BASED ON 1.73 SQ M-ARVRAT: 55 ML/MIN
GLUCOSE SERPL-MCNC: 100 MG/DL (ref 70–105)
HCT VFR BLD CALC: 36 % (ref 40–54)
HGB BLD-MCNC: 9.9 G/DL (ref 13.3–17.7)
LYMPHOCYTES # BLD AUTO: 1.1 10^3/UL (ref 1–4)
LYMPHOCYTES NFR BLD AUTO: 10 % (ref 12–44)
MAGNESIUM SERPL-MCNC: 2.2 MG/DL (ref 1.6–2.4)
MANUAL DIFFERENTIAL PERFORMED BLD QL: NO
MCH RBC QN AUTO: 25 PG (ref 25–34)
MCHC RBC AUTO-ENTMCNC: 27 G/DL (ref 32–36)
MCV RBC AUTO: 91 FL (ref 80–99)
MONOCYTES # BLD AUTO: 0.9 10^3/UL (ref 0–1)
MONOCYTES NFR BLD AUTO: 8 % (ref 0–12)
NEUTROPHILS # BLD AUTO: 8.1 10^3/UL (ref 1.8–7.8)
NEUTROPHILS NFR BLD AUTO: 79 % (ref 42–75)
PLATELET # BLD: 366 10^3/UL (ref 130–400)
PMV BLD AUTO: 10.6 FL (ref 9–12.2)
POTASSIUM SERPL-SCNC: 3.7 MMOL/L (ref 3.6–5)
PROT SERPL-MCNC: 7 GM/DL (ref 6.4–8.2)
SODIUM SERPL-SCNC: 138 MMOL/L (ref 135–145)
WBC # BLD AUTO: 10.3 10^3/UL (ref 4.3–11)

## 2022-06-24 RX ADMIN — INSULIN ASPART SCH UNIT: 100 INJECTION, SOLUTION INTRAVENOUS; SUBCUTANEOUS at 20:49

## 2022-06-24 RX ADMIN — IPRATROPIUM BROMIDE AND ALBUTEROL SULFATE SCH ML: .5; 3 SOLUTION RESPIRATORY (INHALATION) at 08:49

## 2022-06-24 RX ADMIN — DOCUSATE SODIUM AND SENNOSIDES SCH EA: 8.6; 5 TABLET, FILM COATED ORAL at 08:09

## 2022-06-24 RX ADMIN — VANCOMYCIN HYDROCHLORIDE SCH MLS/HR: 500 INJECTION, POWDER, LYOPHILIZED, FOR SOLUTION INTRAVENOUS at 19:20

## 2022-06-24 RX ADMIN — ENOXAPARIN SODIUM SCH MG: 100 INJECTION SUBCUTANEOUS at 08:10

## 2022-06-24 RX ADMIN — INSULIN ASPART SCH UNIT: 100 INJECTION, SOLUTION INTRAVENOUS; SUBCUTANEOUS at 12:19

## 2022-06-24 RX ADMIN — HYDROCODONE BITARTRATE AND ACETAMINOPHEN PRN EA: 5; 325 TABLET ORAL at 08:13

## 2022-06-24 RX ADMIN — ASPIRIN SCH MG: 81 TABLET ORAL at 08:09

## 2022-06-24 RX ADMIN — IPRATROPIUM BROMIDE AND ALBUTEROL SULFATE SCH ML: .5; 3 SOLUTION RESPIRATORY (INHALATION) at 02:33

## 2022-06-24 RX ADMIN — CLOPIDOGREL BISULFATE SCH MG: 75 TABLET, FILM COATED ORAL at 08:10

## 2022-06-24 RX ADMIN — SODIUM CHLORIDE SCH MLS/HR: 900 INJECTION, SOLUTION INTRAVENOUS at 23:34

## 2022-06-24 RX ADMIN — FLUCONAZOLE SCH MG: 100 TABLET ORAL at 08:09

## 2022-06-24 RX ADMIN — IPRATROPIUM BROMIDE AND ALBUTEROL SULFATE SCH ML: .5; 3 SOLUTION RESPIRATORY (INHALATION) at 21:50

## 2022-06-24 RX ADMIN — HYDROCODONE BITARTRATE AND ACETAMINOPHEN PRN EA: 5; 325 TABLET ORAL at 23:34

## 2022-06-24 RX ADMIN — AMLODIPINE BESYLATE SCH MG: 5 TABLET ORAL at 08:10

## 2022-06-24 RX ADMIN — INSULIN ASPART SCH UNIT: 100 INJECTION, SOLUTION INTRAVENOUS; SUBCUTANEOUS at 06:09

## 2022-06-24 RX ADMIN — SODIUM CHLORIDE SCH MLS/HR: 900 INJECTION INTRAVENOUS at 06:09

## 2022-06-24 RX ADMIN — LACTULOSE SCH GM: 20 SOLUTION ORAL at 08:10

## 2022-06-24 RX ADMIN — SODIUM CHLORIDE SCH MLS/HR: 900 INJECTION, SOLUTION INTRAVENOUS at 18:09

## 2022-06-24 RX ADMIN — INSULIN ASPART SCH UNIT: 100 INJECTION, SOLUTION INTRAVENOUS; SUBCUTANEOUS at 16:08

## 2022-06-24 RX ADMIN — DOCUSATE SODIUM AND SENNOSIDES SCH EA: 8.6; 5 TABLET, FILM COATED ORAL at 20:45

## 2022-06-24 RX ADMIN — LACTULOSE SCH GM: 20 SOLUTION ORAL at 20:46

## 2022-06-24 RX ADMIN — VANCOMYCIN HYDROCHLORIDE SCH MLS/HR: 500 INJECTION, POWDER, LYOPHILIZED, FOR SOLUTION INTRAVENOUS at 00:42

## 2022-06-24 NOTE — CARDIOLOGY PROGRESS NOTE
Subjective


Date Seen by Provider:  Jun 24, 2022


Time Seen by Provider:  15:54


Subjective/Events-last exam


Patient was seen at bedside, laying down comfortably, eating lunch.  Feeling 

better.  No new complaint


Review of Systems


General:  No Chills, No Night Sweats; Fatigue, Malaise; No Appetite, No Other


HEENT:  No Head Aches, No Visual Changes, No Eye Pain, No Ear Pain, No 

Dysphasia, No Sinus Congestion, No Post Nasal Drip, No Sore Throat, No Other


Pulmonary:  Dyspnea; No Cough, No Pleuritic Chest Pain, No Other


Cardiovascular:  Edema; No: Chest Pain, Palpitations, Orthopnea, Paroxysmal Noc.

Dyspnea, Lt Headedness, Other





Focused Exam


Time of Focused Exam:  23:00





Objective-Cardiology


Exam


Last Set of Vital Signs





Vital Signs








 6/21/22 6/25/22





 03:47 04:48


 


Temp  35.8


 


Pulse  57


 


Resp  18


 


B/P (MAP)  129/84 (99)


 


Pulse Ox  100


 


O2 Delivery  High Flow N/C


 


O2 Flow Rate  5.00


 


FiO2 32 








I&O











Intake and Output 


 


 6/25/22





 00:00


 


Intake Total 1790 ml


 


Output Total 1775 ml


 


Balance 15 ml


 


 


 


Intake Oral 1790 ml


 


Output Urine Total 1775 ml








General:  Alert, Oriented X3, Cooperative, No Acute Distress


HEENT:  PERRLA, EOMI


Neck:  Supple, No Thyromegaly


Lungs:  Clear to Auscultation


Heart:  Regular Rate, No Murmurs


Abdomen:  Normal Bowel Sounds, No Tenderness


Extremities:  No Clubbing, Other (circumferential erythema, ulcerations, weeping

of right lower leg with absent great toe, left AKA)


Skin:  Other (see above. otherwise warm and dry with no breakdown seen by me 

today.)


Neuro:  Normal Speech


Psych/Mental Status:  Mood NL





Results


Lab


Laboratory Tests


6/25/22 05:20














A/P-Cardiology


Admission Diagnosis


Congestive heart failure


Non-ST elevation myocardial infarction


Hypertension


Hyperlipidemia





Assessment/Plan


Congestive heart failure, acute on chronic left ventricular systolic and 

diastolic dysfunction


Echcardiogram done on June 21, 2022 showing moderate left ventricular systolic 

dysfunction


Started on low-dose carvedilol, has underlying renal insufficiency cannot 

tolerate ACE inhibitor and/or ARB.


Unable to tolerate aggressive diuresis due to renal insufficiency.





Peripheral arterial disease, lower extremity CT done on June 21, 2022 does not 

appear to have obstructive disease.


Recommended to start aspirin and Plavix.


Not a good candidate for statin at that time.





Non-ST elevation myocardial infarction


He has been not having chest pain.


Conservative management per Dr Fletcher





Hypertension, continue to monitor blood pressure





Hyperlipidemia, elevated LFTs, unable to tolerate statin





Acute on chronic renal failure, monitor renal function





Status post acute respiratory failure with hypoxia and hypercapnia





Diabetes mellitus, followed and managed by primary care physician





Morbid obesity, BMI 52.  We discussed weight loss.











JOMAR DIMAS MD              Jun 24, 2022 15:59

## 2022-06-24 NOTE — PROGRESS NOTE
ALTAGRACIA ZUÑIGA 6/24/22 1100:


Subjective


Date Seen by a Provider:  Jun 24, 2022


Time Seen by a Provider:  07:50


Subjective/Events-last exam


Patient was alert lying in bed when seen this morning. He reports being tired 

and feeling about th esame today as he did yesterday. He reports having okay ap

petite. He has jean catheter in place with dark yellow, clear urine present in 

the bag. He had a bone scan of his right leg performed yesterday which show no 

indications of osteomyelitis which was originally a concern.


Review of Systems


General:  No Chills; Fatigue


HEENT:  No Head Aches, No Visual Changes, No Sore Throat


Pulmonary:  Dyspnea; No Cough


Cardiovascular:  No: Chest Pain, Palpitations


Gastrointestinal:  Constipation; No: Nausea, Vomiting


Genitourinary:  No Dysuria, No Hematuria


Musculoskeletal:  No: neck pain, back pain


Neurological:  Other (reports phantom pain in his left leg bwlow the 

amputation); No: Numbness





Focused Exam


Time of Focused Exam:  23:00





Objective


Exam


Last Set of Vital Signs





Vital Signs








  Date Time  Temp Pulse Resp B/P (MAP) Pulse Ox O2 Delivery O2 Flow Rate FiO2


 


6/24/22 10:24 36.2 68   100   


 


6/24/22 08:52      High Flow N/C 4.00 


 


6/24/22 08:05   16 172/81 (111)    


 


6/21/22 03:47        32





Capillary Refill : Less Than 3 Seconds


I&O











Intake and Output 


 


 6/24/22





 00:00


 


Intake Total 3805 ml


 


Output Total 3650 ml


 


Balance 155 ml


 


 


 


Intake Oral 2090 ml


 


IV Total 1715 ml


 


Output Urine Total 3650 ml








General:  Alert, Oriented X3


HEENT:  PERRLA, EOMI


Neck:  Supple, No Thyromegaly


Lungs:  Clear to Auscultation


Heart:  Regular Rate, No Murmurs


Abdomen:  Normal Bowel Sounds, No Tenderness


Extremities:  Other (Missing the first digit of his right foot.)


Skin:  Other (see above. otherwise warm and dry with no breakdown seen by me 

today.)


Neuro:  Normal Speech


Psych/Mental Status:  Mental Status NL





Results


Lab


Laboratory Tests


6/23/22 10:59: Glucometer 265H


6/23/22 15:59: Glucometer 305H


6/23/22 21:49: Glucometer 255H


6/24/22 05:27: Glucometer 96


6/24/22 05:59: 


White Blood Count 10.3, Red Blood Count 3.97L, Hemoglobin 9.9L, Hematocrit 36L, 

Mean Corpuscular Volume 91, Mean Corpuscular Hemoglobin 25, Mean Corpuscular 

Hemoglobin Concent 27L, Red Cell Distribution Width 18.9H, Platelet Count 366, 

Mean Platelet Volume 10.6, Immature Granulocyte % (Auto) 0, Neutrophils (%) 

(Auto) 79H, Lymphocytes (%) (Auto) 10L, Monocytes (%) (Auto) 8, Eosinophils (%) 

(Auto) 2, Basophils (%) (Auto) 0, Neutrophils # (Auto) 8.1H, Lymphocytes # 

(Auto) 1.1, Monocytes # (Auto) 0.9, Eosinophils # (Auto) 0.2, Basophils # (Auto)

0.0, Immature Granulocyte # (Auto) 0.0, Sodium Level 138, Potassium Level 3.7, 

Chloride Level 102, Carbon Dioxide Level 24, Anion Gap 12, Blood Urea Nitrogen 

41H, Creatinine 1.52H, Estimat Glomerular Filtration Rate 55, BUN/Creatinine 

Ratio 27, Glucose Level 100, Calcium Level 8.6, Corrected Calcium 9.6, Magnesium

Level 2.2, Total Bilirubin 0.4, Aspartate Amino Transf (AST/SGOT) 69H, Alanine 

Aminotransferase (ALT/SGPT) 252H, Alkaline Phosphatase 83, Total Protein 7.0, 

Albumin 2.7L





Microbiology


6/21/22 Gram Stain - Final, Resulted


          


6/21/22 Wound Culture - Preliminary, Resulted


          Mixed Bacterial Ya


          Staphylococcus aureus


          Gram Negative Sebastián


6/21/22 MRSA Screen - Final, Complete


          


6/20/22 Urine Culture - Preliminary, Resulted


          Morganella morganii


          Escherichia coli


          Enterococcus faecalis


          Aerococcus urinae


6/20/22 Blood Culture - Preliminary, Resulted


          No growth





Assessment/Plan


Assessment/Plan


Assess & Plan/Chief Complaint


Sepsis


-HR, BP, and RR are stable today with 99% O2 saturation on 5L O2. liver enzymes 

and creatinine continue to improve. He is currently on day 4 of broad spectrum 

coverage with Vancomycin and cefepime. Source is currently unknown but 

possibilities include UTI vs. Pneumonia Vs. cellulitis. Patient is not ready to 

discharge at this time but should be okay to switch from IV cefepime to oral 

cefdinir on discharge if still requiring antibiotics at that time.





UTI


-has catheter in place since march. Ideally needs to be replaced but the last 

one had to be placed in the OR due to extreme difficulty with placement. Fear 

that if removed will be unable to replace. Broad spectrum ABX for now and will 

consider urology consult if necessary.





Cellulitis of right leg


-broad spectrum abx as well as PO fluconazole. bone scan yesterday showed no 

signs of osteomyelitis. 





Possible NSTEMI


-Patient is currently on aspirin and clopidogrel. Cardiology is directing care 

at this time





acute on chronic combined systolic/diastolic heart failure


-Currently on carvedilol and being managed by cardiology.





Acute on chronic Respiratory failure


-Tolerating 5L O2 oxygen well at this time which is at r near his baseline. Has 

albuterol and albuterol/ipratropium inhalers. CO2 today is within normal range. 





lactic acidosis


-Rest of labs continue to improve. Did not repeat today.





Morbid obesity


-Likely has signficant impact on overall health. Continue to recommend healthy 

diet and weigh tloss if possible. 





Primary hypertension


-Currently on carvediol and being managed by cardiology.





Chronic renal insufficiency


-Creatinine today was 1.52  which is minimally improved from 1.56 yesterday. 

Lasix have been held thus far out of concerns for kidney function. Once started 

again his creatinine needs to be monitored closely to ensure his kidneys can 

tolerate the medication well. Will continue to monitor for now.





PAD


per cardiology no areas needing intervention at this time. Statins continue to 

be held due to elevated transaminases.





Pneumonia


-Day 4 of vancomycin and cefepime. Lungs sound clear today and patient appears 

to be improving at this time. Continue supplemental oxygen.





Type 2 diabetes


-Saravanan gacting insulin detemir and sliding scale insulin aspart at this time.





GUERLINE MARIN MD 6/24/22 1245:


Objective


Exam


General:  Alert, No Acute Distress


Lungs:  Clear to Auscultation


Heart:  Regular Rate, No Murmurs


Abdomen:  Normal Bowel Sounds, No Tenderness


Extremities:  Other (circumferential erythema, ulcerations, weeping of right 

lower leg with absent great toe, left AKA)


Neuro:  Normal Speech


Psych/Mental Status:  Mood NL





Supervisory-Addendum Brief


Verification & Attestation


Participated in pt care:  history, MDM, physical


Personally performed:  exam, history, MDM, supervision of care


Care discussed with:  Medical Student


Procedures:  n/a


Verification and Attestation of Medical Student E/M Service





 I reviewed and verified all information documented by the medical student and 

made modifications to such information, when appropriate. I personally performed

the physical exam and medical decision making. 





 Guerline Marin, Jun 24, 2022,12:44











ALTAGRACIA ZUÑIGA                Jun 24, 2022 11:00


GUERLINE MARIN MD             Jun 24, 2022 12:45

## 2022-06-25 VITALS — SYSTOLIC BLOOD PRESSURE: 137 MMHG | DIASTOLIC BLOOD PRESSURE: 93 MMHG

## 2022-06-25 VITALS — DIASTOLIC BLOOD PRESSURE: 94 MMHG | SYSTOLIC BLOOD PRESSURE: 185 MMHG

## 2022-06-25 VITALS — DIASTOLIC BLOOD PRESSURE: 82 MMHG | SYSTOLIC BLOOD PRESSURE: 137 MMHG

## 2022-06-25 VITALS — DIASTOLIC BLOOD PRESSURE: 80 MMHG | SYSTOLIC BLOOD PRESSURE: 165 MMHG

## 2022-06-25 VITALS — DIASTOLIC BLOOD PRESSURE: 84 MMHG | SYSTOLIC BLOOD PRESSURE: 129 MMHG

## 2022-06-25 LAB
ALBUMIN SERPL-MCNC: 2.8 GM/DL (ref 3.2–4.5)
ALP SERPL-CCNC: 84 U/L (ref 40–136)
ALT SERPL-CCNC: 193 U/L (ref 0–55)
BASOPHILS # BLD AUTO: 0 10^3/UL (ref 0–0.1)
BASOPHILS NFR BLD AUTO: 0 % (ref 0–10)
BILIRUB SERPL-MCNC: 0.4 MG/DL (ref 0.1–1)
BUN/CREAT SERPL: 28
CALCIUM SERPL-MCNC: 8.5 MG/DL (ref 8.5–10.1)
CHLORIDE SERPL-SCNC: 103 MMOL/L (ref 98–107)
CO2 SERPL-SCNC: 26 MMOL/L (ref 21–32)
CREAT SERPL-MCNC: 1.44 MG/DL (ref 0.6–1.3)
EOSINOPHIL # BLD AUTO: 0.8 10^3/UL (ref 0–0.3)
EOSINOPHIL NFR BLD AUTO: 10 % (ref 0–10)
GFR SERPLBLD BASED ON 1.73 SQ M-ARVRAT: 58 ML/MIN
GLUCOSE SERPL-MCNC: 101 MG/DL (ref 70–105)
HCT VFR BLD CALC: 32 % (ref 40–54)
HGB BLD-MCNC: 9.3 G/DL (ref 13.3–17.7)
LYMPHOCYTES # BLD AUTO: 1 10^3/UL (ref 1–4)
LYMPHOCYTES NFR BLD AUTO: 13 % (ref 12–44)
MAGNESIUM SERPL-MCNC: 2.2 MG/DL (ref 1.6–2.4)
MANUAL DIFFERENTIAL PERFORMED BLD QL: NO
MCH RBC QN AUTO: 25 PG (ref 25–34)
MCHC RBC AUTO-ENTMCNC: 29 G/DL (ref 32–36)
MCV RBC AUTO: 85 FL (ref 80–99)
MONOCYTES # BLD AUTO: 0.7 10^3/UL (ref 0–1)
MONOCYTES NFR BLD AUTO: 8 % (ref 0–12)
NEUTROPHILS # BLD AUTO: 5.6 10^3/UL (ref 1.8–7.8)
NEUTROPHILS NFR BLD AUTO: 69 % (ref 42–75)
PLATELET # BLD: 367 10^3/UL (ref 130–400)
PMV BLD AUTO: 10.3 FL (ref 9–12.2)
POTASSIUM SERPL-SCNC: 3.7 MMOL/L (ref 3.6–5)
PROT SERPL-MCNC: 6.9 GM/DL (ref 6.4–8.2)
SODIUM SERPL-SCNC: 139 MMOL/L (ref 135–145)
WBC # BLD AUTO: 8.1 10^3/UL (ref 4.3–11)

## 2022-06-25 RX ADMIN — IPRATROPIUM BROMIDE AND ALBUTEROL SULFATE SCH ML: .5; 3 SOLUTION RESPIRATORY (INHALATION) at 11:07

## 2022-06-25 RX ADMIN — CLOPIDOGREL BISULFATE SCH MG: 75 TABLET, FILM COATED ORAL at 08:18

## 2022-06-25 RX ADMIN — INSULIN ASPART SCH UNIT: 100 INJECTION, SOLUTION INTRAVENOUS; SUBCUTANEOUS at 16:32

## 2022-06-25 RX ADMIN — ENOXAPARIN SODIUM SCH MG: 100 INJECTION SUBCUTANEOUS at 08:18

## 2022-06-25 RX ADMIN — INSULIN ASPART SCH UNIT: 100 INJECTION, SOLUTION INTRAVENOUS; SUBCUTANEOUS at 20:47

## 2022-06-25 RX ADMIN — LACTULOSE SCH GM: 20 SOLUTION ORAL at 20:45

## 2022-06-25 RX ADMIN — LACTULOSE SCH GM: 20 SOLUTION ORAL at 08:18

## 2022-06-25 RX ADMIN — ASPIRIN SCH MG: 81 TABLET ORAL at 08:18

## 2022-06-25 RX ADMIN — INSULIN ASPART SCH UNIT: 100 INJECTION, SOLUTION INTRAVENOUS; SUBCUTANEOUS at 06:12

## 2022-06-25 RX ADMIN — AMLODIPINE BESYLATE SCH MG: 5 TABLET ORAL at 08:18

## 2022-06-25 RX ADMIN — SODIUM CHLORIDE SCH MLS/HR: 900 INJECTION, SOLUTION INTRAVENOUS at 11:05

## 2022-06-25 RX ADMIN — ONDANSETRON PRN MG: 4 TABLET, ORALLY DISINTEGRATING ORAL at 16:22

## 2022-06-25 RX ADMIN — FLUCONAZOLE SCH MG: 100 TABLET ORAL at 08:18

## 2022-06-25 RX ADMIN — VANCOMYCIN HYDROCHLORIDE SCH MLS/HR: 500 INJECTION, POWDER, LYOPHILIZED, FOR SOLUTION INTRAVENOUS at 13:08

## 2022-06-25 RX ADMIN — CEFTRIAXONE SCH MLS/HR: 1 INJECTION, SOLUTION INTRAVENOUS at 08:18

## 2022-06-25 RX ADMIN — DOCUSATE SODIUM AND SENNOSIDES SCH EA: 8.6; 5 TABLET, FILM COATED ORAL at 08:18

## 2022-06-25 RX ADMIN — INSULIN ASPART SCH UNIT: 100 INJECTION, SOLUTION INTRAVENOUS; SUBCUTANEOUS at 11:49

## 2022-06-25 RX ADMIN — HYDROCODONE BITARTRATE AND ACETAMINOPHEN PRN EA: 5; 325 TABLET ORAL at 10:35

## 2022-06-25 RX ADMIN — IPRATROPIUM BROMIDE AND ALBUTEROL SULFATE SCH ML: .5; 3 SOLUTION RESPIRATORY (INHALATION) at 21:48

## 2022-06-25 RX ADMIN — DOCUSATE SODIUM AND SENNOSIDES SCH EA: 8.6; 5 TABLET, FILM COATED ORAL at 20:46

## 2022-06-25 NOTE — CARDIOLOGY PROGRESS NOTE
Subjective


Date Seen by Provider:  Jun 25, 2022


Time Seen by Provider:  16:07


Subjective/Events-last exam


Patient was seen at bedside, laying down comfortably, no new complaint.


Review of Systems


General:  No Chills, No Night Sweats; Fatigue, Malaise; No Appetite, No Other


HEENT:  No Head Aches, No Visual Changes, No Eye Pain, No Ear Pain, No 

Dysphasia, No Sinus Congestion, No Post Nasal Drip, No Sore Throat, No Other


Pulmonary:  Dyspnea; No Cough, No Pleuritic Chest Pain, No Other


Cardiovascular:  No: Chest Pain, Palpitations, Orthopnea, Paroxysmal Noc. 

Dyspnea, Edema, Lt Headedness, Other





Focused Exam


Time of Focused Exam:  23:00





Objective-Cardiology


Exam


Last Set of Vital Signs





Vital Signs








 6/21/22 6/25/22





 03:47 11:27


 


Temp  36.1


 


Pulse  55


 


Resp  20


 


B/P (MAP)  137/82 (100)


 


Pulse Ox  100


 


O2 Delivery  High Flow N/C


 


O2 Flow Rate  5.00


 


FiO2 32 








I&O











Intake and Output 


 


 6/25/22





 00:00


 


Intake Total 1790 ml


 


Output Total 1775 ml


 


Balance 15 ml


 


 


 


Intake Oral 1790 ml


 


Output Urine Total 1775 ml








General:  Alert, Oriented X3, Cooperative, No Acute Distress


HEENT:  PERRLA, EOMI


Neck:  Supple, No Thyromegaly


Lungs:  Clear to Auscultation


Heart:  Regular Rate, No Murmurs


Abdomen:  Normal Bowel Sounds, No Tenderness


Extremities:  No Clubbing, Other (circumferential erythema, ulcerations, weeping

of right lower leg with absent great toe, left AKA)


Skin:  No Rashes, Other (see above. otherwise warm and dry with no breakdown 

seen by me today.)


Neuro:  Normal Speech


Psych/Mental Status:  Mood NL





Results


Lab


Laboratory Tests


6/25/22 05:20














A/P-Cardiology


Admission Diagnosis


Congestive heart failure


Non-ST elevation myocardial infarction


Hypertension


Hyperlipidemia





Assessment/Plan


Congestive heart failure, acute on chronic left ventricular systolic and 

diastolic dysfunction


Echcardiogram done on June 21, 2022 showing moderate left ventricular systolic 

dysfunction


Started on low-dose carvedilol, has underlying renal insufficiency cannot 

tolerate ACE inhibitor and/or ARB.


Unable to tolerate aggressive diuresis due to renal insufficiency.


Continue to monitor, no changes are recommended





Peripheral arterial disease, lower extremity CT done on June 21, 2022 does not 

appear to have obstructive disease.


Recommended to start aspirin and Plavix.


Not a good candidate for statin at that time.





Non-ST elevation myocardial infarction


He has been not having chest pain.


Conservative management per Dr Fletcher





Hypertension, continue to monitor blood pressure





Hyperlipidemia, elevated LFTs, unable to tolerate statin





Acute on chronic renal failure, monitor renal function





Status post acute respiratory failure with hypoxia and hypercapnia





Diabetes mellitus, followed and managed by primary care physician





Morbid obesity, BMI 52.  We discussed weight loss.











JOMAR DIMAS MD              Jun 25, 2022 16:08

## 2022-06-25 NOTE — PROGRESS NOTE - HOSPITALIST
Subjective


HPI/CC On Admission


Date Seen by Provider:  Jun 25, 2022


Time Seen by Provider:  10:00


CC: Acute on chronic respiratory failure





HPI: This is a 52 yr old male who was previously on hospice but fired them two 

weeks ago. He presented to the ICU with right lower extremity leg wound, 

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 Non compliant with oxygen and CPAP due 

to obesity hypoventilation syndrome. He remains a DNR. IV fluids will continue 

but monitoring for volume overload. Pt has a poor prognosis. Will move to 4th 

floor and continue supportive care.


Subjective/Events-last exam


Patient doing about the same


Wants to go back to [facility] because he likes that bed better


Antibiotics remain


Right leg improved


Bowels have not moved so order laxatives





Review of Systems


General:  Fatigue, Malaise


Musculoskeletal:  leg pain





Focused Exam


Time of Focused Exam:  23:00





Objective


Exam


Vital Signs





Vital Signs








  Date Time  Temp Pulse Resp B/P (MAP) Pulse Ox O2 Delivery O2 Flow Rate FiO2


 


6/26/22 03:49  60 20 137/73 (94) 99 High Flow N/C 5.00 


 


6/26/22 00:00 36.0       


 


6/21/22 03:47        32





Capillary Refill : Less Than 3 Seconds


General Appearance:  No Apparent Distress, WD/WN, Chronically ill, Obese


Respiratory:  Lungs Clear, Normal Breath Sounds


Cardiovascular:  Regular Rate, Rhythm


Neurologic/Psychiatric:  Alert, Oriented x3, No Motor/Sensory Deficits, Normal 

Mood/Affect





Results/Procedures


Lab


Laboratory Tests


6/26/22 05:15








Patient resulted labs reviewed.





Assessment/Plan


Assessment and Plan


Assess & Plan/Chief Complaint


Assessment:


Acute on chronic respiratory failure


Sepsis


CHF


Right leg cellulitis


OHA


DNR





Plan:


Continue current treatment


IV abx


Monitor O2





Diagnosis/Problems


Diagnosis/Problems





(1) Acute on chronic respiratory failure with hypoxia and hypercapnia


Status:  Acute


(2) Acute on chronic congestive heart failure


Status:  Acute


(3) Obesity hypoventilation syndrome


Status:  Chronic


(4) Cellulitis of right leg


Status:  Acute











JACK OLIVARES DO                Jun 25, 2022 06:05

## 2022-06-26 VITALS — SYSTOLIC BLOOD PRESSURE: 189 MMHG | DIASTOLIC BLOOD PRESSURE: 85 MMHG

## 2022-06-26 VITALS — DIASTOLIC BLOOD PRESSURE: 78 MMHG | SYSTOLIC BLOOD PRESSURE: 142 MMHG

## 2022-06-26 VITALS — DIASTOLIC BLOOD PRESSURE: 56 MMHG | SYSTOLIC BLOOD PRESSURE: 131 MMHG

## 2022-06-26 VITALS — DIASTOLIC BLOOD PRESSURE: 73 MMHG | SYSTOLIC BLOOD PRESSURE: 144 MMHG

## 2022-06-26 VITALS — SYSTOLIC BLOOD PRESSURE: 137 MMHG | DIASTOLIC BLOOD PRESSURE: 73 MMHG

## 2022-06-26 VITALS — DIASTOLIC BLOOD PRESSURE: 79 MMHG | SYSTOLIC BLOOD PRESSURE: 151 MMHG

## 2022-06-26 LAB
ALBUMIN SERPL-MCNC: 2.9 GM/DL (ref 3.2–4.5)
ALP SERPL-CCNC: 98 U/L (ref 40–136)
ALT SERPL-CCNC: 148 U/L (ref 0–55)
BASOPHILS # BLD AUTO: 0 10^3/UL (ref 0–0.1)
BASOPHILS NFR BLD AUTO: 0 % (ref 0–10)
BILIRUB SERPL-MCNC: 0.4 MG/DL (ref 0.1–1)
BUN/CREAT SERPL: 26
CALCIUM SERPL-MCNC: 8.7 MG/DL (ref 8.5–10.1)
CHLORIDE SERPL-SCNC: 101 MMOL/L (ref 98–107)
CO2 SERPL-SCNC: 27 MMOL/L (ref 21–32)
CREAT SERPL-MCNC: 1.38 MG/DL (ref 0.6–1.3)
EOSINOPHIL # BLD AUTO: 1 10^3/UL (ref 0–0.3)
EOSINOPHIL NFR BLD AUTO: 12 % (ref 0–10)
GFR SERPLBLD BASED ON 1.73 SQ M-ARVRAT: 62 ML/MIN
GLUCOSE SERPL-MCNC: 79 MG/DL (ref 70–105)
HCT VFR BLD CALC: 32 % (ref 40–54)
HGB BLD-MCNC: 9.2 G/DL (ref 13.3–17.7)
LYMPHOCYTES # BLD AUTO: 0.7 10^3/UL (ref 1–4)
LYMPHOCYTES NFR BLD AUTO: 9 % (ref 12–44)
MAGNESIUM SERPL-MCNC: 2.3 MG/DL (ref 1.6–2.4)
MANUAL DIFFERENTIAL PERFORMED BLD QL: NO
MCH RBC QN AUTO: 25 PG (ref 25–34)
MCHC RBC AUTO-ENTMCNC: 29 G/DL (ref 32–36)
MCV RBC AUTO: 86 FL (ref 80–99)
MONOCYTES # BLD AUTO: 0.8 10^3/UL (ref 0–1)
MONOCYTES NFR BLD AUTO: 10 % (ref 0–12)
NEUTROPHILS # BLD AUTO: 5.6 10^3/UL (ref 1.8–7.8)
NEUTROPHILS NFR BLD AUTO: 69 % (ref 42–75)
PLATELET # BLD: 385 10^3/UL (ref 130–400)
PMV BLD AUTO: 10.8 FL (ref 9–12.2)
POTASSIUM SERPL-SCNC: 4 MMOL/L (ref 3.6–5)
PROT SERPL-MCNC: 7 GM/DL (ref 6.4–8.2)
SODIUM SERPL-SCNC: 137 MMOL/L (ref 135–145)
WBC # BLD AUTO: 8.1 10^3/UL (ref 4.3–11)

## 2022-06-26 RX ADMIN — DOCUSATE SODIUM AND SENNOSIDES SCH EA: 8.6; 5 TABLET, FILM COATED ORAL at 20:52

## 2022-06-26 RX ADMIN — SODIUM CHLORIDE SCH MLS/HR: 900 INJECTION, SOLUTION INTRAVENOUS at 03:52

## 2022-06-26 RX ADMIN — CEFTRIAXONE SCH MLS/HR: 1 INJECTION, SOLUTION INTRAVENOUS at 08:46

## 2022-06-26 RX ADMIN — IPRATROPIUM BROMIDE AND ALBUTEROL SULFATE SCH ML: .5; 3 SOLUTION RESPIRATORY (INHALATION) at 08:18

## 2022-06-26 RX ADMIN — FLUCONAZOLE SCH MG: 100 TABLET ORAL at 08:00

## 2022-06-26 RX ADMIN — CLOPIDOGREL BISULFATE SCH MG: 75 TABLET, FILM COATED ORAL at 08:00

## 2022-06-26 RX ADMIN — INSULIN ASPART SCH UNIT: 100 INJECTION, SOLUTION INTRAVENOUS; SUBCUTANEOUS at 05:49

## 2022-06-26 RX ADMIN — IPRATROPIUM BROMIDE AND ALBUTEROL SULFATE SCH ML: .5; 3 SOLUTION RESPIRATORY (INHALATION) at 21:33

## 2022-06-26 RX ADMIN — INSULIN ASPART SCH UNIT: 100 INJECTION, SOLUTION INTRAVENOUS; SUBCUTANEOUS at 20:52

## 2022-06-26 RX ADMIN — VANCOMYCIN HYDROCHLORIDE SCH MLS/HR: 500 INJECTION, POWDER, LYOPHILIZED, FOR SOLUTION INTRAVENOUS at 05:49

## 2022-06-26 RX ADMIN — VANCOMYCIN HYDROCHLORIDE SCH MLS/HR: 500 INJECTION, POWDER, LYOPHILIZED, FOR SOLUTION INTRAVENOUS at 17:42

## 2022-06-26 RX ADMIN — INSULIN ASPART SCH UNIT: 100 INJECTION, SOLUTION INTRAVENOUS; SUBCUTANEOUS at 11:07

## 2022-06-26 RX ADMIN — FENTANYL CITRATE PRN MCG: 50 INJECTION, SOLUTION INTRAMUSCULAR; INTRAVENOUS at 17:35

## 2022-06-26 RX ADMIN — HYDROCODONE BITARTRATE AND ACETAMINOPHEN PRN EA: 5; 325 TABLET ORAL at 12:09

## 2022-06-26 RX ADMIN — VANCOMYCIN HYDROCHLORIDE SCH MLS/HR: 500 INJECTION, POWDER, LYOPHILIZED, FOR SOLUTION INTRAVENOUS at 19:51

## 2022-06-26 RX ADMIN — ONDANSETRON PRN MG: 4 TABLET, ORALLY DISINTEGRATING ORAL at 16:47

## 2022-06-26 RX ADMIN — INSULIN ASPART SCH UNIT: 100 INJECTION, SOLUTION INTRAVENOUS; SUBCUTANEOUS at 16:09

## 2022-06-26 RX ADMIN — HYDROCODONE BITARTRATE AND ACETAMINOPHEN PRN EA: 5; 325 TABLET ORAL at 16:47

## 2022-06-26 RX ADMIN — LACTULOSE SCH GM: 20 SOLUTION ORAL at 20:52

## 2022-06-26 RX ADMIN — SODIUM CHLORIDE SCH MLS/HR: 900 INJECTION, SOLUTION INTRAVENOUS at 20:53

## 2022-06-26 RX ADMIN — LACTULOSE SCH GM: 20 SOLUTION ORAL at 08:00

## 2022-06-26 RX ADMIN — ASPIRIN SCH MG: 81 TABLET ORAL at 08:00

## 2022-06-26 RX ADMIN — ENOXAPARIN SODIUM SCH MG: 100 INJECTION SUBCUTANEOUS at 08:00

## 2022-06-26 RX ADMIN — AMLODIPINE BESYLATE SCH MG: 5 TABLET ORAL at 08:00

## 2022-06-26 RX ADMIN — DOCUSATE SODIUM AND SENNOSIDES SCH EA: 8.6; 5 TABLET, FILM COATED ORAL at 08:00

## 2022-06-26 RX ADMIN — SODIUM CHLORIDE SCH MLS/HR: 900 INJECTION, SOLUTION INTRAVENOUS at 17:48

## 2022-06-26 NOTE — CARDIOLOGY PROGRESS NOTE
Subjective


Date Seen by Provider:  Jun 26, 2022


Time Seen by Provider:  11:59


Subjective/Events-last exam


Patient was seen at bedside, laying down comfortably, feeling better.


Review of Systems


General:  No Chills, No Night Sweats; Fatigue, Malaise; No Appetite, No Other


HEENT:  No Head Aches, No Visual Changes, No Eye Pain, No Ear Pain, No 

Dysphasia, No Sinus Congestion, No Post Nasal Drip, No Sore Throat, No Other


Pulmonary:  No Dyspnea, No Cough, No Pleuritic Chest Pain, No Other


Cardiovascular:  No: Chest Pain, Palpitations, Orthopnea, Paroxysmal Noc. 

Dyspnea, Edema, Lt Headedness, Other





Focused Exam


Time of Focused Exam:  23:00





Objective-Cardiology


Exam


Last Set of Vital Signs





Vital Signs








 6/21/22 6/26/22





 03:47 11:06


 


Temp  36.1


 


Pulse  60


 


Resp  16


 


B/P (MAP)  144/73 (96)


 


Pulse Ox  100


 


O2 Delivery  High Flow N/C


 


O2 Flow Rate  4.00


 


FiO2 32 








I&O











Intake and Output 


 


 6/26/22





 00:00


 


Intake Total 3545 ml


 


Output Total 1500 ml


 


Balance 2045 ml


 


 


 


Intake Oral 1880 ml


 


IV Total 1665 ml


 


Output Urine Total 1500 ml








General:  Alert, Oriented X3, Cooperative, No Acute Distress


HEENT:  PERRLA, EOMI


Neck:  Supple, No Thyromegaly


Lungs:  Clear to Auscultation


Heart:  Regular Rate, Normal S1, Normal S2, No Murmurs


Abdomen:  Normal Bowel Sounds, No Tenderness


Extremities:  No Clubbing, Other (circumferential erythema, ulcerations, weeping

of right lower leg with absent great toe, left AKA)


Skin:  No Rashes, Other (see above. otherwise warm and dry with no breakdown se

en by me today.)


Neuro:  Normal Speech


Psych/Mental Status:  Mood NL





Results


Lab


Laboratory Tests


6/26/22 05:15














A/P-Cardiology


Admission Diagnosis


Congestive heart failure


Non-ST elevation myocardial infarction


Hypertension


Hyperlipidemia





Assessment/Plan


Congestive heart failure, acute on chronic left ventricular systolic and 

diastolic dysfunction


Echcardiogram done on June 21, 2022 showing moderate left ventricular systolic 

dysfunction


Started on low-dose carvedilol, has underlying renal insufficiency cannot 

tolerate ACE inhibitor and/or ARB.


Unable to tolerate aggressive diuresis due to renal insufficiency.


Continue to monitor, no changes are recommended





Peripheral arterial disease, lower extremity CT done on June 21, 2022 does not 

appear to have obstructive disease.


Recommended to start aspirin and Plavix.


Not a good candidate for statin at that time.





Non-ST elevation myocardial infarction


He has been not having chest pain.


Conservative management per Dr Fletcher





Hypertension, continue to monitor blood pressure





Hyperlipidemia, elevated LFTs, unable to tolerate statin





Acute on chronic renal failure, monitor renal function





Status post acute respiratory failure with hypoxia and hypercapnia





Diabetes mellitus, followed and managed by primary care physician





Morbid obesity, BMI 52.  We discussed weight loss.











JOMAR DIMAS MD              Jun 26, 2022 12:00

## 2022-06-26 NOTE — PROGRESS NOTE - HOSPITALIST
Subjective


HPI/CC On Admission


Date Seen by Provider:  Jun 26, 2022


Time Seen by Provider:  11:30


CC: Acute on chronic respiratory failure





HPI: This is a 52 yr old male who was previously on hospice but fired them two 

weeks ago. He presented to the ICU with right lower extremity leg wound, 

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 Non compliant with oxygen and CPAP due 

to obesity hypoventilation syndrome. He remains a DNR. IV fluids will continue 

but monitoring for volume overload. Pt has a poor prognosis. Will move to 4th 

floor and continue supportive care.


Subjective/Events-last exam


Patient doing about the same


Right leg wound culture shows MRSA


Rocephin for UTI


Bowels not moving yet he declines a suppository





Review of Systems


Gastrointestinal:  Constipation


Musculoskeletal:  leg pain





Focused Exam


Time of Focused Exam:  23:00





Objective


Exam


Vital Signs





Vital Signs








  Date Time  Temp Pulse Resp B/P (MAP) Pulse Ox O2 Delivery O2 Flow Rate FiO2


 


6/26/22 16:00 35.8 61 20 189/85 (119) 100 High Flow N/C 4.00 


 


6/21/22 03:47        32





Capillary Refill : Less Than 3 Seconds


General Appearance:  No Apparent Distress, WD/WN, Chronically ill, Obese


Respiratory:  Lungs Clear


Cardiovascular:  Regular Rate, Rhythm


Neurologic/Psychiatric:  Alert, Oriented x3





Results/Procedures


Lab


Laboratory Tests


6/26/22 05:15








Patient resulted labs reviewed.





Assessment/Plan


Assessment and Plan


Assess & Plan/Chief Complaint


Assessment:


Acute on chronic respiratory failure


Sepsis


CHF


Right leg cellulitis MRSA


OHA


DNR


UTI





Plan:


Continue current treatment


IV abx


Monitor O2





Diagnosis/Problems


Diagnosis/Problems





(1) Acute on chronic respiratory failure with hypoxia and hypercapnia


Status:  Acute


(2) Acute on chronic congestive heart failure


Status:  Acute


(3) Obesity hypoventilation syndrome


Status:  Chronic


(4) Cellulitis of right leg


Status:  Acute











JACK OLIVARES DO                Jun 26, 2022 07:32

## 2022-06-27 VITALS — SYSTOLIC BLOOD PRESSURE: 137 MMHG | DIASTOLIC BLOOD PRESSURE: 85 MMHG

## 2022-06-27 VITALS — SYSTOLIC BLOOD PRESSURE: 161 MMHG | DIASTOLIC BLOOD PRESSURE: 72 MMHG

## 2022-06-27 VITALS — SYSTOLIC BLOOD PRESSURE: 147 MMHG | DIASTOLIC BLOOD PRESSURE: 72 MMHG

## 2022-06-27 VITALS — DIASTOLIC BLOOD PRESSURE: 83 MMHG | SYSTOLIC BLOOD PRESSURE: 130 MMHG

## 2022-06-27 VITALS — DIASTOLIC BLOOD PRESSURE: 78 MMHG | SYSTOLIC BLOOD PRESSURE: 173 MMHG

## 2022-06-27 VITALS — DIASTOLIC BLOOD PRESSURE: 73 MMHG | SYSTOLIC BLOOD PRESSURE: 141 MMHG

## 2022-06-27 VITALS — DIASTOLIC BLOOD PRESSURE: 72 MMHG | SYSTOLIC BLOOD PRESSURE: 161 MMHG

## 2022-06-27 VITALS — SYSTOLIC BLOOD PRESSURE: 141 MMHG | DIASTOLIC BLOOD PRESSURE: 87 MMHG

## 2022-06-27 LAB
ALBUMIN SERPL-MCNC: 2.6 GM/DL (ref 3.2–4.5)
ALP SERPL-CCNC: 90 U/L (ref 40–136)
ALT SERPL-CCNC: 104 U/L (ref 0–55)
BASOPHILS # BLD AUTO: 0 10^3/UL (ref 0–0.1)
BASOPHILS NFR BLD AUTO: 0 % (ref 0–10)
BILIRUB SERPL-MCNC: 0.3 MG/DL (ref 0.1–1)
BUN/CREAT SERPL: 26
CALCIUM SERPL-MCNC: 8.3 MG/DL (ref 8.5–10.1)
CHLORIDE SERPL-SCNC: 102 MMOL/L (ref 98–107)
CO2 SERPL-SCNC: 26 MMOL/L (ref 21–32)
CREAT SERPL-MCNC: 1.21 MG/DL (ref 0.6–1.3)
EOSINOPHIL # BLD AUTO: 0.9 10^3/UL (ref 0–0.3)
EOSINOPHIL NFR BLD AUTO: 11 % (ref 0–10)
GFR SERPLBLD BASED ON 1.73 SQ M-ARVRAT: 72 ML/MIN
GLUCOSE SERPL-MCNC: 122 MG/DL (ref 70–105)
HCT VFR BLD CALC: 29 % (ref 40–54)
HGB BLD-MCNC: 8.3 G/DL (ref 13.3–17.7)
LYMPHOCYTES # BLD AUTO: 0.9 10^3/UL (ref 1–4)
LYMPHOCYTES NFR BLD AUTO: 11 % (ref 12–44)
MAGNESIUM SERPL-MCNC: 2.1 MG/DL (ref 1.6–2.4)
MANUAL DIFFERENTIAL PERFORMED BLD QL: NO
MCH RBC QN AUTO: 25 PG (ref 25–34)
MCHC RBC AUTO-ENTMCNC: 29 G/DL (ref 32–36)
MCV RBC AUTO: 85 FL (ref 80–99)
MONOCYTES # BLD AUTO: 0.8 10^3/UL (ref 0–1)
MONOCYTES NFR BLD AUTO: 10 % (ref 0–12)
NEUTROPHILS # BLD AUTO: 5.5 10^3/UL (ref 1.8–7.8)
NEUTROPHILS NFR BLD AUTO: 68 % (ref 42–75)
PLATELET # BLD: 346 10^3/UL (ref 130–400)
PMV BLD AUTO: 10.4 FL (ref 9–12.2)
POTASSIUM SERPL-SCNC: 4.2 MMOL/L (ref 3.6–5)
PROT SERPL-MCNC: 6.4 GM/DL (ref 6.4–8.2)
SODIUM SERPL-SCNC: 137 MMOL/L (ref 135–145)
WBC # BLD AUTO: 8 10^3/UL (ref 4.3–11)

## 2022-06-27 RX ADMIN — INSULIN ASPART SCH UNIT: 100 INJECTION, SOLUTION INTRAVENOUS; SUBCUTANEOUS at 05:46

## 2022-06-27 RX ADMIN — DOCUSATE SODIUM AND SENNOSIDES SCH EA: 8.6; 5 TABLET, FILM COATED ORAL at 10:34

## 2022-06-27 RX ADMIN — HYDROCODONE BITARTRATE AND ACETAMINOPHEN PRN EA: 5; 325 TABLET ORAL at 10:33

## 2022-06-27 RX ADMIN — LACTULOSE SCH GM: 20 SOLUTION ORAL at 10:33

## 2022-06-27 RX ADMIN — SODIUM CHLORIDE SCH MLS/HR: 900 INJECTION, SOLUTION INTRAVENOUS at 22:29

## 2022-06-27 RX ADMIN — AMLODIPINE BESYLATE SCH MG: 5 TABLET ORAL at 10:33

## 2022-06-27 RX ADMIN — INSULIN ASPART SCH UNIT: 100 INJECTION, SOLUTION INTRAVENOUS; SUBCUTANEOUS at 12:27

## 2022-06-27 RX ADMIN — ENOXAPARIN SODIUM SCH MG: 100 INJECTION SUBCUTANEOUS at 10:34

## 2022-06-27 RX ADMIN — VANCOMYCIN HYDROCHLORIDE SCH MLS/HR: 500 INJECTION, POWDER, LYOPHILIZED, FOR SOLUTION INTRAVENOUS at 02:41

## 2022-06-27 RX ADMIN — DOCUSATE SODIUM AND SENNOSIDES SCH EA: 8.6; 5 TABLET, FILM COATED ORAL at 20:35

## 2022-06-27 RX ADMIN — LACTULOSE SCH GM: 20 SOLUTION ORAL at 20:35

## 2022-06-27 RX ADMIN — IPRATROPIUM BROMIDE AND ALBUTEROL SULFATE SCH ML: .5; 3 SOLUTION RESPIRATORY (INHALATION) at 22:08

## 2022-06-27 RX ADMIN — INSULIN ASPART SCH UNIT: 100 INJECTION, SOLUTION INTRAVENOUS; SUBCUTANEOUS at 16:45

## 2022-06-27 RX ADMIN — INSULIN ASPART SCH UNIT: 100 INJECTION, SOLUTION INTRAVENOUS; SUBCUTANEOUS at 20:35

## 2022-06-27 RX ADMIN — CLOPIDOGREL BISULFATE SCH MG: 75 TABLET, FILM COATED ORAL at 10:34

## 2022-06-27 RX ADMIN — ASPIRIN SCH MG: 81 TABLET ORAL at 10:33

## 2022-06-27 RX ADMIN — CEFTRIAXONE SCH MLS/HR: 1 INJECTION, SOLUTION INTRAVENOUS at 10:25

## 2022-06-27 RX ADMIN — IPRATROPIUM BROMIDE AND ALBUTEROL SULFATE SCH ML: .5; 3 SOLUTION RESPIRATORY (INHALATION) at 08:37

## 2022-06-27 RX ADMIN — VANCOMYCIN HYDROCHLORIDE SCH MLS/HR: 500 INJECTION, POWDER, LYOPHILIZED, FOR SOLUTION INTRAVENOUS at 12:39

## 2022-06-27 RX ADMIN — FENTANYL CITRATE PRN MCG: 50 INJECTION, SOLUTION INTRAMUSCULAR; INTRAVENOUS at 20:36

## 2022-06-27 RX ADMIN — FLUCONAZOLE SCH MG: 100 TABLET ORAL at 10:34

## 2022-06-27 NOTE — CONSULTATION - SURGERY
History of Present Illness


History of Present Illness


Patient Consulted On(wellington/time)


22


 18:26


Time Seen by Provider:  15:59


History of Present Illness


Surgery asked to consult for Scottie-Cath placement.  





HPI per IM:  This is a 52 yr old male who was previously on hospice but fired 

them two weeks ago. He presented to the ICU with right lower extremity leg 

wound, previous left AKA due to osteomyelitis. Non compliant with oxygen and 

CPAP due to obesity hypoventilation syndrome. He remains a DNR. IV fluids will 

continue but monitoring for volume overload. Pt has a poor prognosis. Will move 

to 4th floor and continue supportive care.





When I spoke to pt this afternoon he was sitting up in his bed, stated he was 

nauseous and felt like he was going to vomit.  Pt is being seen in the hospital 

by Cardiologist for Acute on Chronic heart failure.  Pt is known to me, I 

actually did the amputation on his left leg.  He was brought in 


after being found on the floor of the SNF.  He complains of right leg pain and 

stated it has started looking worse lately.





Allergies and Home Medications


Allergies


Coded Allergies:  


     meperidine HCl (Unverified  Allergy, Unknown, 11)


     shellfish derived (Unverified  Allergy, Unknown, 13)


   FROM UNCODED ALLERGIES


     Penicillins (Unverified  Adverse Reaction, Intermediate, NAUSEA, 11)





Patient Home Medication List


Home Medication List Reviewed:  Yes


Baclofen (Baclofen) 10 Mg Tablet, 10 MG PO Q8H PRN for MUSCLE SPASMS, (Reported)


   Entered as Reported by: KRISTY TOLEDO on 22


   Last Action: Held


Collagenase (Santyl) 250 Unit/Gram Oint..gm., 1 APPLIC TP DAILY, (Reported)


   Entered as Reported by: KRISTY TOELDO on 22


   Last Action: Held


Diphenhydramine HCl (Benadryl Allergy) 25 Mg Tablet, 25 MG PO Q6H PRN for 

ITCHING, (Reported)


   Entered as Reported by: KRISTY TOLEDO on 22


   Last Action: Held


Furosemide (Furosemide) 40 Mg Tablet, 40 MG PO DAILY, (Reported)


   Entered as Reported by: KRISTY TOLEDO on 22


   Last Action: Held


Hydrocodone/Acetaminophen (Hydrocodone-Acetamin 5-325 mg) 5 Mg-325 Mg Tablet, 1 

EA PO Q4H PRN for PAIN-MODERATE (5-7), (Reported)


   Entered as Reported by: KRISTY TOLEDO on 22


   Last Action: Continued


Hyoscyamine Sulfate (Hyoscyamine Sulfate) 0.125 Mg Tab.rapdis, 0.125 MG SL Q6H 

PRN for EXCESSIVE SECRETIONS, (Reported)


   Entered as Reported by: KRISTY TOLEDO on 22


   Last Action: Held


Insulin Detemir (Levemir Flextouch) 100 Unit/Ml (3 Ml) Insuln.pen, 25 UNIT SQ 

BID, (Reported)


   Entered as Reported by: KRISTY TOLEDO on 22


   Last Action: Converted


Lactulose (Lactulose) 10 Gram/15 Ml Solution, 10 GM PO BID, (Reported)


   Entered as Reported by: KRISTY TOLEDO on 22


   Last Action: Continued


Lorazepam (Ativan) 0.5 Mg Tablet, 0.5 MG PO Q6H PRN for ANXIETY/AGITATION, 

(Reported)


   Entered as Reported by: KRISTY TOLEDO on 22


   Last Action: Held


Morphine Sulfate (Morphine Conc. 20mg/ml) 100 Mg/5 Ml (20 Mg/Ml) Solution, 10 MG

PO EVERY 2 HOURS PRN for SHORTNESS OF BREATH/PAIN, (Reported)


   Entered as Reported by: KRISTY TOLEDO on 22


   Last Action: Held


Ondansetron (Ondansetron Odt) 4 Mg Tab.rapdis, 4 MG PO Q6H PRN for 

NAUSEA/VOMITING-1ST LINE, (Reported)


   Entered as Reported by: KRISTY TOLEDO on 22


   Last Action: Continued


Potassium Chloride (Potassium Chloride) 10 Meq Tab.er.prt, 10 MEQ PO Q48H, 

(Reported)


   Entered as Reported by: KRISTY TOLEDO on 22


   Last Action: Held


Sennosides/Docusate Sodium (Senna S Tablet) 8.6 Mg-50 Mg Tablet, 2 EACH PO BID, 

(Reported)


   Entered as Reported by: KRISTY TOLEDO on 22


   Last Action: Continued


Discontinued Medications


Baclofen (Baclofen) 10 Mg Tablet, 10 MG PO TID PRN for MUSCLE SPASMS


   Discontinued Reason: Duplicate Order


   Prescribed by: JACK OLIVARES on 22


   Last Action: Discontinued


Cefuroxime Axetil (Cefuroxime) 250 Mg Tablet, 250 MG PO BID


   Discontinued Reason: Duplicate Order


   Prescribed by: LATOSHA AKERS on 22


   Last Action: Discontinued


Furosemide (Furosemide) 40 Mg Tablet, 40 MG PO DAILY


   Discontinued Reason: Duplicate Order


   Prescribed by: JACK OLIVARES on 22


   Last Action: Discontinued


Hydrocodone Bit/Acetaminophen (HYDROcodone/APAP  5 MG/325 MG TAB) 1 Tab Tab, 1 

EA PO Q4H PRN for PAIN-MODERATE (5-7)


   Discontinued Reason: Duplicate Order


   Prescribed by: JACK OLIVARES on 22


   Last Action: Discontinued


Lactulose (Lactulose) 20 Gm/30 Ml Solution, 10 GM PO BID


   Discontinued Reason: Duplicate Order


   Prescribed by: JACK OLIVARES on 22


   Last Action: Discontinued


Potassium Chloride (Potassium Chloride) 10 Meq Tab.er.prt, 10 MEQ PO Q48H


   Discontinued Reason: Duplicate Order


   Prescribed by: JACK OLIVARES on 22


   Last Action: Discontinued


Sennosides/Docusate Sodium (Stool Softener-Laxative Tablet) 1 Each Tablet, 2 EA 

PO BID


   Discontinued Reason: Duplicate Order


   Prescribed by: JACK OLIVARES on 22


   Last Action: Discontinued





Past Medical-Social-Family Hx


Patient Social History


Smoking Status:  Former Smoker


Former Smoker, Quit:  1999


Type Used:  Cigarettes


2nd Hand Smoke Exposure:  No


Recent Hopitalizations:  Yes (2020)


Alcohol Use?:  No


Have you traveled recently?:  No





Immunizations Up To Date


Tetanus Booster (TDap):  Unknown


PED Vaccines UTD:  No


Date of Pneumonia Vaccine:  2010





Seasonal Allergies


Seasonal Allergies:  Yes





Surgeries


History of Surgeries:  Yes


Surgeries:  Abdominal, Amputation, Orthopedic





Respiratory


History of Respiratory Disorde:  Yes (O2 AT 4L/NC CONTINUOUSLY;CHRONIC RESP 

FAILURE/OBESITY HYPOVENTILATION)


Respiratory Disorders:  Pneumonia, Sleep Apnea, COPD





Cardiovascular


History of Cardiac Disorders:  Yes (LEFT AKA D/T PAD; ALSO HAS R LEG PAD; CHF; )


Cardiac Disorders:  Cardiomyopathy, Chronic Edema/Swelling, High Cholesterol, 

Hypertension, Peripheral Vascular





Neurological


History of Neurological Disord:  Yes (SEVERAL CONCUSSIONS PER PATIENT)


Neurological Disorders:  Concussion, Neuropathy





Reproductive System


Hx Reproductive Disorders:  No


Sexually Transmitted Disease:  No


HIV/AIDS:  No





Genitourinary


History of Genitourinary Disor:  No





Gastrointestinal


History of Gastrointestinal Di:  Yes (CHRONIC NAUSEA)


Gastrointestinal Disorders:  Abdominal Hernia, Chronic Constipation





Musculoskeletal


History of Musculoskeletal Dis:  Yes (LEFT AKA D/T PAD/GANGRENE/CHRONIC LEG-FOOT

ULCERS;CHRONIC PAIN )


Musculoskeletal Disorders:  Amputee, Arthritis, Chronic Back Pain





Endocrine


History of Endocrine Disorders:  Yes (MORBID OBESITY)


Endocrine Disorders:  Diabetes, Insulin dep





HEENT


History of HEENT Disorders:  No


Loss of Vision:  Denies


Hearing Impairment:  Denies





Cancer


History of Cancer:  No





Psychosocial


History of Psychiatric Problem:  Yes


Behavioral Health Disorders:  Anxiety





Integumentary


History of Skin or Integumenta:  Yes (CHRONIC DIABETIC LEG AND FOOT WOUNDS; 

GANGRENE LEFT LEG-S/P AKA)





Blood Transfusions


History of Blood Disorders:  Yes (ANEMIA)





Family Medical History


Significant Family History:  Diabetes


Family Medial History:  


Arthritis


  19 FATHER, Onset:Unknown


Diabetes mellitus


  19 MOTHER, , Onset:Unknown





Review of Systems-General


Constitutional:  malaise, weakness, weight gain


EENTM:  blurred vision; No mouth pain, No epistaxis


Respiratory:  dyspnea on exertion; No hemoptysis; short of breath


Cardiovascular:  Hx of Intervention, vascular heart diseas


Gastrointestinal:  No abdominal pain; nausea, vomiting


Genitourinary:  No hematuria; incontinence, other (indwelling jean)


Musculoskeletal:  joint pain, joint swelling, muscle stiffness, muscle cramps, 

other (Left leg AKA with chronic wound,  right leg chronic wounds)


Skin:  change in color; No change in hair/nails


Psychiatric/Neurological:  Anxiety, Depressed





Physical Exam-General Problems


Physical Exam


Vital Signs





Vital Signs - First Documented








 22





 01:20 03:47


 


Pulse 92 


 


Resp 12 


 


B/P (MAP) 160/96 


 


Pulse Ox 99 


 


O2 Delivery OxyMask 


 


O2 Flow Rate 10.00 


 


FiO2  32





Capillary Refill : Less Than 3 Seconds


General Appearance:  moderate distress, obese (super morbidly)


Eyes:  Bilateral Eye PERRL, Bilateral Eye EOMI


HEENT:  pharynx normal; No scleral icterus (R), No scleral icterus (L)


Neck:  non-tender, supple


Respiratory:  lungs clear, normal breath sounds, no respiratory distress, no 

accessory muscle use


Cardiovascular:  regular rate, rhythm (but distant), no murmur


Gastrointestinal:  non tender, soft


Extremities:  other (almost the entire right leg is erythematous with weeping 

sores, amputated 1st digit)


Neurologic/Psychiatric:  alert, oriented x 3, depressed affect


Skin:  normal color (upper body), warm/dry (upper body)





Data Review


Labs


Laboratory Tests


22 20:22: Glucometer 224H


22 05:09: 


White Blood Count 8.0, Red Blood Count 3.35L, Hemoglobin 8.3L, Hematocrit 29L, 

Mean Corpuscular Volume 85, Mean Corpuscular Hemoglobin 25, Mean Corpuscular 

Hemoglobin Concent 29L, Red Cell Distribution Width 18.6H, Platelet Count 346, 

Mean Platelet Volume 10.4, Immature Granulocyte % (Auto) 0, Neutrophils (%) 

(Auto) 68, Lymphocytes (%) (Auto) 11L, Monocytes (%) (Auto) 10, Eosinophils (%) 

(Auto) 11H, Basophils (%) (Auto) 0, Neutrophils # (Auto) 5.5, Lymphocytes # 

(Auto) 0.9L, Monocytes # (Auto) 0.8, Eosinophils # (Auto) 0.9H, Basophils # 

(Auto) 0.0, Immature Granulocyte # (Auto) 0.0, Sodium Level 137, Potassium Level

4.2, Chloride Level 102, Carbon Dioxide Level 26, Anion Gap 9, Blood Urea Nitrog

en 31H, Creatinine 1.21, Estimat Glomerular Filtration Rate 72, BUN/Creatinine 

Ratio 26, Glucose Level 122H, Calcium Level 8.3L, Corrected Calcium 9.4, 

Magnesium Level 2.1, Total Bilirubin 0.3, Aspartate Amino Transf (AST/SGOT) 21, 

Alanine Aminotransferase (ALT/SGPT) 104H, Alkaline Phosphatase 90, Total Protein

6.4, Albumin 2.6L


22 10:36: Glucometer 217H


22 15:29: Glucometer 165H


22 17:05: Vancomycin Level Trough 40.2*H





Microbiology


22 Gram Stain - Final, Complete


          


22 Wound Culture - Final, Complete


          Mixed Bacterial Ya


          Staphylococcus aureus


          Gram Negative Sebastián


22 MRSA Screen - Final, Complete


          


22 Urine Culture - Preliminary, Resulted


          Morganella morganii


          Escherichia coli


          Enterococcus faecalis


          Aerococcus urinae


22 Blood Culture - Final, Complete


          No growth





Radiology


Date of Exam:22





BONE SCAN 3 PHASE








BONE SCAN 3 PHASE





INDICATION: Chronic ulcer of the right lower leg.





COMPARISON: None available.





TECHNIQUE: Three-phase scintigraphic imaging of the right lower


leg was performed after the intravenous administration 26.6 mCi


of technetium-99m medronate.





FINDINGS: There is no abnormal blood flow within the right lower


extremity. Additionally, there is no abnormal radiotracer uptake


in the blood pool or delayed phase.





IMPRESSION: No scintigraphic features of osteomyelitis in the


right lower leg.





Dictated by: 





  Dictated on workstation # [id number]








Dict:   [id number]


Trans:   22 [id number]


 [phone]





Interpreted by:     DIDIER BARRETT MD


Electronically signed by: DIDIER BARRETT MD [id number]





Assessment/Plan


Venous Insufficiency


-Pt has very poor prognosis and also has very poor veins.  He would benefit from

a Scottie-cath to be able to get meds and for drawing blood.  Pt agrees to this; 

discussed risks and complications not limited to pain, bleeding, infection, 

scar, damage to vessels and even Pneumothorax.  He is on Plavix and will stop it

so we can do surgery .








Sepsis


-HR, BP, and RR are stable today with 99% O2 saturation on 5L O2. liver enzymes 

and creatinine continue to improve. He is currently on day 4 of broad spectrum 

coverage with Vancomycin and cefepime. Source is currently unknown but 

possibilities include UTI vs. Pneumonia Vs. cellulitis. Patient is not ready to 

discharge at this time but should be okay to switch from IV cefepime to oral 

cefdinir on discharge if still requiring antibiotics at that time.





UTI


-has catheter in place since march. Ideally needs to be replaced but the last 

one had to be placed in the OR due to extreme difficulty with placement. Fear 

that if removed will be unable to replace. Broad spectrum ABX for now and will 

consider urology consult if necessary.





Cellulitis of right leg


-broad spectrum abx as well as PO fluconazole. bone scan yesterday showed no 

signs of osteomyelitis. 





Possible NSTEMI


-Patient is currently on aspirin and clopidogrel.  





acute on chronic combined systolic/diastolic heart failure


-Currently on carvedilol 





Acute on chronic Respiratory failure


-Tolerating 5L O2 oxygen well at this time which is at r near his baseline. Has 

albuterol and albuterol/ipratropium inhalers. CO2 today is within normal range. 





lactic acidosis -resolved





Morbid obesity


-Likely has signficant impact on overall health. Continue to recommend healthy 

diet and weigh loss if possible. 





Primary hypertension


-Currently on carvediol and other meds





Chronic renal insufficiency


-Creatinine today was 1.52  which is minimally improved from 1.56 yesterday. 

Lasix have been held thus far out of concerns for kidney function. Once started 

again his creatinine needs to be monitored closely to ensure his kidneys can 

tolerate the medication well. Will continue to monitor for now.





PAD


per cardiology no areas needing intervention at this time. Statins continue to 

be held due to elevated transaminases.





Pneumonia


-Day 4 of vancomycin and cefepime. Lungs sound clear today and patient appears 

to be improving at this time. Continue supplemental oxygen.





Type 2 diabetes


-Long acting insulin detemir and sliding scale insulin aspart at this time. It 

is uncontrolled











BREANNA BLACK DO               2022 18:32

## 2022-06-27 NOTE — CARDIOLOGY PROGRESS NOTE
Progress Note-Cardiology


Events since last exam


Date Seen by Provider:  Jun 27, 2022


Time Seen by Provider:  08:54


Events since last exam


I am following him due to heart failure, elevated troponin level and peripheral 

arterial disease.  He denies dyspnea at rest.  He denies chest pain, 

palpitations, or syncope.  The peripheral edema of his right leg has improved to

some extent.





Certain portions of this document may have been dictated utilizing voice 

recognition technology.  Inherent to this technology, typographical and 

grammatical errors may exist.  As much as I am diligent to identify and correct 

these mistakes, some errors may remain in the document.





Vitals


Last set of Vitals Signs





Vital Signs








 6/21/22 6/27/22 6/27/22





 03:47 08:08 08:37


 


Temp  36.6 


 


Pulse  67 


 


Resp  22 


 


B/P (MAP)  130/83 (99) 


 


Pulse Ox   100


 


O2 Delivery   High Flow N/C


 


O2 Flow Rate   4.00


 


FiO2 32  











Labs


Labs


Laboratory Tests


6/27/22 05:09














Exam


Vital Signs





Vital Signs








  Date Time  Temp Pulse Resp B/P (MAP) Pulse Ox O2 Delivery O2 Flow Rate FiO2


 


6/27/22 08:37     100 High Flow N/C 4.00 


 


6/27/22 08:08 36.6 67 22 130/83 (99)    


 


6/21/22 03:47        32








Physical Exam


General: Alert. No acute distress.  He is morbidly obese.


Eye: No xanthelasma.


HENT: Normocephalic.


Neck: Jugular venous pressure does not appear elevated.


Respiratory: Lungs are clear to auscultation. Respirations are non-labored. 

Breath sounds are equal. Symmetrical chest wall expansion.


Cardiovascular: Normal rate. Regular rhythm.  Distant S1/S2.  No murmur. No 

gallop.  1+ pretibial edema of right leg with probable cellulitis to the knee.  

Left above-knee amputation.


Gastrointestinal: Soft. Normal bowel sounds.


Skin: Warm. Dry.


Neurologic: Alert and oriented to person, place, time. Cranial nerves 3-11 

grossly intact.


Psychiatric: Cooperative. Appropriate mood & affect.


Labs





Laboratory Tests








Test


 6/26/22


16:00 6/26/22


20:22 6/27/22


05:09 6/27/22


10:36 Range/Units


 


 


Glucometer 144 H 224 H  217 H   MG/DL


 


White Blood Count


 


 


 8.0 


 


 4.3-11.0


10^3/uL


 


Red Blood Count


 


 


 3.35 L


 


 4.30-5.52


10^6/uL


 


Hemoglobin   8.3 L  13.3-17.7  g/dL


 


Hematocrit   29 L  40-54  %


 


Mean Corpuscular Volume   85   80-99  fL


 


Mean Corpuscular Hemoglobin   25   25-34  pg


 


Mean Corpuscular Hemoglobin


Concent 


 


 29 L


 


 32-36  g/dL





 


Red Cell Distribution Width   18.6 H  10.0-14.5  %


 


Platelet Count


 


 


 346 


 


 130-400


10^3/uL


 


Mean Platelet Volume   10.4   9.0-12.2  fL


 


Immature Granulocyte % (Auto)   0    %


 


Neutrophils (%) (Auto)   68   42-75  %


 


Lymphocytes (%) (Auto)   11 L  12-44  %


 


Monocytes (%) (Auto)   10   0-12  %


 


Eosinophils (%) (Auto)   11 H  0-10  %


 


Basophils (%) (Auto)   0   0-10  %


 


Neutrophils # (Auto)


 


 


 5.5 


 


 1.8-7.8


10^3/uL


 


Lymphocytes # (Auto)


 


 


 0.9 L


 


 1.0-4.0


10^3/uL


 


Monocytes # (Auto)


 


 


 0.8 


 


 0.0-1.0


10^3/uL


 


Eosinophils # (Auto)


 


 


 0.9 H


 


 0.0-0.3


10^3/uL


 


Basophils # (Auto)


 


 


 0.0 


 


 0.0-0.1


10^3/uL


 


Immature Granulocyte # (Auto)


 


 


 0.0 


 


 0.0-0.1


10^3/uL


 


Sodium Level   137   135-145  MMOL/L


 


Potassium Level   4.2   3.6-5.0  MMOL/L


 


Chloride Level   102     MMOL/L


 


Carbon Dioxide Level   26   21-32  MMOL/L


 


Anion Gap   9   5-14  MMOL/L


 


Blood Urea Nitrogen   31 H  7-18  MG/DL


 


Creatinine


 


 


 1.21 


 


 0.60-1.30


MG/DL


 


Estimat Glomerular Filtration


Rate 


 


 72 


 


  





 


BUN/Creatinine Ratio   26    


 


Glucose Level   122 H    MG/DL


 


Calcium Level   8.3 L  8.5-10.1  MG/DL


 


Corrected Calcium   9.4   8.5-10.1  MG/DL


 


Magnesium Level   2.1   1.6-2.4  MG/DL


 


Total Bilirubin   0.3   0.1-1.0  MG/DL


 


Aspartate Amino Transf


(AST/SGOT) 


 


 21 


 


 5-34  U/L





 


Alanine Aminotransferase


(ALT/SGPT) 


 


 104 H


 


 0-55  U/L





 


Alkaline Phosphatase   90     U/L


 


Total Protein   6.4   6.4-8.2  GM/DL


 


Albumin   2.6 L  3.2-4.5  GM/DL











Diagnosis/Problems


Diagnosis/Problems





(1) Acute on chronic combined systolic and diastolic congestive heart failure


Status:  Acute


Assessment & Plan:  His echocardiogram from 6/21 shows moderate left ventricular

systolic dysfunction.  This is a new finding in this patient although the study 

was technically difficult.  I started him on low-dose carvedilol and I will 

increase the dose today.  Due to his poor renal function, he is not a good 

candidate for ACE inhibitor, ARB or aldosterone antagonist.  He has received 

intravenous Lasix.  His creatinine got worse with the diuretic which was then 

discontinued.  His creatinine now seems to have stabilized.  I will restart 

furosemide 40 mg orally daily which he was taking at home.  I recommend we stop 

the amlodipine.  This could be making his lower extremity edema worse and does 

not usually help with a low ejection fraction.  I will consider adding 

hydralazine and nitrates once we see how he does on the higher dose of 

carvedilol.





(2) Cardiomyopathy


Status:  Acute


Assessment & Plan:  This appears to be a new finding in the patient.  As above, 

I started him on carvedilol.  He is not currently a candidate for the other 

guideline directed medical therapy due to poor renal function.  He would also 

not be a good candidate for a LifeVest since this device does not fit well on a 

patient with morbid obesity. As above, if his blood pressure remains good with 

the beta blocker, we could consider adding hydralazine and nitrates.  At some 

point, we may want to consider a cardiac catheterization but since he seems to 

have active cellulitis, this is a relative contraindication to coronary stenting

if that was needed.





(3) Primary hypertension


Status:  Chronic


Assessment & Plan:  His blood pressures have been trending upwards.  I will 

increase the dose of carvedilol again today.  As above, I recommend we stop the 

amlodipine.





(4) Peripheral arterial disease


Status:  Chronic


Assessment & Plan:  He had a lower extremity CT angiogram on 6/21.  There does 

not appear to be any disease of the right lower extremity that would require 

revascularization at this time.  I recommend he continue on aspirin and 

clopidogrel.  He is not currently a good candidate for statin medication due to 

elevated transaminase levels.





(5) Non-ST elevation myocardial infarction (NSTEMI), initial care episode


Status:  Acute


Assessment & Plan:  Unclear whether he may have had a type I or type II non-ST 

elevation myocardial infarction.  He has not been having chest pain.  I have 

ordered aspirin and clopidogrel.  He was on therapeutic dosing of enoxaparin 

which was changed over to DVT prophylactic dose.  I ordered carvedilol as above.

 He is not a candidate for statin medication due to the elevated transaminase 

levels.  He is not an ideal candidate for an invasive cardiac evaluation due to 

his ongoing infection of the right foot.





(6) Mixed hyperlipidemia


Status:  Chronic


Assessment & Plan:  As above, his transaminase levels are elevated.  This is a 

relative contraindication to starting statin medication.  Once the transaminase 

levels are improved, we can consider adding statin medication.





(7) Acute kidney injury superimposed on chronic kidney disease


Status:  Acute


Assessment & Plan:  His renal function had stabilized but then got worse as of 

6/22.  Intravenous diuretic was been discontinued.  His creatinine has improved 

and stabilized.  As above, I will start his previous oral dose of furosemide.





(8) Acute on chronic respiratory failure with hypoxia and hypercapnia


Status:  Acute


Assessment & Plan:  Most likely multifactorial.





(9) Type 2 diabetes mellitus with complication


Status:  Chronic


Assessment & Plan:  The hospitalist is managing his diabetes.





(10) Morbid obesity


Status:  Chronic


Assessment & Plan:  He needs to work on weight loss.  He has been counseled 

about this in the past.  Unfortunately, he has been ordering fast food to be 

delivered to his room in the skilled nursing facility.














YAA LYN JR, MD         Jun 27, 2022 08:55

## 2022-06-27 NOTE — PROGRESS NOTE - HOSPITALIST
Subjective


HPI/CC On Admission


Date Seen by Provider:  Jun 27, 2022


Time Seen by Provider:  10:00


CC: Acute on chronic respiratory failure





HPI: This is a 52 yr old male who was previously on hospice but fired them two 

weeks ago. He presented to the ICU with right lower extremity leg wound, 

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 Non compliant with oxygen and CPAP due 

to obesity hypoventilation syndrome. He remains a DNR. IV fluids will continue 

but monitoring for volume overload. Pt has a poor prognosis. Will move to 4th 

floor and continue supportive care.


Subjective/Events-last exam


IV vancomycin and Rocephin maintain for infections





[place] port will be placed by Dr. Dunham





Hopefully discharge to nursing home this week





Review of Systems


General:  Fatigue, Malaise


Musculoskeletal:  leg pain





Focused Exam


Time of Focused Exam:  23:00





Objective


Exam


Vital Signs





Vital Signs








  Date Time  Temp Pulse Resp B/P (MAP) Pulse Ox O2 Delivery O2 Flow Rate FiO2


 


6/27/22 15:45 36.1 62 20 173/78 (109) 100 High Flow N/C 4.00 


 


6/21/22 03:47        32





Capillary Refill : Less Than 3 Seconds


General Appearance:  No Apparent Distress, WD/WN, Chronically ill


Respiratory:  Lungs Clear, Normal Breath Sounds


Cardiovascular:  Regular Rate, Rhythm


Neurologic/Psychiatric:  Alert, Oriented x3, No Motor/Sensory Deficits, Normal 

Mood/Affect





Results/Procedures


Lab


Laboratory Tests


6/27/22 05:09








Patient resulted labs reviewed.





Assessment/Plan


Assessment and Plan


Assess & Plan/Chief Complaint


Assessment:


Acute on chronic respiratory failure


Sepsis


CHF


Right leg cellulitis MRSA


OHA


DNR


UTI


Poor vascular access





Plan:


Continue current treatment


IV abx


Monitor O2


Port placement





Diagnosis/Problems


Diagnosis/Problems





(1) Acute on chronic respiratory failure with hypoxia and hypercapnia


Status:  Acute


(2) Acute on chronic congestive heart failure


Status:  Acute


(3) Obesity hypoventilation syndrome


Status:  Chronic


(4) Cellulitis of right leg


Status:  Acute











JACK OLIVARES DO                Jun 27, 2022 06:12

## 2022-06-28 VITALS — SYSTOLIC BLOOD PRESSURE: 178 MMHG | DIASTOLIC BLOOD PRESSURE: 90 MMHG

## 2022-06-28 VITALS — DIASTOLIC BLOOD PRESSURE: 79 MMHG | SYSTOLIC BLOOD PRESSURE: 125 MMHG

## 2022-06-28 VITALS — SYSTOLIC BLOOD PRESSURE: 151 MMHG | DIASTOLIC BLOOD PRESSURE: 78 MMHG

## 2022-06-28 VITALS — DIASTOLIC BLOOD PRESSURE: 57 MMHG | SYSTOLIC BLOOD PRESSURE: 120 MMHG

## 2022-06-28 LAB
ALBUMIN SERPL-MCNC: 2.5 GM/DL (ref 3.2–4.5)
ALP SERPL-CCNC: 89 U/L (ref 40–136)
ALT SERPL-CCNC: 78 U/L (ref 0–55)
BASOPHILS # BLD AUTO: 0 10^3/UL (ref 0–0.1)
BASOPHILS NFR BLD AUTO: 0 % (ref 0–10)
BILIRUB SERPL-MCNC: 0.3 MG/DL (ref 0.1–1)
BUN/CREAT SERPL: 22
CALCIUM SERPL-MCNC: 8.2 MG/DL (ref 8.5–10.1)
CHLORIDE SERPL-SCNC: 104 MMOL/L (ref 98–107)
CO2 SERPL-SCNC: 26 MMOL/L (ref 21–32)
CREAT SERPL-MCNC: 1.22 MG/DL (ref 0.6–1.3)
EOSINOPHIL # BLD AUTO: 0.7 10^3/UL (ref 0–0.3)
EOSINOPHIL NFR BLD AUTO: 8 % (ref 0–10)
GFR SERPLBLD BASED ON 1.73 SQ M-ARVRAT: 71 ML/MIN
GLUCOSE SERPL-MCNC: 107 MG/DL (ref 70–105)
HCT VFR BLD CALC: 27 % (ref 40–54)
HGB BLD-MCNC: 7.8 G/DL (ref 13.3–17.7)
LYMPHOCYTES # BLD AUTO: 0.7 10^3/UL (ref 1–4)
LYMPHOCYTES NFR BLD AUTO: 9 % (ref 12–44)
MAGNESIUM SERPL-MCNC: 1.9 MG/DL (ref 1.6–2.4)
MANUAL DIFFERENTIAL PERFORMED BLD QL: NO
MCH RBC QN AUTO: 25 PG (ref 25–34)
MCHC RBC AUTO-ENTMCNC: 29 G/DL (ref 32–36)
MCV RBC AUTO: 85 FL (ref 80–99)
MONOCYTES # BLD AUTO: 0.8 10^3/UL (ref 0–1)
MONOCYTES NFR BLD AUTO: 10 % (ref 0–12)
NEUTROPHILS # BLD AUTO: 5.7 10^3/UL (ref 1.8–7.8)
NEUTROPHILS NFR BLD AUTO: 71 % (ref 42–75)
PLATELET # BLD: 329 10^3/UL (ref 130–400)
PMV BLD AUTO: 10.2 FL (ref 9–12.2)
POTASSIUM SERPL-SCNC: 4.3 MMOL/L (ref 3.6–5)
PROT SERPL-MCNC: 6.2 GM/DL (ref 6.4–8.2)
SODIUM SERPL-SCNC: 137 MMOL/L (ref 135–145)
WBC # BLD AUTO: 8 10^3/UL (ref 4.3–11)

## 2022-06-28 RX ADMIN — HYDROCODONE BITARTRATE AND ACETAMINOPHEN PRN EA: 5; 325 TABLET ORAL at 09:11

## 2022-06-28 RX ADMIN — ENOXAPARIN SODIUM SCH MG: 100 INJECTION SUBCUTANEOUS at 09:10

## 2022-06-28 RX ADMIN — IPRATROPIUM BROMIDE AND ALBUTEROL SULFATE SCH ML: .5; 3 SOLUTION RESPIRATORY (INHALATION) at 06:54

## 2022-06-28 RX ADMIN — FENTANYL CITRATE PRN MCG: 50 INJECTION, SOLUTION INTRAMUSCULAR; INTRAVENOUS at 00:40

## 2022-06-28 RX ADMIN — ASPIRIN SCH MG: 81 TABLET ORAL at 09:12

## 2022-06-28 RX ADMIN — INSULIN ASPART SCH UNIT: 100 INJECTION, SOLUTION INTRAVENOUS; SUBCUTANEOUS at 21:25

## 2022-06-28 RX ADMIN — VANCOMYCIN HYDROCHLORIDE SCH MLS/HR: 500 INJECTION, POWDER, LYOPHILIZED, FOR SOLUTION INTRAVENOUS at 07:52

## 2022-06-28 RX ADMIN — IPRATROPIUM BROMIDE AND ALBUTEROL SULFATE SCH ML: .5; 3 SOLUTION RESPIRATORY (INHALATION) at 21:08

## 2022-06-28 RX ADMIN — SODIUM CHLORIDE SCH MLS/HR: 900 INJECTION, SOLUTION INTRAVENOUS at 00:40

## 2022-06-28 RX ADMIN — DOCUSATE SODIUM AND SENNOSIDES SCH EA: 8.6; 5 TABLET, FILM COATED ORAL at 21:24

## 2022-06-28 RX ADMIN — FENTANYL CITRATE PRN MCG: 50 INJECTION, SOLUTION INTRAMUSCULAR; INTRAVENOUS at 05:45

## 2022-06-28 RX ADMIN — SODIUM CHLORIDE SCH MLS/HR: 900 INJECTION, SOLUTION INTRAVENOUS at 16:26

## 2022-06-28 RX ADMIN — INSULIN ASPART SCH UNIT: 100 INJECTION, SOLUTION INTRAVENOUS; SUBCUTANEOUS at 05:25

## 2022-06-28 RX ADMIN — LACTULOSE SCH GM: 20 SOLUTION ORAL at 21:24

## 2022-06-28 RX ADMIN — FLUCONAZOLE SCH MG: 100 TABLET ORAL at 09:11

## 2022-06-28 RX ADMIN — DOCUSATE SODIUM AND SENNOSIDES SCH EA: 8.6; 5 TABLET, FILM COATED ORAL at 09:11

## 2022-06-28 RX ADMIN — CEFTRIAXONE SCH MLS/HR: 1 INJECTION, SOLUTION INTRAVENOUS at 09:10

## 2022-06-28 RX ADMIN — LACTULOSE SCH GM: 20 SOLUTION ORAL at 09:11

## 2022-06-28 RX ADMIN — INSULIN ASPART SCH UNIT: 100 INJECTION, SOLUTION INTRAVENOUS; SUBCUTANEOUS at 10:59

## 2022-06-28 RX ADMIN — FUROSEMIDE SCH MG: 40 TABLET ORAL at 09:12

## 2022-06-28 RX ADMIN — INSULIN ASPART SCH UNIT: 100 INJECTION, SOLUTION INTRAVENOUS; SUBCUTANEOUS at 16:39

## 2022-06-28 NOTE — PROGRESS NOTE - SURGERY
Subjective


Time Seen by a Provider:  11:31


Subjective/Events-last exam


Pt seen and examined, no significant changes.


Review of Systems


Pulmonary:  No Dyspnea, No Cough


Cardiovascular:  No: Chest Pain, Palpitations


Musculoskeletal:  leg pain





Focused Exam


Time of Focused Exam:  23:00





Objective


Exam





Vital Signs








  Date Time  Temp Pulse Resp B/P (MAP) Pulse Ox O2 Delivery O2 Flow Rate FiO2


 


6/28/22 09:00      High Flow N/C 4.00 


 


6/28/22 07:48 36.2 58 20 125/79 (94) 97 High Flow N/C 4.00 


 


6/28/22 06:54     98 High Flow N/C 4.00 


 


6/28/22 04:14 36.3 61 22 120/57 (78) 96 High Flow N/C 4.00 


 


6/27/22 23:58 36.4 72 20 141/73 (95) 94 High Flow N/C 4.00 


 


6/27/22 23:37 36.5 70   95   


 


6/27/22 22:08     100 High Flow N/C 4.00 


 


6/27/22 21:00      High Flow N/C 4.00 


 


6/27/22 20:00 36.5 70 20 161/72 (101) 95 High Flow N/C 4.00 














I & O 


 


 6/28/22





 07:00


 


Intake Total 3355.0 ml


 


Output Total 3000 ml


 


Balance 355.0 ml





Capillary Refill : Less Than 3 Seconds


General Appearance:  Chronically ill, Mild Distress


HEENT:  PERRL/EOMI


Respiratory:  Lungs Clear, Normal Breath Sounds


Cardiovascular:  Regular Rate, Rhythm


Gastrointestinal:  non tender, soft, other (super morbidly obese)


Extremity:  Other (LEFT AKA, WITH CHRONIC APPEARING WOUND TO STUMP. SLIGHT 

SEROSANGUINOUS DRAINAGE.    LEFT LEG WITH EXTENSIVE CHRONIC VENOUS STASIS 

CHANGES, EXTENSIVE SCALING AND MULTIPLE CHRONIC APPEARING LEG WOUNDS, SOME WITH 

SLIGHT BLEEING OR SEROSANGUINOUS DRAINAGE. ENTIRE RIGHT LOWER LEG WITH DIFFUSE 

ERYTHEMA. UNABLE TO DETERMINE DEGREE OF EDEMA DUE TO how bad leg looks, the 

entire thing is beet red and tender )


Skin:  Rash (right leg erythema and draining)





Results


Lab


Laboratory Tests


6/27/22 17:05: Vancomycin Level Trough 40.2*H


6/27/22 20:00: Glucometer 219H


6/28/22 04:40: 


Vancomycin Level Trough 27.8#*H, White Blood Count 8.0, Red Blood Count 3.17L, 

Hemoglobin 7.8L, Hematocrit 27L, Mean Corpuscular Volume 85, Mean Corpuscular 

Hemoglobin 25, Mean Corpuscular Hemoglobin Concent 29L, Red Cell Distribution 

Width 18.6H, Platelet Count 329, Mean Platelet Volume 10.2, Immature Granulocyte

% (Auto) 1, Neutrophils (%) (Auto) 71, Lymphocytes (%) (Auto) 9L, Monocytes (%) 

(Auto) 10, Eosinophils (%) (Auto) 8, Basophils (%) (Auto) 0, Neutrophils # 

(Auto) 5.7, Lymphocytes # (Auto) 0.7L, Monocytes # (Auto) 0.8, Eosinophils # 

(Auto) 0.7H, Basophils # (Auto) 0.0, Immature Granulocyte # (Auto) 0.0, Sodium 

Level 137, Potassium Level 4.3, Chloride Level 104, Carbon Dioxide Level 26, 

Anion Gap 7, Blood Urea Nitrogen 27H, Creatinine 1.22, Estimat Glomerular 

Filtration Rate 71, BUN/Creatinine Ratio 22, Glucose Level 107H, Calcium Level 

8.2L, Corrected Calcium 9.4, Magnesium Level 1.9, Total Bilirubin 0.3, Aspartate

Amino Transf (AST/SGOT) 18, Alanine Aminotransferase (ALT/SGPT) 78H, Alkaline 

Phosphatase 89, Total Protein 6.2L, Albumin 2.5L


6/28/22 05:21: Glucometer 97


6/28/22 10:54: Glucometer 127H


6/28/22 13:20: Vancomycin Level Trough 22.1H


6/28/22 16:33: Glucometer 203H





Microbiology


6/21/22 Gram Stain - Final, Complete


          


6/21/22 Wound Culture - Final, Complete


          Mixed Bacterial Ya


          Staphylococcus aureus


          Gram Negative Sebastián


6/21/22 MRSA Screen - Final, Complete


          


6/20/22 Urine Culture - Preliminary, Resulted


          Morganella morganii


          Escherichia coli


          Enterococcus faecalis


          Aerococcus urinae


6/20/22 Blood Culture - Final, Complete


          No growth





Assessment/Plan


Venous Insufficiency


-Pt has very poor prognosis and also has very poor veins.  He would benefit from

a Scottie-cath to be able to get meds and for drawing blood.  Pt agrees to this; 

discussed risks and complications not limited to pain, bleeding, infection, 

scar, damage to vessels and even Pneumothorax.  He is on Plavix and will stop it

so we can do surgery Wednesday afternoon.








Sepsis


-HR, BP, and RR are stable today with 99% O2 saturation on 5L O2. liver enzymes 

and creatinine continue to improve. He is currently on day 4 of broad spectrum 

coverage with Vancomycin and cefepime. Source is currently unknown but 

possibilities include UTI vs. Pneumonia Vs. cellulitis. Patient is not ready to 

discharge at this time but should be okay to switch from IV cefepime to oral 

cefdinir on discharge if still requiring antibiotics at that time.





UTI


-has catheter in place since march. Ideally needs to be replaced but the last o

ne had to be placed in the OR due to extreme difficulty with placement. Fear 

that if removed will be unable to replace. Broad spectrum ABX for now and will 

consider urology consult if necessary.





Cellulitis of right leg


-broad spectrum abx as well as PO fluconazole. bone scan yesterday showed no 

signs of osteomyelitis. 





Possible NSTEMI


-Patient is currently on aspirin and clopidogrel.  





acute on chronic combined systolic/diastolic heart failure


-Currently on carvedilol 





Acute on chronic Respiratory failure


-Tolerating 5L O2 oxygen well at this time which is at r near his baseline. Has 

albuterol and albuterol/ipratropium inhalers. CO2 today is within normal range. 





lactic acidosis -resolved





Morbid obesity


-Likely has signficant impact on overall health. Continue to recommend healthy 

diet and weigh loss if possible. 





Primary hypertension


-Currently on carvediol and other meds





Chronic renal insufficiency


-Creatinine today was 1.52  which is minimally improved from 1.56 yesterday. 

Lasix have been held thus far out of concerns for kidney function. Once started 

again his creatinine needs to be monitored closely to ensure his kidneys can 

tolerate the medication well. Will continue to monitor for now.





PAD


per cardiology no areas needing intervention at this time. Statins continue to 

be held due to elevated transaminases.





Pneumonia


-Day 4 of vancomycin and cefepime. Lungs sound clear today and patient appears 

to be improving at this time. Continue supplemental oxygen.





Type 2 diabetes


-Long acting insulin detemir and sliding scale insulin aspart at this time. It 

is uncontrolled











BREANNA BLACK DO               Jun 28, 2022 16:38

## 2022-06-28 NOTE — PROGRESS NOTE - HOSPITALIST
Subjective


HPI/CC On Admission


Date Seen by Provider:  Jun 28, 2022


Time Seen by Provider:  09:30


CC: Acute on chronic respiratory failure





HPI: This is a 52 yr old male who was previously on hospice but fired them two 

weeks ago. He presented to the ICU with right lower extremity leg wound, 

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 Non compliant with oxygen and CPAP due 

to obesity hypoventilation syndrome. He remains a DNR. IV fluids will continue 

but monitoring for volume overload. Pt has a poor prognosis. Will move to 4th 

floor and continue supportive care.


Subjective/Events-last exam


Patient still complaining of right leg pain


Vancomycin and Rocephin maintained


Port will be placed tomorrow due to Plavix being held


Hemoglobin 7.8


Ordered him mag citrate for continued constipation





Review of Systems


Musculoskeletal:  leg pain





Focused Exam


Time of Focused Exam:  23:00





Objective


Exam


Vital Signs





Vital Signs








  Date Time  Temp Pulse Resp B/P (MAP) Pulse Ox O2 Delivery O2 Flow Rate FiO2


 


6/28/22 19:28 36.8 74 16 178/90 (119) 93 High Flow N/C 4.00 





Capillary Refill : Less Than 3 Seconds


General Appearance:  No Apparent Distress, WD/WN, Chronically ill


Respiratory:  Lungs Clear, Normal Breath Sounds


Cardiovascular:  Regular Rate, Rhythm


Neurologic/Psychiatric:  Alert, Oriented x3, No Motor/Sensory Deficits, Normal 

Mood/Affect





Results/Procedures


Lab


Laboratory Tests


6/28/22 04:40








Patient resulted labs reviewed.





Assessment/Plan


Assessment and Plan


Assess & Plan/Chief Complaint


Assessment:


Acute on chronic respiratory failure


Sepsis


CHF


Right leg cellulitis MRSA


OHA


DNR


UTI


Poor vascular access





Plan:


Continue current treatment


IV abx


Monitor O2


Port placement





Diagnosis/Problems


Diagnosis/Problems





(1) Acute on chronic respiratory failure with hypoxia and hypercapnia


Status:  Acute


(2) Acute on chronic congestive heart failure


Status:  Acute


(3) Obesity hypoventilation syndrome


Status:  Chronic


(4) Cellulitis of right leg


Status:  Acute











JACK OLIVARES DO                Jun 28, 2022 06:01

## 2022-06-28 NOTE — CARDIOLOGY PROGRESS NOTE
Progress Note-Cardiology


Events since last exam


Date Seen by Provider:  Jun 28, 2022


Time Seen by Provider:  08:30


Events since last exam


I am following him due to heart failure with reduced ejection fraction, possible

NSTEMI and peripheral arterial disease.  He was sitting up in bed eating 

breakfast.  He denies chest discomfort, dyspnea at rest, palpitations, or 

syncope.  His chronic right lower extremity edema is about the same.





Certain portions of this document may have been dictated utilizing voice recog

nition technology.  Inherent to this technology, typographical and grammatical 

errors may exist.  As much as I am diligent to identify and correct these 

mistakes, some errors may remain in the document.





Vitals


Last set of Vitals Signs





Vital Signs








 6/28/22





 07:48


 


Temp 36.2


 


Pulse 58


 


Resp 20


 


B/P (MAP) 125/79 (94)


 


Pulse Ox 97


 


O2 Delivery High Flow N/C


 


O2 Flow Rate 4.00











Labs


Labs


Laboratory Tests


6/28/22 04:40














Exam


Vital Signs





Vital Signs








  Date Time  Temp Pulse Resp B/P (MAP) Pulse Ox O2 Delivery O2 Flow Rate FiO2


 


6/28/22 07:48 36.2 58 20 125/79 (94) 97 High Flow N/C 4.00 








Physical Exam


General: Alert. No acute distress.  He is morbidly obese.


Eye: No xanthelasma.


HENT: Normocephalic.


Neck: Jugular venous pressure does not appear elevated.


Respiratory: Lungs are clear to auscultation. Respirations are non-labored. 

Breath sounds are equal. Symmetrical chest wall expansion.


Cardiovascular: Normal rate. Regular rhythm.  Distant S1/S2.  No murmur. No 

gallop.  1+ pretibial edema of right leg with probable cellulitis to the knee.  

Left above-knee amputation.


Gastrointestinal: Soft. Normal bowel sounds.


Skin: Warm. Dry.


Neurologic: Alert and oriented to person, place, time. Cranial nerves 3-11 

grossly intact.


Psychiatric: Cooperative. Appropriate mood & affect.


Labs





Laboratory Tests








Test


 6/27/22


10:36 6/27/22


15:29 6/27/22


17:05 6/27/22


20:00 Range/Units


 


 


Glucometer 217 H 165 H  219 H   MG/DL


 


Vancomycin Level Trough


 


 


 40.2 *H


 


 10.0-20.0


UG/ML


 


Test


 6/28/22


04:40 6/28/22


05:21 


 


 Range/Units


 


 


White Blood Count


 8.0 


 


 


 


 4.3-11.0


10^3/uL


 


Red Blood Count


 3.17 L


 


 


 


 4.30-5.52


10^6/uL


 


Hemoglobin 7.8 L    13.3-17.7  g/dL


 


Hematocrit 27 L    40-54  %


 


Mean Corpuscular Volume 85     80-99  fL


 


Mean Corpuscular Hemoglobin 25     25-34  pg


 


Mean Corpuscular Hemoglobin


Concent 29 L


 


 


 


 32-36  g/dL





 


Red Cell Distribution Width 18.6 H    10.0-14.5  %


 


Platelet Count


 329 


 


 


 


 130-400


10^3/uL


 


Mean Platelet Volume 10.2     9.0-12.2  fL


 


Immature Granulocyte % (Auto) 1      %


 


Neutrophils (%) (Auto) 71     42-75  %


 


Lymphocytes (%) (Auto) 9 L    12-44  %


 


Monocytes (%) (Auto) 10     0-12  %


 


Eosinophils (%) (Auto) 8     0-10  %


 


Basophils (%) (Auto) 0     0-10  %


 


Neutrophils # (Auto)


 5.7 


 


 


 


 1.8-7.8


10^3/uL


 


Lymphocytes # (Auto)


 0.7 L


 


 


 


 1.0-4.0


10^3/uL


 


Monocytes # (Auto)


 0.8 


 


 


 


 0.0-1.0


10^3/uL


 


Eosinophils # (Auto)


 0.7 H


 


 


 


 0.0-0.3


10^3/uL


 


Basophils # (Auto)


 0.0 


 


 


 


 0.0-0.1


10^3/uL


 


Immature Granulocyte # (Auto)


 0.0 


 


 


 


 0.0-0.1


10^3/uL


 


Sodium Level 137     135-145  MMOL/L


 


Potassium Level 4.3     3.6-5.0  MMOL/L


 


Chloride Level 104       MMOL/L


 


Carbon Dioxide Level 26     21-32  MMOL/L


 


Anion Gap 7     5-14  MMOL/L


 


Blood Urea Nitrogen 27 H    7-18  MG/DL


 


Creatinine


 1.22 


 


 


 


 0.60-1.30


MG/DL


 


Estimat Glomerular Filtration


Rate 71 


 


 


 


  





 


BUN/Creatinine Ratio 22      


 


Glucose Level 107 H      MG/DL


 


Calcium Level 8.2 L    8.5-10.1  MG/DL


 


Corrected Calcium 9.4     8.5-10.1  MG/DL


 


Magnesium Level 1.9     1.6-2.4  MG/DL


 


Total Bilirubin 0.3     0.1-1.0  MG/DL


 


Aspartate Amino Transf


(AST/SGOT) 18 


 


 


 


 5-34  U/L





 


Alanine Aminotransferase


(ALT/SGPT) 78 H


 


 


 


 0-55  U/L





 


Alkaline Phosphatase 89       U/L


 


Total Protein 6.2 L    6.4-8.2  GM/DL


 


Albumin 2.5 L    3.2-4.5  GM/DL


 


Vancomycin Level Trough


 27.8 #*H


 


 


 


 10.0-20.0


UG/ML


 


Glucometer  97      MG/DL











Diagnosis/Problems


Diagnosis/Problems





(1) Acute on chronic combined systolic and diastolic congestive heart failure


Status:  Acute


Assessment & Plan:  His echocardiogram from 6/21 shows moderate left ventricular

systolic dysfunction.  This is a new finding in this patient although the study 

was technically difficult.  I started him on low-dose carvedilol and have been 

working to titrate up.  Due to his poor renal function, he is not a good ca

ndidate for ACE inhibitor, ARB or aldosterone antagonist.  He has received 

intravenous Lasix.  His creatinine got worse with the diuretic which was then 

discontinued.  His creatinine now seems to have stabilized.  On 6/27 I restarted

furosemide 40 mg orally daily which he was taking at home.  I recommend we stop 

the amlodipine.  This could be making his lower extremity edema worse and does 

not usually help with a low ejection fraction.  I will consider adding 

hydralazine and nitrates once we see how he does on the higher dose of 

carvedilol.





(2) Cardiomyopathy


Status:  Acute


Assessment & Plan:  This appears to be a new finding in the patient.  As above, 

I started him on carvedilol.  He is not currently a candidate for the other 

guideline directed medical therapy due to poor renal function.  He would also 

not be a good candidate for a LifeVest since this device does not fit well on a 

patient with morbid obesity. As above, if his blood pressure remains good with 

the beta blocker, we could consider adding hydralazine and nitrates.  At some 

point, we may want to consider a cardiac catheterization but since he seems to 

have active cellulitis, this is a relative contraindication to coronary stenting

if that was needed.





(3) Primary hypertension


Status:  Chronic


Assessment & Plan:  His blood pressures had been trending upwards.  I increased 

the dose of carvedilol on 6/27.  As above, I have stopped the amlodipine since 

this could make his peripheral edema worse.





(4) Peripheral arterial disease


Status:  Chronic


Assessment & Plan:  He had a lower extremity CT angiogram on 6/21.  There does 

not appear to be any disease of the right lower extremity that would require 

revascularization at this time.  I recommend he continue on aspirin and 

clopidogrel but clopidogrel is currently on hold for Port-A-Cath placement 

tentatively scheduled for 6/29.  He is not currently a good candidate for statin

medication due to elevated transaminase levels.





(5) Non-ST elevation myocardial infarction (NSTEMI), initial care episode


Status:  Acute


Assessment & Plan:  Unclear whether he may have had a type I or type II non-ST 

elevation myocardial infarction.  He has not been having chest pain.  I have 

ordered aspirin and clopidogrel but as above, clopidogrel is now on hold for 

Port-A-Cath placement scheduled for 6/29.  He was on therapeutic dosing of 

enoxaparin which was changed over to DVT prophylactic dose.  He is on 

carvedilol.  He is not a candidate for statin medication due to the elevated 

transaminase levels.  He is not an ideal candidate for an invasive cardiac 

evaluation due to his ongoing infection of the right foot.





(6) Mixed hyperlipidemia


Status:  Chronic


Assessment & Plan:  As above, his transaminase levels are elevated.  This is a 

relative contraindication to starting statin medication.  Once the transaminase 

levels are improved, we can consider adding statin medication.





(7) Acute kidney injury superimposed on chronic kidney disease


Status:  Acute


Assessment & Plan:  His renal function had stabilized but then got worse as of 

6/22.  Intravenous diuretic was discontinued.  His creatinine has improved and 

stabilized.  As above, I restarted his previous home oral dose of furosemide on 

6/27.





(8) Acute on chronic respiratory failure with hypoxia and hypercapnia


Status:  Acute


Assessment & Plan:  Most likely multifactorial.





(9) Type 2 diabetes mellitus with complication


Status:  Chronic


Assessment & Plan:  The hospitalist is managing his diabetes.





(10) Morbid obesity


Status:  Chronic


Assessment & Plan:  He needs to work on weight loss.  He has been counseled 

about this in the past.  Unfortunately, he has been ordering fast food to be 

delivered to his room in the skilled nursing facility.














YAA LYN JR, MD         Jun 28, 2022 08:33

## 2022-06-29 VITALS — SYSTOLIC BLOOD PRESSURE: 153 MMHG | DIASTOLIC BLOOD PRESSURE: 72 MMHG

## 2022-06-29 VITALS — DIASTOLIC BLOOD PRESSURE: 63 MMHG | SYSTOLIC BLOOD PRESSURE: 137 MMHG

## 2022-06-29 LAB
ALBUMIN SERPL-MCNC: 2.5 GM/DL (ref 3.2–4.5)
ALP SERPL-CCNC: 93 U/L (ref 40–136)
ALT SERPL-CCNC: 61 U/L (ref 0–55)
BASOPHILS # BLD AUTO: 0 10^3/UL (ref 0–0.1)
BASOPHILS NFR BLD AUTO: 0 % (ref 0–10)
BILIRUB SERPL-MCNC: 0.3 MG/DL (ref 0.1–1)
BUN/CREAT SERPL: 20
CALCIUM SERPL-MCNC: 8.2 MG/DL (ref 8.5–10.1)
CHLORIDE SERPL-SCNC: 105 MMOL/L (ref 98–107)
CO2 SERPL-SCNC: 25 MMOL/L (ref 21–32)
CREAT SERPL-MCNC: 1.17 MG/DL (ref 0.6–1.3)
EOSINOPHIL # BLD AUTO: 0.6 10^3/UL (ref 0–0.3)
EOSINOPHIL NFR BLD AUTO: 8 % (ref 0–10)
GFR SERPLBLD BASED ON 1.73 SQ M-ARVRAT: 75 ML/MIN
GLUCOSE SERPL-MCNC: 166 MG/DL (ref 70–105)
HCT VFR BLD CALC: 25 % (ref 40–54)
HGB BLD-MCNC: 7.5 G/DL (ref 13.3–17.7)
LYMPHOCYTES # BLD AUTO: 0.8 10^3/UL (ref 1–4)
LYMPHOCYTES NFR BLD AUTO: 11 % (ref 12–44)
MAGNESIUM SERPL-MCNC: 1.9 MG/DL (ref 1.6–2.4)
MANUAL DIFFERENTIAL PERFORMED BLD QL: NO
MCH RBC QN AUTO: 25 PG (ref 25–34)
MCHC RBC AUTO-ENTMCNC: 30 G/DL (ref 32–36)
MCV RBC AUTO: 84 FL (ref 80–99)
MONOCYTES # BLD AUTO: 0.7 10^3/UL (ref 0–1)
MONOCYTES NFR BLD AUTO: 10 % (ref 0–12)
NEUTROPHILS # BLD AUTO: 5 10^3/UL (ref 1.8–7.8)
NEUTROPHILS NFR BLD AUTO: 70 % (ref 42–75)
PLATELET # BLD: 339 10^3/UL (ref 130–400)
PMV BLD AUTO: 10 FL (ref 9–12.2)
POTASSIUM SERPL-SCNC: 4.1 MMOL/L (ref 3.6–5)
PROT SERPL-MCNC: 6.1 GM/DL (ref 6.4–8.2)
SODIUM SERPL-SCNC: 137 MMOL/L (ref 135–145)
VANCOMYCIN TROUGH SERPL-MCNC: 16.3 UG/ML (ref 10–20)
WBC # BLD AUTO: 7.2 10^3/UL (ref 4.3–11)

## 2022-06-29 PROCEDURE — 0JH60WZ INSERTION OF TOTALLY IMPLANTABLE VASCULAR ACCESS DEVICE INTO CHEST SUBCUTANEOUS TISSUE AND FASCIA, OPEN APPROACH: ICD-10-PCS | Performed by: SURGERY

## 2022-06-29 PROCEDURE — 5A12012 PERFORMANCE OF CARDIAC OUTPUT, SINGLE, MANUAL: ICD-10-PCS | Performed by: SURGERY

## 2022-06-29 PROCEDURE — 02HV33Z INSERTION OF INFUSION DEVICE INTO SUPERIOR VENA CAVA, PERCUTANEOUS APPROACH: ICD-10-PCS | Performed by: SURGERY

## 2022-06-29 RX ADMIN — INSULIN ASPART SCH UNIT: 100 INJECTION, SOLUTION INTRAVENOUS; SUBCUTANEOUS at 10:44

## 2022-06-29 RX ADMIN — FUROSEMIDE SCH MG: 40 TABLET ORAL at 07:23

## 2022-06-29 RX ADMIN — INSULIN ASPART SCH UNIT: 100 INJECTION, SOLUTION INTRAVENOUS; SUBCUTANEOUS at 06:22

## 2022-06-29 RX ADMIN — DOCUSATE SODIUM AND SENNOSIDES SCH EA: 8.6; 5 TABLET, FILM COATED ORAL at 07:23

## 2022-06-29 RX ADMIN — INSULIN ASPART SCH UNIT: 100 INJECTION, SOLUTION INTRAVENOUS; SUBCUTANEOUS at 17:47

## 2022-06-29 RX ADMIN — IPRATROPIUM BROMIDE AND ALBUTEROL SULFATE SCH ML: .5; 3 SOLUTION RESPIRATORY (INHALATION) at 07:46

## 2022-06-29 RX ADMIN — ASPIRIN SCH MG: 81 TABLET ORAL at 07:22

## 2022-06-29 RX ADMIN — ENOXAPARIN SODIUM SCH MG: 100 INJECTION SUBCUTANEOUS at 07:23

## 2022-06-29 RX ADMIN — LACTULOSE SCH GM: 20 SOLUTION ORAL at 07:22

## 2022-06-29 RX ADMIN — FENTANYL CITRATE PRN MCG: 50 INJECTION, SOLUTION INTRAMUSCULAR; INTRAVENOUS at 08:51

## 2022-06-29 RX ADMIN — SODIUM CHLORIDE SCH MLS/HR: 900 INJECTION, SOLUTION INTRAVENOUS at 07:31

## 2022-06-29 NOTE — PROGRESS NOTE - HOSPITALIST
Subjective


HPI/CC On Admission


Date Seen by Provider:  Jun 29, 2022


Time Seen by Provider:  09:00


CC: Acute on chronic respiratory failure





HPI: This is a 52 yr old male who was previously on hospice but fired them two 

weeks ago. He presented to the ICU with right lower extremity leg wound, 

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 Non compliant with oxygen and CPAP due 

to obesity hypoventilation syndrome. He remains a DNR. IV fluids will continue 

but monitoring for volume overload. Pt has a poor prognosis. Will move to 4th 

floor and continue supportive care.





Focused Exam


Time of Focused Exam:  23:00





Objective


Exam


Vital Signs





Vital Signs








  Date Time  Temp Pulse Resp B/P (MAP) Pulse Ox O2 Delivery O2 Flow Rate FiO2


 


6/29/22 12:24   16  100 Ambu Bag 10 


 


6/29/22 07:56 36.4 58      





Capillary Refill : Less Than 3 Seconds





Results/Procedures


Lab


Laboratory Tests


6/29/22 04:05








Patient resulted labs reviewed.





Assessment/Plan


Assessment and Plan


Assess & Plan/Chief Complaint


Assessment:


Acute on chronic respiratory failure


Sepsis


CHF


Right leg cellulitis MRSA


OHA


DNR


UTI


Poor vascular access





Plan:


Continue current treatment


IV abx


Monitor O2


Port placement





Diagnosis/Problems


Diagnosis/Problems





(1) Acute on chronic respiratory failure with hypoxia and hypercapnia


Status:  Acute


(2) Acute on chronic congestive heart failure


Status:  Acute


(3) Obesity hypoventilation syndrome


Status:  Chronic


(4) Cellulitis of right leg


Status:  Acute











JACK OLIVARES DO                Jun 29, 2022 06:07

## 2022-06-29 NOTE — CARDIOLOGY PROGRESS NOTE
Progress Note-Cardiology


Events since last exam


Date Seen by Provider:  2022


Time Seen by Provider:  08:41


Events since last exam


I am following him due to heart failure and possible NSTEMI.  He was getting a 

dressing change when I saw him.  He was complaining of pain from moving around 

in bed.  He denied chest discomfort, dyspnea, palpitations, or syncope.  His 

chronic right leg edema is unchanged.  He will be going for a Port-A-Cath 

placement later today.





Certain portions of this document may have been dictated utilizing voice 

recognition technology.  Inherent to this technology, typographical and 

grammatical errors may exist.  As much as I am diligent to identify and correct 

these mistakes, some errors may remain in the document.





Vitals


Last set of Vitals Signs





Vital Signs








 22





 07:56


 


Temp 36.4


 


Pulse 58


 


B/P (MAP) 137/63 (87)











Labs


Labs


Laboratory Tests


22 04:05














Exam


Vital Signs





Vital Signs








  Date Time  Temp Pulse Resp B/P (MAP) Pulse Ox O2 Delivery O2 Flow Rate FiO2


 


22 12:24   16  100 Ambu Bag 10 


 


22 07:56 36.4 58      








Physical Exam


General: Alert. No acute distress.  He is morbidly obese.


Eye: No xanthelasma.


HENT: Normocephalic.


Neck: Jugular venous pressure does not appear elevated.


Respiratory: Lungs are clear to auscultation. Respirations are non-labored. 

Breath sounds are equal. Symmetrical chest wall expansion.


Cardiovascular: Normal rate. Regular rhythm.  Distant S1/S2.  No murmur. No g

allop.  1+ pretibial edema of right leg with probable cellulitis to the knee.  

Left above-knee amputation.


Gastrointestinal: Soft. Normal bowel sounds.


Skin: Warm. Dry.


Neurologic: Alert and oriented to person, place, time. Cranial nerves 3-11 

grossly intact.


Psychiatric: Cooperative. Appropriate mood & affect.


Labs





Laboratory Tests








Test


 22


16:33 22


20:39 22


04:05 22


10:31 Range/Units


 


 


Glucometer 203 H 220 H  84    MG/DL


 


White Blood Count


 


 


 7.2 


 


 4.3-11.0


10^3/uL


 


Red Blood Count


 


 


 3.02 L


 


 4.30-5.52


10^6/uL


 


Hemoglobin   7.5 L  13.3-17.7  g/dL


 


Hematocrit   25 L  40-54  %


 


Mean Corpuscular Volume   84   80-99  fL


 


Mean Corpuscular Hemoglobin   25   25-34  pg


 


Mean Corpuscular Hemoglobin


Concent 


 


 30 L


 


 32-36  g/dL





 


Red Cell Distribution Width   18.8 H  10.0-14.5  %


 


Platelet Count


 


 


 339 


 


 130-400


10^3/uL


 


Mean Platelet Volume   10.0   9.0-12.2  fL


 


Immature Granulocyte % (Auto)   0    %


 


Neutrophils (%) (Auto)   70   42-75  %


 


Lymphocytes (%) (Auto)   11 L  12-44  %


 


Monocytes (%) (Auto)   10   0-12  %


 


Eosinophils (%) (Auto)   8   0-10  %


 


Basophils (%) (Auto)   0   0-10  %


 


Neutrophils # (Auto)


 


 


 5.0 


 


 1.8-7.8


10^3/uL


 


Lymphocytes # (Auto)


 


 


 0.8 L


 


 1.0-4.0


10^3/uL


 


Monocytes # (Auto)


 


 


 0.7 


 


 0.0-1.0


10^3/uL


 


Eosinophils # (Auto)


 


 


 0.6 H


 


 0.0-0.3


10^3/uL


 


Basophils # (Auto)


 


 


 0.0 


 


 0.0-0.1


10^3/uL


 


Immature Granulocyte # (Auto)


 


 


 0.0 


 


 0.0-0.1


10^3/uL


 


Sodium Level   137   135-145  MMOL/L


 


Potassium Level   4.1   3.6-5.0  MMOL/L


 


Chloride Level   105     MMOL/L


 


Carbon Dioxide Level   25   21-32  MMOL/L


 


Anion Gap   7   5-14  MMOL/L


 


Blood Urea Nitrogen   23 H  7-18  MG/DL


 


Creatinine


 


 


 1.17 


 


 0.60-1.30


MG/DL


 


Estimat Glomerular Filtration


Rate 


 


 75 


 


  





 


BUN/Creatinine Ratio   20    


 


Glucose Level   166 H    MG/DL


 


Calcium Level   8.2 L  8.5-10.1  MG/DL


 


Corrected Calcium   9.4   8.5-10.1  MG/DL


 


Magnesium Level   1.9   1.6-2.4  MG/DL


 


Total Bilirubin   0.3   0.1-1.0  MG/DL


 


Aspartate Amino Transf


(AST/SGOT) 


 


 16 


 


 5-34  U/L





 


Alanine Aminotransferase


(ALT/SGPT) 


 


 61 H


 


 0-55  U/L





 


Alkaline Phosphatase   93     U/L


 


Total Protein   6.1 L  6.4-8.2  GM/DL


 


Albumin   2.5 L  3.2-4.5  GM/DL


 


Vancomycin Level Trough


 


 


 16.3 


 


 10.0-20.0


UG/ML











Diagnosis/Problems


Diagnosis/Problems





(1) Acute on chronic combined systolic and diastolic congestive heart failure


Status:  Acute


Assessment & Plan:  His echocardiogram from  showed moderate left 

ventricular systolic dysfunction.  This is a new finding in this patient 

although the study was technically difficult.  I started him on low-dose 

carvedilol and have been working to titrate up.  Due to his poor renal function,

he is not a good candidate for ACE inhibitor, ARB or aldosterone antagonist.  He

has received intravenous Lasix.  His creatinine got worse with the diuretic 

which was then discontinued.  His creatinine now seems to have stabilized.  On 

 I restarted furosemide 40 mg orally daily which he was taking at home.  I 

recommend we stop the amlodipine.  This could be making his lower extremity 

edema worse and does not usually help with a low ejection fraction.  I will 

consider adding hydralazine and nitrates once we see how he does on the higher 

dose of carvedilol.





Unfortunately, after I started my note this morning, the patient developed 

cardiac arrest following his tunneled intravenous catheter and .





(2) Cardiomyopathy


Status:  Acute


Assessment & Plan:  This appears to be a new finding in the patient.  As above, 

I started him on carvedilol.  He is not currently a candidate for the other 

guideline directed medical therapy due to poor renal function.  He would also 

not be a good candidate for a LifeVest since this device does not fit well on a 

patient with morbid obesity. As above, if his blood pressure remains good with 

the beta blocker, we could consider adding hydralazine and nitrates. As above, 

the patient  during his surgical procedure earlier today.





(3) Primary hypertension


Status:  Chronic


Assessment & Plan:  His blood pressures had been trending upwards.  I increased 

the dose of carvedilol on .  As above, I stopped the amlodipine since this 

could make his peripheral edema worse.





(4) Peripheral arterial disease


Status:  Chronic


Assessment & Plan:  He had a lower extremity CT angiogram on .  There does 

not appear to be any disease of the right lower extremity that would require 

revascularization at this time.  I had recommended he continue on aspirin and 

clopidogrel but clopidogrel was on hold for Port-A-Cath placement. He has now 

.





(5) Non-ST elevation myocardial infarction (NSTEMI), initial care episode


Status:  Acute


Assessment & Plan:  Unclear whether he may have had a type I or type II non-ST 

elevation myocardial infarction.  He had not been having chest pain.  I had ord

ered aspirin and clopidogrel but as above, clopidogrel was now on hold for 

Port-A-Cath placement. He was not on statin medication due to elevated 

transaminase levels.  As above, he has now .





(6) Mixed hyperlipidemia


Status:  Chronic


Assessment & Plan:  As above, his transaminase levels are elevated.  This is a 

relative contraindication to starting statin medication.  





(7) Acute kidney injury superimposed on chronic kidney disease


Status:  Acute


Assessment & Plan:  His renal function had stabilized but then got worse as of 

.  Intravenous diuretic was discontinued.  His creatinine has improved and 

stabilized.  As above, I restarted his previous home oral dose of furosemide on 

.





(8) Acute on chronic respiratory failure with hypoxia and hypercapnia


Status:  Acute


Assessment & Plan:  Most likely multifactorial.





(9) Type 2 diabetes mellitus with complication


Status:  Chronic


Assessment & Plan:  The hospitalist was managing his diabetes.





(10) Morbid obesity


Status:  Chronic


Assessment & Plan:  I suspect this contributed to his other health issues and 

possibly his cardiac arrest.














YAA LYN JR, MD         2022 08:42

## 2022-06-29 NOTE — PROGRESS NOTE
Standard Progress Note


Progress Notes/Assess & Plan


Time Seen by a Provider:  12:41


Progress/Assessment & Plan


Pt had come out of surgery and was stable, breathing on his own.  He got to PACU

and according to nurse and CRNA his heart started into bradycardia and kept 

going down.  They treated with Atropine and  Epinephrine, but then


his pulse ox went down and finally his heart stopped.  ACLS was started and I 

was called to come up.  Pt was whitish/blue, pupils were blown and they were 

doing compressions.  He is a DNR and I called his primary IM, Dr. Gibson.


She stated she had multiple conversations with pt, he never wanted anything done

if this occurred.  In fact, he was on hospice for a while but not really 

declining and therefore he revoked it.  She advised us to stop ACLS because


he would not want it.  ACLS was stopped and time of death called.











BREANNA BLACK DO               Jun 29, 2022 12:50

## 2022-06-29 NOTE — PROGRESS NOTE-POST OPERATIVE
Post-Operative Progess Note


Surgeon (s)/Assistant (s)


Surgeon


BREANNA BLACK DO


Assistant:  none





Pre-Operative Diagnosis


Venous insufficiency





Post-Operative Diagnosis





same





Procedure & Operative Findings


Date of Procedure


6/29/22


Procedure Performed/Findings


PROCEDURE:  Scottie-Cath placement








The patient was taken to the operating suite, was prepped and


draped in the sterile fashion. A surgical pause was performed.


Local anesthetic was infiltrated at the clavicle and along the tract


to the right anterior chest, where more local was placed so the 


pocket could be created.  Using an 18 gauge finder needle with negative


inspiration the right subclavian vein was accessed on the first


attempt and dark nonpulsatile blood was withdrawn. The wire was 


inserted and fluoroscopy assured proper placement. The needle was removed.  


The wire was then secured.  A #11 blade scalpel was used to


make an incision over the right chest and along guidewire. Cautery was used to


dissect down to the pectoral fascia. A pocket was created with


blunt dissection. The dilator sheath was then advanced over the


wire under fluoroscopy; unfortunately it curled up as it was being 


advanced, tried to save it but unable.  Used the 18 gauge needle to 


get access again, place wire and then advance the dilator over the 


wire using the Seldinger technique.  This time it went in easily, 


confirmed with Flouro and the dilator and wire were removed. The


Groshong catheter was inserted through the sheath and the sheath


was then removed. The Groshong wire was removed. The catheter was


then tunneled to the right chest pocket. Fluoroscopy was used to


cut to length and this was then attached to the port which was


then placed within the pocket. The port was then accessed without


difficulty. It was then flushed with saline and then heparin. The


subcutaneous tissues were then reapproximated using 3-0 Vicryl. Finally


the skin was closed with 4-0 undyed monocryl, 3 interrupted sutures.


The areas were then washed and dried. Skin Affix was placed over incision.


The insertion point of the neck Skin Affix was placed over the 


incision. The patient tolerated the procedure well without complication 


and was taken to recovery room in stable condition.


Anesthesia Type


LMA





Estimated Blood Loss


Estimated blood loss (mL):  10ml





Specimens/Packing


Specimens Removed


BREANNA Mercado DO               Jun 29, 2022 12:24

## 2022-06-29 NOTE — DIAGNOSTIC IMAGING REPORT
INDICATION: Port-A-Cath placement.



COMPARISON: 06/22/2022.



TOTAL FLUORO TIME: 19 seconds.



TOTAL NUMBER OF FLUOROSCOPIC IMAGES SAVED: 2



FINDINGS: Multiple intraoperative image-intensifier views of the

right chest were obtained during Port-A-Cath placement. Images

provided show right subclavian venous approach. Central tip of

the catheter is not included in the field-of-view. Evaluation for

pneumothorax is suboptimal given fluoroscopic modality. Please

note, interpreting radiologist was not present during the

procedure.



IMPRESSION:

1. Fluoroscopic guidance provided during right-sided Port-A-Cath

placement as above.



Dictated by: 



  Dictated on workstation # [id number]

## 2022-06-29 NOTE — DISCHARGE SUMMARY
[facility]                                      Department of Human Services                                   Certificate of Child Health Examination       Student's Name  Barron Landry Birth Date  4/27/2010  Sex  Male Race/Ethnicity   School/Grade Level/ID#  9th Grade   Address  [address] [place], [place] [address]t [facility] [address] Parent/Guardian      Telephone# - Home   Telephone# - Work                              IMMUNIZATIONS:  To be completed by health care provider.  The mo/da/yr for every dose administered is required.  If a specific vaccine is medically contraindicated, a separate written statement must be attached by the health care provider responsible for completing the health examination explaining the medical reason for the contradiction.   VACCINE/DOSE DATE DATE DATE DATE DATE   Diphtheria, Tetanus and Pertussis (DTP or DTap) 6/28/2010 9/8/2010 11/29/2010 11/7/2011 8/14/2015   Tdap 7/30/2021       Td        Pediatric DT        Inactivate Polio (IPV) 6/28/2010 9/8/2010 11/7/2011 8/14/2015    Oral Polio (OPV)        Haemophilus Influenza Type B (Hib) 6/28/2010 9/8/2010 11/29/2010 7/28/2011    Hepatitis B (HB) 4/27/2010 6/3/2010 2/7/2011     Varicella (Chickenpox) 5/2/2011 8/14/2015      Combined Measles, Mumps and Rubella (MMR) 5/2/2011 8/14/2015      Measles (Rubeola)        Rubella (3-day measles)        Mumps        Pneumococcal 6/28/2010 9/8/2010 11/29/2010 5/2/2011    Meningococcal Conjugate 7/30/2021          RECOMMENDED, BUT NOT REQUIRED  Vaccine/Dose        VACCINE/DOSE DATE DATE   Hepatitis A 5/2/2011 11/7/2011   HPV 7/30/2020 7/30/2021   Influenza     Men B     Covid        Other:  Specify Immunization/Adminstered Dates:   Health care provider (MD, DO, APN, PA , school health professional) verifying above immunization history must sign below.  Signature                                                                                        Discharge Summary


Hospital Course


Was the Problem List Reviewed?:  Yes


Problems/Dx:  


(1) PEA (Pulseless electrical activity)


(2) DNR (do not resuscitate)


(3) Acute on chronic combined systolic and diastolic congestive heart failure


Status:  Acute


(4) Cardiomyopathy


Status:  Acute


(5) Primary hypertension


Status:  Chronic


(6) Peripheral arterial disease


Status:  Chronic


(7) Non-ST elevation myocardial infarction (NSTEMI), initial care episode


Status:  Acute


(8) Mixed hyperlipidemia


Status:  Chronic


(9) Acute kidney injury superimposed on chronic kidney disease


Status:  Acute


(10) Acute on chronic respiratory failure with hypoxia and hypercapnia


Status:  Acute


(11) Type 2 diabetes mellitus with complication


Status:  Chronic


(12) Morbid obesity


Status:  Chronic


Hospital Course


Date of Admission: 2022 at 23:25 


Admission Diagnosis :  





Family Physician/Provider: Noe Roque MD  





Date of Discharge: 22 


Discharge Diagnosis: PEA, DNR, , CHF, obesity, prev Hospice patient, 

right leg cellulitis








Hospital Course:


Pt had an uneventful hospital course for 10 days. He was admitted for acute on 

chronic heart failure, respiratory failure, non compliant with CPAP and oxygen. 

Pt was found to have right leg cellulitis. No evidence of osteomyelitis. He was 

placed on Vancomycin and Rocephin for cellulitis of MRSA and UTI. Rocpehin was 

completed. Vancomycin will be maintained for another two weeks. Port was placed 

due to poor vascular access and continued recurrent issues of hospital stays. 

That was placed in an uncomplicated manner but in PACU patient experienced 

bradycardia and ultimately lost pulse and PEA on monitor and resuscitation 

started but Dr Dunham called me after recalling he was a DNR and I confirmed 

this was the case and I had talked to him in-depth each and every time he was 

hospitalized and he agreed each time he wanted to remain DNR and considering he 

just revoked Hospice 2 weeks ago he was maintained as he wished as DNR so I told

Dr Dunham to stop efforts and he was pronounced dead














Labs and Pending Lab Test:


Laboratory Tests


22 10:54: Glucometer 127H


22 13:20: Vancomycin Level Trough 22.1H


22 16:33: Glucometer 203H


22 20:39: Glucometer 220H


22 04:05: 


White Blood Count 7.2, Red Blood Count 3.02L, Hemoglobin 7.5L, Hematocrit 25L, 

Mean Corpuscular Volume 84, Mean Corpuscular Hemoglobin 25, Mean Corpuscular 

Hemoglobin Concent 30L, Red Cell Distribution Width 18.8H, Platelet Count 339, 

Mean Platelet Volume 10.0, Immature Granulocyte % (Auto) 0, Neutrophils (%) 

(Auto) 70, Lymphocytes (%) (Auto) 11L, Monocytes (%) (Auto) 10, Eosinophils (%) 

(Auto) 8, Basophils (%) (Auto) 0, Neutrophils # (Auto) 5.0, Lymphocytes # (Auto)

0.8L, Monocytes # (Auto) 0.7, Eosinophils # (Auto) 0.6H, Basophils # (Auto) 0.0,

Immature Granulocyte # (Auto) 0.0, Sodium Level 137, Potassium Level 4.1, 

Chloride Level 105, Carbon Dioxide Level 25, Anion Gap 7, Blood Urea Nitrogen 

23H, Creatinine 1.17, Estimat Glomerular Filtration Rate 75, BUN/Creatinine Rat

io 20, Glucose Level 166H, Calcium Level 8.2L, Corrected Calcium 9.4, Magnesium 

Level 1.9, Total Bilirubin 0.3, Aspartate Amino Transf (AST/SGOT) 16, Alanine 

Aminotransferase (ALT/SGPT) 61H, Alkaline Phosphatase 93, Total Protein 6.1L, 

Albumin 2.5L, Vancomycin Level Trough 16.3


22 10:31: Glucometer 84





Microbiology


22 Gram Stain - Final, Complete


          


22 Wound Culture - Final, Complete


          Mixed Bacterial Ya


          Staphylococcus aureus


          Gram Negative Sebastián


22 MRSA Screen - Final, Complete


          


22 Urine Culture - Preliminary, Resulted


          Morganella morganii


          Escherichia coli


          Enterococcus faecalis


          Aerococcus urinae


22 Blood Culture - Final, Complete


          No growth





Home Meds


Active


Aspirin EC (Aspirin) 81 Mg Tablet.dr 81 Mg PO DAILY


Carvedilol 12.5 Mg Tablet 25 Mg PO BID


Clopidogrel (Clopidogrel Bisulfate) 75 Mg Tablet 75 Mg PO DAILY


Enoxaparin Sodium 60 Mg/0.6 Ml Syringe 60 Mg SC DAILY


Vanco 1 Gram/250 ml-0.9% NaCl (Vancomycin/0.9 % Sod Chloride) 1 Gram/250 Ml 

Plast..bag 1 Gm IV DAILY


Hydrocodone-Acetamin 5-325 mg (Hydrocodone/Acetaminophen) 5 Mg-325 Mg Tablet 1 

Ea PO Q4H PRN


Reported


Senna S Tablet (Sennosides/Docusate Sodium) 8.6 Mg-50 Mg Tablet 2 Each PO BID


Santyl (Collagenase) 250 Unit/Gram Oint..gm. 1 Applic TP DAILY


     APPLY TO:


     RIGHT TOR AMPUTATION SITE


     RIGHT ANTERIOR LEG


     RIGHT REAR CALF


     LEFT ABOVE THE KNEE AMPUTATION


Potassium Chloride 10 Meq Tab.er.prt 10 Meq PO Q48H


     GIVE WITH 4OZ TO 8OZ OF WATER


Ondansetron Odt (Ondansetron) 4 Mg Tab.rapdis 4 Mg PO Q6H PRN


Morphine Conc. 20mg/ml (Morphine Sulfate) 100 Mg/5 Ml (20 Mg/Ml) Solution 10 Mg 

PO EVERY 2 HOURS PRN


Levemir Flextouch (Insulin Detemir) 100 Unit/Ml (3 Ml) Insuln.pen 25 Unit SQ BID


Lactulose 10 Gram/15 Ml Solution 10 Gm PO BID


Hyoscyamine Sulfate 0.125 Mg Tab.rapdis 0.125 Mg SL Q6H PRN


Furosemide 40 Mg Tablet 40 Mg PO DAILY


Benadryl Allergy (Diphenhydramine HCl) 25 Mg Tablet 25 Mg PO Q6H PRN


Baclofen 10 Mg Tablet 10 Mg PO Q8H PRN


Ativan (Lorazepam) 0.5 Mg Tablet 0.5 Mg PO Q6H PRN


Assessment/Pt Instructions





Discharge Planning:  <30 minutes discharge planning





Discharge Physical Examination


Vital Signs





Vital Signs








  Date Time  Temp Pulse Resp B/P (MAP) Pulse Ox O2 Delivery O2 Flow Rate FiO2


 


22 08:24      High Flow N/C 4.00 


 


22 07:56 36.4 58 20 137/63 (87) 99   








Allergies:  


Coded Allergies:  


     meperidine HCl (Unverified  Allergy, Unknown, 11)


     shellfish derived (Unverified  Allergy, Unknown, 13)


   FROM UNCODED ALLERGIES


     Penicillins (Unverified  Adverse Reaction, Intermediate, NAUSEA, 11)





Discharge Summary


Date of Admission


2022 at 23:25


Date of Discharge





Discharge Date:  2022


Admission Diagnosis


Assessment:


Acute on chronic respiratory failure


CHF


Right leg cellulitis


OHA


DNR





Plan:


Transfer to 4th floor


IV abx


Monitor O2


Discharge Diagnosis


Assessment:


Acute on chronic respiratory failure


Sepsis


CHF


Right leg cellulitis MRSA


OHA


DNR


UTI


Poor vascular access





Plan:


Continue current treatment


IV abx


Monitor O2


Port placement





(1) Acute on chronic combined systolic and diastolic congestive heart failure


Status:  Acute


Assessment & Plan:  His echocardiogram from  shows moderate left ventricular

systolic dysfunction.  This is a new finding in this patient although the study 

was technically difficult.  I started him on low-dose carvedilol and have been 

working to titrate up.  Due to his poor renal function, he is not a good 

candidate for ACE inhibitor, ARB or aldosterone antagonist.  He has received 

intravenous Lasix.  His creatinine got worse with the diuretic which was then 

discontinued.  His creatinine now seems to have stabilized.  On  I restarted

furosemide 40 mg orally daily which he was taking at home.  I recommend we stop 

the amlodipine.  This could be making his lower extremity edema worse and does 

not usually help with a low ejection fraction.  I will consider adding 

hydralazine and nitrates once we see how he does on the higher dose of 

carvedilol.





(2) Cardiomyopathy


Status:  Acute


Assessment & Plan:  This appears to be a new finding in the patient.  As above, 

I started him on carvedilol.  He is not currently a candidate for the other 

guideline directed medical therapy due to poor renal function.  He would also 

not be a good candidate for a LifeVest since this device does not fit well on a 

patient with morbid obesity. As above, if his blood pressure remains good with 

the beta blocker, we could consider adding hydralazine and nitrates.  At some 

point, we may want to consider a cardiac catheterization but since he seems to 

have active cellulitis, this is a relative contraindication to coronary stenting

if that was needed.





(3) Primary hypertension


Status:  Chronic


Assessment & Plan:  His blood pressures had been trending upwards.  I increased 

the dose of carvedilol on .  As above, I have stopped the amlodipine since 

this could make his peripheral edema worse.





(4) Peripheral arterial disease


Status:  Chronic


Assessment & Plan:  He had a lower extremity CT angiogram on .  There does 

not appear to be any disease of the right lower extremity that would require 

revascularization at this time.  I recommend he continue on aspirin and 

clopidogrel but clopidogrel is currently on hold for Port-A-Cath placement 

tentatively scheduled for .  He is not currently a good candidate for statin

medication due to elevated transaminase levels.





(5) Non-ST elevation myocardial infarction (NSTEMI), initial care episode


Status:  Acute


Assessment & Plan:  Unclear whether he may have had a type I or type II non-ST 

elevation myocardial infarction.  He has not been having chest pain.  I have 

ordered aspirin and clopidogrel but as above, clopidogrel is now on hold for 

Port-A-Cath placement scheduled for .  He was on therapeutic dosing of 

enoxaparin which was changed over to DVT prophylactic dose.  He is on 

carvedilol.  He is not a candidate for statin medication due to the elevated 

transaminase levels.  He is not an ideal candidate for an invasive cardiac 

evaluation due to his ongoing infection of the right foot.





(6) Mixed hyperlipidemia


Status:  Chronic


Assessment & Plan:  As above, his transaminase levels are elevated.  This is a 

relative contraindication to starting statin medication.  Once the transaminase 

levels are improved, we can consider adding statin medication.





(7) Acute kidney injury superimposed on chronic kidney disease


Status:  Acute


Assessment & Plan:  His renal function had stabilized but then got worse as of 

.  Intravenous diuretic was discontinued.  His creatinine has improved and 

stabilized.  As above, I restarted his previous home oral dose of furosemide on 

.





(8) Acute on chronic respiratory failure with hypoxia and hypercapnia


Status:  Acute


Assessment & Plan:  Most likely multifactorial.





(9) Type 2 diabetes mellitus with complication


Status:  Chronic


Assessment & Plan:  The hospitalist is managing his diabetes.





(10) Morbid obesity


Status:  Chronic


Assessment & Plan:  He needs to work on weight loss.  He has been counseled 

about this in the past.  Unfortunately, he has been ordering fast food to be 

delivered to his room in the skilled nursing facility.














JACK OLIVARES DO                2022 10:49                                                    Title                           Date  7/2/2024   Signature                                                                                                                                              Title                           Date    (If adding dates to the above immunization history section, put your initials by date(s) and sign here.)   ALTERNATIVE PROOF OF IMMUNITY   1.Clinical diagnosis (measles, mumps, hepatits B) is allowed when verified by physician & supported with lab confirmation. Attach copy of lab result.       *MEASLES (Rubeola)  MO/DA/YR        * MUMPS MO/DA/YR       HEPATITIS B   MO/DA/YR        VARICELLA MO/DA/YR           2.  History of varicella (chickenpox) disease is acceptable if verified by health care provider, school health professional, or health official.       Person signing below is verifying  parent/guardian’s description of varicella disease is indicative of past infection and is accepting such hx as documentation of disease.       Date of Disease                                  Signature                                                                         Title                           Date             3.  Lab Evidence of Immunity (check one)    __Measles*       __Mumps *       __Rubella        __Varicella      __Hepatitis B       *Measles diagnosed on/after 7/1/2002 AND mumps diagnosed on/after 7/1/2013 must be confirmed by laboratory evidence   Completion of Alternatives 1 or 3 MUST be accompanied by Labs & Physician Signature:  Physician Statements of Immunity MUST be submitted to [facility] for review.   Certificates of Sabianist Exemption to Immunizations or Physician Medical Statements of Medical Contraindication are Reviewed and Maintained by the School Authority.           Student's Name  Barron Landry Birth Date  4/27/2010  Sex  Male School   Grade Level/ID#  9th Grade   HEALTH HISTORY          TO BE COMPLETED AND  SIGNED BY PARENT/GUARDIAN AND VERIFIED BY HEALTH CARE PROVIDER    ALLERGIES  (Food, drug, insect, other)  Patient has no known allergies. MEDICATION  (List all prescribed or taken on a regular basis.)    Current Outpatient Medications:     methylphenidate ER 18 MG Oral Tab CR, Take 1 tablet (18 mg total) by mouth every morning., Disp: , Rfl:     Albuterol Sulfate 108 (90 Base) MCG/ACT Inhalation Aerosol Powder, Breath Activated, Inhale into the lungs., Disp: , Rfl:    Diagnosis of asthma?  Child wakes during the night coughing   Yes   No    Yes   No    Loss of function of one of paired organs? (eye/ear/kidney/testicle)   Yes   No      Birth Defects?  Developmental delay?   Yes   No    Yes   No  Hospitalizations?  When?  What for?   Yes   No    Blood disorders?  Hemophilia, Sickle Cell, Other?  Explain.   Yes   No  Surgery?  (List all.)  When?  What for?   Yes   No    Diabetes?   Yes   No  Serious injury or illness?   Yes   No    Head Injury/Concussion/Passed out?   Yes   No  TB skin text positive (past/present)?   Yes   No *If yes, refer to local    Seizures?  What are they like?   Yes   No  TB disease (past or present)?   Yes   No *health department   Heart problem/Shortness of breath?   Yes   No  Tobacco use (type, frequency)?   Yes   No    Heart murmur/High blood pressure?   Yes   No  Alcohol/Drug use?   Yes   No    Dizziness or chest pain with exercise?   Yes   No  Fam hx sudden death < age 50 (Cause?)    Yes   No    Eye/Vision problems?  Yes  No   Glasses  Yes   No  Contacts  Yes    No   Last eye exam___  Other concerns? (crossed eye, drooping lids, squinting, difficulty reading) Dental:  ____Braces    ____Bridge    ____Plate    ____Other  Other concerns?     Ear/Hearing problems?   Yes   No  Information may be shared with appropriate personnel for health /educational purposes.   Bone/Joint problem/injury/scoliosis?   Yes   No  Parent/Guardian Signature                                          Date     PHYSICAL  EXAMINATION REQUIREMENTS    Entire section below to be completed by MD//APN/PA       PHYSICAL EXAMINATION REQUIREMENTS (head circumference if <2-3 years old):   /66   Pulse 80   Temp 98 °F (36.7 °C) (Temporal)   Resp 16   Ht 5' 3.75\" (1.619 m)   Wt 148 lb 4 oz (67.2 kg)   SpO2 99%   BMI 25.65 kg/m²     DIABETES SCREENING  BMI>85% age/sex  No And any two of the following:  Family History No    Ethnic Minority  No          Signs of Insulin Resistance (hypertension, dyslipidemia, polycystic ovarian syndrome, acanthosis nigricans)    No           At Risk  No   Lead Risk Questionnaire  Req'd for children 6 months thru 6 yrs enrolled in licensed or public school operated day care, ,  nursery school and/or  (blood test req’d if resides in [facility] or high risk zip)   Questionnaire Administered:Yes   Blood Test Indicated:No   Blood Test Date                 Result:                 TB Skin OR Blood Test   Rec.only for children in high-risk groups incl. children immunosuppressed due to HIV infection or other conditions, frequent travel to or born in high prevalence countries or those exposed to adults in high-risk categories.  See CDCguidelines.  http://www.cdc.gov/tb/publications/factsheets/testing/TB_testing.htm.      No Test Needed        Skin Test:     Date Read                  /      /              Result:                     mm    ______________                         Blood Test:   Date Reported          /      /              Result:                  Value ______________               LAB TESTS (Recommended) Date Results  Date Results   Hemoglobin or Hematocrit   Sickle Cell  (when indicated)     Urinalysis   Developmental Screening Tool     SYSTEM REVIEW Normal Comments/Follow-up/Needs  Normal Comments/Follow-up/Needs   Skin Yes  Endocrine Yes    Ears Yes                      Screen result: Gastrointestinal Yes    Eyes Yes     Screen result:   Genito-Urinary Yes  LMP   Nose Yes   Neurological Yes    Throat Yes  Musculoskeletal Yes    Mouth/Dental Yes  Spinal examination Yes    Cardiovascular/HTN Yes  Nutritional status Yes    Respiratory Yes                   Diagnosis of Asthma: No Mental Health Yes        Currently Prescribed Asthma Medication:            Quick-relief  medication (e.g. Short Acting Beta Antagonist): No          Controller medication (e.g. inhaled corticosteroid):   No Other   NEEDS/MODIFICATIONS required in the school setting  None DIETARY Needs/Restrictions     None   SPECIAL INSTRUCTIONS/DEVICES e.g. safety glasses, glass eye, chest protector for arrhythmia, pacemaker, prosthetic device, dental bridge, false teeth, athleticsupport/cup     None   MENTAL HEALTH/OTHER   Is there anything else the school should know about this student?  No  If you would like to discuss this student's health with school or school health professional, check title:  __Nurse  __Teacher  __Counselor  __Principal   EMERGENCY ACTION  needed while at school due to child's health condition (e.g., seizures, asthma, insect sting, food, peanut allergy, bleeding problem, diabetes, heart problem)?  No  If yes, please describe.     On the basis of the examination on this day, I approve this child's participation in        (If No or Modified, please attach explanation.)  PHYSICAL EDUCATION    Yes      INTERSCHOLASTIC SPORTS   Yes   Physician/Advanced Practice Nurse/Physician Assistant performing examination  Print Name  Milagros Eli MD                                            Signature                                                                                         Date  7/2/2024     Address/Phone  [facility], [address] [id number] [phone]   Rev 11/15                                                                    Printed by the Authority of the [facility]

## 2022-06-29 NOTE — DISCHARGE INST-SKILLED NURSING
Discharge Inst-Skilled NF


Reconcile Patient Problems


Problems Reviewed?:  Yes





Chief Complaint


CC: Acute on chronic respiratory failure





HPI: This is a 52 yr old male who was previously on hospice but fired them two 

weeks ago. He presented to the ICU with right lower extremity leg wound, 

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 Non compliant with oxygen and CPAP due 

to obesity hypoventilation syndrome. He remains a DNR. IV fluids will continue 

but monitoring for volume overload. Pt has a poor prognosis. Will move to 4th 

floor and continue supportive care.





Patient Instructions


Patient Problems:  


Cellulitis


Goal:  


independence





Consult/Follow Up/Orders


Follow Up Appt.:  


pcp 1 week


Skilled NF Admit to:  [facility]


[facility] ([facility])


I certify that SNF services are required to be given on an inpatient basis 

because of the above named patient's need for skilled nursing care on a 

continuing basis for the conditions(s) for which he/she was receiving inpatient 

hospital services prior to his/her transfer to the [facility].


Skilled Nursing Facility Order:  Nursing Services, Physical Therapy-Evaluate & 

Treat, Wound Care-Eval/Treat


Oxygen Delivery Method:  Room Air


Discharge Diet:  ADA Diet


Daily Activity as Tolerated:  Yes


Resuscitation Status:  Do Not Resuscitate





New & Resume Previous Orders


New Medications:  


Aspirin (Aspirin EC) 81 Mg Tablet.dr


81 MG PO DAILY, #30 TAB





Carvedilol (Carvedilol) 12.5 Mg Tablet


25 MG PO BID, #60 TAB





Clopidogrel Bisulfate (Clopidogrel) 75 Mg Tablet


75 MG PO DAILY, #30 TAB





Enoxaparin Sodium (Enoxaparin Sodium) 60 Mg/0.6 Ml Syringe


60 MG SC DAILY, #14 SYRINGE





Vancomycin/0.9 % Sod Chloride (Vanco 1 Gram/250 ml-0.9% NaCl) 1 Gram/250 Ml 

Plast..bag


1 GM IV DAILY, #14 EA





 


Continued Medications:  


Baclofen (Baclofen) 10 Mg Tablet


10 MG PO Q8H PRN for MUSCLE SPASMS, TAB





Collagenase (Santyl) 250 Unit/Gram Oint..gm.


1 APPLIC TP DAILY, EA


APPLY TO:


 RIGHT TOR AMPUTATION SITE


 RIGHT ANTERIOR LEG


 RIGHT REAR CALF


 LEFT ABOVE THE KNEE AMPUTATION


Diphenhydramine HCl (Benadryl Allergy) 25 Mg Tablet


25 MG PO Q6H PRN for ITCHING, TAB





Furosemide (Furosemide) 40 Mg Tablet


40 MG PO DAILY, TAB





Hydrocodone/Acetaminophen (Hydrocodone-Acetamin 5-325 mg) 5 Mg-325 Mg Tablet


1 EA PO Q4H PRN for PAIN-MODERATE (5-7), #20 TAB (This prescription has been 

renewed)





Hyoscyamine Sulfate (Hyoscyamine Sulfate) 0.125 Mg Tab.rapdis


0.125 MG SL Q6H PRN for EXCESSIVE SECRETIONS, TAB





Insulin Detemir (Levemir Flextouch) 100 Unit/Ml (3 Ml) Insuln.pen


25 UNIT SQ BID, EA





Lactulose (Lactulose) 10 Gram/15 Ml Solution


10 GM PO BID, GM





Lorazepam (Ativan) 0.5 Mg Tablet


0.5 MG PO Q6H PRN for ANXIETY/AGITATION, TAB





Ondansetron (Ondansetron Odt) 4 Mg Tab.rapdis


4 MG PO Q6H PRN for NAUSEA/VOMITING-1ST LINE, TAB





Sennosides/Docusate Sodium (Senna S Tablet) 8.6 Mg-50 Mg Tablet


2 EACH PO BID, TAB





 


Discontinued Medications:  


Morphine Sulfate (Morphine Conc. 20mg/ml) 100 Mg/5 Ml (20 Mg/Ml) Solution


10 MG PO EVERY 2 HOURS PRN for SHORTNESS OF BREATH/PAIN





Potassium Chloride (Potassium Chloride) 10 Meq Tab.er.prt


10 MEQ PO Q48H, TAB


GIVE WITH 4OZ TO 8OZ OF WATER





Sendy Gibson 


Jun 29, 2022 


10:48











SENDY GIBSON DO                Jun 29, 2022 10:49

## 2022-06-30 NOTE — PHYSICIAN QUERY CLARIFICATION
PQ-Uncertain Diagnosis


Admission/Discharge


Admission Date: Jun 20, 2022 at 23:25 


Discharge Date:  Jun 29, 2022 at 23:39


Dr. Gibson,





The medical record reflects the following clinical scenario:





History/Risk Factors:


Acute on chronic hypoxic/hypercapnia respiratory failure, acute on chronic 

systolic/diastolic CHF, UTI d/t indwelling catheter, MI 2, pneumonia





Clinical Findings:


WBC 9.3, Lactic acid 7.81, T 35.7, P 89, R 23





Treatment:


IV Vancomycin, IV Cefepime





Question:





Is sepsis a clinically valid diagnosis?


Sepsis was documented in the ER record with no further documentation in the 

medical record. 





Please document a response in Progress Note or Discharge Summary.





   1.  Yes, clinically valid, condition resolved.





   2.  No, condition ruled out.





   3.  Other, with explanation of clinical findings.





   4.  Undetermined, no explanation for clinical findings.





PHYSICIAN RESPONSE


Diagnosis clinically valid:  Yes, Conditon resolved


In responding to this query, please exercise your independent professional 

judgment.  The purpose of this communication is to more accurately reflect the 

complexity of your patients condition. The fact that a question is asked does 

not imply that any particular answer is desired or expected.  





Thank you for your timely response to this clarification.      


   


Requestors name: Letitia   





Phone # [phone]








THIS PHYSICIAN QUERY FORM IS A PERMANENT PART OF THE MEDICAL RECORD











LETITIA LOPES                 Jun 30, 2022 09:49


JACK GIBSON DO                Jun 30, 2022 20:06